# Patient Record
Sex: FEMALE | Race: WHITE | NOT HISPANIC OR LATINO | Employment: FULL TIME | URBAN - METROPOLITAN AREA
[De-identification: names, ages, dates, MRNs, and addresses within clinical notes are randomized per-mention and may not be internally consistent; named-entity substitution may affect disease eponyms.]

---

## 2017-06-22 LAB
ALBUMIN SERPL-MCNC: 4.4 G/DL (ref 3.1–4.7)
ALP SERPL-CCNC: 58 IU/L (ref 40–104)
ALT (SGPT): 26 IU/L (ref 3–33)
AST SERPL-CCNC: 24 IU/L (ref 10–40)
BILIRUB SERPL-MCNC: 0.8 MG/DL (ref 0.3–1)
BUN SERPL-MCNC: 14 MG/DL (ref 8–20)
CALCIUM SERPL-MCNC: 9.3 MG/DL (ref 7.7–10.4)
CHLORIDE: 104 MMOL/L (ref 98–110)
CO2 SERPL-SCNC: 26.7 MMOL/L (ref 22.8–31.6)
CREATININE: 0.66 MG/DL (ref 0.6–1.4)
GLUCOSE: 87 MG/DL (ref 70–99)
POTASSIUM SERPL-SCNC: 3.9 MMOL/L (ref 3.5–5)
PROT SERPL-MCNC: 7.5 G/DL (ref 6–8.2)
SODIUM: 139 MMOL/L (ref 134–144)

## 2017-09-25 RX ORDER — FLUTICASONE PROPIONATE 50 MCG
1 SPRAY, SUSPENSION (ML) NASAL DAILY
COMMUNITY
End: 2019-01-17

## 2017-09-25 RX ORDER — LORATADINE 10 MG/1
10 TABLET ORAL DAILY
COMMUNITY
End: 2017-10-05 | Stop reason: SDUPTHER

## 2017-09-25 RX ORDER — MONTELUKAST SODIUM 10 MG/1
10 TABLET ORAL NIGHTLY
COMMUNITY
End: 2017-10-05 | Stop reason: SDUPTHER

## 2017-09-25 RX ORDER — METOPROLOL SUCCINATE 25 MG/1
25 TABLET, EXTENDED RELEASE ORAL DAILY
COMMUNITY
End: 2017-10-05 | Stop reason: SDUPTHER

## 2017-09-25 RX ORDER — SIMVASTATIN 20 MG/1
20 TABLET, FILM COATED ORAL NIGHTLY
COMMUNITY
End: 2017-10-05 | Stop reason: SDUPTHER

## 2017-10-05 ENCOUNTER — OFFICE VISIT (OUTPATIENT)
Dept: FAMILY MEDICINE | Facility: CLINIC | Age: 42
End: 2017-10-05
Payer: COMMERCIAL

## 2017-10-05 VITALS
HEART RATE: 70 BPM | OXYGEN SATURATION: 98 % | SYSTOLIC BLOOD PRESSURE: 100 MMHG | HEIGHT: 67 IN | DIASTOLIC BLOOD PRESSURE: 70 MMHG | BODY MASS INDEX: 25.11 KG/M2 | WEIGHT: 160 LBS

## 2017-10-05 DIAGNOSIS — J30.1 CHRONIC SEASONAL ALLERGIC RHINITIS DUE TO POLLEN: ICD-10-CM

## 2017-10-05 DIAGNOSIS — E78.01 FAMILIAL HYPERCHOLESTEROLEMIA: ICD-10-CM

## 2017-10-05 DIAGNOSIS — I49.1 PAC (PREMATURE ATRIAL CONTRACTION): ICD-10-CM

## 2017-10-05 DIAGNOSIS — F32.1 MODERATE MAJOR DEPRESSION: Primary | ICD-10-CM

## 2017-10-05 PROBLEM — E78.5 HYPERLIPIDEMIA: Status: ACTIVE | Noted: 2017-10-05

## 2017-10-05 PROCEDURE — 99214 OFFICE O/P EST MOD 30 MIN: CPT | Mod: ,,, | Performed by: FAMILY MEDICINE

## 2017-10-05 RX ORDER — MONTELUKAST SODIUM 10 MG/1
10 TABLET ORAL NIGHTLY
Qty: 90 TABLET | Refills: 1 | Status: SHIPPED | OUTPATIENT
Start: 2017-10-05 | End: 2018-04-04 | Stop reason: SDUPTHER

## 2017-10-05 RX ORDER — SIMVASTATIN 20 MG/1
20 TABLET, FILM COATED ORAL NIGHTLY
Qty: 90 TABLET | Refills: 1 | Status: SHIPPED | OUTPATIENT
Start: 2017-10-05 | End: 2018-04-04 | Stop reason: SDUPTHER

## 2017-10-05 RX ORDER — METOPROLOL SUCCINATE 25 MG/1
25 TABLET, EXTENDED RELEASE ORAL DAILY
Qty: 90 TABLET | Refills: 1 | Status: SHIPPED | OUTPATIENT
Start: 2017-10-05 | End: 2018-04-04 | Stop reason: SDUPTHER

## 2017-10-05 RX ORDER — BUPROPION HYDROCHLORIDE 150 MG/1
150 TABLET ORAL DAILY
Qty: 30 TABLET | Refills: 11 | Status: SHIPPED | OUTPATIENT
Start: 2017-10-05 | End: 2017-11-09 | Stop reason: SDUPTHER

## 2017-10-05 RX ORDER — LORATADINE 10 MG/1
10 TABLET ORAL DAILY
Qty: 90 TABLET | Refills: 1 | COMMUNITY
Start: 2017-10-05 | End: 2019-06-04 | Stop reason: SDUPTHER

## 2017-10-05 NOTE — PATIENT INSTRUCTIONS
Depression  Depression is one of the most common mental health problems today. It is not just a state of unhappiness or sadness. It is a true disease. The cause seems to be related to a decrease in chemicals that transmit signals in the brain. Having a family history of depression, alcoholism, or suicide increases the risk. Chronic illness, chronic pain, migraine headaches and high emotional stress also increase the risk.  Depression is something we tend to recognize in others, but may have a hard time seeing in ourselves. It can show in many physical and emotional ways:  · Loss of appetite  · Over-eating  · Not being able to sleep  · Sleeping too much  · Tiredness not related to physical exertion  · Restlessness or irritability  · Slowness of movement or speech  · Feeling depressed or withdrawn  · Loss of interest in things you once enjoyed  · Trouble concentrating, poor memory, trouble making decisions  · Thoughts of harming or killing oneself, or thoughts that life is not worth living  · Low self-esteem  The treatment for depression may include both medicine and psychotherapy. Antidepressants can reduce suffering and can improve the ability to function during the depressed period. Therapy can offer emotional support and help you understand emotional factors that may be causing the depression.  Home care  · On-going care and support helps people manage this disease.  Find a healthcare provider and therapist who meet your needs. Seek help when you feel like you may be getting ill.  · Be kind to yourself. Make it a point to do things that you enjoy (gardening, walking in nature, going to a movie, etc.). Reward yourself for small successes.  · Take care of your physical body. Eat a balanced diet (low in saturated fat and high in fruits and vegetables). Exercise at least 3 times a week for 30 minutes. Even mild-moderate exercise (like brisk walking) can make you feel better.  · Avoid alcohol, which can make  depression worse.  · Take medicine as prescribed.  · Tell each of your healthcare providers about all of the prescription drugs, over-the-counter medicines, vitamins, and supplements you take. Certain supplements interact with medicines and can result in dangerous side effects. Ask your pharmacist when you have questions about drug interactions.  · Talk with your family and trusted friends about your feelings and thoughts. Ask them to help you recognize behavior changes early so you can get help and, if needed, medicine can be adjusted.  Follow-up care  Follow up with your healthcare provider, or as advised.  Call 911  Call 911 if you:  · Have suicidal thoughts, a suicide plan, and the means to carry out the plan  · Have trouble breathing  · Are very confused  · Feel very drowsy or have trouble awakening  · Faint or lose consciousness  · Have new chest pain that becomes more severe, lasts longer, or spreads into your shoulder, arm, neck, jaw or back  When to seek medical advice  Call your healthcare provider right away if any of these occur:  · Feeling extreme depression, fear, anxiety, or anger toward yourself or others  · Feeling out of control  · Feeling that you may try to harm yourself or another  · Hearing voices that others do not hear  · Seeing things that others do not see  · Cant sleep or eat for 3 days in a row  · Friends or family express concern over your behavior and ask you to seek help  Date Last Reviewed: 9/29/2015  © 5707-3245 [employer]. [address] [place] [address], [place] [address]. All rights reserved. This information is not intended as a substitute for professional medical care. Always follow your healthcare professional's instructions.

## 2017-11-09 ENCOUNTER — OFFICE VISIT (OUTPATIENT)
Dept: FAMILY MEDICINE | Facility: CLINIC | Age: 42
End: 2017-11-09
Payer: COMMERCIAL

## 2017-11-09 VITALS
OXYGEN SATURATION: 99 % | HEART RATE: 67 BPM | WEIGHT: 158 LBS | HEIGHT: 67 IN | DIASTOLIC BLOOD PRESSURE: 70 MMHG | BODY MASS INDEX: 24.8 KG/M2 | SYSTOLIC BLOOD PRESSURE: 110 MMHG

## 2017-11-09 DIAGNOSIS — F40.243 FEAR OF FLYING: ICD-10-CM

## 2017-11-09 DIAGNOSIS — J45.990 EXERCISE-INDUCED ASTHMA: ICD-10-CM

## 2017-11-09 DIAGNOSIS — F32.1 MODERATE MAJOR DEPRESSION: ICD-10-CM

## 2017-11-09 DIAGNOSIS — Z00.00 ENCOUNTER FOR PREVENTIVE HEALTH EXAMINATION: Primary | ICD-10-CM

## 2017-11-09 PROCEDURE — 99396 PREV VISIT EST AGE 40-64: CPT | Mod: ,,, | Performed by: FAMILY MEDICINE

## 2017-11-09 RX ORDER — BUPROPION HYDROCHLORIDE 300 MG/1
300 TABLET ORAL DAILY
Qty: 30 TABLET | Refills: 11 | Status: SHIPPED | OUTPATIENT
Start: 2017-11-09 | End: 2018-01-31

## 2017-11-09 RX ORDER — ALPRAZOLAM 0.5 MG/1
0.5 TABLET ORAL 3 TIMES DAILY
Qty: 60 TABLET | Refills: 0 | Status: SHIPPED | OUTPATIENT
Start: 2017-11-09 | End: 2017-12-13 | Stop reason: SDUPTHER

## 2017-11-09 RX ORDER — ALBUTEROL SULFATE 90 UG/1
2 AEROSOL, METERED RESPIRATORY (INHALATION) EVERY 6 HOURS PRN
Qty: 1 INHALER | Refills: 2 | Status: SHIPPED | OUTPATIENT
Start: 2017-11-09 | End: 2022-08-29 | Stop reason: SDUPTHER

## 2017-11-09 NOTE — PROGRESS NOTES
Subjective:       Patient ID: Rosaura Loredo is a 41 y.o. female.    Chief Complaint: Depression (no better)    Stable on depression, still with some anxiety, nonsuicidal      Review of Systems   Constitutional: Negative for appetite change, chills, diaphoresis, fatigue and fever.   HENT: Negative for congestion, ear pain, hearing loss, sore throat and trouble swallowing.    Eyes: Negative for photophobia, pain and visual disturbance.   Respiratory: Negative for cough, chest tightness and shortness of breath.    Cardiovascular: Negative for chest pain, palpitations and leg swelling.   Gastrointestinal: Negative for abdominal pain, blood in stool, constipation, diarrhea, nausea and vomiting.   Endocrine: Negative for cold intolerance and heat intolerance.   Genitourinary: Negative for difficulty urinating, flank pain, pelvic pain and vaginal pain.   Musculoskeletal: Negative for arthralgias and myalgias.   Skin: Negative for rash.   Allergic/Immunologic: Negative for immunocompromised state.   Neurological: Negative for dizziness, weakness, light-headedness and headaches.   Hematological: Negative for adenopathy. Does not bruise/bleed easily.   Psychiatric/Behavioral: Negative for confusion, self-injury and suicidal ideas.       Past Medical History:   Diagnosis Date    Allergy     Hyperlipidemia     PAC (premature atrial contraction)     Rosacea       Past Surgical History:   Procedure Laterality Date     SECTION      HYSTERECTOMY      SPINE SURGERY      cervical    TONSILLECTOMY         Family History   Problem Relation Age of Onset    Diabetes Mother     Asthma Mother     Cancer Father     Hypertension Father        Social History     Social History    Marital status:      Spouse name: N/A    Number of children: N/A    Years of education: N/A     Social History Main Topics    Smoking status: Never Smoker    Smokeless tobacco: Never Used    Alcohol use Yes    Drug use: No     "Sexual activity: Yes     Other Topics Concern    None     Social History Narrative    None       Current Outpatient Prescriptions   Medication Sig Dispense Refill    buPROPion (WELLBUTRIN XL) 300 MG 24 hr tablet Take 1 tablet (300 mg total) by mouth once daily. 30 tablet 11    fluticasone (FLONASE) 50 mcg/actuation nasal spray 1 spray by Each Nare route once daily.      loratadine (CLARITIN) 10 mg tablet Take 1 tablet (10 mg total) by mouth once daily. 90 tablet 1    metoprolol succinate (TOPROL-XL) 25 MG 24 hr tablet Take 1 tablet (25 mg total) by mouth once daily. 90 tablet 1    montelukast (SINGULAIR) 10 mg tablet Take 1 tablet (10 mg total) by mouth every evening. 90 tablet 1    simvastatin (ZOCOR) 20 MG tablet Take 1 tablet (20 mg total) by mouth every evening. 90 tablet 1    albuterol 90 mcg/actuation inhaler Inhale 2 puffs into the lungs every 6 (six) hours as needed for Wheezing. 1 Inhaler 2    ALPRAZolam (XANAX) 0.5 MG tablet Take 1 tablet (0.5 mg total) by mouth 3 (three) times daily. 60 tablet 0     No current facility-administered medications for this visit.        Review of patient's allergies indicates:   Allergen Reactions    Mepergan fortis Shortness Of Breath    Penicillins Rash    Sulfa (sulfonamide antibiotics) Rash     Objective:    HPI     Depression    Additional comments: no better       Last edited by Marie Downs MA on 11/9/2017  1:11 PM. (History)      Blood pressure 110/70, pulse 67, height 5' 6.5" (1.689 m), weight 71.7 kg (158 lb), SpO2 99 %. Body mass index is 25.12 kg/m².   Physical Exam   Constitutional: She is oriented to person, place, and time. She appears well-developed and well-nourished. She is cooperative. No distress.   HENT:   Head: Normocephalic and atraumatic.   Right Ear: Tympanic membrane normal.   Left Ear: Tympanic membrane normal.   Eyes: Conjunctivae, EOM and lids are normal. Pupils are equal, round, and reactive to light. Lids are everted and swept, no " foreign bodies found. Right pupil is round and reactive. Left pupil is round and reactive.   Neck: Trachea normal and normal range of motion. Neck supple.   Cardiovascular: Normal rate, regular rhythm, S1 normal, S2 normal, normal heart sounds and intact distal pulses.    Pulmonary/Chest: Breath sounds normal.   Abdominal: Soft. Bowel sounds are normal. There is no rigidity and no guarding.   Musculoskeletal: Normal range of motion.   Lymphadenopathy:     She has no cervical adenopathy.     She has no axillary adenopathy.   Neurological: She is alert and oriented to person, place, and time.   Skin: Skin is warm and dry. Capillary refill takes less than 2 seconds.   Psychiatric: She has a normal mood and affect. Her behavior is normal. Judgment and thought content normal.   Nursing note and vitals reviewed.          Assessment:       1. Encounter for preventive health examination    2. Moderate major depression    3. Fear of flying    4. Exercise-induced asthma    5. Moderate major depression        Plan:       Rosaura was seen today for depression.    Diagnoses and all orders for this visit:    Encounter for preventive health examination    Moderate major depression  Comments:  stable  Orders:  -     buPROPion (WELLBUTRIN XL) 300 MG 24 hr tablet; Take 1 tablet (300 mg total) by mouth once daily.    Fear of flying  -     ALPRAZolam (XANAX) 0.5 MG tablet; Take 1 tablet (0.5 mg total) by mouth 3 (three) times daily.    Exercise-induced asthma  -     albuterol 90 mcg/actuation inhaler; Inhale 2 puffs into the lungs every 6 (six) hours as needed for Wheezing.    Moderate major depression  -     buPROPion (WELLBUTRIN XL) 300 MG 24 hr tablet; Take 1 tablet (300 mg total) by mouth once daily.    Improving, latasha 1m

## 2017-11-09 NOTE — PATIENT INSTRUCTIONS
Understanding Anxiety Disorders  Almost everyone gets nervous now and then. Its normal to have knots in your stomach before a test, or for your heart to race on a first date. But an anxiety disorder is much more than a case of nerves. In fact, its symptoms may be overwhelming. But treatment can relieve many of these symptoms. Talking to your healthcare provider is the first step.    What are anxiety disorders?  An anxiety disorder causes intense feelings of panic and fear. These feelings may arise for no apparent reason. And they tend to recur again and again. They may prevent you from coping with life and cause you great distress. As a result, you may avoid anything that triggers your fear. In extreme cases, you may never leave the house. Anxiety disorders may cause other symptoms, such as:  · Obsessive thoughts you cant control  · Constant nightmares or painful thoughts of the past  · Nausea, sweating, and muscle tension  · Trouble sleeping or concentrating  What causes anxiety disorders?  Anxiety disorders tend to run in families. For some people, childhood abuse or neglect may play a role. For others, stressful life events or trauma may trigger anxiety disorders. Anxiety can trigger low self-esteem and poor coping skills.  Common anxiety disorders  · Panic disorder. This causes an intense fear of being in danger.  · Phobias. These are extreme fears of certain objects, places, or events.  · Obsessive-compulsive disorder. This causes you to have unwanted thoughts and urges. You also may perform certain actions over and over.  · Posttraumatic stress disorder. This occurs in people who have survived a terrible ordeal. It can cause nightmares and flashbacks about the event.  · Generalized anxiety disorder. This causes constant worry that can greatly disrupt your life.   Getting better  You may believe that nothing can help you. Or, you might fear what others may think. But most anxiety symptoms can be eased.  Having an anxiety disorder is nothing to be ashamed of. Most people do best with treatment that combines medicine and therapy. These arent cures. But they can help you live a healthier life.  Date Last Reviewed: 2/1/2017  © 4622-7016 [employer] [address] [place] [address], [place] [address]. All rights reserved. This information is not intended as a substitute for professional medical care. Always follow your healthcare professional's instructions.

## 2017-12-13 ENCOUNTER — OFFICE VISIT (OUTPATIENT)
Dept: FAMILY MEDICINE | Facility: CLINIC | Age: 42
End: 2017-12-13
Payer: COMMERCIAL

## 2017-12-13 VITALS
OXYGEN SATURATION: 98 % | WEIGHT: 165 LBS | DIASTOLIC BLOOD PRESSURE: 80 MMHG | BODY MASS INDEX: 25.9 KG/M2 | SYSTOLIC BLOOD PRESSURE: 110 MMHG | HEART RATE: 72 BPM | HEIGHT: 67 IN

## 2017-12-13 DIAGNOSIS — F40.243 FEAR OF FLYING: ICD-10-CM

## 2017-12-13 DIAGNOSIS — F41.9 CHRONIC ANXIETY: ICD-10-CM

## 2017-12-13 PROBLEM — F32.1 MODERATE MAJOR DEPRESSION: Status: RESOLVED | Noted: 2017-10-05 | Resolved: 2017-12-13

## 2017-12-13 PROCEDURE — 99212 OFFICE O/P EST SF 10 MIN: CPT | Mod: ,,, | Performed by: FAMILY MEDICINE

## 2017-12-13 RX ORDER — BUSPIRONE HYDROCHLORIDE 15 MG/1
15 TABLET ORAL 2 TIMES DAILY
Qty: 60 TABLET | Refills: 11 | Status: SHIPPED | OUTPATIENT
Start: 2017-12-13 | End: 2018-01-31

## 2017-12-13 RX ORDER — ALPRAZOLAM 0.5 MG/1
0.5 TABLET ORAL 3 TIMES DAILY
Qty: 60 TABLET | Refills: 0 | Status: SHIPPED | OUTPATIENT
Start: 2017-12-13 | End: 2018-10-09 | Stop reason: SDUPTHER

## 2017-12-13 NOTE — PATIENT INSTRUCTIONS
Treating Anxiety Disorders with Medicine  An anxiety disorder can make you feel nervous or apprehensive, even without a clear reason. In people age 65 and older, generalized anxiety disorder is one of the most commonly diagnosed anxiety disorders. Many times it occurs with depression. Certain anxiety disorders can cause intense feelings of fear or panic. You may even have physical symptoms such as a racing heartbeat, sweating, or dizziness. If you have these feelings, you dont have to suffer anymore. Treatment to help you overcome your fears will likely include therapy (also called counseling). Medicine may also be prescribed to help control your symptoms.    Medicines  Certain medicines may be prescribed to help control your symptoms. So you may feel less anxious. You may also feel able to move forward with therapy. At first, medicines and dosages may need to be adjusted to find what works best for you. Try to be patient. Tell your healthcare provider how a medicine makes you feel. This way, you can work together to find the treatment thats best for you. Keep in mind that medicines can have side effects. Talk with your provider about any side effects that are bothering you. Changing the dose or type of medicine may help. Dont stop taking medicine on your own. That can cause symptoms to come back.  · Anti-anxiety medicine. This medicine eases symptoms and helps you relax. Your healthcare provider will explain when and how to use it. It may be prescribed for use before situations that make you anxious. You may also be told to take medicine on a regular schedule. Anti-anxiety medicine may make you feel a little sleepy or out of it. Dont drive a car or operate machinery while on this medicine, until you know how it affects you.  Caution  Never use alcohol or other drugs with anti-anxiety medicines. This could result in loss of muscular control, sedation, coma, or death. Also, use only the amount of medicine  prescribed for you. If you think you may have taken too much, get emergency care right away.   · Antidepressant medicine. This kind of medicine is often used to treat anxiety, even if you arent depressed. An antidepressant helps balance out brain chemicals. This helps keep anxiety under control. This medicine is taken on a schedule. It takes a few weeks to start working. If you dont notice a change at first, you may just need more time. But if you dont notice results after the first few weeks, tell your provider.  Keep taking medicines as prescribed  Never change your dosage, share or use another person's medicine, or stop taking your medicines without talking to your healthcare provider first. Keep the following in mind:  · Some medicines must be taken on a schedule. Make this part of your daily routine. For instance, always take your pill before brushing your teeth. A pillbox can help you remember if youve taken your medicine each day.  · Medicines are often taken for 6 to 12 months. Your healthcare provider will then evaluate whether you need to stay on them. Many people who have also had therapy may no longer need medicine to manage anxiety.  · You may need to stop taking medicine slowly to give your body time to adjust. When its time to stop, your healthcare provider will tell you more. Remember: Never stop taking your medicine without talking to your provider first.  · If symptoms return, you may need to start taking medicines again. This isnt your fault. Its just the nature of your anxiety disorder.  Special concerns  · Side effects. Medicines may cause side effects. Ask your healthcare provider or pharmacist what you can expect. They may have ideas for avoiding some side effects.  · Sexual problems. Some antidepressants can affect your desire for sex or your ability to have an orgasm. A change in dosage or medicine often solves the problem. If you have a sexual side effect that concerns you, tell your  healthcare provider.  · Addiction. If youve never had a problem with drugs or alcohol, you may not have a problem with medicines used to treat anxiety disorders. But always discuss the medicines with your healthcare provider before taking them. If you have a history of addiction, you may not be able to use certain medicines used to treat anxiety disorders.  · Medicine interactions. Always check with your pharmacist before using any over-the-counter medicines, including herbal supplements.   Date Last Reviewed: 5/1/2017 © 2000-2017 [employer]. [address] [place] [address], [place] [address]. All rights reserved. This information is not intended as a substitute for professional medical care. Always follow your healthcare professional's instructions.

## 2017-12-13 NOTE — PROGRESS NOTES
Subjective:       Patient ID: Rosaura Loredo is a 41 y.o. female.    Chief Complaint: Anxiety    wellbutrin no help and she stopped, nonsuicidal.       Anxiety   Symptoms include nervous/anxious behavior. Patient reports no chest pain, confusion, dizziness, nausea, palpitations, shortness of breath or suicidal ideas.         Review of Systems   Constitutional: Negative for appetite change, chills, diaphoresis, fatigue and fever.   HENT: Negative for congestion, ear pain, hearing loss, sore throat and trouble swallowing.    Eyes: Negative for photophobia, pain and visual disturbance.   Respiratory: Negative for cough, chest tightness and shortness of breath.    Cardiovascular: Negative for chest pain, palpitations and leg swelling.   Gastrointestinal: Negative for abdominal pain, blood in stool, constipation, diarrhea, nausea and vomiting.   Endocrine: Negative for cold intolerance and heat intolerance.   Genitourinary: Negative for difficulty urinating, flank pain, pelvic pain and vaginal pain.   Musculoskeletal: Negative for arthralgias and myalgias.   Skin: Negative for rash.   Allergic/Immunologic: Negative for immunocompromised state.   Neurological: Negative for dizziness, weakness, light-headedness and headaches.   Hematological: Negative for adenopathy. Does not bruise/bleed easily.   Psychiatric/Behavioral: Negative for agitation, confusion, self-injury and suicidal ideas. The patient is nervous/anxious.        Past Medical History:   Diagnosis Date    Allergy     Hyperlipidemia     PAC (premature atrial contraction)     Rosacea       Past Surgical History:   Procedure Laterality Date     SECTION      HYSTERECTOMY      SPINE SURGERY      cervical    TONSILLECTOMY         Family History   Problem Relation Age of Onset    Diabetes Mother     Asthma Mother     Cancer Father     Hypertension Father        Social History     Social History    Marital status:      Spouse name: N/A     "Number of children: N/A    Years of education: N/A     Social History Main Topics    Smoking status: Never Smoker    Smokeless tobacco: Never Used    Alcohol use Yes    Drug use: No    Sexual activity: Yes     Other Topics Concern    None     Social History Narrative    None       Current Outpatient Prescriptions   Medication Sig Dispense Refill    albuterol 90 mcg/actuation inhaler Inhale 2 puffs into the lungs every 6 (six) hours as needed for Wheezing. 1 Inhaler 2    ALPRAZolam (XANAX) 0.5 MG tablet Take 1 tablet (0.5 mg total) by mouth 3 (three) times daily. 60 tablet 0    buPROPion (WELLBUTRIN XL) 300 MG 24 hr tablet Take 1 tablet (300 mg total) by mouth once daily. 30 tablet 11    fluticasone (FLONASE) 50 mcg/actuation nasal spray 1 spray by Each Nare route once daily.      loratadine (CLARITIN) 10 mg tablet Take 1 tablet (10 mg total) by mouth once daily. 90 tablet 1    metoprolol succinate (TOPROL-XL) 25 MG 24 hr tablet Take 1 tablet (25 mg total) by mouth once daily. 90 tablet 1    montelukast (SINGULAIR) 10 mg tablet Take 1 tablet (10 mg total) by mouth every evening. 90 tablet 1    simvastatin (ZOCOR) 20 MG tablet Take 1 tablet (20 mg total) by mouth every evening. 90 tablet 1    busPIRone (BUSPAR) 15 MG tablet Take 1 tablet (15 mg total) by mouth 2 (two) times daily. 60 tablet 11     No current facility-administered medications for this visit.        Review of patient's allergies indicates:   Allergen Reactions    Mepergan fortis Shortness Of Breath    Penicillins Rash    Sulfa (sulfonamide antibiotics) Rash     Objective:      Blood pressure 110/80, pulse 72, height 5' 6.5" (1.689 m), weight 74.8 kg (165 lb), SpO2 98 %. Body mass index is 26.23 kg/m².   Physical Exam   Constitutional: She is oriented to person, place, and time. She appears well-developed and well-nourished. She is cooperative. No distress.   HENT:   Head: Normocephalic and atraumatic.   Right Ear: Tympanic membrane " normal.   Left Ear: Tympanic membrane normal.   Eyes: Conjunctivae, EOM and lids are normal. Pupils are equal, round, and reactive to light. Lids are everted and swept, no foreign bodies found. Right pupil is round and reactive. Left pupil is round and reactive.   Neck: Trachea normal and normal range of motion. Neck supple.   Cardiovascular: S1 normal and S2 normal.    Abdominal: There is no rigidity and no guarding.   Musculoskeletal: Normal range of motion.   Lymphadenopathy:     She has no cervical adenopathy.     She has no axillary adenopathy.   Neurological: She is alert and oriented to person, place, and time.   Skin: Skin is warm and dry. Capillary refill takes less than 2 seconds.   Psychiatric: She has a normal mood and affect. Her behavior is normal. Judgment and thought content normal.   Nursing note and vitals reviewed.          Assessment:       1. Chronic anxiety    2. Fear of flying        Plan:       Rosaura was seen today for anxiety.    Diagnoses and all orders for this visit:    Chronic anxiety  -     busPIRone (BUSPAR) 15 MG tablet; Take 1 tablet (15 mg total) by mouth 2 (two) times daily.    Fear of flying  -     ALPRAZolam (XANAX) 0.5 MG tablet; Take 1 tablet (0.5 mg total) by mouth 3 (three) times daily.

## 2018-01-09 ENCOUNTER — TELEPHONE (OUTPATIENT)
Dept: FAMILY MEDICINE | Facility: CLINIC | Age: 43
End: 2018-01-09

## 2018-01-09 DIAGNOSIS — E78.5 HYPERLIPIDEMIA, UNSPECIFIED HYPERLIPIDEMIA TYPE: Primary | ICD-10-CM

## 2018-01-31 ENCOUNTER — OFFICE VISIT (OUTPATIENT)
Dept: FAMILY MEDICINE | Facility: CLINIC | Age: 43
End: 2018-01-31
Payer: COMMERCIAL

## 2018-01-31 VITALS
BODY MASS INDEX: 25.11 KG/M2 | HEIGHT: 67 IN | WEIGHT: 160 LBS | SYSTOLIC BLOOD PRESSURE: 114 MMHG | TEMPERATURE: 99 F | HEART RATE: 95 BPM | DIASTOLIC BLOOD PRESSURE: 84 MMHG | OXYGEN SATURATION: 98 %

## 2018-01-31 DIAGNOSIS — R50.9 FEVER, UNSPECIFIED FEVER CAUSE: ICD-10-CM

## 2018-01-31 DIAGNOSIS — J06.9 URI WITH COUGH AND CONGESTION: Primary | ICD-10-CM

## 2018-01-31 PROCEDURE — 3008F BODY MASS INDEX DOCD: CPT | Mod: ,,, | Performed by: NURSE PRACTITIONER

## 2018-01-31 PROCEDURE — 99213 OFFICE O/P EST LOW 20 MIN: CPT | Mod: 25,,, | Performed by: NURSE PRACTITIONER

## 2018-01-31 PROCEDURE — 96372 THER/PROPH/DIAG INJ SC/IM: CPT | Mod: ,,, | Performed by: NURSE PRACTITIONER

## 2018-01-31 RX ORDER — BENZONATATE 100 MG/1
100 CAPSULE ORAL 3 TIMES DAILY PRN
Qty: 30 CAPSULE | Refills: 0 | Status: SHIPPED | OUTPATIENT
Start: 2018-01-31 | End: 2018-04-10 | Stop reason: ALTCHOICE

## 2018-01-31 RX ORDER — DEXAMETHASONE SODIUM PHOSPHATE 4 MG/ML
8 INJECTION, SOLUTION INTRA-ARTICULAR; INTRALESIONAL; INTRAMUSCULAR; INTRAVENOUS; SOFT TISSUE
Status: COMPLETED | OUTPATIENT
Start: 2018-01-31 | End: 2018-01-31

## 2018-01-31 RX ORDER — AZITHROMYCIN 250 MG/1
250 TABLET, FILM COATED ORAL DAILY
Qty: 6 TABLET | Refills: 0 | Status: SHIPPED | OUTPATIENT
Start: 2018-01-31 | End: 2018-04-10 | Stop reason: ALTCHOICE

## 2018-01-31 RX ADMIN — DEXAMETHASONE SODIUM PHOSPHATE 8 MG: 4 INJECTION, SOLUTION INTRA-ARTICULAR; INTRALESIONAL; INTRAMUSCULAR; INTRAVENOUS; SOFT TISSUE at 12:01

## 2018-01-31 NOTE — PROGRESS NOTES
SUBJECTIVE:   HPI: Rosaura Loredo  is a 42 y.o. female with   Sore Throat (Since Friday. neg flu monday) and Cough (productive. Had fever last night-100.6)    C/o sore throat since Friday. Had a flu test Monday that was neg. Started last night fever, chills, cough and congestion. Has productive cough, sore throat is improved.       Sore Throat    This is a new problem. The current episode started in the past 7 days. The problem has been unchanged. Neither side of throat is experiencing more pain than the other. The maximum temperature recorded prior to her arrival was 100.4 - 100.9 F. Associated symptoms include congestion and coughing. Pertinent negatives include no ear discharge, shortness of breath, stridor, trouble swallowing or vomiting. She has tried nothing for the symptoms.   Cough   This is a new problem. The current episode started in the past 7 days. The problem has been unchanged. The problem occurs constantly. The cough is non-productive. Associated symptoms include nasal congestion, postnasal drip and a sore throat. Pertinent negatives include no chest pain, chills, shortness of breath or wheezing.       (Not in a hospital admission)  Review of patient's allergies indicates:   Allergen Reactions    Mepergan fortis Shortness Of Breath    Penicillins Rash    Sulfa (sulfonamide antibiotics) Rash     Current Outpatient Prescriptions on File Prior to Visit   Medication Sig Dispense Refill    albuterol 90 mcg/actuation inhaler Inhale 2 puffs into the lungs every 6 (six) hours as needed for Wheezing. 1 Inhaler 2    ALPRAZolam (XANAX) 0.5 MG tablet Take 1 tablet (0.5 mg total) by mouth 3 (three) times daily. 60 tablet 0    fluticasone (FLONASE) 50 mcg/actuation nasal spray 1 spray by Each Nare route once daily.      loratadine (CLARITIN) 10 mg tablet Take 1 tablet (10 mg total) by mouth once daily. 90 tablet 1    metoprolol succinate (TOPROL-XL) 25 MG 24 hr tablet Take 1 tablet (25 mg total) by  mouth once daily. 90 tablet 1    montelukast (SINGULAIR) 10 mg tablet Take 1 tablet (10 mg total) by mouth every evening. 90 tablet 1    simvastatin (ZOCOR) 20 MG tablet Take 1 tablet (20 mg total) by mouth every evening. 90 tablet 1    [DISCONTINUED] buPROPion (WELLBUTRIN XL) 300 MG 24 hr tablet Take 1 tablet (300 mg total) by mouth once daily. 30 tablet 11    [DISCONTINUED] busPIRone (BUSPAR) 15 MG tablet Take 1 tablet (15 mg total) by mouth 2 (two) times daily. 60 tablet 11     No current facility-administered medications on file prior to visit.      Past Medical History:   Diagnosis Date    Allergy     Hyperlipidemia     PAC (premature atrial contraction)     Rosacea      Past Surgical History:   Procedure Laterality Date     SECTION      HYSTERECTOMY      SPINE SURGERY      cervical    TONSILLECTOMY       Family History   Problem Relation Age of Onset    Diabetes Mother     Asthma Mother     Cancer Father     Hypertension Father      Social History   Substance Use Topics    Smoking status: Never Smoker    Smokeless tobacco: Never Used    Alcohol use Yes      Health Maintenance Topics with due status: Not Due       Topic Last Completion Date    Mammogram 10/25/2016    TETANUS VACCINE 10/05/2017       There is no immunization history on file for this patient.    Review of Systems   Constitutional: Negative for appetite change, chills, diaphoresis and unexpected weight change.   HENT: Positive for congestion, postnasal drip and sore throat. Negative for ear discharge, hearing loss, trouble swallowing and voice change.    Eyes: Negative for photophobia and pain.   Respiratory: Positive for cough. Negative for chest tightness, shortness of breath, wheezing and stridor.    Cardiovascular: Negative for chest pain.   Gastrointestinal: Negative for blood in stool and vomiting.   Endocrine: Negative for cold intolerance and heat intolerance.   Genitourinary: Negative for difficulty urinating  "and flank pain.   Musculoskeletal: Negative for joint swelling and neck stiffness.   Skin: Negative for pallor.   Neurological: Negative for speech difficulty.   Hematological: Does not bruise/bleed easily.   Psychiatric/Behavioral: Negative for confusion.      OBJECTIVE:      Vitals:    01/31/18 1103   BP: 114/84   Pulse: 95   Temp: 98.9 °F (37.2 °C)   TempSrc: Oral   SpO2: 98%   Weight: 72.6 kg (160 lb)   Height: 5' 6.5" (1.689 m)     Physical Exam   Constitutional: She is oriented to person, place, and time. She appears well-developed and well-nourished.   HENT:   Head: Atraumatic.   Right Ear: Tympanic membrane normal.   Left Ear: Tympanic membrane normal.   Nose: Rhinorrhea present. Mucosal edema: turbinates erythematous and swollen. Right sinus exhibits no maxillary sinus tenderness and no frontal sinus tenderness. Left sinus exhibits no maxillary sinus tenderness and no frontal sinus tenderness.   Mouth/Throat: Uvula is midline and mucous membranes are normal. Posterior oropharyngeal erythema present. No tonsillar exudate.   Eyes: Conjunctivae are normal.   Neck: Neck supple.   Cardiovascular: Normal rate, regular rhythm, normal heart sounds and intact distal pulses.    Pulmonary/Chest: Effort normal and breath sounds normal. She has no wheezes. She has no rhonchi. She has no rales.   Abdominal: Soft. Bowel sounds are normal. She exhibits no distension. There is no tenderness.   Musculoskeletal: Normal range of motion.   Neurological: She is alert and oriented to person, place, and time.   Skin: Skin is warm and dry. No rash noted.   Psychiatric: She has a normal mood and affect. Her speech is normal and behavior is normal.   Nursing note and vitals reviewed.     Assessment:       1. URI with cough and congestion    2. Fever, unspecified fever cause        Plan:       URI with cough and congestion  -     azithromycin (Z-ORION) 250 MG tablet; Take 1 tablet (250 mg total) by mouth once daily. po on day 1 then 1 " tab po on days 2-5  Dispense: 6 tablet; Refill: 0  -     benzonatate (TESSALON) 100 MG capsule; Take 1 capsule (100 mg total) by mouth 3 (three) times daily as needed for Cough.  Dispense: 30 capsule; Refill: 0  -     dexamethasone injection 8 mg; Inject 2 mLs (8 mg total) into the muscle one time.  Instructed to hold antibiotic unless symptoms worsen    Fever, unspecified fever cause  -     POCT Influenza A/B                      Neg  Nasopharyngitis acute: likely viral infection.  Treated with symptomatic relief.  Advise pt to take antipyretics such as  alternative tylenol and motrin for fever > 100.4.  Getting plenty of rest, drinking  Fluid to maintain hydration, and gargling with warm salt water.     Follow-up if symptoms worsen or fail to improve.      1/31/2018 FRED Noel, FNP

## 2018-01-31 NOTE — PATIENT INSTRUCTIONS

## 2018-03-05 ENCOUNTER — TELEPHONE (OUTPATIENT)
Dept: FAMILY MEDICINE | Facility: CLINIC | Age: 43
End: 2018-03-05

## 2018-03-05 DIAGNOSIS — E78.5 HYPERLIPIDEMIA, UNSPECIFIED HYPERLIPIDEMIA TYPE: Primary | ICD-10-CM

## 2018-03-15 ENCOUNTER — TELEPHONE (OUTPATIENT)
Dept: FAMILY MEDICINE | Facility: CLINIC | Age: 43
End: 2018-03-15

## 2018-03-15 LAB
ALBUMIN SERPL-MCNC: 4.4 G/DL (ref 3.6–5.1)
ALBUMIN/GLOB SERPL: 1.8 (CALC) (ref 1–2.5)
ALP SERPL-CCNC: 57 U/L (ref 33–115)
ALT SERPL-CCNC: 15 U/L (ref 6–29)
AST SERPL-CCNC: 16 U/L (ref 10–30)
BILIRUB SERPL-MCNC: 0.5 MG/DL (ref 0.2–1.2)
BUN SERPL-MCNC: 11 MG/DL (ref 7–25)
BUN/CREAT SERPL: NORMAL (CALC) (ref 6–22)
CALCIUM SERPL-MCNC: 9.3 MG/DL (ref 8.6–10.2)
CHLORIDE SERPL-SCNC: 104 MMOL/L (ref 98–110)
CHOLEST SERPL-MCNC: 142 MG/DL
CHOLEST/HDLC SERPL: 2.5 (CALC)
CO2 SERPL-SCNC: 26 MMOL/L (ref 20–31)
CREAT SERPL-MCNC: 0.66 MG/DL (ref 0.5–1.1)
GFR SERPL CREATININE-BSD FRML MDRD: 107 ML/MIN/1.73M2
GLOBULIN SER CALC-MCNC: 2.4 G/DL (CALC) (ref 1.9–3.7)
GLUCOSE SERPL-MCNC: 98 MG/DL (ref 65–99)
HDLC SERPL-MCNC: 56 MG/DL
LDLC SERPL CALC-MCNC: 70 MG/DL (CALC)
NONHDLC SERPL-MCNC: 86 MG/DL (CALC)
POTASSIUM SERPL-SCNC: 4 MMOL/L (ref 3.5–5.3)
PROT SERPL-MCNC: 6.8 G/DL (ref 6.1–8.1)
SODIUM SERPL-SCNC: 137 MMOL/L (ref 135–146)
TRIGL SERPL-MCNC: 77 MG/DL

## 2018-04-04 DIAGNOSIS — J30.1 CHRONIC SEASONAL ALLERGIC RHINITIS DUE TO POLLEN: ICD-10-CM

## 2018-04-04 DIAGNOSIS — I49.1 PAC (PREMATURE ATRIAL CONTRACTION): ICD-10-CM

## 2018-04-04 DIAGNOSIS — E78.01 FAMILIAL HYPERCHOLESTEROLEMIA: ICD-10-CM

## 2018-04-04 RX ORDER — METOPROLOL SUCCINATE 25 MG/1
TABLET, EXTENDED RELEASE ORAL
Qty: 90 TABLET | Refills: 0 | Status: SHIPPED | OUTPATIENT
Start: 2018-04-04 | End: 2018-09-24 | Stop reason: SDUPTHER

## 2018-04-04 RX ORDER — MONTELUKAST SODIUM 10 MG/1
TABLET ORAL
Qty: 90 TABLET | Refills: 0 | Status: SHIPPED | OUTPATIENT
Start: 2018-04-04 | End: 2018-09-24 | Stop reason: SDUPTHER

## 2018-04-04 RX ORDER — SIMVASTATIN 20 MG/1
TABLET, FILM COATED ORAL
Qty: 90 TABLET | Refills: 0 | Status: SHIPPED | OUTPATIENT
Start: 2018-04-04 | End: 2018-10-09 | Stop reason: SDUPTHER

## 2018-04-10 ENCOUNTER — OFFICE VISIT (OUTPATIENT)
Dept: FAMILY MEDICINE | Facility: CLINIC | Age: 43
End: 2018-04-10
Payer: COMMERCIAL

## 2018-04-10 VITALS
WEIGHT: 159 LBS | BODY MASS INDEX: 24.96 KG/M2 | OXYGEN SATURATION: 99 % | SYSTOLIC BLOOD PRESSURE: 110 MMHG | DIASTOLIC BLOOD PRESSURE: 80 MMHG | HEART RATE: 81 BPM | HEIGHT: 67 IN

## 2018-04-10 DIAGNOSIS — I49.1 PAC (PREMATURE ATRIAL CONTRACTION): ICD-10-CM

## 2018-04-10 DIAGNOSIS — J30.1 CHRONIC SEASONAL ALLERGIC RHINITIS DUE TO POLLEN: ICD-10-CM

## 2018-04-10 DIAGNOSIS — E78.01 FAMILIAL HYPERCHOLESTEROLEMIA: Primary | ICD-10-CM

## 2018-04-10 DIAGNOSIS — F41.9 CHRONIC ANXIETY: ICD-10-CM

## 2018-04-10 PROCEDURE — 99213 OFFICE O/P EST LOW 20 MIN: CPT | Mod: ,,, | Performed by: FAMILY MEDICINE

## 2018-04-10 NOTE — PROGRESS NOTES
Subjective:       Patient ID: Rosaura Loredo is a 42 y.o. female.    Chief Complaint: Hyperlipidemia (discuss lab results)    Feeling great, no side effects of meds, nonsuicidal      Hyperlipidemia   This is a chronic problem. The current episode started more than 1 year ago. The problem is controlled. Recent lipid tests were reviewed and are normal. She has no history of chronic renal disease or obesity. Factors aggravating her hyperlipidemia include fatty foods. Pertinent negatives include no chest pain, focal sensory loss, myalgias or shortness of breath. Current antihyperlipidemic treatment includes statins. The current treatment provides significant improvement of lipids. There are no compliance problems.      Review of Systems   Constitutional: Negative for appetite change, chills, diaphoresis, fatigue and fever.   HENT: Negative for congestion, ear pain, hearing loss, sore throat and trouble swallowing.    Eyes: Negative for photophobia, pain and visual disturbance.   Respiratory: Negative for cough, chest tightness and shortness of breath.    Cardiovascular: Negative for chest pain, palpitations and leg swelling.   Gastrointestinal: Negative for abdominal pain, blood in stool, constipation, diarrhea, nausea and vomiting.   Endocrine: Negative for cold intolerance and heat intolerance.   Genitourinary: Negative for difficulty urinating, flank pain, pelvic pain and vaginal pain.   Musculoskeletal: Negative for arthralgias and myalgias.   Skin: Negative for rash.   Allergic/Immunologic: Negative for immunocompromised state.   Neurological: Negative for dizziness, weakness, light-headedness and headaches.   Hematological: Negative for adenopathy. Does not bruise/bleed easily.   Psychiatric/Behavioral: Negative for confusion, self-injury and suicidal ideas.       Past Medical History:   Diagnosis Date    Allergy     Hyperlipidemia     PAC (premature atrial contraction)     Rosacea       Past Surgical  "History:   Procedure Laterality Date     SECTION      HYSTERECTOMY      SPINE SURGERY      cervical    TONSILLECTOMY         Family History   Problem Relation Age of Onset    Diabetes Mother     Asthma Mother     Cancer Father     Hypertension Father        Social History     Social History    Marital status:      Spouse name: N/A    Number of children: N/A    Years of education: N/A     Social History Main Topics    Smoking status: Never Smoker    Smokeless tobacco: Never Used    Alcohol use Yes    Drug use: No    Sexual activity: Yes     Other Topics Concern    None     Social History Narrative    None       Current Outpatient Prescriptions   Medication Sig Dispense Refill    albuterol 90 mcg/actuation inhaler Inhale 2 puffs into the lungs every 6 (six) hours as needed for Wheezing. 1 Inhaler 2    ALPRAZolam (XANAX) 0.5 MG tablet Take 1 tablet (0.5 mg total) by mouth 3 (three) times daily. 60 tablet 0    fluticasone (FLONASE) 50 mcg/actuation nasal spray 1 spray by Each Nare route once daily.      loratadine (CLARITIN) 10 mg tablet Take 1 tablet (10 mg total) by mouth once daily. 90 tablet 1    metoprolol succinate (TOPROL-XL) 25 MG 24 hr tablet TAKE 1 TABLET BY MOUTH DAILY 90 tablet 0    montelukast (SINGULAIR) 10 mg tablet TAKE 1 TABLET BY MOUTH EVERY EVENING 90 tablet 0    simvastatin (ZOCOR) 20 MG tablet TAKE 1 TABLET BY MOUTH EVERY EVENING 90 tablet 0     No current facility-administered medications for this visit.        Review of patient's allergies indicates:   Allergen Reactions    Mepergan fortis Shortness Of Breath    Penicillins Rash    Sulfa (sulfonamide antibiotics) Rash     Objective:    HPI     Hyperlipidemia    Additional comments: discuss lab results       Last edited by Marie Downs MA on 4/10/2018 10:14 AM. (History)      Blood pressure 110/80, pulse 81, height 5' 6.5" (1.689 m), weight 72.1 kg (159 lb), SpO2 99 %. Body mass index is 25.28 kg/m². "   Physical Exam   Constitutional: She is oriented to person, place, and time. She appears well-developed and well-nourished. She is cooperative. No distress.   HENT:   Head: Normocephalic and atraumatic.   Right Ear: Tympanic membrane normal.   Left Ear: Tympanic membrane normal.   Eyes: Conjunctivae, EOM and lids are normal. Pupils are equal, round, and reactive to light. Lids are everted and swept, no foreign bodies found. Right pupil is round and reactive. Left pupil is round and reactive.   Neck: Trachea normal and normal range of motion. Neck supple.   Cardiovascular: Normal rate, regular rhythm, S1 normal, S2 normal, normal heart sounds and intact distal pulses.    Pulmonary/Chest: Breath sounds normal.   Abdominal: There is no rigidity and no guarding.   Musculoskeletal: Normal range of motion.   Lymphadenopathy:     She has no cervical adenopathy.     She has no axillary adenopathy.   Neurological: She is alert and oriented to person, place, and time.   Skin: Skin is warm and dry. Capillary refill takes less than 2 seconds.   Psychiatric: She has a normal mood and affect. Her behavior is normal. Judgment and thought content normal.   Nursing note and vitals reviewed.          Assessment:       1. Familial hypercholesterolemia    2. PAC (premature atrial contraction)    3. Chronic seasonal allergic rhinitis due to pollen    4. Chronic anxiety        Plan:       Rosaura was seen today for hyperlipidemia.    Diagnoses and all orders for this visit:    Familial hypercholesterolemia  Comments:  labs reviewed, stable    PAC (premature atrial contraction)  Comments:  stable    Chronic seasonal allergic rhinitis due to pollen  Comments:  controlled    Chronic anxiety  Comments:  controlled    The patient is asked to make an attempt to improve diet and exercise patterns to aid in medical management of this problem.  Maria Teresa 6m.   Refills up to date

## 2018-07-03 ENCOUNTER — TELEPHONE (OUTPATIENT)
Dept: FAMILY MEDICINE | Facility: CLINIC | Age: 43
End: 2018-07-03

## 2018-07-03 NOTE — TELEPHONE ENCOUNTER
Pt says she has been on acyclovir before for cold sores and is asking for a new script. She is currently having a breakout and has no medication.

## 2018-07-05 RX ORDER — VALACYCLOVIR HYDROCHLORIDE 1 G/1
2000 TABLET, FILM COATED ORAL EVERY 12 HOURS
Qty: 12 TABLET | Refills: 0 | Status: SHIPPED | OUTPATIENT
Start: 2018-07-05 | End: 2019-04-09

## 2018-07-05 NOTE — TELEPHONE ENCOUNTER
rx sent for valtrex;  She should take 2 tab every 12 hours for only 2 doses.  That is the recommended dose for recurrences.

## 2018-09-17 ENCOUNTER — TELEPHONE (OUTPATIENT)
Dept: FAMILY MEDICINE | Facility: CLINIC | Age: 43
End: 2018-09-17

## 2018-09-17 DIAGNOSIS — E78.01 FAMILIAL HYPERCHOLESTEROLEMIA: Primary | ICD-10-CM

## 2018-09-24 DIAGNOSIS — I49.1 PAC (PREMATURE ATRIAL CONTRACTION): ICD-10-CM

## 2018-09-24 DIAGNOSIS — J30.1 CHRONIC SEASONAL ALLERGIC RHINITIS DUE TO POLLEN: ICD-10-CM

## 2018-09-24 RX ORDER — MONTELUKAST SODIUM 10 MG/1
TABLET ORAL
Qty: 90 TABLET | Refills: 0 | Status: SHIPPED | OUTPATIENT
Start: 2018-09-24 | End: 2018-10-09 | Stop reason: SDUPTHER

## 2018-09-24 RX ORDER — METOPROLOL SUCCINATE 25 MG/1
TABLET, EXTENDED RELEASE ORAL
Qty: 90 TABLET | Refills: 0 | Status: SHIPPED | OUTPATIENT
Start: 2018-09-24 | End: 2018-10-09 | Stop reason: SDUPTHER

## 2018-09-26 LAB
ALBUMIN SERPL-MCNC: 4.2 G/DL (ref 3.6–5.1)
ALBUMIN/GLOB SERPL: 1.8 (CALC) (ref 1–2.5)
ALP SERPL-CCNC: 67 U/L (ref 33–115)
ALT SERPL-CCNC: 18 U/L (ref 6–29)
AST SERPL-CCNC: 19 U/L (ref 10–30)
BILIRUB SERPL-MCNC: 0.4 MG/DL (ref 0.2–1.2)
BUN SERPL-MCNC: 11 MG/DL (ref 7–25)
BUN/CREAT SERPL: NORMAL (CALC) (ref 6–22)
CALCIUM SERPL-MCNC: 8.9 MG/DL (ref 8.6–10.2)
CHLORIDE SERPL-SCNC: 103 MMOL/L (ref 98–110)
CO2 SERPL-SCNC: 30 MMOL/L (ref 20–32)
CREAT SERPL-MCNC: 0.58 MG/DL (ref 0.5–1.1)
GFR SERPL CREATININE-BSD FRML MDRD: 112 ML/MIN/1.73M2
GLOBULIN SER CALC-MCNC: 2.3 G/DL (CALC) (ref 1.9–3.7)
GLUCOSE SERPL-MCNC: 97 MG/DL (ref 65–139)
POTASSIUM SERPL-SCNC: 4.1 MMOL/L (ref 3.5–5.3)
PROT SERPL-MCNC: 6.5 G/DL (ref 6.1–8.1)
SODIUM SERPL-SCNC: 140 MMOL/L (ref 135–146)

## 2018-10-09 ENCOUNTER — OFFICE VISIT (OUTPATIENT)
Dept: FAMILY MEDICINE | Facility: CLINIC | Age: 43
End: 2018-10-09
Payer: COMMERCIAL

## 2018-10-09 VITALS
OXYGEN SATURATION: 98 % | BODY MASS INDEX: 25.74 KG/M2 | DIASTOLIC BLOOD PRESSURE: 82 MMHG | HEIGHT: 67 IN | SYSTOLIC BLOOD PRESSURE: 116 MMHG | HEART RATE: 81 BPM | WEIGHT: 164 LBS

## 2018-10-09 DIAGNOSIS — F40.243 FEAR OF FLYING: ICD-10-CM

## 2018-10-09 DIAGNOSIS — J30.1 CHRONIC SEASONAL ALLERGIC RHINITIS DUE TO POLLEN: ICD-10-CM

## 2018-10-09 DIAGNOSIS — Z00.00 ENCOUNTER FOR PREVENTIVE HEALTH EXAMINATION: Primary | ICD-10-CM

## 2018-10-09 DIAGNOSIS — I49.1 PAC (PREMATURE ATRIAL CONTRACTION): ICD-10-CM

## 2018-10-09 DIAGNOSIS — E78.01 FAMILIAL HYPERCHOLESTEROLEMIA: ICD-10-CM

## 2018-10-09 DIAGNOSIS — G43.009 MIGRAINE WITHOUT AURA AND WITHOUT STATUS MIGRAINOSUS, NOT INTRACTABLE: ICD-10-CM

## 2018-10-09 PROCEDURE — 99396 PREV VISIT EST AGE 40-64: CPT | Mod: ,,, | Performed by: FAMILY MEDICINE

## 2018-10-09 RX ORDER — METOPROLOL SUCCINATE 25 MG/1
25 TABLET, EXTENDED RELEASE ORAL DAILY
Qty: 90 TABLET | Refills: 0 | Status: SHIPPED | OUTPATIENT
Start: 2018-10-09 | End: 2018-10-09 | Stop reason: SDUPTHER

## 2018-10-09 RX ORDER — MONTELUKAST SODIUM 10 MG/1
10 TABLET ORAL NIGHTLY
Qty: 90 TABLET | Refills: 0 | Status: SHIPPED | OUTPATIENT
Start: 2018-10-09 | End: 2018-10-09 | Stop reason: SDUPTHER

## 2018-10-09 RX ORDER — CYCLOBENZAPRINE HCL 10 MG
10 TABLET ORAL DAILY PRN
Qty: 30 TABLET | Refills: 0 | Status: SHIPPED | OUTPATIENT
Start: 2018-10-09 | End: 2018-10-19

## 2018-10-09 RX ORDER — SUMATRIPTAN SUCCINATE 100 MG/1
100 TABLET ORAL
Qty: 27 TABLET | Refills: 3 | Status: SHIPPED | OUTPATIENT
Start: 2018-10-09 | End: 2018-10-09 | Stop reason: SDUPTHER

## 2018-10-09 RX ORDER — ALPRAZOLAM 0.5 MG/1
0.5 TABLET ORAL 3 TIMES DAILY
Qty: 60 TABLET | Refills: 0 | Status: SHIPPED | OUTPATIENT
Start: 2018-10-09 | End: 2019-06-04 | Stop reason: SDUPTHER

## 2018-10-09 RX ORDER — SIMVASTATIN 20 MG/1
20 TABLET, FILM COATED ORAL NIGHTLY
Qty: 90 TABLET | Refills: 0 | Status: SHIPPED | OUTPATIENT
Start: 2018-10-09 | End: 2018-10-09 | Stop reason: SDUPTHER

## 2018-10-09 NOTE — TELEPHONE ENCOUNTER
----- Message from Jennifer Dave sent at 10/9/2018 10:10 AM CDT -----  Pt needs these medications to go to [facility] (mail order) and a 90 day supply please:      Metoprolol Succinate  Montelukast  Simvastatin  Sumatriptan    ##They were sent to her local pharmacy already but needs to go to mail order##

## 2018-10-09 NOTE — PROGRESS NOTES
Subjective:       Patient ID: Rosaura Loredo is a 42 y.o. female.    Chief Complaint: Hyperlipidemia (6m f/u, lab results) and Migraine (needs imitrex 100mg fill)    Feeling well, labs reviewed, compliant with meds. Mild fatigue      Review of Systems   Constitutional: Positive for fatigue. Negative for appetite change, chills, diaphoresis and fever.   HENT: Negative for congestion, ear pain, hearing loss, sore throat and trouble swallowing.    Eyes: Negative for photophobia, pain and visual disturbance.   Respiratory: Negative for cough, chest tightness and shortness of breath.    Cardiovascular: Negative for chest pain, palpitations and leg swelling.   Gastrointestinal: Negative for abdominal pain, blood in stool, constipation, diarrhea, nausea and vomiting.   Endocrine: Negative for cold intolerance and heat intolerance.   Genitourinary: Negative for difficulty urinating, flank pain, pelvic pain and vaginal pain.   Musculoskeletal: Negative for arthralgias and myalgias.   Skin: Negative for rash.   Allergic/Immunologic: Negative for immunocompromised state.   Neurological: Negative for dizziness, weakness, light-headedness and headaches.   Hematological: Negative for adenopathy. Does not bruise/bleed easily.   Psychiatric/Behavioral: Negative for confusion, self-injury and suicidal ideas.       Past Medical History:   Diagnosis Date    Allergy     Hyperlipidemia     PAC (premature atrial contraction)     Rosacea       Past Surgical History:   Procedure Laterality Date     SECTION      HYSTERECTOMY      SPINE SURGERY      cervical    TONSILLECTOMY         Family History   Problem Relation Age of Onset    Diabetes Mother     Asthma Mother     Cancer Father     Hypertension Father        Social History     Socioeconomic History    Marital status:      Spouse name: None    Number of children: None    Years of education: None    Highest education level: None   Social Needs     Financial resource strain: None    Food insecurity - worry: None    Food insecurity - inability: None    Transportation needs - medical: None    Transportation needs - non-medical: None   Occupational History    None   Tobacco Use    Smoking status: Never Smoker    Smokeless tobacco: Never Used   Substance and Sexual Activity    Alcohol use: Yes    Drug use: No    Sexual activity: Yes   Other Topics Concern    None   Social History Narrative    None       Current Outpatient Medications   Medication Sig Dispense Refill    albuterol 90 mcg/actuation inhaler Inhale 2 puffs into the lungs every 6 (six) hours as needed for Wheezing. 1 Inhaler 2    ALPRAZolam (XANAX) 0.5 MG tablet Take 1 tablet (0.5 mg total) by mouth 3 (three) times daily. 60 tablet 0    fluticasone (FLONASE) 50 mcg/actuation nasal spray 1 spray by Each Nare route once daily.      loratadine (CLARITIN) 10 mg tablet Take 1 tablet (10 mg total) by mouth once daily. 90 tablet 1    metoprolol succinate (TOPROL-XL) 25 MG 24 hr tablet Take 1 tablet (25 mg total) by mouth once daily. 90 tablet 0    montelukast (SINGULAIR) 10 mg tablet Take 1 tablet (10 mg total) by mouth every evening. 90 tablet 0    simvastatin (ZOCOR) 20 MG tablet Take 1 tablet (20 mg total) by mouth every evening. 90 tablet 0    sumatriptan (IMITREX) 100 MG tablet Take 1 tablet (100 mg total) by mouth every 2 (two) hours as needed for Migraine. 27 tablet 3    valACYclovir (VALTREX) 1000 MG tablet Take 2 tablets (2,000 mg total) by mouth every 12 (twelve) hours. For 2 doses prn outbreak 12 tablet 0     No current facility-administered medications for this visit.        Review of patient's allergies indicates:   Allergen Reactions    Mepergan fortis Shortness Of Breath    Penicillins Rash    Sulfa (sulfonamide antibiotics) Rash     Objective:    HPI     Hyperlipidemia      Additional comments: 6m f/u, lab results              Migraine      Additional comments: needs  "imitrex 100mg fill          Last edited by Gely Scott LPN on 10/9/2018  9:46 AM. (History)      Blood pressure 116/82, pulse 81, height 5' 6.5" (1.689 m), weight 74.4 kg (164 lb), SpO2 98 %. Body mass index is 26.07 kg/m².   Physical Exam   Constitutional: She is oriented to person, place, and time. She appears well-developed and well-nourished. She is cooperative. No distress.   HENT:   Head: Normocephalic and atraumatic.   Right Ear: Tympanic membrane normal.   Left Ear: Tympanic membrane normal.   Eyes: Conjunctivae, EOM and lids are normal. Pupils are equal, round, and reactive to light. Lids are everted and swept, no foreign bodies found. Right pupil is round and reactive. Left pupil is round and reactive.   Neck: Trachea normal and normal range of motion. Neck supple.   Cardiovascular: Normal rate, regular rhythm, S1 normal, S2 normal, normal heart sounds and intact distal pulses.   Pulmonary/Chest: Breath sounds normal.   Abdominal: There is no rigidity and no guarding.   Musculoskeletal: Normal range of motion.   Lymphadenopathy:     She has no cervical adenopathy.     She has no axillary adenopathy.   Neurological: She is alert and oriented to person, place, and time.   Skin: Skin is warm and dry. Capillary refill takes less than 2 seconds.   Psychiatric: She has a normal mood and affect. Her behavior is normal. Judgment and thought content normal.   Nursing note and vitals reviewed.          Assessment:       1. Familial hypercholesterolemia    2. Chronic seasonal allergic rhinitis due to pollen    3. PAC (premature atrial contraction)    4. Fear of flying    5. Migraine without aura and without status migrainosus, not intractable        Plan:       Rosaura was seen today for hyperlipidemia and migraine.    Diagnoses and all orders for this visit:    Familial hypercholesterolemia  -     simvastatin (ZOCOR) 20 MG tablet; Take 1 tablet (20 mg total) by mouth every evening.    Chronic seasonal allergic " rhinitis due to pollen  -     montelukast (SINGULAIR) 10 mg tablet; Take 1 tablet (10 mg total) by mouth every evening.    PAC (premature atrial contraction)  -     metoprolol succinate (TOPROL-XL) 25 MG 24 hr tablet; Take 1 tablet (25 mg total) by mouth once daily.    Fear of flying  -     ALPRAZolam (XANAX) 0.5 MG tablet; Take 1 tablet (0.5 mg total) by mouth 3 (three) times daily.    Migraine without aura and without status migrainosus, not intractable    Other orders  -     sumatriptan (IMITREX) 100 MG tablet; Take 1 tablet (100 mg total) by mouth every 2 (two) hours as needed for Migraine.    Maria Teresa 6m or change. Will get flu shot through work.

## 2018-10-09 NOTE — PATIENT INSTRUCTIONS

## 2018-10-10 RX ORDER — MONTELUKAST SODIUM 10 MG/1
10 TABLET ORAL NIGHTLY
Qty: 90 TABLET | Refills: 1 | Status: SHIPPED | OUTPATIENT
Start: 2018-10-10 | End: 2019-04-09 | Stop reason: SDUPTHER

## 2018-10-10 RX ORDER — SIMVASTATIN 20 MG/1
20 TABLET, FILM COATED ORAL NIGHTLY
Qty: 90 TABLET | Refills: 1 | Status: SHIPPED | OUTPATIENT
Start: 2018-10-10 | End: 2019-04-09 | Stop reason: SDUPTHER

## 2018-10-10 RX ORDER — SUMATRIPTAN SUCCINATE 100 MG/1
100 TABLET ORAL
Qty: 27 TABLET | Refills: 3 | Status: SHIPPED | OUTPATIENT
Start: 2018-10-10 | End: 2019-12-05 | Stop reason: SDUPTHER

## 2018-10-10 RX ORDER — METOPROLOL SUCCINATE 25 MG/1
25 TABLET, EXTENDED RELEASE ORAL DAILY
Qty: 90 TABLET | Refills: 1 | Status: SHIPPED | OUTPATIENT
Start: 2018-10-10 | End: 2019-01-17

## 2018-11-07 ENCOUNTER — TELEPHONE (OUTPATIENT)
Dept: FAMILY MEDICINE | Facility: CLINIC | Age: 43
End: 2018-11-07

## 2018-11-07 NOTE — TELEPHONE ENCOUNTER
----- Message from Jennifer Wheelern sent at 11/7/2018 10:21 AM CST -----  Pt called; she was seen for wellness visit on 10/9/18 and it is documented in the chart but the code that was billed was 39070.  Can an addendum be done to reflect the preventative CPT code please. Also, please let me know the action taken for this so I can ask for the claim to be rebilled.  Thank you.

## 2019-01-17 ENCOUNTER — OFFICE VISIT (OUTPATIENT)
Dept: FAMILY MEDICINE | Facility: CLINIC | Age: 44
End: 2019-01-17
Payer: COMMERCIAL

## 2019-01-17 VITALS
OXYGEN SATURATION: 99 % | BODY MASS INDEX: 25.43 KG/M2 | SYSTOLIC BLOOD PRESSURE: 130 MMHG | WEIGHT: 162 LBS | DIASTOLIC BLOOD PRESSURE: 80 MMHG | HEART RATE: 100 BPM | HEIGHT: 67 IN

## 2019-01-17 DIAGNOSIS — R07.81 RIB PAIN ON LEFT SIDE: Primary | ICD-10-CM

## 2019-01-17 PROCEDURE — 3008F PR BODY MASS INDEX (BMI) DOCUMENTED: ICD-10-PCS | Mod: ,,, | Performed by: NURSE PRACTITIONER

## 2019-01-17 PROCEDURE — 99213 OFFICE O/P EST LOW 20 MIN: CPT | Mod: ,,, | Performed by: NURSE PRACTITIONER

## 2019-01-17 PROCEDURE — 3008F BODY MASS INDEX DOCD: CPT | Mod: ,,, | Performed by: NURSE PRACTITIONER

## 2019-01-17 PROCEDURE — 99213 PR OFFICE/OUTPT VISIT, EST, LEVL III, 20-29 MIN: ICD-10-PCS | Mod: ,,, | Performed by: NURSE PRACTITIONER

## 2019-01-17 RX ORDER — MELOXICAM 15 MG/1
15 TABLET ORAL DAILY
Qty: 20 TABLET | Refills: 0 | Status: SHIPPED | OUTPATIENT
Start: 2019-01-17 | End: 2019-04-09 | Stop reason: ALTCHOICE

## 2019-01-17 NOTE — PROGRESS NOTES
SUBJECTIVE:      Patient ID: Rosaura Loredo is a 43 y.o. female.    Chief Complaint: Back Pain (lt side rib)    Patient states she started with left sided back pain one month ago after having a stomach virus. She vomited a few times but does not remember injuring her back. The pain has been intermittent and unchanged since then. The pain is worse with lying on her left side, reclining on her back and twisting while driving.        Back Pain   This is a new problem. The current episode started more than 1 month ago. The problem occurs 2 to 4 times per day. The problem is unchanged. The pain is present in the costovertebral angle and thoracic spine. The quality of the pain is described as aching. The pain is at a severity of 8/10. The pain is moderate. The pain is worse during the night. The symptoms are aggravated by lying down and sitting. Stiffness is present at night. Pertinent negatives include no abdominal pain, bladder incontinence, bowel incontinence, chest pain, dysuria, fever, headaches, leg pain, numbness, paresis, paresthesias, pelvic pain, perianal numbness, tingling, weakness or weight loss. Risk factors include lack of exercise. She has tried NSAIDs for the symptoms. The treatment provided mild relief.       Past Surgical History:   Procedure Laterality Date     SECTION      HYSTERECTOMY      SPINE SURGERY      cervical    TONSILLECTOMY       Family History   Problem Relation Age of Onset    Diabetes Mother     Asthma Mother     Cancer Father     Hypertension Father       Social History     Socioeconomic History    Marital status:      Spouse name: None    Number of children: None    Years of education: None    Highest education level: None   Social Needs    Financial resource strain: None    Food insecurity - worry: None    Food insecurity - inability: None    Transportation needs - medical: None    Transportation needs - non-medical: None   Occupational  History    None   Tobacco Use    Smoking status: Never Smoker    Smokeless tobacco: Never Used   Substance and Sexual Activity    Alcohol use: Yes    Drug use: No    Sexual activity: Yes   Other Topics Concern    None   Social History Narrative    None     Current Outpatient Medications   Medication Sig Dispense Refill    albuterol 90 mcg/actuation inhaler Inhale 2 puffs into the lungs every 6 (six) hours as needed for Wheezing. 1 Inhaler 2    ALPRAZolam (XANAX) 0.5 MG tablet Take 1 tablet (0.5 mg total) by mouth 3 (three) times daily. 60 tablet 0    loratadine (CLARITIN) 10 mg tablet Take 1 tablet (10 mg total) by mouth once daily. 90 tablet 1    montelukast (SINGULAIR) 10 mg tablet Take 1 tablet (10 mg total) by mouth every evening. 90 tablet 1    simvastatin (ZOCOR) 20 MG tablet Take 1 tablet (20 mg total) by mouth every evening. 90 tablet 1    sumatriptan (IMITREX) 100 MG tablet Take 1 tablet (100 mg total) by mouth every 2 (two) hours as needed for Migraine. 27 tablet 3    valACYclovir (VALTREX) 1000 MG tablet Take 2 tablets (2,000 mg total) by mouth every 12 (twelve) hours. For 2 doses prn outbreak 12 tablet 0    meloxicam (MOBIC) 15 MG tablet Take 1 tablet (15 mg total) by mouth once daily. 20 tablet 0     No current facility-administered medications for this visit.      Review of patient's allergies indicates:   Allergen Reactions    Mepergan fortis Shortness Of Breath    Penicillins Rash    Sulfa (sulfonamide antibiotics) Rash      Past Medical History:   Diagnosis Date    Allergy     Hyperlipidemia     PAC (premature atrial contraction)     Rosacea      Past Surgical History:   Procedure Laterality Date     SECTION      HYSTERECTOMY      SPINE SURGERY      cervical    TONSILLECTOMY         Review of Systems   Constitutional: Negative for appetite change, chills, diaphoresis, fever, unexpected weight change and weight loss.   HENT: Negative for ear discharge, hearing loss,  "trouble swallowing and voice change.    Eyes: Negative for photophobia and pain.   Respiratory: Negative for chest tightness, shortness of breath, wheezing and stridor.    Cardiovascular: Negative for chest pain.   Gastrointestinal: Negative for abdominal pain, blood in stool, bowel incontinence and vomiting.   Endocrine: Negative for cold intolerance and heat intolerance.   Genitourinary: Negative for bladder incontinence, difficulty urinating, dysuria, flank pain, hematuria and pelvic pain.   Musculoskeletal: Positive for back pain. Negative for joint swelling and neck stiffness.   Skin: Negative for pallor.   Neurological: Negative for tingling, speech difficulty, weakness, numbness, headaches and paresthesias.   Hematological: Does not bruise/bleed easily.   Psychiatric/Behavioral: Negative for confusion.      OBJECTIVE:      Vitals:    01/17/19 1358   BP: 130/80   Pulse: 100   SpO2: 99%   Weight: 73.5 kg (162 lb)   Height: 5' 6.5" (1.689 m)     Physical Exam   Constitutional: She is oriented to person, place, and time. She appears well-developed and well-nourished.   HENT:   Head: Atraumatic.   Eyes: Conjunctivae are normal.   Neck: Neck supple.   Cardiovascular: Normal rate, regular rhythm, normal heart sounds and intact distal pulses.   Pulmonary/Chest: Effort normal and breath sounds normal. She has no wheezes. She has no rhonchi. She has no rales.   Abdominal: Soft. Bowel sounds are normal. She exhibits no distension. There is no hepatosplenomegaly. There is no tenderness. There is no rigidity, no rebound, no guarding and negative Smith's sign.   Musculoskeletal: Normal range of motion.        Thoracic back: She exhibits tenderness.   Left posterior lower ribs tender to palpation   Lymphadenopathy:     She has no cervical adenopathy.   Neurological: She is alert and oriented to person, place, and time.   Skin: Skin is warm and dry. No rash noted.   Psychiatric: She has a normal mood and affect. Her speech " is normal.   Nursing note and vitals reviewed.     Assessment:       1. Rib pain on left side        Plan:       Rib pain on left side  -     X-Ray Ribs 2 View Left; Future; Expected date: 01/17/2019  -     X-Ray Chest PA And Lateral; Future; Expected date: 01/17/2019  -     meloxicam (MOBIC) 15 MG tablet; Take 1 tablet (15 mg total) by mouth once daily.  Dispense: 20 tablet; Refill: 0        Follow-up if symptoms worsen or fail to improve.      1/17/2019 FRED Noel, FNP

## 2019-01-17 NOTE — PATIENT INSTRUCTIONS
Back Pain (Acute or Chronic)    Back pain is one of the most common problems. The good news is that most people feel better in 1 to 2 weeks, and most of the rest in 1 to 2 months. Most people can remain active.  People experience and describe pain differently; not everyone is the same.  · The pain can be sharp, stabbing, shooting, aching, cramping or burning.  · Movement, standing, bending, lifting, sitting, or walking may worsen pain.  · It can be localized to one spot or area, or it can be more generalized.  · It can spread or radiate upwards, to the front, or go down your arms or legs (sciatica).  · It can cause muscle spasm.  Most of the time, mechanical problems with the muscles or spine cause the pain. Mechanical problems are usually caused by an injury to the muscles or ligaments. While illness can cause back pain, it is usually not caused by a serious illness. Mechanical problems include:   · Physical activity such as sports, exercise, work, or normal activity  · Overexertion, lifting, pushing, pulling incorrectly or too aggressively  · Sudden twisting, bending, or stretching from an accident, or accidental movement  · Poor posture  · Stretching or moving wrong, without noticing pain at the time  · Poor coordination, lack of regular exercise (check with your doctor about this)  · Spinal disc disease or arthritis  · Stress  Pain can also be related to pregnancy, or illness like appendicitis, bladder or kidney infections, pelvic infections, and many other things.  Acute back pain usually gets better in 1 to 2 weeks. Back pain related to disk disease, arthritis in the spinal joints or spinal stenosis (narrowing of the spinal canal) can become chronic and last for months or years.  Unless you had a physical injury (for example, a car accident or fall) X-rays are usually not needed for the initial evaluation of back pain. If pain continues and does not respond to medical treatment, X-rays and other tests may be  needed.  Home care  Try these home care recommendations:  · When in bed, try to find a position of comfort. A firm mattress is best. Try lying flat on your back with pillows under your knees. You can also try lying on your side with your knees bent up towards your chest and a pillow between your knees.  · At first, do not try to stretch out the sore spots. If there is a strain, it is not like the good soreness you get after exercising without an injury. In this case, stretching may make it worse.  · Avoid prolong sitting, long car rides, or travel. This puts more stress on the lower back than standing or walking.  · During the first 24 to 72 hours after an acute injury or flare up of chronic back pain, apply an ice pack to the painful area for 20 minutes and then remove it for 20 minutes. Do this over a period of 60 to 90 minutes or several times a day. This will reduce swelling and pain. Wrap the ice pack in a thin towel or plastic to protect your skin.  · You can start with ice, then switch to heat. Heat (hot shower, hot bath, or heating pad) reduces pain and works well for muscle spasms. Heat can be applied to the painful area for 20 minutes then remove it for 20 minutes. Do this over a period of 60 to 90 minutes or several times a day. Do not sleep on a heating pad. It can lead to skin burns or tissue damage.  · You can alternate ice and heat therapy. Talk with your doctor about the best treatment for your back pain.  · Therapeutic massage can help relax the back muscles without stretching them.  · Be aware of safe lifting methods and do not lift anything without stretching first.  Medicines  Talk to your doctor before using medicine, especially if you have other medical problems or are taking other medicines.  · You may use over-the-counter medicine as directed on the bottle to control pain, unless another pain medicine was prescribed. If you have chronic conditions like diabetes, liver or kidney disease,  stomach ulcers, or gastrointestinal bleeding, or are taking blood thinners, talk to your doctor before taking any medicine.  · Be careful if you are given a prescription medicines, narcotics, or medicine for muscle spasms. They can cause drowsiness, affect your coordination, reflexes, and judgement. Do not drive or operate heavy machinery.  Follow-up care  Follow up with your healthcare provider, or as advised.   A radiologist will review any X-rays that were taken. Your provide will notify you of any new findings that may affect your care.  Call 911  Call emergency services if any of the following occur:  · Trouble breathing  · Confusion  · Very drowsy or trouble awakening  · Fainting or loss of consciousness  · Rapid or very slow heart rate  · Loss of bowel or bladder control  When to seek medical advice  Call your healthcare provider right away if any of these occur:   · Pain becomes worse or spreads to your legs  · Weakness or numbness in one or both legs  · Numbness in the groin or genital area  Date Last Reviewed: 7/1/2016  © 7681-3655 [employer]. [address] [place] [address], [place] [address]. All rights reserved. This information is not intended as a substitute for professional medical care. Always follow your healthcare professional's instructions.

## 2019-04-05 ENCOUNTER — PATIENT MESSAGE (OUTPATIENT)
Dept: FAMILY MEDICINE | Facility: CLINIC | Age: 44
End: 2019-04-05

## 2019-04-09 ENCOUNTER — OFFICE VISIT (OUTPATIENT)
Dept: FAMILY MEDICINE | Facility: CLINIC | Age: 44
End: 2019-04-09
Payer: COMMERCIAL

## 2019-04-09 VITALS
SYSTOLIC BLOOD PRESSURE: 120 MMHG | OXYGEN SATURATION: 98 % | HEART RATE: 82 BPM | BODY MASS INDEX: 24.8 KG/M2 | DIASTOLIC BLOOD PRESSURE: 80 MMHG | WEIGHT: 158 LBS | HEIGHT: 67 IN

## 2019-04-09 DIAGNOSIS — R07.82 INTERCOSTAL PAIN: ICD-10-CM

## 2019-04-09 DIAGNOSIS — R07.89 LEFT-SIDED CHEST WALL PAIN: Primary | ICD-10-CM

## 2019-04-09 DIAGNOSIS — M54.14 RADICULITIS, THORACIC: ICD-10-CM

## 2019-04-09 DIAGNOSIS — E78.01 FAMILIAL HYPERCHOLESTEROLEMIA: ICD-10-CM

## 2019-04-09 DIAGNOSIS — J30.1 CHRONIC SEASONAL ALLERGIC RHINITIS DUE TO POLLEN: ICD-10-CM

## 2019-04-09 PROCEDURE — 99213 OFFICE O/P EST LOW 20 MIN: CPT | Mod: ,,, | Performed by: FAMILY MEDICINE

## 2019-04-09 PROCEDURE — 3008F PR BODY MASS INDEX (BMI) DOCUMENTED: ICD-10-PCS | Mod: ,,, | Performed by: FAMILY MEDICINE

## 2019-04-09 PROCEDURE — 3008F BODY MASS INDEX DOCD: CPT | Mod: ,,, | Performed by: FAMILY MEDICINE

## 2019-04-09 PROCEDURE — 99213 PR OFFICE/OUTPT VISIT, EST, LEVL III, 20-29 MIN: ICD-10-PCS | Mod: ,,, | Performed by: FAMILY MEDICINE

## 2019-04-09 RX ORDER — GLUCOSAMINE/CHONDR SU A SOD 167-133 MG
100 CAPSULE ORAL NIGHTLY
COMMUNITY
End: 2019-06-17 | Stop reason: ALTCHOICE

## 2019-04-09 RX ORDER — MONTELUKAST SODIUM 10 MG/1
10 TABLET ORAL NIGHTLY
Qty: 90 TABLET | Refills: 1 | Status: SHIPPED | OUTPATIENT
Start: 2019-04-09 | End: 2019-06-04 | Stop reason: SDUPTHER

## 2019-04-09 RX ORDER — SIMVASTATIN 20 MG/1
20 TABLET, FILM COATED ORAL NIGHTLY
Qty: 90 TABLET | Refills: 1 | Status: SHIPPED | OUTPATIENT
Start: 2019-04-09 | End: 2019-06-04 | Stop reason: SDUPTHER

## 2019-04-09 NOTE — PATIENT INSTRUCTIONS
Back Safety: Basics of Good Posture  Good posture helps protect you from injury. It also increases your comfort. Aim for good posture throughout the day.    Check your posture  The human body works best when it is properly aligned. To improve your standing posture, follow these steps:  · Take a moment to close your eyes and feel your body. Then breathe deeply and relax your shoulders, hips, and knees.  · Now, from the very top of your head, lift up just a bit. Think of a line linking your ears, shoulders, hips, and ankles. Adjust your body to follow the line. You may need to relax your hips and tuck your buttocks under a bit.  · Next, take a look at yourself in a mirror. Is one ear, shoulder, or hip higher than the other? They should be level.  Check how you sit  When you sit properly, pressure on your back is reduced. Try these steps:  · Sit so that the curve of your lower back fits easily against the chair. Keep your gaze level.  · Support your feet. They should be flat on the floor or on a footrest. Your knees should be level with your hips.  · Adjust the chair height as needed. Sit so your forearms are level with the work surface.  Proper posture helps  When your back is aligned, its more likely to stay safe throughout the day.  · Standing in place. Rest one foot on a stool or low box to ease pressure on your lower back. Switch feet often. If you can, adjust the height of your work surface so your neck and shoulders arent under strain.  · Driving. Sit close enough to the steering wheel to keep your knees slightly bent. For comfort, your knees should be level with your hips or just a bit lower. Sit as straight as you can. The curve of your lower back should be fully supported.  · Walking. Stand tall and walk with your head up. Let your arms swing while you walk. This helps relax muscles. Wear shoes that fit and support your feet. If you will be standing or walking for a long time, dont wear high  heels.  · Sitting and sleeping. Choose your furniture with care. Make sure its not causing or increasing your back pain. Chairs should allow for comfortable, correct sitting posture. Use pillows for added support if needed. Your bed should support your backs natural curves without being too hard or too soft.  Date Last Reviewed: 10/18/2015  © 5115-0996 [employer]. [address] [place] [address], [place] [address]. All rights reserved. This information is not intended as a substitute for professional medical care. Always follow your healthcare professional's instructions.

## 2019-04-09 NOTE — PROGRESS NOTES
Subjective:       Patient ID: Rosaura Loredo is a 43 y.o. female.    Chief Complaint: Chest Pain (lt side.  Follow up)    Chest Pain    This is a chronic problem. The current episode started more than 1 month ago. The onset quality is gradual. The problem occurs constantly. The problem has been unchanged. The pain is at a severity of 3/10. The pain is mild. The quality of the pain is described as dull. Pertinent negatives include no abdominal pain, cough, diaphoresis, dizziness, fever, headaches, nausea, palpitations, shortness of breath, vomiting or weakness.     Review of Systems   Constitutional: Positive for activity change. Negative for appetite change, chills, diaphoresis, fatigue, fever and unexpected weight change.   HENT: Negative for congestion, ear pain, hearing loss, rhinorrhea, sore throat and trouble swallowing.    Eyes: Negative for photophobia, pain, discharge and visual disturbance.   Respiratory: Negative for cough, chest tightness, shortness of breath and wheezing.    Cardiovascular: Positive for chest pain. Negative for palpitations and leg swelling.   Gastrointestinal: Negative for abdominal pain, blood in stool, constipation, diarrhea, nausea and vomiting.   Endocrine: Negative for cold intolerance, heat intolerance, polydipsia and polyuria.   Genitourinary: Negative for difficulty urinating, dysuria, flank pain, hematuria, menstrual problem, pelvic pain and vaginal pain.   Musculoskeletal: Negative for arthralgias, joint swelling, myalgias and neck pain.   Skin: Negative for rash.   Allergic/Immunologic: Negative for immunocompromised state.   Neurological: Negative for dizziness, weakness, light-headedness and headaches.   Hematological: Negative for adenopathy. Does not bruise/bleed easily.   Psychiatric/Behavioral: Negative for confusion, dysphoric mood, self-injury and suicidal ideas.       Past Medical History:   Diagnosis Date    Allergy     Hyperlipidemia     PAC (premature  atrial contraction)     Rosacea       Past Surgical History:   Procedure Laterality Date     SECTION      HYSTERECTOMY      SPINE SURGERY      cervical    TONSILLECTOMY         Family History   Problem Relation Age of Onset    Diabetes Mother     Asthma Mother     Cancer Father     Hypertension Father        Social History     Socioeconomic History    Marital status:      Spouse name: Not on file    Number of children: Not on file    Years of education: Not on file    Highest education level: Not on file   Occupational History    Not on file   Social Needs    Financial resource strain: Not on file    Food insecurity:     Worry: Not on file     Inability: Not on file    Transportation needs:     Medical: Not on file     Non-medical: Not on file   Tobacco Use    Smoking status: Never Smoker    Smokeless tobacco: Never Used   Substance and Sexual Activity    Alcohol use: Yes    Drug use: No    Sexual activity: Yes   Lifestyle    Physical activity:     Days per week: Not on file     Minutes per session: Not on file    Stress: Not on file   Relationships    Social connections:     Talks on phone: Not on file     Gets together: Not on file     Attends [employer] service: Not on file     Active member of club or organization: Not on file     Attends meetings of clubs or organizations: Not on file     Relationship status: Not on file   Other Topics Concern    Not on file   Social History Narrative    Not on file       Current Outpatient Medications   Medication Sig Dispense Refill    albuterol 90 mcg/actuation inhaler Inhale 2 puffs into the lungs every 6 (six) hours as needed for Wheezing. 1 Inhaler 2    ALPRAZolam (XANAX) 0.5 MG tablet Take 1 tablet (0.5 mg total) by mouth 3 (three) times daily. 60 tablet 0    loratadine (CLARITIN) 10 mg tablet Take 1 tablet (10 mg total) by mouth once daily. 90 tablet 1    montelukast (SINGULAIR) 10 mg tablet Take 1 tablet (10 mg total) by  "mouth every evening. 90 tablet 1    niacin 500 MG Tab Take 100 mg by mouth every evening.      simvastatin (ZOCOR) 20 MG tablet Take 1 tablet (20 mg total) by mouth every evening. 90 tablet 1    sumatriptan (IMITREX) 100 MG tablet Take 1 tablet (100 mg total) by mouth every 2 (two) hours as needed for Migraine. 27 tablet 3     No current facility-administered medications for this visit.        Review of patient's allergies indicates:   Allergen Reactions    Mepergan fortis Shortness Of Breath    Penicillins Rash    Sulfa (sulfonamide antibiotics) Rash     Objective:    HPI     Chest Pain      Additional comments: lt side.  Follow up          Last edited by Marie Downs MA on 4/9/2019 10:44 AM. (History)      Blood pressure 120/80, pulse 82, height 5' 6.5" (1.689 m), weight 71.7 kg (158 lb), SpO2 98 %. Body mass index is 25.12 kg/m².   Physical Exam   Constitutional: She is oriented to person, place, and time. She appears well-developed and well-nourished. She is cooperative. No distress.   HENT:   Head: Normocephalic and atraumatic.   Right Ear: Tympanic membrane normal.   Left Ear: Tympanic membrane normal.   Eyes: Pupils are equal, round, and reactive to light. Conjunctivae, EOM and lids are normal. Lids are everted and swept, no foreign bodies found. Right pupil is round and reactive. Left pupil is round and reactive.   Neck: Trachea normal and normal range of motion. Neck supple.   Cardiovascular: Normal rate, regular rhythm, S1 normal, S2 normal, normal heart sounds and intact distal pulses.   Pulmonary/Chest: Breath sounds normal.   Abdominal: Soft. Bowel sounds are normal. There is no rigidity and no guarding.   Musculoskeletal: Normal range of motion. She exhibits no edema, tenderness or deformity.   Lymphadenopathy:     She has no cervical adenopathy.     She has no axillary adenopathy.   Neurological: She is alert and oriented to person, place, and time.   Skin: Skin is warm and dry. Capillary " refill takes less than 2 seconds.   Psychiatric: She has a normal mood and affect. Her behavior is normal. Judgment and thought content normal.   Nursing note and vitals reviewed.          Assessment:       1. Left-sided chest wall pain    2. Radiculitis, thoracic    3. Intercostal pain        Plan:       Rosaura was seen today for chest pain.    Diagnoses and all orders for this visit:    Left-sided chest wall pain  -     CT Chest W Wo Contrast; Future    Radiculitis, thoracic  -     X-Ray Thoracic Spine 4 or more views  -     CT Chest W Wo Contrast; Future    Intercostal pain  -     CT Chest W Wo Contrast; Future

## 2019-04-10 ENCOUNTER — TELEPHONE (OUTPATIENT)
Dept: FAMILY MEDICINE | Facility: CLINIC | Age: 44
End: 2019-04-10

## 2019-04-10 DIAGNOSIS — R93.89 ABNORMAL CT OF THE CHEST: Primary | ICD-10-CM

## 2019-04-10 NOTE — TELEPHONE ENCOUNTER
----- Message from NIEVES Humphrey MD sent at 4/10/2019  8:50 AM CDT -----  Left renal ultrasound is needed to confirm that this is a cyst.

## 2019-04-10 NOTE — TELEPHONE ENCOUNTER
"Pt asking about the spot on liver and asking if that should be looked at further as well.  "The gallbladder is partially contracted. A low density mass inferiorly  within the left lobe of the liver may reflect a cyst but is also not accurately characterized."  "

## 2019-04-11 ENCOUNTER — TELEPHONE (OUTPATIENT)
Dept: FAMILY MEDICINE | Facility: CLINIC | Age: 44
End: 2019-04-11

## 2019-04-11 NOTE — TELEPHONE ENCOUNTER
"Pt asking about the spot on liver in ct scan and asking if that should be looked at further as well.  "The gallbladder is partially contracted. A low density mass inferiorly  within the left lobe of the liver may reflect a cyst but is also not accurately characterized."    "

## 2019-04-16 ENCOUNTER — TELEPHONE (OUTPATIENT)
Dept: FAMILY MEDICINE | Facility: CLINIC | Age: 44
End: 2019-04-16

## 2019-04-16 NOTE — TELEPHONE ENCOUNTER
----- Message from NIEVES Humphrey MD sent at 4/15/2019  8:22 AM CDT -----  Multiple kidney cysts but none look worrisome. Needs eval by nephrology to follow long term.

## 2019-04-16 NOTE — TELEPHONE ENCOUNTER
Spoke with pt regarding results. What can be causing the pain in her side? And what should she do next to figure that out?

## 2019-04-30 ENCOUNTER — TELEPHONE (OUTPATIENT)
Dept: FAMILY MEDICINE | Facility: CLINIC | Age: 44
End: 2019-04-30

## 2019-04-30 DIAGNOSIS — N28.1 KIDNEY CYSTS: Primary | ICD-10-CM

## 2019-04-30 NOTE — TELEPHONE ENCOUNTER
Pt says she is still having pain that she came in for on 4/9. She is asking for MRI orders as nothing else has shown what the cause is.

## 2019-05-02 NOTE — TELEPHONE ENCOUNTER
Pt notified of referral. Pt says she will see urologist but will see someone else. Pt stated that she does not need referral.

## 2019-06-04 ENCOUNTER — OFFICE VISIT (OUTPATIENT)
Dept: FAMILY MEDICINE | Facility: CLINIC | Age: 44
End: 2019-06-04
Payer: COMMERCIAL

## 2019-06-04 ENCOUNTER — TELEPHONE (OUTPATIENT)
Dept: FAMILY MEDICINE | Facility: CLINIC | Age: 44
End: 2019-06-04

## 2019-06-04 VITALS
HEART RATE: 87 BPM | HEIGHT: 67 IN | OXYGEN SATURATION: 99 % | WEIGHT: 159 LBS | SYSTOLIC BLOOD PRESSURE: 130 MMHG | BODY MASS INDEX: 24.96 KG/M2 | DIASTOLIC BLOOD PRESSURE: 84 MMHG

## 2019-06-04 DIAGNOSIS — E78.01 FAMILIAL HYPERCHOLESTEROLEMIA: ICD-10-CM

## 2019-06-04 DIAGNOSIS — J30.1 CHRONIC SEASONAL ALLERGIC RHINITIS DUE TO POLLEN: ICD-10-CM

## 2019-06-04 DIAGNOSIS — F40.243 FEAR OF FLYING: ICD-10-CM

## 2019-06-04 DIAGNOSIS — R41.840 DIFFICULTY CONCENTRATING: ICD-10-CM

## 2019-06-04 PROCEDURE — 99214 OFFICE O/P EST MOD 30 MIN: CPT | Mod: ,,, | Performed by: FAMILY MEDICINE

## 2019-06-04 PROCEDURE — 3008F PR BODY MASS INDEX (BMI) DOCUMENTED: ICD-10-PCS | Mod: ,,, | Performed by: FAMILY MEDICINE

## 2019-06-04 PROCEDURE — 99214 PR OFFICE/OUTPT VISIT, EST, LEVL IV, 30-39 MIN: ICD-10-PCS | Mod: ,,, | Performed by: FAMILY MEDICINE

## 2019-06-04 PROCEDURE — 3008F BODY MASS INDEX DOCD: CPT | Mod: ,,, | Performed by: FAMILY MEDICINE

## 2019-06-04 RX ORDER — LORATADINE 10 MG/1
10 TABLET ORAL DAILY
Qty: 90 TABLET | Refills: 1 | COMMUNITY
Start: 2019-06-04

## 2019-06-04 RX ORDER — MONTELUKAST SODIUM 10 MG/1
10 TABLET ORAL NIGHTLY
Qty: 90 TABLET | Refills: 1 | Status: SHIPPED | OUTPATIENT
Start: 2019-06-04 | End: 2019-09-05 | Stop reason: SDUPTHER

## 2019-06-04 RX ORDER — LISDEXAMFETAMINE DIMESYLATE 30 MG/1
30 CAPSULE ORAL EVERY MORNING
Qty: 30 CAPSULE | Refills: 0 | Status: SHIPPED | OUTPATIENT
Start: 2019-06-04 | End: 2019-07-23 | Stop reason: SDUPTHER

## 2019-06-04 RX ORDER — SIMVASTATIN 20 MG/1
20 TABLET, FILM COATED ORAL NIGHTLY
Qty: 90 TABLET | Refills: 1 | Status: SHIPPED | OUTPATIENT
Start: 2019-06-04 | End: 2019-09-05 | Stop reason: SDUPTHER

## 2019-06-04 RX ORDER — ALPRAZOLAM 0.5 MG/1
0.5 TABLET ORAL 3 TIMES DAILY
Qty: 60 TABLET | Refills: 0 | Status: SHIPPED | OUTPATIENT
Start: 2019-06-04 | End: 2019-12-05 | Stop reason: SDUPTHER

## 2019-06-04 NOTE — TELEPHONE ENCOUNTER
The following medication needs a prior authorization:     Medication Name: Vyvanse    Dosage: 30mg    Frequency: qam    Directions for use: take 1 capsule by mouth every morning    Diagnosis: R41.840 difficulty concentrating    Is the request for a reauthorization? no    Is the patient currently stable on therapy?     Please list all therapeutic alternatives previously used with start/end dates and outcome:

## 2019-06-04 NOTE — PATIENT INSTRUCTIONS
Anxiety Reaction  Anxiety is the feeling we all get when we think something bad might happen. It is a normal response to stress and usually causes only a mild reaction. When anxiety becomes more severe, it can interfere with daily life. In some cases, you may not even be aware of what it is youre anxious about. There may also be a genetic link or it may be a learned behavior in the home.  Both psychological and physical triggers cause stress reaction. It's often a response to fear or emotional stress, real or imagined. This stress may come from home, family, work, or social relationships.  During an anxiety reaction, you may feel:  · Helpless  · Nervous  · Depressed  · Irritable  Your body may show signs of anxiety in many ways. You may experience:  · Dry mouth  · Shakiness  · Dizziness  · Weakness  · Trouble breathing  · Breathing fast (hyperventilating)  · Chest pressure  · Sweating  · Headache  · Nausea  · Diarrhea  · Tiredness  · Inability to sleep  · Sexual problems  Home care  · Try to locate the sources of stress in your life. They may not be obvious. These may include:  ¨ Daily hassles of life (traffic jams, missed appointments, car troubles, etc.)  ¨ Major life changes, both good (new baby, job promotion) and bad (loss of job, loss of loved one)  ¨ Overload: feeling that you have too many responsibilities and can't take care of all of them at once  ¨ Feeling helpless, feeling that your problems are beyond what youre able to solve  · Notice how your body reacts to stress. Learn to listen to your body signals. This will help you take action before the stress becomes severe.  · When you can, do something about the source of your stress. (Avoid hassles, limit the amount of change that happens in your life at one time and take a break when you feel overloaded).  · Unfortunately, many stressful situations can't be avoided. It is necessary to learn how to better manage stress. There are many proven methods  that will reduce your anxiety. These include simple things like exercise, good nutrition and adequate rest. Also, there are certain techniques that are helpful:  ¨ Relaxation  ¨ Breathing exercises  ¨ Visualization  ¨ Biofeedback  ¨ Meditation  For more information about this, consult your doctor or go to a local bookstore and review the many books and tapes available on this subject.  Follow-up care  If you feel that your anxiety is not responding to self-help measures, contact your doctor or make an appointment with a counselor. You may need short-term psychological counseling and temporary medicine to help you manage stress.  Call 911  Call your healthcare provider right away if any of these occur:  · Trouble breathing  · Confusion  · Drowsiness or trouble wakening  · Fainting or loss of consciousness  · Rapid heart rate  · Seizure  · New chest pain that becomes more severe, lasts longer, or spreads into your shoulder, arm, neck, jaw, or back  When to seek medical advice  Call your healthcare provider right away if any of these occur:  · Your symptoms get worse  · Severe headache not relieved by rest and mild pain reliever  Date Last Reviewed: 9/29/2015  © 7816-0142 [employer]. [address] [place] [id number]. All rights reserved. This information is not intended as a substitute for professional medical care. Always follow your healthcare professional's instructions.

## 2019-06-04 NOTE — PROGRESS NOTES
Subjective:       Patient ID: Rosaura Loredo is a 43 y.o. female.    Chief Complaint: ADHD    Feeling well but having difficulty concentrating. Has been on ADD meds in the past including vyvanse. No cardiac issues recently.    Review of Systems   Constitutional: Negative for appetite change, chills, diaphoresis, fatigue and fever.   HENT: Negative for congestion, ear pain, hearing loss, sore throat and trouble swallowing.    Eyes: Negative for photophobia, pain and visual disturbance.   Respiratory: Negative for cough, chest tightness and shortness of breath.    Cardiovascular: Negative for chest pain, palpitations and leg swelling.   Gastrointestinal: Negative for abdominal pain, blood in stool, constipation, diarrhea, nausea and vomiting.   Endocrine: Negative for cold intolerance and heat intolerance.   Genitourinary: Negative for difficulty urinating, flank pain, pelvic pain and vaginal pain.   Musculoskeletal: Negative for arthralgias and myalgias.   Skin: Negative for rash.   Allergic/Immunologic: Negative for immunocompromised state.   Neurological: Negative for dizziness, weakness, light-headedness and headaches.   Hematological: Negative for adenopathy. Does not bruise/bleed easily.   Psychiatric/Behavioral: Positive for decreased concentration. Negative for agitation, behavioral problems, confusion, dysphoric mood, self-injury and suicidal ideas.       Past Medical History:   Diagnosis Date    Allergy     Anxiety     Asthma     Hyperlipidemia     PAC (premature atrial contraction)     Rosacea       Past Surgical History:   Procedure Laterality Date     SECTION      HYSTERECTOMY      SPINE SURGERY      cervical    TONSILLECTOMY         Family History   Problem Relation Age of Onset    Diabetes Mother     Asthma Mother     Cancer Father     Hypertension Father        Social History     Socioeconomic History    Marital status:      Spouse name: Not on file    Number of  children: Not on file    Years of education: Not on file    Highest education level: Not on file   Occupational History    Not on file   Social Needs    Financial resource strain: Not on file    Food insecurity:     Worry: Not on file     Inability: Not on file    Transportation needs:     Medical: Not on file     Non-medical: Not on file   Tobacco Use    Smoking status: Never Smoker    Smokeless tobacco: Never Used   Substance and Sexual Activity    Alcohol use: Yes    Drug use: No    Sexual activity: Yes   Lifestyle    Physical activity:     Days per week: Not on file     Minutes per session: Not on file    Stress: Not on file   Relationships    Social connections:     Talks on phone: Not on file     Gets together: Not on file     Attends Bahai service: Not on file     Active member of club or organization: Not on file     Attends meetings of clubs or organizations: Not on file     Relationship status: Not on file   Other Topics Concern    Not on file   Social History Narrative    Not on file       Current Outpatient Medications   Medication Sig Dispense Refill    albuterol 90 mcg/actuation inhaler Inhale 2 puffs into the lungs every 6 (six) hours as needed for Wheezing. 1 Inhaler 2    ALPRAZolam (XANAX) 0.5 MG tablet Take 1 tablet (0.5 mg total) by mouth 3 (three) times daily. 60 tablet 0    loratadine (CLARITIN) 10 mg tablet Take 1 tablet (10 mg total) by mouth once daily. 90 tablet 1    montelukast (SINGULAIR) 10 mg tablet Take 1 tablet (10 mg total) by mouth every evening. 90 tablet 1    niacin 500 MG Tab Take 100 mg by mouth every evening.      simvastatin (ZOCOR) 20 MG tablet Take 1 tablet (20 mg total) by mouth every evening. 90 tablet 1    sumatriptan (IMITREX) 100 MG tablet Take 1 tablet (100 mg total) by mouth every 2 (two) hours as needed for Migraine. 27 tablet 3    lisdexamfetamine (VYVANSE) 30 MG capsule Take 1 capsule (30 mg total) by mouth every morning. 30 capsule 0  "    No current facility-administered medications for this visit.        Review of patient's allergies indicates:   Allergen Reactions    Mepergan fortis Shortness Of Breath    Penicillins Rash    Sulfa (sulfonamide antibiotics) Rash     Objective:      Blood pressure 130/84, pulse 87, height 5' 6.5" (1.689 m), weight 72.1 kg (159 lb), SpO2 99 %. Body mass index is 25.28 kg/m².   Physical Exam   Constitutional: She is oriented to person, place, and time. She appears well-developed and well-nourished. She is cooperative. No distress.   HENT:   Head: Normocephalic and atraumatic.   Right Ear: Tympanic membrane normal.   Left Ear: Tympanic membrane normal.   Eyes: Pupils are equal, round, and reactive to light. Conjunctivae, EOM and lids are normal. Lids are everted and swept, no foreign bodies found. Right pupil is round and reactive. Left pupil is round and reactive.   Neck: Trachea normal and normal range of motion. Neck supple.   Cardiovascular: Normal rate, regular rhythm, S1 normal, S2 normal, normal heart sounds and intact distal pulses.   Pulmonary/Chest: Breath sounds normal.   Abdominal: There is no rigidity and no guarding.   Musculoskeletal: Normal range of motion.   Lymphadenopathy:     She has no cervical adenopathy.     She has no axillary adenopathy.   Neurological: She is alert and oriented to person, place, and time.   Skin: Skin is warm and dry. Capillary refill takes less than 2 seconds.   Psychiatric: She has a normal mood and affect. Her behavior is normal. Judgment and thought content normal.   Nursing note and vitals reviewed.          Assessment:       1. Fear of flying    2. Familial hypercholesterolemia    3. Chronic seasonal allergic rhinitis due to pollen    4. Difficulty concentrating        Plan:       Rosaura was seen today for adhd.    Diagnoses and all orders for this visit:    Fear of flying  -     ALPRAZolam (XANAX) 0.5 MG tablet; Take 1 tablet (0.5 mg total) by mouth 3 (three) times " daily.    Familial hypercholesterolemia  -     simvastatin (ZOCOR) 20 MG tablet; Take 1 tablet (20 mg total) by mouth every evening.    Chronic seasonal allergic rhinitis due to pollen  -     montelukast (SINGULAIR) 10 mg tablet; Take 1 tablet (10 mg total) by mouth every evening.  -     loratadine (CLARITIN) 10 mg tablet; Take 1 tablet (10 mg total) by mouth once daily.    Difficulty concentrating    Other orders  -     lisdexamfetamine (VYVANSE) 30 MG capsule; Take 1 capsule (30 mg total) by mouth every morning.

## 2019-06-05 ENCOUNTER — TELEPHONE (OUTPATIENT)
Dept: UROLOGY | Facility: CLINIC | Age: 44
End: 2019-06-05

## 2019-06-05 NOTE — TELEPHONE ENCOUNTER
Spoke w pt regarding urological history and pt voiced that she has seen Dr Garcia many years ago.

## 2019-06-16 NOTE — PROGRESS NOTES
[facility] Urology Consult:  Consult from: Dr Humphrey  Consult for: renal cysts    Rosaura Loredo is a 43 y.o. female who presents for evaluation of renal cysts and L flank pain    She had been having L sided chest wall pain and intercostal pain and thoracic radiculitis. Also followed for ADD and anxiety.  CT chest 4/9/19: A 9 mm hyperdense mass arises exophytically from the upper pole of the leftkidney. Correlation with targeted ultrasound is recommended for further characterization. Small low density masses within the cortex of the upper kidneys are not accurately characterized, possibly representing small cortical cysts. The gallbladder is partially contracted. A low density mass inferiorly within the left lobe of the liver may reflect a cyst but is also not accurately characterized.  Follow up RBUS 4/12/19:  The right kidney measures 11.4 cm in sagittal dimension and the left kidney measures 10.9 cm. There is normal-appearing cortical thickness and echogenicity. There is no hydronephrosis. A 10 mm cyst is present at the upper pole of the right kidney. There are several scattered left renal cysts measuring up to 10mm in the lower pole. A 9 mm cyst corresponds to the exophytic mass at the upper pole of left kidney as described on recent CT. The adjacent retroperitoneum including the visualized portions of the aorta and vena cava demonstrate no significant abnormalities. Scattered atheromatous changes are present in the wall of the aorta. The urinary bladder is unremarkable.  - Small bilateral renal cortical cysts. One of these corresponds with the exophytic mass arising from the upper left kidney demonstrated on recent CT examination.    She reports that her pain was more left upper quadrant front to back.  Pain is positional in exacerbated by sitting in different positions.  Not worsened by bending twisting.  No change in relation to urination or fluid intake.  Localize it to lower intercostal spaces left of  "center  Pain worse at rest, relaxing, trying to sleep.  When on her feet active and working, no pain.  No dysuria, no hematuria  Does have history of kidney stones, 15 years ago passed 2 stones and was told she had a few in the collecting system as well as a few phleboliths.    On personal review of imaging, CT of the chest reviewed and did see simple cyst in the right kidney, as well as the slightly hyperdense cyst exophytic posterior upper pole of the left kidney.  On review of ultrasound this does appear simply cystic without complex features, as does the left lower pole cyst as well.      ROS: A comprehensive 10 system review was performed and is negative except as noted above in HPI    PHYSICAL EXAM:    Vitals:    06/17/19 0855   BP: (!) 144/82   Pulse: 83   Resp: 18     Body mass index is 25.03 kg/m². Weight: 71.4 kg (157 lb 6.5 oz) Height: 5' 6.5" (168.9 cm)       General: Alert, cooperative, no distress, appears stated age   Head: Normocephalic, without obvious abnormality, atraumatic   Eyes: PERRL, conjunctiva/corneas clear   Lungs: Respirations unlabored   Heart: Warm and well perfused   Abdomen: soft, NT, ND  Back: no CVAT, no point tenderness, no TTP intercostal, no reproducible pain  Extremities: Extremities normal, atraumatic, no cyanosis or edema   Skin: Skin color, texture, turgor normal, no rashes or lesions   Psych: Appropriate   Neurologic: Non-focal       Recent Results (from the past 336 hour(s))   POCT URINE DIPSTICK WITHOUT MICROSCOPE    Collection Time: 06/17/19  9:02 AM   Result Value Ref Range    Color, UA yellow     Spec Grav UA 1.025     pH, UA 5     WBC, UA neg     Nitrite, UA neg     Protein neg     Glucose, UA neg     Ketones, UA neg     Urobilinogen, UA 0.2     Bilirubin neg     Blood, UA trace        ASSESSMENT   1. Renal cysts, acquired, bilateral  POCT URINE DIPSTICK WITHOUT MICROSCOPE   2. Microhematuria  Urinalysis Microscopic       Plan    We did discuss the benign nature of " renal cysts, and natural history that they may enlarge over time. Very rarely cause symptoms, and when doing so are often anterior cysts causing compressive symptoms on adjacent structures, or significant sized posterior cysts causing back pain. Both scenarios quite rare.   On review of his imaging, her cysts are all subcentimeter and provided reassurance at this time. Renal cysts are not the cause of her symptoms   Discussed incidence of simple renal cysts approximately 30% of population of varying sizes, and periodic ultrasound screening can be performed every 1-2 years.  If any concern for complexity of cysts on ultrasound in the future could reevaluate with CT abdomen with/without contrast and refer back if any concerning features.   Could also consider that initial surveillance in 1-2 years be a CT with and without contrast, though no indication at this time.  Reassurance provided. RTC prn    Her urinalysis dipstick demonstrates trace blood only.  We did discuss that it dipstick trace positivity is often false positive for clinically insignificant.  I will send a formal urinalysis microscopic exam to evaluate for true microscopic hematuria, greater than 3-5 red blood cells per high-power field, which if present we did discuss would pursue workup for micro hematuria which would include a CT urogram and therefore cross-sectional imaging would be done at that time.  If there are less than 3-5 red blood cells in the urine, we did discuss this is insignificant and again reassurance was provided, and no further workup is needed.

## 2019-06-17 ENCOUNTER — OFFICE VISIT (OUTPATIENT)
Dept: UROLOGY | Facility: CLINIC | Age: 44
End: 2019-06-17
Payer: COMMERCIAL

## 2019-06-17 VITALS
RESPIRATION RATE: 18 BRPM | SYSTOLIC BLOOD PRESSURE: 144 MMHG | DIASTOLIC BLOOD PRESSURE: 82 MMHG | WEIGHT: 157.44 LBS | HEART RATE: 83 BPM | BODY MASS INDEX: 24.71 KG/M2 | HEIGHT: 67 IN

## 2019-06-17 DIAGNOSIS — R31.29 MICROHEMATURIA: ICD-10-CM

## 2019-06-17 DIAGNOSIS — N28.1 RENAL CYSTS, ACQUIRED, BILATERAL: Primary | ICD-10-CM

## 2019-06-17 LAB
BILIRUB SERPL-MCNC: ABNORMAL MG/DL
BLOOD URINE, POC: ABNORMAL
COLOR, POC UA: YELLOW
GLUCOSE UR QL STRIP: ABNORMAL
KETONES UR QL STRIP: ABNORMAL
LEUKOCYTE ESTERASE URINE, POC: ABNORMAL
NITRITE, POC UA: ABNORMAL
PH, POC UA: 5
PROTEIN, POC: ABNORMAL
SPECIFIC GRAVITY, POC UA: 1.02
UROBILINOGEN, POC UA: 0.2

## 2019-06-17 PROCEDURE — 99999 PR PBB SHADOW E&M-EST. PATIENT-LVL III: CPT | Mod: PBBFAC,,, | Performed by: UROLOGY

## 2019-06-17 PROCEDURE — 81002 POCT URINE DIPSTICK WITHOUT MICROSCOPE: ICD-10-PCS | Mod: S$GLB,,, | Performed by: UROLOGY

## 2019-06-17 PROCEDURE — 99999 PR PBB SHADOW E&M-EST. PATIENT-LVL III: ICD-10-PCS | Mod: PBBFAC,,, | Performed by: UROLOGY

## 2019-06-17 PROCEDURE — 99243 PR OFFICE CONSULTATION,LEVEL III: ICD-10-PCS | Mod: 25,S$GLB,, | Performed by: UROLOGY

## 2019-06-17 PROCEDURE — 99243 OFF/OP CNSLTJ NEW/EST LOW 30: CPT | Mod: 25,S$GLB,, | Performed by: UROLOGY

## 2019-06-17 PROCEDURE — 81002 URINALYSIS NONAUTO W/O SCOPE: CPT | Mod: S$GLB,,, | Performed by: UROLOGY

## 2019-06-17 NOTE — LETTER
June 17, 2019      NIEVES Humphrey MD  [address]  [place] [address]           [place] [facility]  [address] [place] [address] [address]  [place] [address]  Phone: [phone]  Fax: [phone]          Patient: Rosaura Loredo   MR Number: [id number]   YOB: 1975   Date of Visit: 6/17/2019       Dear Dr. NIEVES Humphrey:    Thank you for referring Rosaura Loredo to me for evaluation. Attached you will find relevant portions of my assessment and plan of care.    If you have questions, please do not hesitate to call me. I look forward to following Rosaura Lordeo along with you.    Sincerely,    Paul Payton MD    Enclosure  CC:  No Recipients    If you would like to receive this communication electronically, please contact [email or web address] or [phone] to request more information on [employer] access.    For providers and/or their staff who would like to refer a patient to [facility], please contact us through our one-stop-shop provider referral line, [facility], [place], at [phone].    If you feel you have received this communication in error or would no longer like to receive these types of communications, please e-mail [email or web address]

## 2019-06-19 NOTE — TELEPHONE ENCOUNTER
Spoke with insurance rep for PA. Information on previous ADHD medications given. Told that the information will be processed and if anything else is needed, the req will be faxed to us. Faxed number verified.

## 2019-07-24 RX ORDER — LISDEXAMFETAMINE DIMESYLATE 30 MG/1
30 CAPSULE ORAL EVERY MORNING
Qty: 30 CAPSULE | Refills: 0 | Status: SHIPPED | OUTPATIENT
Start: 2019-07-24 | End: 2019-09-05 | Stop reason: SDUPTHER

## 2019-09-05 ENCOUNTER — OFFICE VISIT (OUTPATIENT)
Dept: FAMILY MEDICINE | Facility: CLINIC | Age: 44
End: 2019-09-05
Payer: COMMERCIAL

## 2019-09-05 VITALS
WEIGHT: 159 LBS | SYSTOLIC BLOOD PRESSURE: 108 MMHG | OXYGEN SATURATION: 98 % | HEIGHT: 67 IN | HEART RATE: 86 BPM | DIASTOLIC BLOOD PRESSURE: 70 MMHG | TEMPERATURE: 98 F | BODY MASS INDEX: 24.96 KG/M2

## 2019-09-05 DIAGNOSIS — Z00.00 ENCOUNTER FOR PREVENTATIVE ADULT HEALTH CARE EXAMINATION: ICD-10-CM

## 2019-09-05 DIAGNOSIS — E78.01 FAMILIAL HYPERCHOLESTEROLEMIA: ICD-10-CM

## 2019-09-05 DIAGNOSIS — J30.1 CHRONIC SEASONAL ALLERGIC RHINITIS DUE TO POLLEN: ICD-10-CM

## 2019-09-05 DIAGNOSIS — F90.0 ATTENTION DEFICIT HYPERACTIVITY DISORDER (ADHD), PREDOMINANTLY INATTENTIVE TYPE: Primary | ICD-10-CM

## 2019-09-05 PROCEDURE — 99213 OFFICE O/P EST LOW 20 MIN: CPT | Mod: S$GLB,,, | Performed by: INTERNAL MEDICINE

## 2019-09-05 PROCEDURE — 3008F PR BODY MASS INDEX (BMI) DOCUMENTED: ICD-10-PCS | Mod: S$GLB,,, | Performed by: INTERNAL MEDICINE

## 2019-09-05 PROCEDURE — 3008F BODY MASS INDEX DOCD: CPT | Mod: S$GLB,,, | Performed by: INTERNAL MEDICINE

## 2019-09-05 PROCEDURE — 99213 PR OFFICE/OUTPT VISIT, EST, LEVL III, 20-29 MIN: ICD-10-PCS | Mod: S$GLB,,, | Performed by: INTERNAL MEDICINE

## 2019-09-05 RX ORDER — LISDEXAMFETAMINE DIMESYLATE 30 MG/1
30 CAPSULE ORAL EVERY MORNING
Qty: 30 CAPSULE | Refills: 0 | Status: SHIPPED | OUTPATIENT
Start: 2019-10-31 | End: 2020-09-15 | Stop reason: CLARIF

## 2019-09-05 RX ORDER — LISDEXAMFETAMINE DIMESYLATE 30 MG/1
30 CAPSULE ORAL EVERY MORNING
Qty: 30 CAPSULE | Refills: 0 | Status: SHIPPED | OUTPATIENT
Start: 2019-09-05 | End: 2020-09-15 | Stop reason: CLARIF

## 2019-09-05 RX ORDER — LISDEXAMFETAMINE DIMESYLATE 30 MG/1
30 CAPSULE ORAL EVERY MORNING
Qty: 30 CAPSULE | Refills: 0 | Status: SHIPPED | OUTPATIENT
Start: 2019-10-03 | End: 2020-09-15 | Stop reason: CLARIF

## 2019-09-05 RX ORDER — MONTELUKAST SODIUM 10 MG/1
10 TABLET ORAL NIGHTLY
Qty: 90 TABLET | Refills: 1 | Status: SHIPPED | OUTPATIENT
Start: 2019-09-05 | End: 2019-12-05 | Stop reason: SDUPTHER

## 2019-09-05 RX ORDER — SIMVASTATIN 20 MG/1
20 TABLET, FILM COATED ORAL NIGHTLY
Qty: 90 TABLET | Refills: 1 | Status: SHIPPED | OUTPATIENT
Start: 2019-09-05 | End: 2019-12-05 | Stop reason: SDUPTHER

## 2019-09-05 NOTE — PROGRESS NOTES
Subjective:       Patient ID: Rosaura Loredo is a 43 y.o. female.    Chief Complaint: ADHD (continuity of care) and Hyperlipidemia    Here for routine follow up and med refills; previously seeing Dr. Humphrey.  Diagnosed by Psychology with ADHD about 7-8 years ago.  She was on meds until she got tired of taking them but decided to resume them earlier this year d/t better work performance when she is on meds.   Currently on vyvanse with good results.   No appetite issues or weight loss.  She does have a h/o PACs and anxiety but hasn't noticed any worsening of anxiety or PACs (which she only notices about once a month).      Review of Systems   Constitutional: Negative for chills, fatigue, fever and unexpected weight change.   HENT: Positive for postnasal drip. Negative for congestion, hearing loss, rhinorrhea, trouble swallowing and voice change.    Eyes: Negative for photophobia and visual disturbance.   Respiratory: Negative for apnea, cough, choking, chest tightness, shortness of breath and wheezing.    Cardiovascular: Positive for palpitations. Negative for chest pain and leg swelling.   Gastrointestinal: Negative for abdominal pain, blood in stool, constipation, diarrhea, nausea, rectal pain and vomiting.   Endocrine: Negative for cold intolerance, heat intolerance, polydipsia and polyuria.   Genitourinary: Negative for decreased urine volume, difficulty urinating, dysuria, frequency, genital sores, hematuria, menstrual problem, pelvic pain, urgency, vaginal bleeding and vaginal discharge.   Musculoskeletal: Negative for arthralgias, back pain, gait problem, joint swelling, myalgias, neck pain and neck stiffness.   Skin: Negative for color change, rash and wound.   Allergic/Immunologic: Negative for environmental allergies and food allergies.   Neurological: Negative for dizziness, tremors, seizures, syncope, facial asymmetry, speech difficulty, weakness, light-headedness, numbness and headaches.    Hematological: Negative for adenopathy. Does not bruise/bleed easily.   Psychiatric/Behavioral: Negative for confusion, hallucinations, sleep disturbance and suicidal ideas. The patient is nervous/anxious.        Past Medical History:   Diagnosis Date    Allergy     Anxiety     Asthma     Hyperlipidemia     PAC (premature atrial contraction)     Rosacea       Past Surgical History:   Procedure Laterality Date     SECTION      COLD KNIFE CONIZATION OF CERVIX      multiple    HYSTERECTOMY      ovaries intact    TONSILLECTOMY         Family History   Problem Relation Age of Onset    Diabetes Mother     Asthma Mother     Hypertension Father     Prostate cancer Father        Social History     Socioeconomic History    Marital status:      Spouse name: Not on file    Number of children: Not on file    Years of education: Not on file    Highest education level: Not on file   Occupational History    Not on file   Social Needs    Financial resource strain: Not on file    Food insecurity:     Worry: Not on file     Inability: Not on file    Transportation needs:     Medical: Not on file     Non-medical: Not on file   Tobacco Use    Smoking status: Never Smoker    Smokeless tobacco: Never Used   Substance and Sexual Activity    Alcohol use: Yes     Frequency: 2-4 times a month     Drinks per session: 1 or 2    Drug use: No    Sexual activity: Yes     Birth control/protection: See Surgical Hx, Surgical   Lifestyle    Physical activity:     Days per week: Not on file     Minutes per session: Not on file    Stress: Not at all   Relationships    Social connections:     Talks on phone: Not on file     Gets together: Not on file     Attends [employer] service: Not on file     Active member of club or organization: Not on file     Attends meetings of clubs or organizations: Not on file     Relationship status: Not on file   Other Topics Concern    Not on file   Social History Narrative  "   Live with        Current Outpatient Medications   Medication Sig Dispense Refill    albuterol 90 mcg/actuation inhaler Inhale 2 puffs into the lungs every 6 (six) hours as needed for Wheezing. 1 Inhaler 2    ALPRAZolam (XANAX) 0.5 MG tablet Take 1 tablet (0.5 mg total) by mouth 3 (three) times daily. 60 tablet 0    lisdexamfetamine (VYVANSE) 30 MG capsule Take 1 capsule (30 mg total) by mouth every morning. 30 capsule 0    [START ON 10/3/2019] lisdexamfetamine (VYVANSE) 30 MG capsule Take 1 capsule (30 mg total) by mouth every morning. 30 capsule 0    [START ON 10/31/2019] lisdexamfetamine (VYVANSE) 30 MG capsule Take 1 capsule (30 mg total) by mouth every morning. 30 capsule 0    loratadine (CLARITIN) 10 mg tablet Take 1 tablet (10 mg total) by mouth once daily. 90 tablet 1    montelukast (SINGULAIR) 10 mg tablet Take 1 tablet (10 mg total) by mouth every evening. 90 tablet 1    simvastatin (ZOCOR) 20 MG tablet Take 1 tablet (20 mg total) by mouth every evening. 90 tablet 1    sumatriptan (IMITREX) 100 MG tablet Take 1 tablet (100 mg total) by mouth every 2 (two) hours as needed for Migraine. 27 tablet 3     No current facility-administered medications for this visit.        Review of patient's allergies indicates:   Allergen Reactions    Mepergan fortis Shortness Of Breath    Penicillins Rash    Sulfa (sulfonamide antibiotics) Rash     Objective:    HPI     ADHD      Additional comments: continuity of care          Last edited by Ismael Raymond MA on 9/5/2019  9:45 AM. (History)      Blood pressure 108/70, pulse 86, temperature 97.7 °F (36.5 °C), temperature source Temporal, height 5' 6.5" (1.689 m), weight 72.1 kg (159 lb), SpO2 98 %. Body mass index is 25.28 kg/m².   Physical Exam   Constitutional: She appears well-developed. No distress.   HENT:   Nose: Nose normal.   Mouth/Throat: Oropharynx is clear and moist.   Eyes: Conjunctivae are normal. Right eye exhibits no discharge. Left eye " exhibits no discharge. No scleral icterus.   Neck: Carotid bruit is not present.   Cardiovascular: Normal rate, regular rhythm and normal heart sounds.   No murmur heard.  Pulmonary/Chest: Effort normal and breath sounds normal. No respiratory distress. She has no decreased breath sounds. She has no wheezes. She has no rhonchi. She has no rales.   Abdominal: Soft. She exhibits no distension. There is no tenderness. There is no rebound and no guarding.   Musculoskeletal: She exhibits no edema.   Neurological: She is alert. She displays no tremor.   Skin: Skin is warm and dry.   Psychiatric: She has a normal mood and affect. Her speech is normal.   Nursing note and vitals reviewed.          Assessment:       1. Attention deficit hyperactivity disorder (ADHD), predominantly inattentive type    2. Encounter for preventative adult health care examination        Plan:       Rosaura was seen today for adhd and hyperlipidemia.    Diagnoses and all orders for this visit:    Attention deficit hyperactivity disorder (ADHD), predominantly inattentive type  -     lisdexamfetamine (VYVANSE) 30 MG capsule; Take 1 capsule (30 mg total) by mouth every morning.  -     lisdexamfetamine (VYVANSE) 30 MG capsule; Take 1 capsule (30 mg total) by mouth every morning.  -     lisdexamfetamine (VYVANSE) 30 MG capsule; Take 1 capsule (30 mg total) by mouth every morning.    Encounter for preventative adult health care examination  -     Comprehensive metabolic panel; Future  -     Lipid panel; Future  -     Comprehensive metabolic panel  -     Lipid panel

## 2019-10-02 DIAGNOSIS — Z12.39 SCREENING BREAST EXAMINATION: Primary | ICD-10-CM

## 2019-11-05 LAB
ALBUMIN SERPL-MCNC: 4.4 G/DL (ref 3.6–5.1)
ALBUMIN/GLOB SERPL: 1.8 (CALC) (ref 1–2.5)
ALP SERPL-CCNC: 67 U/L (ref 33–115)
ALT SERPL-CCNC: 12 U/L (ref 6–29)
AST SERPL-CCNC: 15 U/L (ref 10–35)
BILIRUB SERPL-MCNC: 0.6 MG/DL (ref 0.2–1.2)
BUN SERPL-MCNC: 12 MG/DL (ref 7–25)
BUN/CREAT SERPL: NORMAL (CALC) (ref 6–22)
CALCIUM SERPL-MCNC: 9.3 MG/DL (ref 8.6–10.2)
CHLORIDE SERPL-SCNC: 103 MMOL/L (ref 98–110)
CHOLEST SERPL-MCNC: 160 MG/DL
CHOLEST/HDLC SERPL: 2.9 (CALC)
CO2 SERPL-SCNC: 31 MMOL/L (ref 20–32)
CREAT SERPL-MCNC: 0.7 MG/DL (ref 0.5–1.1)
GFRSERPLBLD MDRD-ARVRAT: 105 ML/MIN/1.73M2
GLOBULIN SER CALC-MCNC: 2.4 G/DL (CALC) (ref 1.9–3.7)
GLUCOSE SERPL-MCNC: 89 MG/DL (ref 65–99)
HDLC SERPL-MCNC: 55 MG/DL
LDLC SERPL CALC-MCNC: 86 MG/DL (CALC)
NONHDLC SERPL-MCNC: 105 MG/DL (CALC)
POTASSIUM SERPL-SCNC: 4.2 MMOL/L (ref 3.5–5.3)
PROT SERPL-MCNC: 6.8 G/DL (ref 6.1–8.1)
SODIUM SERPL-SCNC: 139 MMOL/L (ref 135–146)
TRIGL SERPL-MCNC: 97 MG/DL

## 2019-12-05 ENCOUNTER — OFFICE VISIT (OUTPATIENT)
Dept: FAMILY MEDICINE | Facility: CLINIC | Age: 44
End: 2019-12-05
Payer: COMMERCIAL

## 2019-12-05 VITALS
TEMPERATURE: 98 F | DIASTOLIC BLOOD PRESSURE: 84 MMHG | BODY MASS INDEX: 25.27 KG/M2 | OXYGEN SATURATION: 99 % | WEIGHT: 161 LBS | HEART RATE: 80 BPM | SYSTOLIC BLOOD PRESSURE: 128 MMHG | HEIGHT: 67 IN

## 2019-12-05 DIAGNOSIS — J30.1 CHRONIC SEASONAL ALLERGIC RHINITIS DUE TO POLLEN: ICD-10-CM

## 2019-12-05 DIAGNOSIS — F41.9 ANXIETY AND DEPRESSION: Primary | ICD-10-CM

## 2019-12-05 DIAGNOSIS — F90.0 ATTENTION DEFICIT HYPERACTIVITY DISORDER (ADHD), PREDOMINANTLY INATTENTIVE TYPE: ICD-10-CM

## 2019-12-05 DIAGNOSIS — E78.01 FAMILIAL HYPERCHOLESTEROLEMIA: ICD-10-CM

## 2019-12-05 DIAGNOSIS — G43.009 MIGRAINE WITHOUT AURA AND WITHOUT STATUS MIGRAINOSUS, NOT INTRACTABLE: ICD-10-CM

## 2019-12-05 DIAGNOSIS — F32.A ANXIETY AND DEPRESSION: Primary | ICD-10-CM

## 2019-12-05 PROCEDURE — 3008F PR BODY MASS INDEX (BMI) DOCUMENTED: ICD-10-PCS | Mod: S$GLB,,, | Performed by: INTERNAL MEDICINE

## 2019-12-05 PROCEDURE — 99214 PR OFFICE/OUTPT VISIT, EST, LEVL IV, 30-39 MIN: ICD-10-PCS | Mod: S$GLB,,, | Performed by: INTERNAL MEDICINE

## 2019-12-05 PROCEDURE — 3008F BODY MASS INDEX DOCD: CPT | Mod: S$GLB,,, | Performed by: INTERNAL MEDICINE

## 2019-12-05 PROCEDURE — 99214 OFFICE O/P EST MOD 30 MIN: CPT | Mod: S$GLB,,, | Performed by: INTERNAL MEDICINE

## 2019-12-05 RX ORDER — DULOXETIN HYDROCHLORIDE 60 MG/1
60 CAPSULE, DELAYED RELEASE ORAL DAILY
Qty: 30 CAPSULE | Refills: 3 | Status: SHIPPED | OUTPATIENT
Start: 2019-12-05 | End: 2020-09-15 | Stop reason: CLARIF

## 2019-12-05 RX ORDER — ALPRAZOLAM 0.5 MG/1
0.5 TABLET ORAL 3 TIMES DAILY PRN
Qty: 60 TABLET | Refills: 0 | Status: SHIPPED | OUTPATIENT
Start: 2019-12-05 | End: 2020-10-26 | Stop reason: SDUPTHER

## 2019-12-05 RX ORDER — LISDEXAMFETAMINE DIMESYLATE 30 MG/1
30 CAPSULE ORAL EVERY MORNING
Qty: 30 CAPSULE | Refills: 0 | Status: CANCELLED | OUTPATIENT
Start: 2019-12-05

## 2019-12-05 RX ORDER — SIMVASTATIN 20 MG/1
20 TABLET, FILM COATED ORAL NIGHTLY
Qty: 90 TABLET | Refills: 1 | Status: SHIPPED | OUTPATIENT
Start: 2019-12-05 | End: 2020-05-18

## 2019-12-05 RX ORDER — MONTELUKAST SODIUM 10 MG/1
10 TABLET ORAL NIGHTLY
Qty: 90 TABLET | Refills: 1 | Status: SHIPPED | OUTPATIENT
Start: 2019-12-05 | End: 2020-09-21 | Stop reason: SDUPTHER

## 2019-12-05 RX ORDER — SUMATRIPTAN SUCCINATE 100 MG/1
100 TABLET ORAL
Qty: 27 TABLET | Refills: 3 | Status: SHIPPED | OUTPATIENT
Start: 2019-12-05 | End: 2020-10-26 | Stop reason: SDUPTHER

## 2019-12-05 NOTE — PROGRESS NOTES
Subjective:       Patient ID: Rosaura Loredo is a 43 y.o. female.    Chief Complaint: ADHD (continuity of care); Depression; Anxiety; and Hyperlipidemia    Here for routine follow up and med refills.  She has been having worsening issues with depression over the last month or so.   She also has issues with anxiety but that hasn't really been significantly.  She has been on SSRIs in the past, she thinks paxil and zoloft, but doesn't remember them working well.  PHQ9 score 14.   Her focus and concentration have been fine; she reports she only takes the vyvanse about 3 days per week and that she still has a month at the pharmacy.   Has taken toprol twice in the last month for PACs.    Review of Systems   Constitutional: Negative for activity change, chills, fatigue, fever and unexpected weight change.   HENT: Negative for congestion, hearing loss, postnasal drip, rhinorrhea, trouble swallowing and voice change.    Eyes: Negative for photophobia, discharge and visual disturbance.   Respiratory: Negative for apnea, cough, choking, chest tightness, shortness of breath and wheezing.    Cardiovascular: Positive for palpitations. Negative for chest pain and leg swelling.   Gastrointestinal: Negative for abdominal pain, blood in stool, constipation, diarrhea, nausea, rectal pain and vomiting.   Endocrine: Negative for cold intolerance, heat intolerance, polydipsia and polyuria.   Genitourinary: Negative for decreased urine volume, difficulty urinating, dysuria, frequency, genital sores, hematuria, menstrual problem, pelvic pain, urgency, vaginal bleeding and vaginal discharge.   Musculoskeletal: Negative for arthralgias, back pain, gait problem, joint swelling, myalgias, neck pain and neck stiffness.   Skin: Negative for color change, rash and wound.   Allergic/Immunologic: Negative for environmental allergies and food allergies.   Neurological: Negative for dizziness, tremors, seizures, syncope, facial asymmetry,  speech difficulty, weakness, light-headedness, numbness and headaches.   Hematological: Negative for adenopathy. Does not bruise/bleed easily.   Psychiatric/Behavioral: Negative for confusion, dysphoric mood, hallucinations, sleep disturbance and suicidal ideas. The patient is nervous/anxious.        Past Medical History:   Diagnosis Date    Allergy     Anxiety     Asthma     Hyperlipidemia     PAC (premature atrial contraction)     Rosacea       Past Surgical History:   Procedure Laterality Date     SECTION      COLD KNIFE CONIZATION OF CERVIX      multiple    HYSTERECTOMY      ovaries intact    TONSILLECTOMY         Family History   Problem Relation Age of Onset    Diabetes Mother     Asthma Mother     Hypertension Father     Prostate cancer Father        Social History     Socioeconomic History    Marital status:      Spouse name: Not on file    Number of children: Not on file    Years of education: Not on file    Highest education level: Not on file   Occupational History    Not on file   Social Needs    Financial resource strain: Not hard at all    Food insecurity:     Worry: Never true     Inability: Never true    Transportation needs:     Medical: No     Non-medical: No   Tobacco Use    Smoking status: Never Smoker    Smokeless tobacco: Never Used   Substance and Sexual Activity    Alcohol use: Yes     Frequency: 2-4 times a month     Drinks per session: 1 or 2     Binge frequency: Less than monthly    Drug use: No    Sexual activity: Yes     Birth control/protection: See Surgical Hx, Surgical   Lifestyle    Physical activity:     Days per week: 0 days     Minutes per session: Not on file    Stress: Rather much   Relationships    Social connections:     Talks on phone: More than three times a week     Gets together: Once a week     Attends [employer] service: Not on file     Active member of club or organization: Yes     Attends meetings of clubs or organizations:  "More than 4 times per year     Relationship status:    Other Topics Concern    Not on file   Social History Narrative    Live with        Current Outpatient Medications   Medication Sig Dispense Refill    albuterol 90 mcg/actuation inhaler Inhale 2 puffs into the lungs every 6 (six) hours as needed for Wheezing. 1 Inhaler 2    ALPRAZolam (XANAX) 0.5 MG tablet Take 1 tablet (0.5 mg total) by mouth 3 (three) times daily as needed for Anxiety. 60 tablet 0    lisdexamfetamine (VYVANSE) 30 MG capsule Take 1 capsule (30 mg total) by mouth every morning. 30 capsule 0    lisdexamfetamine (VYVANSE) 30 MG capsule Take 1 capsule (30 mg total) by mouth every morning. 30 capsule 0    lisdexamfetamine (VYVANSE) 30 MG capsule Take 1 capsule (30 mg total) by mouth every morning. 30 capsule 0    loratadine (CLARITIN) 10 mg tablet Take 1 tablet (10 mg total) by mouth once daily. 90 tablet 1    montelukast (SINGULAIR) 10 mg tablet Take 1 tablet (10 mg total) by mouth every evening. 90 tablet 1    simvastatin (ZOCOR) 20 MG tablet Take 1 tablet (20 mg total) by mouth every evening. 90 tablet 1    DULoxetine (CYMBALTA) 60 MG capsule Take 1 capsule (60 mg total) by mouth once daily. 30 capsule 3    sumatriptan (IMITREX) 100 MG tablet Take 1 tablet (100 mg total) by mouth every 2 (two) hours as needed for Migraine. Do not exceed 2 doses in 24 hours. 27 tablet 3     No current facility-administered medications for this visit.        Review of patient's allergies indicates:   Allergen Reactions    Mepergan fortis Shortness Of Breath    Penicillins Rash    Sulfa (sulfonamide antibiotics) Rash     Objective:    HPI     ADHD      Additional comments: continuity of care          Last edited by Ismael Raymond MA on 12/5/2019 10:41 AM. (History)      Blood pressure 128/84, pulse 80, temperature 97.7 °F (36.5 °C), temperature source Temporal, height 5' 6.5" (1.689 m), weight 73 kg (161 lb), SpO2 99 %. Body mass index is " 25.6 kg/m².   Physical Exam      No visits with results within 1 Month(s) from this visit.   Latest known visit with results is:   Office Visit on 09/05/2019   Component Date Value Ref Range Status    Glucose 11/04/2019 89  65 - 99 mg/dL Final    Comment:               Fasting reference interval         BUN, Bld 11/04/2019 12  7 - 25 mg/dL Final    Creatinine 11/04/2019 0.70  0.50 - 1.10 mg/dL Final    eGFR if non African American 11/04/2019 105  > OR = 60 mL/min/1.73m2 Final    eGFR if  11/04/2019 121  > OR = 60 mL/min/1.73m2 Final    BUN/Creatinine Ratio 11/04/2019 NOT APPLICABLE  6 - 22 (calc) Final    Sodium 11/04/2019 139  135 - 146 mmol/L Final    Potassium 11/04/2019 4.2  3.5 - 5.3 mmol/L Final    Chloride 11/04/2019 103  98 - 110 mmol/L Final    CO2 11/04/2019 31  20 - 32 mmol/L Final    Calcium 11/04/2019 9.3  8.6 - 10.2 mg/dL Final    Total Protein 11/04/2019 6.8  6.1 - 8.1 g/dL Final    Albumin 11/04/2019 4.4  3.6 - 5.1 g/dL Final    Globulin, Total 11/04/2019 2.4  1.9 - 3.7 g/dL (calc) Final    Albumin/Globulin Ratio 11/04/2019 1.8  1.0 - 2.5 (calc) Final    Total Bilirubin 11/04/2019 0.6  0.2 - 1.2 mg/dL Final    Alkaline Phosphatase 11/04/2019 67  33 - 115 U/L Final    AST 11/04/2019 15  10 - 35 U/L Final    ALT 11/04/2019 12  6 - 29 U/L Final    Cholesterol 11/04/2019 160  <200 mg/dL Final    HDL 11/04/2019 55  >50 mg/dL Final    Triglycerides 11/04/2019 97  <150 mg/dL Final    LDL Cholesterol 11/04/2019 86  mg/dL (calc) Final    Comment: Reference range: <100     Desirable range <100 mg/dL for primary prevention;    <70 mg/dL for patients with CHD or diabetic patients   with > or = 2 CHD risk factors.     LDL-C is now calculated using the Brian-Gracia   calculation, which is a validated novel method providing   better accuracy than the Friedewald equation in the   estimation of LDL-C.   Brian FRAUSTO et al. JACQUELINE. 2013;310(): 1987-3630    (http://education.[employer].MiArch/faq/OKD413)      Hdl/Cholesterol Ratio 11/04/2019 2.9  <5.0 (calc) Final    Non HDL Chol. (LDL+VLDL) 11/04/2019 105  <130 mg/dL (calc) Final    Comment: For patients with diabetes plus 1 major ASCVD risk   factor, treating to a non-HDL-C goal of <100 mg/dL   (LDL-C of <70 mg/dL) is considered a therapeutic   option.     ]  Assessment:       1. Anxiety and depression    2. Familial hypercholesterolemia    3. Chronic seasonal allergic rhinitis due to pollen    4. Attention deficit hyperactivity disorder (ADHD), predominantly inattentive type    5. Migraine without aura and without status migrainosus, not intractable        Plan:       Rosaura was seen today for adhd, depression, anxiety and hyperlipidemia.    Diagnoses and all orders for this visit:    Anxiety and depression  -     ALPRAZolam (XANAX) 0.5 MG tablet; Take 1 tablet (0.5 mg total) by mouth 3 (three) times daily as needed for Anxiety.  -     DULoxetine (CYMBALTA) 60 MG capsule; Take 1 capsule (60 mg total) by mouth once daily.    Familial hypercholesterolemia  -     simvastatin (ZOCOR) 20 MG tablet; Take 1 tablet (20 mg total) by mouth every evening.    Chronic seasonal allergic rhinitis due to pollen  -     montelukast (SINGULAIR) 10 mg tablet; Take 1 tablet (10 mg total) by mouth every evening.    Attention deficit hyperactivity disorder (ADHD), predominantly inattentive type    Migraine without aura and without status migrainosus, not intractable  -     sumatriptan (IMITREX) 100 MG tablet; Take 1 tablet (100 mg total) by mouth every 2 (two) hours as needed for Migraine. Do not exceed 2 doses in 24 hours.    Other orders  -     Cancel: lisdexamfetamine (VYVANSE) 30 MG capsule; Take 1 capsule (30 mg total) by mouth every morning.  -     Cancel: lisdexamfetamine (VYVANSE) 30 MG capsule; Take 1 capsule (30 mg total) by mouth every morning.  -     Cancel: lisdexamfetamine (VYVANSE) 30 MG capsule; Take 1 capsule (30 mg  total) by mouth every morning.

## 2020-01-27 ENCOUNTER — HOSPITAL ENCOUNTER (OUTPATIENT)
Dept: RADIOLOGY | Facility: HOSPITAL | Age: 45
Discharge: HOME | End: 2020-01-27
Attending: SPECIALIST
Payer: COMMERCIAL

## 2020-01-27 VITALS — BODY MASS INDEX: 25.26 KG/M2 | WEIGHT: 160.94 LBS | HEIGHT: 67 IN

## 2020-01-27 DIAGNOSIS — Z12.39 SCREENING BREAST EXAMINATION: ICD-10-CM

## 2020-01-27 PROCEDURE — 77067 SCR MAMMO BI INCL CAD: CPT | Mod: TC,PO

## 2020-04-23 DIAGNOSIS — Z01.84 ANTIBODY RESPONSE EXAMINATION: ICD-10-CM

## 2020-04-27 ENCOUNTER — LAB VISIT (OUTPATIENT)
Dept: LAB | Facility: HOSPITAL | Age: 45
End: 2020-04-27
Attending: INTERNAL MEDICINE
Payer: COMMERCIAL

## 2020-04-27 DIAGNOSIS — Z01.84 ANTIBODY RESPONSE EXAMINATION: ICD-10-CM

## 2020-04-27 LAB — SARS-COV-2 IGG SERPL QL IA: NEGATIVE

## 2020-04-27 PROCEDURE — 86769 SARS-COV-2 COVID-19 ANTIBODY: CPT

## 2020-04-27 PROCEDURE — 36415 COLL VENOUS BLD VENIPUNCTURE: CPT

## 2020-05-18 DIAGNOSIS — E78.01 FAMILIAL HYPERCHOLESTEROLEMIA: ICD-10-CM

## 2020-05-18 RX ORDER — SIMVASTATIN 20 MG/1
TABLET, FILM COATED ORAL
Qty: 90 TABLET | Refills: 0 | Status: SHIPPED | OUTPATIENT
Start: 2020-05-18 | End: 2020-09-15 | Stop reason: CLARIF

## 2020-08-27 ENCOUNTER — OFFICE VISIT (OUTPATIENT)
Dept: PRIMARY CARE CLINIC | Facility: CLINIC | Age: 45
End: 2020-08-27
Payer: COMMERCIAL

## 2020-08-27 DIAGNOSIS — Z20.822 SUSPECTED COVID-19 VIRUS INFECTION: Primary | ICD-10-CM

## 2020-08-27 PROCEDURE — 99203 OFFICE O/P NEW LOW 30 MIN: CPT | Mod: S$GLB,CS,, | Performed by: EMERGENCY MEDICINE

## 2020-08-27 PROCEDURE — 99203 PR OFFICE/OUTPT VISIT, NEW, LEVL III, 30-44 MIN: ICD-10-PCS | Mod: S$GLB,CS,, | Performed by: EMERGENCY MEDICINE

## 2020-08-27 PROCEDURE — U0003 INFECTIOUS AGENT DETECTION BY NUCLEIC ACID (DNA OR RNA); SEVERE ACUTE RESPIRATORY SYNDROME CORONAVIRUS 2 (SARS-COV-2) (CORONAVIRUS DISEASE [COVID-19]), AMPLIFIED PROBE TECHNIQUE, MAKING USE OF HIGH THROUGHPUT TECHNOLOGIES AS DESCRIBED BY CMS-2020-01-R: HCPCS

## 2020-08-27 NOTE — PROGRESS NOTES
Subjective:        Time seen by provider: 2:13 PM on 08/27/2020    Rosaura Loredo is a 44 y.o. female with asthma who presents for an evaluation of possible COVID-19. She complains of mild SOB, mild cough, and HA. The patient states her symptoms began a few days ago with a head cold and has had no known positive exposure but does work in health care. She denies fever or any other symptoms at this time. No pulmonary PSHx.     Review of Systems   Constitutional: Negative for activity change, appetite change, fatigue and fever.   HENT: Negative for congestion, rhinorrhea and sore throat.    Respiratory: Positive for cough and shortness of breath. Negative for chest tightness and wheezing.    Cardiovascular: Negative for chest pain and palpitations.   Gastrointestinal: Negative for diarrhea, nausea and vomiting.   Musculoskeletal: Negative for arthralgias and myalgias.   Skin: Negative for rash.   Neurological: Positive for headaches. Negative for weakness, light-headedness and numbness.       Objective:      Physical Exam  Vitals signs and nursing note reviewed.   Constitutional:       General: She is not in acute distress.     Appearance: She is well-developed. She is not diaphoretic.   HENT:      Head: Normocephalic and atraumatic.      Nose: Nose normal.   Eyes:      Conjunctiva/sclera: Conjunctivae normal.   Neck:      Musculoskeletal: Normal range of motion.   Cardiovascular:      Rate and Rhythm: Normal rate and regular rhythm.      Heart sounds: Normal heart sounds. No murmur.   Pulmonary:      Effort: No respiratory distress.      Breath sounds: Normal breath sounds. No wheezing.   Musculoskeletal: Normal range of motion.   Skin:     General: Skin is warm and dry.   Neurological:      Mental Status: She is alert and oriented to person, place, and time.         Assessment:       1. Suspected COVID-19  COVID-19 Routine Screening     Plan:       1. Suspected COVID-19  COVID-19 Routine Screening    2. Discharge home  and await results.   3. Return to clinic or ED for new or worsening symptoms.   4. Follow-up with PCP as needed.     Scribe Attestation:   I, Allie Rico, am scribing for, and in the presence of, Shelby Mcpherson PA-C. I performed the above scribed service and the documentation accurately describes the services I performed. I attest to the accuracy of the note.    I, Shelby Mcpherson PA-C, personally performed the services described in this documentation. All medical record entries made by the scribe were at my direction and in my presence.  I have reviewed the chart and agree that the record reflects my personal performance and is accurate and complete. Shelby Mcpherson PA-C.  6:57 PM 08/27/2020

## 2020-08-27 NOTE — PATIENT INSTRUCTIONS
Instructions for Patients with Confirmed or Suspected COVID-19    If you are awaiting your test result, you will either be called or it will be released to the patient portal.  If you have any questions about your test, please visit [email or web address] or call our COVID-19 information line at [phone].      Instructions for non-hospitalized or discharged patients with confirmed or suspected COVID-19:       Stay home except to get medical care.    Separate yourself from other people and animals in your home.    Call ahead before visiting your doctor.    Wear a face mask.    Cover your coughs and sneezes.    Clean your hands often.    Avoid sharing personal household items.    Clean all high-touch surfaces every day.    Monitor your symptoms. Seek prompt medical attention if your illness is worsening (e.g., difficulty breathing). Before seeking care, call your healthcare provider.    If you have a medical emergency and must call 911, notify the dispatcher that you have or are being evaluated for COVID-19. If possible, put on a face mask before emergency medical services arrive.    Use the following symptom-based strategy to return to normal activity following a suspected or confirmed case of COVID-19. Continue isolation until:   o At least 3 days (72 hours) have passed since recovery defined as resolution of fever without the use of fever-reducing medications and improvement in respiratory symptoms (e.g. cough, shortness of breath), and   o At least 10 days have passed since the first positive test.       As one of the next steps, you will receive a call or text from the [place] Department of Health ([facility]) COVID-19 Tracing Team. See the contact information below so you know not to ignore the health departments call. It is important that you contact them back immediately so they can help.     Contact Tracer Number:  [phone]  Caller ID for most carriers: [place] Dept [facility]    What is  contact tracing?   Contact tracing is a process that helps identify everyone who has been in close contact with an infected person. Contact tracers let those people know they may have been exposed and guide them on next steps. Confidentiality is important for everyone; no one will be told who may have exposed them to the virus.   Your involvement is important. The more we know about where and how this virus is spreading, the better chance we have at stopping it from spreading further.  What does exposure mean?   Exposure means you have been within 6 feet for more than 15 minutes with a person who has or had COVID-19.  What kind of questions do the contact tracers ask?   A contact tracer will confirm your basic contact information including name, address, phone number, and next of kin, as well as asking about any symptoms you may have had. Theyll also ask you how you think you may have gotten sick, such as places where you may have been exposed to the virus, and people you were with. Those names will never be shared with anyone outside of that call, and will only be used to help trace and stop the spread of the virus.   I have privacy concerns. How will the state use my information?   Your privacy about your health is important. All calls are completed using call centers that use the appropriate health privacy protection measures (HIPAA compliance), meaning that your patient information is safe. No one will ever ask you any questions related to immigration status. Your health comes first.   Do I have to participate?   You do not have to participate, but we strongly encourage you to. Contact tracing can help us catch and control new outbreaks as theyre developing to keep your friends and family safe.   What if I dont hear from anyone?   If you dont receive a call within 24 hours, you can call the number above right away to inquire about your status. That line is open from 8:00 am - 8:00 p.m., 7 days a  week.  Contact tracing saves lives! Together, we have the power to beat this virus and keep our loved ones and neighbors safe.       Instructions for household members, intimate partners and caregivers in a non-healthcare setting of a patient with confirmed or suspected COVID-19:         Close contacts should monitor their health and call their healthcare provider right away if they develop symptoms suggestive of COVID-19 (e.g., fever, cough, shortness of breath).    Stay home except to get medical care. Separate yourself from other people and animals in the home.   Monitor the patients symptoms. If the patient is getting sicker, call his or her healthcare provider. If the patient has a medical emergency and you need to call 911, notify the dispatch personnel that the patient has or is being evaluated for COVID-19.    Wear a facemask when around other people such as sharing a room or vehicle and before entering a healthcare provider's office.   Cover coughs and sneezes with a tissue. Throw used tissues in a lined trash can immediately and wash hands.   Clean hands often with soap and water for at least 20 seconds or with an alcohol-based hand , rubbing hands together until they feel dry. Avoid touching your eyes, nose, and mouth with unwashed hands.   Clean all high-touch; surfaces every day, including counters, tabletops, doorknobs, bathroom fixtures, toilets, phones, keyboards, tablets, bedside tables, etc. Use a household cleaning spray or wipe according to label instructions.   Avoid sharing personal household items such as dishes, drinking glasses, cups, towels, bedding, etc. After these items are used, they should be washed thoroughly with soap and water.   Continue isolation until:   At least 3 days (72 hours) have passed since recovery defined as resolution of fever without the use of fever-reducing medications and improvement in respiratory symptoms (e.g. cough, shortness of breath),  and    At least 10 days have passed since the patients first positive test.    https://www.cdc.gov/coronavirus/2019-ncov/your-health/index.htm

## 2020-08-28 LAB — SARS-COV-2 RNA RESP QL NAA+PROBE: NOT DETECTED

## 2020-09-02 ENCOUNTER — OFFICE VISIT (OUTPATIENT)
Dept: PRIMARY CARE CLINIC | Facility: CLINIC | Age: 45
End: 2020-09-02
Payer: COMMERCIAL

## 2020-09-02 VITALS
DIASTOLIC BLOOD PRESSURE: 76 MMHG | TEMPERATURE: 99 F | OXYGEN SATURATION: 100 % | RESPIRATION RATE: 18 BRPM | SYSTOLIC BLOOD PRESSURE: 132 MMHG | HEART RATE: 72 BPM

## 2020-09-02 DIAGNOSIS — Z20.822 SUSPECTED COVID-19 VIRUS INFECTION: ICD-10-CM

## 2020-09-02 PROCEDURE — 99203 OFFICE O/P NEW LOW 30 MIN: CPT | Mod: S$GLB,CS,ICN, | Performed by: NURSE PRACTITIONER

## 2020-09-02 PROCEDURE — 99203 PR OFFICE/OUTPT VISIT, NEW, LEVL III, 30-44 MIN: ICD-10-PCS | Mod: S$GLB,CS,ICN, | Performed by: NURSE PRACTITIONER

## 2020-09-02 PROCEDURE — U0003 INFECTIOUS AGENT DETECTION BY NUCLEIC ACID (DNA OR RNA); SEVERE ACUTE RESPIRATORY SYNDROME CORONAVIRUS 2 (SARS-COV-2) (CORONAVIRUS DISEASE [COVID-19]), AMPLIFIED PROBE TECHNIQUE, MAKING USE OF HIGH THROUGHPUT TECHNOLOGIES AS DESCRIBED BY CMS-2020-01-R: HCPCS

## 2020-09-02 NOTE — PATIENT INSTRUCTIONS
Instructions for Patients with Confirmed or Suspected COVID-19    If you are awaiting your test result, you will either be called or it will be released to the patient portal.  If you have any questions about your test, please visit [email or web address] or call our COVID-19 information line at [phone].      Instructions for non-hospitalized or discharged patients with confirmed or suspected COVID-19:       Stay home except to get medical care.    Separate yourself from other people and animals in your home.    Call ahead before visiting your doctor.    Wear a face mask.    Cover your coughs and sneezes.    Clean your hands often.    Avoid sharing personal household items.    Clean all high-touch surfaces every day.    Monitor your symptoms. Seek prompt medical attention if your illness is worsening (e.g., difficulty breathing). Before seeking care, call your healthcare provider.    If you have a medical emergency and must call 911, notify the dispatcher that you have or are being evaluated for COVID-19. If possible, put on a face mask before emergency medical services arrive.    Use the following symptom-based strategy to return to normal activity following a suspected or confirmed case of COVID-19. Continue isolation until:   o At least 3 days (72 hours) have passed since recovery defined as resolution of fever without the use of fever-reducing medications and improvement in respiratory symptoms (e.g. cough, shortness of breath), and   o At least 10 days have passed since the first positive test.       As one of the next steps, you will receive a call or text from the [place] Department of Health ([facility]) COVID-19 Tracing Team. See the contact information below so you know not to ignore the health departments call. It is important that you contact them back immediately so they can help.     Contact Tracer Number:  [phone]  Caller ID for most carriers: [place] Dept [facility]    What is  contact tracing?   Contact tracing is a process that helps identify everyone who has been in close contact with an infected person. Contact tracers let those people know they may have been exposed and guide them on next steps. Confidentiality is important for everyone; no one will be told who may have exposed them to the virus.   Your involvement is important. The more we know about where and how this virus is spreading, the better chance we have at stopping it from spreading further.  What does exposure mean?   Exposure means you have been within 6 feet for more than 15 minutes with a person who has or had COVID-19.  What kind of questions do the contact tracers ask?   A contact tracer will confirm your basic contact information including name, address, phone number, and next of kin, as well as asking about any symptoms you may have had. Theyll also ask you how you think you may have gotten sick, such as places where you may have been exposed to the virus, and people you were with. Those names will never be shared with anyone outside of that call, and will only be used to help trace and stop the spread of the virus.   I have privacy concerns. How will the state use my information?   Your privacy about your health is important. All calls are completed using call centers that use the appropriate health privacy protection measures (HIPAA compliance), meaning that your patient information is safe. No one will ever ask you any questions related to immigration status. Your health comes first.   Do I have to participate?   You do not have to participate, but we strongly encourage you to. Contact tracing can help us catch and control new outbreaks as theyre developing to keep your friends and family safe.   What if I dont hear from anyone?   If you dont receive a call within 24 hours, you can call the number above right away to inquire about your status. That line is open from 8:00 am - 8:00 p.m., 7 days a  week.  Contact tracing saves lives! Together, we have the power to beat this virus and keep our loved ones and neighbors safe.       Instructions for household members, intimate partners and caregivers in a non-healthcare setting of a patient with confirmed or suspected COVID-19:         Close contacts should monitor their health and call their healthcare provider right away if they develop symptoms suggestive of COVID-19 (e.g., fever, cough, shortness of breath).    Stay home except to get medical care. Separate yourself from other people and animals in the home.   Monitor the patients symptoms. If the patient is getting sicker, call his or her healthcare provider. If the patient has a medical emergency and you need to call 911, notify the dispatch personnel that the patient has or is being evaluated for COVID-19.    Wear a facemask when around other people such as sharing a room or vehicle and before entering a healthcare provider's office.   Cover coughs and sneezes with a tissue. Throw used tissues in a lined trash can immediately and wash hands.   Clean hands often with soap and water for at least 20 seconds or with an alcohol-based hand , rubbing hands together until they feel dry. Avoid touching your eyes, nose, and mouth with unwashed hands.   Clean all high-touch; surfaces every day, including counters, tabletops, doorknobs, bathroom fixtures, toilets, phones, keyboards, tablets, bedside tables, etc. Use a household cleaning spray or wipe according to label instructions.   Avoid sharing personal household items such as dishes, drinking glasses, cups, towels, bedding, etc. After these items are used, they should be washed thoroughly with soap and water.   Continue isolation until:   At least 3 days (72 hours) have passed since recovery defined as resolution of fever without the use of fever-reducing medications and improvement in respiratory symptoms (e.g. cough, shortness of breath),  and    At least 10 days have passed since the patients first positive test.    https://www.cdc.gov/coronavirus/2019-ncov/your-health/index.htm

## 2020-09-02 NOTE — PROGRESS NOTES
Subjective:        Time seen by provider: 3:59 PM on 09/02/2020    Rosaura Loredo is a 44 y.o. female with asthma who presents for an evaluation of possible COVID-19. She complains of persistent fatigue and bilateral shoulder pain. The patient states her symptoms began 7 days ago and had a negative COVID test the same day on 8/27 but was sent to the clinic by her employer for a second test. She has had no known positive exposure but is a nurse at [employer]. The patient reports no fever in the last 5 days. Her Tmax was 99.8°. She denies any other symptoms at this time. No pulmonary PSHx.     Review of Systems   Constitutional: Positive for fatigue. Negative for activity change, appetite change and fever.   HENT: Negative for congestion, rhinorrhea and sore throat.    Respiratory: Negative for cough, chest tightness, shortness of breath and wheezing.    Cardiovascular: Negative for chest pain and palpitations.   Gastrointestinal: Negative for diarrhea, nausea and vomiting.   Musculoskeletal: Positive for arthralgias. Negative for myalgias.   Skin: Negative for rash.   Neurological: Negative for weakness, light-headedness, numbness and headaches.       Objective:      Physical Exam  Vitals signs and nursing note reviewed.   Constitutional:       General: She is not in acute distress.     Appearance: She is well-developed. She is not diaphoretic.   HENT:      Head: Normocephalic and atraumatic.      Nose: Nose normal.   Eyes:      Conjunctiva/sclera: Conjunctivae normal.   Neck:      Musculoskeletal: Normal range of motion and neck supple.   Cardiovascular:      Rate and Rhythm: Normal rate and regular rhythm.      Heart sounds: Normal heart sounds. No murmur.   Pulmonary:      Effort: No respiratory distress.      Breath sounds: Normal breath sounds. No wheezing.   Musculoskeletal: Normal range of motion.   Skin:     General: Skin is warm and dry.   Neurological:      Mental Status: She is alert and  oriented to person, place, and time.         Assessment:       1. Suspected COVID-19  COVID-19 Routine Screening     Plan:       1. 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 No documented fever but persistent body aches and fatigue. Instructed to follow up with her PCP pending test results for further evaluation. Pt verbalized understanding and agreeable to plan of care.   - COVID-19 Routine Screening    2. Discharge home and await results.   3. Return to clinic or ED for new or worsening symptoms.        Scribe Attestation:   I, Allie Rico, am scribing for, and in the presence of, DAMARIS Zhang. I performed the above scribed service and the documentation accurately describes the services I performed. I attest to the accuracy of the note.    I, DAMARIS Zhang, personally performed the services described in this documentation. All medical record entries made by the scribe were at my direction and in my presence.  I have reviewed the chart and agree that the record reflects my personal performance and is accurate and complete. DAMARIS Zhang.  4:54 PM 09/02/2020

## 2020-09-03 LAB — SARS-COV-2 RNA RESP QL NAA+PROBE: DETECTED

## 2020-09-04 DIAGNOSIS — U07.1 COVID-19 VIRUS DETECTED: ICD-10-CM

## 2020-09-15 ENCOUNTER — HOSPITAL ENCOUNTER (OUTPATIENT)
Facility: HOSPITAL | Age: 45
Discharge: HOME | End: 2020-09-16
Attending: EMERGENCY MEDICINE | Admitting: INTERNAL MEDICINE
Payer: COMMERCIAL

## 2020-09-15 DIAGNOSIS — R10.31 RIGHT LOWER QUADRANT ABDOMINAL PAIN: Primary | ICD-10-CM

## 2020-09-15 LAB
ALBUMIN SERPL BCP-MCNC: 4.6 G/DL (ref 3.5–5.2)
ALP SERPL-CCNC: 59 U/L (ref 55–135)
ALT SERPL W/O P-5'-P-CCNC: 46 U/L (ref 10–44)
ANION GAP SERPL CALC-SCNC: 14 MMOL/L (ref 8–16)
AST SERPL-CCNC: 31 U/L (ref 10–40)
BASOPHILS # BLD AUTO: 0.03 K/UL (ref 0–0.2)
BASOPHILS NFR BLD: 0.4 % (ref 0–1.9)
BILIRUB SERPL-MCNC: 0.9 MG/DL (ref 0.1–1)
BILIRUB UR QL STRIP: NEGATIVE
BUN SERPL-MCNC: 15 MG/DL (ref 6–20)
CALCIUM SERPL-MCNC: 9.5 MG/DL (ref 8.7–10.5)
CHLORIDE SERPL-SCNC: 99 MMOL/L (ref 95–110)
CLARITY UR: CLEAR
CO2 SERPL-SCNC: 24 MMOL/L (ref 23–29)
COLOR UR: YELLOW
CREAT SERPL-MCNC: 0.5 MG/DL (ref 0.5–1.4)
DIFFERENTIAL METHOD: NORMAL
EOSINOPHIL # BLD AUTO: 0.1 K/UL (ref 0–0.5)
EOSINOPHIL NFR BLD: 1.1 % (ref 0–8)
ERYTHROCYTE [DISTWIDTH] IN BLOOD BY AUTOMATED COUNT: 12.8 % (ref 11.5–14.5)
EST. GFR  (AFRICAN AMERICAN): >60 ML/MIN/1.73 M^2
EST. GFR  (NON AFRICAN AMERICAN): >60 ML/MIN/1.73 M^2
GLUCOSE SERPL-MCNC: 95 MG/DL (ref 70–110)
GLUCOSE UR QL STRIP: NEGATIVE
HCT VFR BLD AUTO: 40.1 % (ref 37–48.5)
HGB BLD-MCNC: 13.5 G/DL (ref 12–16)
HGB UR QL STRIP: NEGATIVE
IMM GRANULOCYTES # BLD AUTO: 0.04 K/UL (ref 0–0.04)
IMM GRANULOCYTES NFR BLD AUTO: 0.5 % (ref 0–0.5)
KETONES UR QL STRIP: ABNORMAL
LEUKOCYTE ESTERASE UR QL STRIP: NEGATIVE
LIPASE SERPL-CCNC: 28 U/L (ref 4–60)
LYMPHOCYTES # BLD AUTO: 1.9 K/UL (ref 1–4.8)
LYMPHOCYTES NFR BLD: 22.6 % (ref 18–48)
MAGNESIUM SERPL-MCNC: 2 MG/DL (ref 1.6–2.6)
MCH RBC QN AUTO: 30 PG (ref 27–31)
MCHC RBC AUTO-ENTMCNC: 33.7 G/DL (ref 32–36)
MCV RBC AUTO: 89 FL (ref 82–98)
MONOCYTES # BLD AUTO: 0.6 K/UL (ref 0.3–1)
MONOCYTES NFR BLD: 7.2 % (ref 4–15)
NEUTROPHILS # BLD AUTO: 5.6 K/UL (ref 1.8–7.7)
NEUTROPHILS NFR BLD: 68.2 % (ref 38–73)
NITRITE UR QL STRIP: NEGATIVE
NRBC BLD-RTO: 0 /100 WBC
PH UR STRIP: 8 [PH] (ref 5–8)
PLATELET # BLD AUTO: 323 K/UL (ref 150–350)
PMV BLD AUTO: 9.5 FL (ref 9.2–12.9)
POTASSIUM SERPL-SCNC: 4.1 MMOL/L (ref 3.5–5.1)
PROT SERPL-MCNC: 7.4 G/DL (ref 6–8.4)
PROT UR QL STRIP: NEGATIVE
RBC # BLD AUTO: 4.5 M/UL (ref 4–5.4)
SODIUM SERPL-SCNC: 137 MMOL/L (ref 136–145)
SP GR UR STRIP: 1.02 (ref 1–1.03)
URN SPEC COLLECT METH UR: ABNORMAL
UROBILINOGEN UR STRIP-ACNC: NEGATIVE EU/DL
WBC # BLD AUTO: 8.19 K/UL (ref 3.9–12.7)

## 2020-09-15 PROCEDURE — 36415 COLL VENOUS BLD VENIPUNCTURE: CPT

## 2020-09-15 PROCEDURE — 85025 COMPLETE CBC W/AUTO DIFF WBC: CPT

## 2020-09-15 PROCEDURE — 81003 URINALYSIS AUTO W/O SCOPE: CPT

## 2020-09-15 PROCEDURE — G0378 HOSPITAL OBSERVATION PER HR: HCPCS

## 2020-09-15 PROCEDURE — 99900035 HC TECH TIME PER 15 MIN (STAT)

## 2020-09-15 PROCEDURE — 96360 HYDRATION IV INFUSION INIT: CPT | Mod: 59

## 2020-09-15 PROCEDURE — 96361 HYDRATE IV INFUSION ADD-ON: CPT

## 2020-09-15 PROCEDURE — 83690 ASSAY OF LIPASE: CPT

## 2020-09-15 PROCEDURE — 80053 COMPREHEN METABOLIC PANEL: CPT

## 2020-09-15 PROCEDURE — 25500020 PHARM REV CODE 255: Performed by: EMERGENCY MEDICINE

## 2020-09-15 PROCEDURE — 83735 ASSAY OF MAGNESIUM: CPT

## 2020-09-15 PROCEDURE — 25000003 PHARM REV CODE 250: Performed by: NURSE PRACTITIONER

## 2020-09-15 PROCEDURE — 99285 EMERGENCY DEPT VISIT HI MDM: CPT | Mod: 25

## 2020-09-15 PROCEDURE — 99900031 HC PATIENT EDUCATION (STAT)

## 2020-09-15 RX ORDER — MULTIVITAMIN
1 TABLET ORAL DAILY
COMMUNITY

## 2020-09-15 RX ORDER — SODIUM CHLORIDE 9 MG/ML
INJECTION, SOLUTION INTRAVENOUS CONTINUOUS
Status: DISCONTINUED | OUTPATIENT
Start: 2020-09-15 | End: 2020-09-16

## 2020-09-15 RX ORDER — SIMVASTATIN 20 MG/1
20 TABLET, FILM COATED ORAL NIGHTLY
COMMUNITY
End: 2020-10-26 | Stop reason: SDUPTHER

## 2020-09-15 RX ORDER — HYDROCODONE BITARTRATE AND ACETAMINOPHEN 5; 325 MG/1; MG/1
1 TABLET ORAL EVERY 6 HOURS PRN
Status: DISCONTINUED | OUTPATIENT
Start: 2020-09-15 | End: 2020-09-15

## 2020-09-15 RX ORDER — SIMVASTATIN 20 MG/1
20 TABLET, FILM COATED ORAL NIGHTLY
Status: DISCONTINUED | OUTPATIENT
Start: 2020-09-15 | End: 2020-09-16 | Stop reason: HOSPADM

## 2020-09-15 RX ORDER — ACETAMINOPHEN 500 MG
1000 TABLET ORAL EVERY 6 HOURS PRN
COMMUNITY
End: 2023-09-13

## 2020-09-15 RX ORDER — SODIUM CHLORIDE 0.9 % (FLUSH) 0.9 %
10 SYRINGE (ML) INJECTION
Status: DISCONTINUED | OUTPATIENT
Start: 2020-09-15 | End: 2020-09-16 | Stop reason: HOSPADM

## 2020-09-15 RX ORDER — ALBUTEROL SULFATE 90 UG/1
2 AEROSOL, METERED RESPIRATORY (INHALATION) EVERY 6 HOURS PRN
Status: DISCONTINUED | OUTPATIENT
Start: 2020-09-15 | End: 2020-09-16 | Stop reason: HOSPADM

## 2020-09-15 RX ORDER — SUMATRIPTAN 50 MG/1
100 TABLET, FILM COATED ORAL
Status: DISCONTINUED | OUTPATIENT
Start: 2020-09-15 | End: 2020-09-16 | Stop reason: HOSPADM

## 2020-09-15 RX ORDER — ONDANSETRON 2 MG/ML
4 INJECTION INTRAMUSCULAR; INTRAVENOUS EVERY 6 HOURS PRN
Status: DISCONTINUED | OUTPATIENT
Start: 2020-09-15 | End: 2020-09-16 | Stop reason: HOSPADM

## 2020-09-15 RX ORDER — IBUPROFEN 200 MG
400 TABLET ORAL EVERY 6 HOURS PRN
COMMUNITY

## 2020-09-15 RX ORDER — TIZANIDINE 4 MG/1
4 TABLET ORAL EVERY 8 HOURS PRN
Status: DISCONTINUED | OUTPATIENT
Start: 2020-09-15 | End: 2020-09-16 | Stop reason: HOSPADM

## 2020-09-15 RX ORDER — ACETAMINOPHEN 500 MG
1000 TABLET ORAL EVERY 6 HOURS PRN
Status: DISCONTINUED | OUTPATIENT
Start: 2020-09-15 | End: 2020-09-16 | Stop reason: HOSPADM

## 2020-09-15 RX ORDER — MONTELUKAST SODIUM 10 MG/1
10 TABLET ORAL NIGHTLY
Status: DISCONTINUED | OUTPATIENT
Start: 2020-09-15 | End: 2020-09-16 | Stop reason: HOSPADM

## 2020-09-15 RX ORDER — SIMETHICONE 80 MG
1 TABLET,CHEWABLE ORAL 3 TIMES DAILY PRN
Status: DISCONTINUED | OUTPATIENT
Start: 2020-09-15 | End: 2020-09-16 | Stop reason: HOSPADM

## 2020-09-15 RX ORDER — CETIRIZINE HYDROCHLORIDE 10 MG/1
10 TABLET ORAL DAILY
Status: DISCONTINUED | OUTPATIENT
Start: 2020-09-16 | End: 2020-09-16 | Stop reason: HOSPADM

## 2020-09-15 RX ORDER — HYDROXYZINE HYDROCHLORIDE 25 MG/1
25 TABLET, FILM COATED ORAL EVERY 6 HOURS PRN
Status: DISCONTINUED | OUTPATIENT
Start: 2020-09-15 | End: 2020-09-16 | Stop reason: HOSPADM

## 2020-09-15 RX ORDER — ALPRAZOLAM 0.5 MG/1
0.5 TABLET ORAL 3 TIMES DAILY PRN
Status: DISCONTINUED | OUTPATIENT
Start: 2020-09-15 | End: 2020-09-16 | Stop reason: HOSPADM

## 2020-09-15 RX ADMIN — IOHEXOL 100 ML: 350 INJECTION, SOLUTION INTRAVENOUS at 11:09

## 2020-09-15 RX ADMIN — SODIUM CHLORIDE: 0.9 INJECTION, SOLUTION INTRAVENOUS at 07:09

## 2020-09-15 RX ADMIN — SIMETHICONE CHEW TAB 80 MG 80 MG: 80 TABLET ORAL at 08:09

## 2020-09-15 RX ADMIN — TIZANIDINE 4 MG: 4 TABLET ORAL at 09:09

## 2020-09-15 NOTE — ED PROVIDER NOTES
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  The patient describes the pain as sharp and ringing in nature.  She had 1 episode of nausea and vomiting this morning.  She also had a stool at 3:00 a.m. this morning.  There is no blood in the emesis or stool.  No history any fever.  She admits having had a hysterectomy many years ago but still has her tubes and ovaries.  Denies any difficulty urinating. No complaint of any flank pain.        Review of patient's allergies indicates:   Allergen Reactions    Mepergan fortis Shortness Of Breath    Penicillins Rash    Sulfa (sulfonamide antibiotics) Rash     Past Medical History:   Diagnosis Date    Allergy     Anxiety     Asthma     Hyperlipidemia     PAC (premature atrial contraction)     Rosacea      Past Surgical History:   Procedure Laterality Date     SECTION      COLD KNIFE CONIZATION OF CERVIX      multiple    HYSTERECTOMY      ovaries intact    TONSILLECTOMY       Family History   Problem Relation Age of Onset    Diabetes Mother     Asthma Mother     Hypertension Father     Prostate cancer Father      Social History     Tobacco Use    Smoking status: Never Smoker    Smokeless tobacco: Never Used   Substance Use Topics    Alcohol use: Yes     Frequency: 2-4 times a month     Drinks per session: 1 or 2     Binge frequency: Less than monthly     Comment: occ    Drug use: No     Review of Systems   Constitutional: Positive for appetite change. Negative for chills, diaphoresis and fever.   HENT: Negative.  Negative for sore throat.    Respiratory: Negative.  Negative for shortness of breath.    Cardiovascular: Negative.  Negative for chest pain.   Gastrointestinal: Positive for abdominal pain,  nausea and vomiting.   Genitourinary: Negative for difficulty urinating and dysuria.   Musculoskeletal: Negative for back pain.   Skin: Negative for rash.   Neurological: Negative for weakness.   Hematological: Does not bruise/bleed easily.   All other systems reviewed and are negative.      Physical Exam     Initial Vitals [09/15/20 0855]   BP Pulse Resp Temp SpO2   (!) 140/68 98 16 98.1 °F (36.7 °C) 98 %      MAP       --         Physical Exam    Vitals reviewed.  Constitutional: She appears well-developed and well-nourished. She is not diaphoretic. No distress.   HENT:   Head: Normocephalic and atraumatic.   Nose: Nose normal.   Mouth/Throat: Oropharynx is clear and moist. No oropharyngeal exudate.   Eyes: Conjunctivae are normal. Pupils are equal, round, and reactive to light. Right eye exhibits no discharge. Left eye exhibits no discharge.   Neck: Normal range of motion. Neck supple. No JVD present.   Cardiovascular: Normal rate, regular rhythm, normal heart sounds and intact distal pulses. Exam reveals no gallop and no friction rub.    No murmur heard.  Pulmonary/Chest: Breath sounds normal. No respiratory distress. She has no wheezes. She has no rhonchi. She has no rales. She exhibits no tenderness.   Abdominal: Soft. Bowel sounds are normal. She exhibits no distension. There is abdominal tenderness. There is guarding. There is no rebound.   Right lower quadrant pain   Musculoskeletal: Normal range of motion. No tenderness or edema.   Lymphadenopathy:     She has no cervical adenopathy.   Neurological: She is alert and oriented to person, place, and time. She has normal strength. GCS score is 15. GCS eye subscore is 4. GCS verbal subscore is 5. GCS motor subscore is 6.   Skin: Skin is warm and dry. Capillary refill takes less than 2 seconds. No rash noted. No erythema. No pallor.   Psychiatric: She has a normal mood and affect. Her behavior is normal. Judgment and thought content normal.         ED Course    Procedures  Labs Reviewed - No data to display       Imaging Results    None                       Attending Attestation:             Attending ED Notes:   This 44-year-old female presented with right lower quadrant abdominal pain, has a normal white blood cell count.  The patient's urinalysis unremarkable.  Chemistries unremarkable.  The patient's CT scan of the abdomen and pelvis did not reveal any appendicitis.  Later in a transvaginal non OB ultrasound was obtained which showed physiologic follicles are ovary but otherwise unremarkable.  Throughout the visit the patient was re-examined and remained with exquisite right lower quadrant pain.  As a consequence hospital Medicine is being consulted for admission the patient may be in need of seeing either general surgery or gyn.  The patient may require a laparoscopy to further work her up.  Additionally concerning is a possibility of adhesions accounting for pain.                    Clinical Impression:       ICD-10-CM ICD-9-CM   1. Right lower quadrant abdominal pain  R10.31 789.03                                  Enrico Wahl Jr., MD  09/15/20 1612

## 2020-09-15 NOTE — ED NOTES
Attempted to call report to the floor, I was told by Norma that they are trying to do shift change and the night nurse (Ralf) would call me when they are finished.

## 2020-09-16 VITALS
WEIGHT: 140.5 LBS | SYSTOLIC BLOOD PRESSURE: 118 MMHG | RESPIRATION RATE: 15 BRPM | BODY MASS INDEX: 23.41 KG/M2 | HEIGHT: 65 IN | OXYGEN SATURATION: 97 % | DIASTOLIC BLOOD PRESSURE: 57 MMHG | HEART RATE: 88 BPM | TEMPERATURE: 98 F

## 2020-09-16 PROBLEM — R10.31 RIGHT LOWER QUADRANT ABDOMINAL PAIN: Status: RESOLVED | Noted: 2020-09-15 | Resolved: 2020-09-16

## 2020-09-16 LAB
ALBUMIN SERPL BCP-MCNC: 3.9 G/DL (ref 3.5–5.2)
ALP SERPL-CCNC: 48 U/L (ref 55–135)
ALT SERPL W/O P-5'-P-CCNC: 36 U/L (ref 10–44)
ANION GAP SERPL CALC-SCNC: 7 MMOL/L (ref 8–16)
AST SERPL-CCNC: 22 U/L (ref 10–40)
BASOPHILS # BLD AUTO: 0.02 K/UL (ref 0–0.2)
BASOPHILS NFR BLD: 0.3 % (ref 0–1.9)
BILIRUB SERPL-MCNC: 1.1 MG/DL (ref 0.1–1)
BUN SERPL-MCNC: 10 MG/DL (ref 6–20)
CALCIUM SERPL-MCNC: 8.7 MG/DL (ref 8.7–10.5)
CHLORIDE SERPL-SCNC: 106 MMOL/L (ref 95–110)
CO2 SERPL-SCNC: 28 MMOL/L (ref 23–29)
CREAT SERPL-MCNC: 0.5 MG/DL (ref 0.5–1.4)
CRP SERPL-MCNC: 0.27 MG/DL
DIFFERENTIAL METHOD: ABNORMAL
EOSINOPHIL # BLD AUTO: 0.2 K/UL (ref 0–0.5)
EOSINOPHIL NFR BLD: 2.7 % (ref 0–8)
ERYTHROCYTE [DISTWIDTH] IN BLOOD BY AUTOMATED COUNT: 13 % (ref 11.5–14.5)
EST. GFR  (AFRICAN AMERICAN): >60 ML/MIN/1.73 M^2
EST. GFR  (NON AFRICAN AMERICAN): >60 ML/MIN/1.73 M^2
GLUCOSE SERPL-MCNC: 91 MG/DL (ref 70–110)
HCT VFR BLD AUTO: 39.4 % (ref 37–48.5)
HGB BLD-MCNC: 13 G/DL (ref 12–16)
IMM GRANULOCYTES # BLD AUTO: 0.04 K/UL (ref 0–0.04)
IMM GRANULOCYTES NFR BLD AUTO: 0.6 % (ref 0–0.5)
LYMPHOCYTES # BLD AUTO: 1.8 K/UL (ref 1–4.8)
LYMPHOCYTES NFR BLD: 26.3 % (ref 18–48)
MCH RBC QN AUTO: 30.7 PG (ref 27–31)
MCHC RBC AUTO-ENTMCNC: 33 G/DL (ref 32–36)
MCV RBC AUTO: 93 FL (ref 82–98)
MONOCYTES # BLD AUTO: 0.8 K/UL (ref 0.3–1)
MONOCYTES NFR BLD: 10.7 % (ref 4–15)
NEUTROPHILS # BLD AUTO: 4.2 K/UL (ref 1.8–7.7)
NEUTROPHILS NFR BLD: 59.4 % (ref 38–73)
NRBC BLD-RTO: 0 /100 WBC
PLATELET # BLD AUTO: 285 K/UL (ref 150–350)
PMV BLD AUTO: 9.7 FL (ref 9.2–12.9)
POTASSIUM SERPL-SCNC: 3.6 MMOL/L (ref 3.5–5.1)
PROT SERPL-MCNC: 6.3 G/DL (ref 6–8.4)
RBC # BLD AUTO: 4.24 M/UL (ref 4–5.4)
SODIUM SERPL-SCNC: 141 MMOL/L (ref 136–145)
WBC # BLD AUTO: 6.99 K/UL (ref 3.9–12.7)

## 2020-09-16 PROCEDURE — 94761 N-INVAS EAR/PLS OXIMETRY MLT: CPT

## 2020-09-16 PROCEDURE — 96361 HYDRATE IV INFUSION ADD-ON: CPT

## 2020-09-16 PROCEDURE — 86140 C-REACTIVE PROTEIN: CPT

## 2020-09-16 PROCEDURE — G0378 HOSPITAL OBSERVATION PER HR: HCPCS

## 2020-09-16 PROCEDURE — 36415 COLL VENOUS BLD VENIPUNCTURE: CPT

## 2020-09-16 PROCEDURE — 99900035 HC TECH TIME PER 15 MIN (STAT)

## 2020-09-16 PROCEDURE — 85025 COMPLETE CBC W/AUTO DIFF WBC: CPT

## 2020-09-16 PROCEDURE — 80053 COMPREHEN METABOLIC PANEL: CPT

## 2020-09-16 RX ORDER — DICYCLOMINE HYDROCHLORIDE 10 MG/1
10 CAPSULE ORAL 3 TIMES DAILY PRN
Qty: 30 CAPSULE | Refills: 0 | Status: SHIPPED | OUTPATIENT
Start: 2020-09-16 | End: 2020-09-26

## 2020-09-16 RX ORDER — HYDROCODONE BITARTRATE AND ACETAMINOPHEN 5; 325 MG/1; MG/1
1 TABLET ORAL EVERY 6 HOURS PRN
Qty: 20 TABLET | Refills: 0 | Status: SHIPPED | OUTPATIENT
Start: 2020-09-16 | End: 2020-09-21

## 2020-09-16 NOTE — RESPIRATORY THERAPY
09/15/20 2048   Patient Assessment/Suction   Level of Consciousness (AVPU) alert   Respiratory Effort Normal;Unlabored   Expansion/Accessory Muscles/Retractions expansion symmetric;no retractions;no use of accessory muscles   All Lung Fields Breath Sounds clear   Rhythm/Pattern, Respiratory no shortness of breath reported;pattern regular;unlabored   Cough Type dry;good;nonproductive   PRE-TX-O2   O2 Device (Oxygen Therapy) room air   SpO2 96 %   Pulse 78   Resp 14   Inhaler   $ Inhaler Charges Spacer - Equipment;PRN treatment not required   Respiratory Interventions   Cough And Deep Breathing done with encouragement   Breathing Techniques/Airway Clearance deep/controlled cough encouraged;diaphragmatic breathing promoted   Education   $ Education Bronchodilator;Other (see comment);15 min  (prn tx,mdi,spacer,deep diaphragmatic breathing exercises)   Respiratory Evaluation   $ Care Plan Tech Time 15 min   Evaluation For New Orders

## 2020-09-16 NOTE — PLAN OF CARE
Pharmacy with [facility] in [facility] PCP is Dr Reis in [facility] area.       09/16/20 1202   Discharge Assessment   Assessment Type Discharge Planning Assessment   Confirmed/corrected address and phone number on facesheet? Yes   Assessment information obtained from? Patient   Expected Length of Stay (days) 1   Communicated expected length of stay with patient/caregiver yes   Prior to hospitilization cognitive status: Alert/Oriented   Prior to hospitalization functional status: Independent   Current cognitive status: Alert/Oriented   Current Functional Status: Independent   Facility Arrived From: Home   Lives With spouse   Able to Return to Prior Arrangements yes   Is patient able to care for self after discharge? Yes   Who are your caregiver(s) and their phone number(s)? Spouse Mr Jeff Loredo   Patient's perception of discharge disposition home or selfcare   Readmission Within the Last 30 Days no previous admission in last 30 days   Patient currently being followed by outpatient case management? No   Patient currently receives any other outside agency services? No   Equipment Currently Used at Home none   Part D Coverage [employer]   Do you have any problems affording any of your prescribed medications? No   Is the patient taking medications as prescribed? yes   Does the patient have transportation home? Yes   Transportation Anticipated family or friend will provide   Dialysis Name and Scheduled days NA   Does the patient receive services at the Coumadin Clinic? No   Discharge Plan A Home with family   Discharge Plan B Home with family   DME Needed Upon Discharge  none

## 2020-09-16 NOTE — CONSULTS
[facility]  General Surgery  Consult Note    Consults  Subjective:     Chief Complaint/Reason for Admission: RLQ pain     History of Present Illness: Patient is 44 female admitted for observation for RLQ abdominal pain. She presented with two days of increasing RLQ pain. She states she had nausea on morning of admission. She had no other diarrhea, vomiting. CT showed no evidence of intraabdominal or pelvic abnormalities. It showed a redundant ascending colon with a normal appendix. There are multiple non obstructing renal calculi bilaterally with largest measuring 3 mm. There is also some reticular nodular opacities at the left posterior costophrenic angle. She was diagnosed with Covid three weeks ago. She had respiratory symptoms for one day and states she seemed fairly asymptomatic after words.   She is feeling better this morning. Pain is still present but much improved. CBC is within normal limits. CMP unremarkable. She has past abdominal surgical history of  and hysterectomy.     No current facility-administered medications on file prior to encounter.      Current Outpatient Medications on File Prior to Encounter   Medication Sig    acetaminophen (TYLENOL) 500 MG tablet Take 1,000 mg by mouth every 6 (six) hours as needed for Pain.    ibuprofen (ADVIL,MOTRIN) 200 MG tablet Take 400 mg by mouth every 6 (six) hours as needed for Pain.    loratadine (CLARITIN) 10 mg tablet Take 1 tablet (10 mg total) by mouth once daily.    montelukast (SINGULAIR) 10 mg tablet Take 1 tablet (10 mg total) by mouth every evening.    multivitamin (ONE DAILY MULTIVITAMIN) per tablet Take 1 tablet by mouth once daily.    simvastatin (ZOCOR) 20 MG tablet Take 20 mg by mouth every evening.    albuterol 90 mcg/actuation inhaler Inhale 2 puffs into the lungs every 6 (six) hours as needed for Wheezing.    ALPRAZolam (XANAX) 0.5 MG tablet Take 1 tablet (0.5 mg total) by mouth 3 (three) times daily as needed  for Anxiety.    augmented betamethasone dipropionate (DIPROLENE-AF) 0.05 % ointment Apply topically.    clobetasoL (TEMOVATE) 0.05 % cream Apply topically.    clobetasol 0.05% (TEMOVATE) 0.05 % Oint Apply topically to the affected area once daily under occlusion.    hydroxyzine HCL (ATARAX) 25 MG tablet Take 1 to 2 tablets by mouth every 6 hours as needed itching    sumatriptan (IMITREX) 100 MG tablet Take 1 tablet (100 mg total) by mouth every 2 (two) hours as needed for Migraine. Do not exceed 2 doses in 24 hours.       Review of patient's allergies indicates:   Allergen Reactions    Mepergan fortis Shortness Of Breath    Penicillins Rash    Sulfa (sulfonamide antibiotics) Rash       Past Medical History:   Diagnosis Date    Allergy     Anxiety     Asthma     Hyperlipidemia     PAC (premature atrial contraction)     Rosacea      Past Surgical History:   Procedure Laterality Date     SECTION      COLD KNIFE CONIZATION OF CERVIX      multiple    HYSTERECTOMY      ovaries intact    TONSILLECTOMY       Family History     Problem Relation (Age of Onset)    Asthma Mother    Diabetes Mother    Hypertension Father    Prostate cancer Father        Tobacco Use    Smoking status: Never Smoker    Smokeless tobacco: Never Used   Substance and Sexual Activity    Alcohol use: Yes     Frequency: 2-4 times a month     Drinks per session: 1 or 2     Binge frequency: Less than monthly     Comment: occ    Drug use: No    Sexual activity: Yes     Birth control/protection: See Surgical Hx, Surgical     Review of Systems   Constitutional: Negative for appetite change, chills, fever and unexpected weight change.   HENT: Negative for hearing loss, rhinorrhea, sore throat and voice change.    Eyes: Negative for photophobia and visual disturbance.   Respiratory: Negative for cough, choking and shortness of breath.    Cardiovascular: Negative for chest pain, palpitations and leg swelling.   Gastrointestinal:  Positive for abdominal pain. Negative for blood in stool, constipation, diarrhea, nausea and vomiting.   Endocrine: Negative for cold intolerance, heat intolerance, polydipsia and polyuria.   Musculoskeletal: Negative for arthralgias, back pain, joint swelling and neck stiffness.   Skin: Negative for color change, pallor and rash.   Neurological: Negative for dizziness, seizures, syncope and headaches.   Hematological: Negative for adenopathy. Does not bruise/bleed easily.   Psychiatric/Behavioral: Negative for agitation, behavioral problems and confusion.     Objective:     Vital Signs (Most Recent):  Temp: 98 °F (36.7 °C) (09/16/20 0800)  Pulse: 71 (09/16/20 0833)  Resp: 15 (09/16/20 0833)  BP: 128/61 (09/16/20 0800)  SpO2: 99 % (09/16/20 0833) Vital Signs (24h Range):  Temp:  [98 °F (36.7 °C)-98.3 °F (36.8 °C)] 98 °F (36.7 °C)  Pulse:  [71-79] 71  Resp:  [11-24] 15  SpO2:  [96 %-100 %] 99 %  BP: (119-132)/(61-71) 128/61     Weight: 63.7 kg (140 lb 8 oz)  Body mass index is 23.38 kg/m².    No intake or output data in the 24 hours ending 09/16/20 1025    Physical Exam  Constitutional:       General: She is not in acute distress.     Appearance: Normal appearance. She is well-developed. She is not toxic-appearing.   HENT:      Head: Normocephalic and atraumatic. No abrasion or laceration.      Right Ear: External ear normal.      Left Ear: External ear normal.      Nose: Nose normal.   Eyes:      Pupils: Pupils are equal, round, and reactive to light.   Neck:      Musculoskeletal: Neck supple. Normal range of motion.      Trachea: Trachea normal. No tracheal deviation.   Cardiovascular:      Rate and Rhythm: Normal rate and regular rhythm.   Pulmonary:      Effort: Pulmonary effort is normal. No tachypnea, accessory muscle usage or respiratory distress.   Abdominal:      General: There is no distension.      Palpations: Abdomen is soft. There is no mass.      Tenderness: There is abdominal tenderness in the right  lower quadrant and suprapubic area. There is no guarding or rebound. Negative signs include McBurney's sign.      Hernia: No hernia is present.      Comments: Focally tender RLQ and over adnexa but no guarding or rebound    Lymphadenopathy:      Cervical: No cervical adenopathy.   Skin:     General: Skin is warm.      Coloration: Skin is not jaundiced.   Neurological:      Mental Status: She is alert and oriented to person, place, and time.      Coordination: Coordination normal.      Gait: Gait normal.   Psychiatric:         Speech: Speech normal.         Behavior: Behavior normal.         Significant Labs:  CBC:   Recent Labs   Lab 09/16/20  0326   WBC 6.99   RBC 4.24   HGB 13.0   HCT 39.4      MCV 93   MCH 30.7   MCHC 33.0     CMP:   Recent Labs   Lab 09/16/20 0326   GLU 91   CALCIUM 8.7   ALBUMIN 3.9   PROT 6.3      K 3.6   CO2 28      BUN 10   CREATININE 0.5   ALKPHOS 48*   ALT 36   AST 22   BILITOT 1.1*       Significant Diagnostics:  CT -    1.  Redundant CT abdomen ascending colon noted with normal  appendix. No acute intra-abdominal abnormality identified.  2.  Focal reticular nodular opacities noted in the left posterior  costophrenic angle. Findings may reflect pneumonitis, infectious  rather infiltrate. Consider follow-up CT chest in 3 months.  3.  Multiple non obstructing renal calculi noted bilaterally,  largest measuring up to 3 mm in diameter.    Assessment/Plan:     Active Diagnoses:    Diagnosis Date Noted POA    Right lower quadrant abdominal pain [R10.31] 09/15/2020 Yes      Problems Resolved During this Admission:     -I am comfortable with observation. Do not have high enough suspicion for appendicitis or intestinal issues to recommend laparoscopy. Will follow clinically. OK for diet.  Thank you for your consult.     Elias Gil III, MD  General Surgery  [facility]

## 2020-09-16 NOTE — H&P
[facility] Medicine History & Physical Examination   Patient Name: Rosaura Loredo  MRN: [id number]  Patient Class: OP- Observation   Admission Date: 9/15/2020  8:57 AM  Length of Stay: 0  Attending Physician: Gabriela Jacobsen MD  Primary Care Provider: Jayy Ramos Jr, MD  Face-to-Face encounter date: 09/15/2020  Code Status: full code  Chief Complaint: Abdominal Pain and Vomiting        Patient information was obtained from patient, past medical records and ER records.   HISTORY OF PRESENT ILLNESS:   Rosaura Loredo is a 44 y.o. White female who  has a past medical history of Allergy, Anxiety, Asthma, Hyperlipidemia, PAC (premature atrial contraction), and Rosacea.. The patient presented to [facility] on 9/15/2020 with a primary complaint of Abdominal Pain and Vomiting  .       History was obtained from the patient and ER physician Sign-out. Patient presents to the ED with the complaint of RLQ abdominal pain that began 2-3 days ago. She describes the pain as intermittent sharp pain to RLQ. The pain does not radiate. She reports one episode of nausea with NBNB emesis this morning. Her last BM was at 3am and it was normal for her. She denies fever, chills, chest pain, sob, diarrhea, melena, rectal bleeding, numbness, tingling, or LOC.    In the emergency room, patient is afebrile.  CBC was unremarkable. CMP was unremarkable. BNP and troponin within reference ranges. Lipase is wnl. UA is unremarkable. CT of abdomen and pelvis negative for acute intra-abdominal abnormalities. Focal reticular nodular opacities noted to left posterior costophrenic angle. Multiple non obstructing renal calculi noted bilaterally. 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 She denies any upper respiratory symptoms today. The ED provider would like to admit the patient for observation and further evaluation.     Decision to admit was taken and patient  was informed about the plan of care.   REVIEW OF SYSTEMS:   10 Point Review of System was performed and was found to be negative except for that mentioned already in the HPI above.     PAST MEDICAL HISTORY:     Past Medical History:   Diagnosis Date    Allergy     Anxiety     Asthma     Hyperlipidemia     PAC (premature atrial contraction)     Rosacea        PAST SURGICAL HISTORY:     Past Surgical History:   Procedure Laterality Date     SECTION      COLD KNIFE CONIZATION OF CERVIX      multiple    HYSTERECTOMY      ovaries intact    TONSILLECTOMY         ALLERGIES:   Mepergan fortis, Penicillins, and Sulfa (sulfonamide antibiotics)    FAMILY HISTORY:     Family History   Problem Relation Age of Onset    Diabetes Mother     Asthma Mother     Hypertension Father     Prostate cancer Father        SOCIAL HISTORY:     Social History     Tobacco Use    Smoking status: Never Smoker    Smokeless tobacco: Never Used   Substance Use Topics    Alcohol use: Yes     Frequency: 2-4 times a month     Drinks per session: 1 or 2     Binge frequency: Less than monthly     Comment: occ        Social History     Substance and Sexual Activity   Sexual Activity Yes    Birth control/protection: See Surgical Hx, Surgical        HOME MEDICATIONS:     Prior to Admission medications    Medication Sig Start Date End Date Taking? Authorizing Provider   acetaminophen (TYLENOL) 500 MG tablet Take 1,000 mg by mouth every 6 (six) hours as needed for Pain.   Yes Historical Provider   ibuprofen (ADVIL,MOTRIN) 200 MG tablet Take 400 mg by mouth every 6 (six) hours as needed for Pain.   Yes Historical Provider   loratadine (CLARITIN) 10 mg tablet Take 1 tablet (10 mg total) by mouth once daily. 19  Yes NIEVES Humphrey MD   montelukast (SINGULAIR) 10 mg tablet Take 1 tablet (10 mg total) by mouth every evening. 19  Yes Jayy Ramos Jr., MD   multivitamin (ONE DAILY MULTIVITAMIN) per tablet Take 1 tablet by  mouth once daily.   Yes Historical Provider   simvastatin (ZOCOR) 20 MG tablet Take 20 mg by mouth every evening.   Yes Historical Provider   albuterol 90 mcg/actuation inhaler Inhale 2 puffs into the lungs every 6 (six) hours as needed for Wheezing. 11/9/17   NIEVES Humphrey MD   ALPRAZolam (XANAX) 0.5 MG tablet Take 1 tablet (0.5 mg total) by mouth 3 (three) times daily as needed for Anxiety. 12/5/19 7/30/20  Jayy Ramos Jr., MD   augmented betamethasone dipropionate (DIPROLENE-AF) 0.05 % ointment Apply topically. 4/1/20   Vignesh Wan MD   clobetasoL (TEMOVATE) 0.05 % cream Apply topically. 4/1/20   Vignesh Wan MD   clobetasol 0.05% (TEMOVATE) 0.05 % Oint Apply topically to the affected area once daily under occlusion. 4/1/20   Vignesh Wan MD   hydroxyzine HCL (ATARAX) 25 MG tablet Take 1 to 2 tablets by mouth every 6 hours as needed itching 4/1/20   Vignesh Wan MD   sumatriptan (IMITREX) 100 MG tablet Take 1 tablet (100 mg total) by mouth every 2 (two) hours as needed for Migraine. Do not exceed 2 doses in 24 hours. 12/5/19 1/4/20  Jayy Ramos Jr., MD   DULoxetine (CYMBALTA) 60 MG capsule Take 1 capsule (60 mg total) by mouth once daily. 12/5/19 9/15/20  Jayy Ramos Jr., MD   lisdexamfetamine (VYVANSE) 30 MG capsule Take 1 capsule (30 mg total) by mouth every morning. 9/5/19 9/15/20  Jayy Ramos Jr., MD   lisdexamfetamine (VYVANSE) 30 MG capsule Take 1 capsule (30 mg total) by mouth every morning. 10/3/19 9/15/20  Jayy Ramos Jr., MD   lisdexamfetamine (VYVANSE) 30 MG capsule Take 1 capsule (30 mg total) by mouth every morning. 10/31/19 9/15/20  Jayy Ramos Jr., MD   methylPREDNISolone (MEDROL DOSEPACK) 4 mg tablet Take as directed on dose pack. 3/31/20 9/15/20  Hyacinth Martin MD   simvastatin (ZOCOR) 20 MG tablet TAKE 1 TABLET BY MOUTH EVERY EVENING  Patient taking differently: 20 mg.  5/18/20 9/15/20  Jayy Ramos Jr., MD         PHYSICAL EXAM:   BP  "121/65 (BP Location: Right arm, Patient Position: Sitting)   Pulse 76   Temp 98.1 °F (36.7 °C) (Oral)   Resp (!) 24   Ht 5' 5" (1.651 m)   Wt 63.5 kg (140 lb)   SpO2 100%   BMI 23.30 kg/m²   Vitals Reviewed  General appearance: Well-developed, well-nourished, disheveled, sick appearing White female in no apparent distress.  Skin: No Rash. Warm, dry.  Neuro: Motor and sensory exams grossly intact. Good tone. Power in all 4 extremities 5/5.   HENT: Atraumatic head. Moist mucous membranes of oral cavity.  Eyes: Normal extraocular movements. PERRLA  Neck: Supple. No evidence of lymphadenopathy. No thyroidomegaly.  Lungs: Clear to auscultation bilaterally. No wheezing present.   Heart: Regular rate and rhythm. S1 and S2 present with no murmurs/gallop/rub. No pedal edema. No JVD present.   Abdomen: Soft, non-distended. Tenderness to RLQ on palpation. There is guarding. No masses or organomegaly. Bowel sounds are normal. Bladder is not palpable.   Extremities: No cyanosis, clubbing. Capillary refill less than 2 seconds.   Psych/mental status: Alert and oriented. Cooperative. Responds appropriately to questions.   EMERGENCY DEPARTMENT LABS AND IMAGING:     Labs Reviewed   COMPREHENSIVE METABOLIC PANEL - Abnormal; Notable for the following components:       Result Value    ALT 46 (*)     All other components within normal limits   URINALYSIS, REFLEX TO URINE CULTURE - Abnormal; Notable for the following components:    Ketones, UA 1+ (*)     All other components within normal limits    Narrative:     Specimen Source->Urine   CBC W/ AUTO DIFFERENTIAL   LIPASE       US Pelvis Comp with Transvag NON-OB (xpd)   Final Result      CT Abdomen Pelvis With Contrast   Final Result          ASSESSMENT & PLAN:   RLQ Abdominal pain: ?appendicitis/UTI/ovarian cysts??  CT of abdomen and pelvis negative for acute etiology  Transvaginal ultrasound with no acute etiology  Lipase wnl  Admit to Med-surg  IV hydration  NPO but the patient " insists on having food and agreed to full liquid diet. Discussed with patient that if pain is worse with food or she has nausea/vomiting she will need to be NPO.   PRN analgesics and antiemetics  Serial abdominal assessment. Will observe overnight and if no improvement or worsening pain will have patient seen by surgery.  General surgery consult    Anxiety:  Continue home Xanax    Hyperlipidemia:  Chronic; continue statin        DVT Prophylaxis: will be placed on Heparin/Lovenox for DVT prophylaxis and will be advised to be as mobile as possible and sit in a chair as tolerated.   ________________________________________________________________________________    Discharge Planning and Disposition: No mobility needs. Ambulating well. Patient will be discharged in   Face-to-Face encounter date: 09/15/2020  Encounter included review of the medical records, interviewing and examining the patient face-to-face, discussion with family and other health care providers including emergency medicine physician, admission orders, interpreting lab/test results and formulating a plan of care.   Medical Decision Making during this encounter was  [_] Low Complexity  [_] Moderate Complexity  [x] High Complexity  _________________________________________________________________________________    INPATIENT LIST OF MEDICATIONS     Current Facility-Administered Medications:     acetaminophen tablet 1,000 mg, 1,000 mg, Oral, Q6H PRN, Khushbu Rivera NP    albuterol inhaler 2 puff, 2 puff, Inhalation, Q6H PRN, Khushbu Rivera NP    ALPRAZolam tablet 0.5 mg, 0.5 mg, Oral, TID PRN, Khushbu Rivera NP    [START ON 9/16/2020] cetirizine tablet 10 mg, 10 mg, Oral, Daily, Khushbu Rivera NP    hydrOXYzine HCL tablet 25 mg, 25 mg, Oral, Q6H PRN, Khushbu Rivera NP    montelukast tablet 10 mg, 10 mg, Oral, QHS, Khushbu Rivera NP    [START ON 9/16/2020] multivitamin tablet, 1 tablet, Oral, Daily, Khushbu Rivera NP     simethicone chewable tablet 80 mg, 1 tablet, Oral, TID PRN, Khushbu Rivera NP    simvastatin tablet 20 mg, 20 mg, Oral, QHS, Khushbu Rivera NP    sumatriptan tablet 100 mg, 100 mg, Oral, Q2H PRN, Khushbu Rivera NP    Current Outpatient Medications:     acetaminophen (TYLENOL) 500 MG tablet, Take 1,000 mg by mouth every 6 (six) hours as needed for Pain., Disp: , Rfl:     ibuprofen (ADVIL,MOTRIN) 200 MG tablet, Take 400 mg by mouth every 6 (six) hours as needed for Pain., Disp: , Rfl:     loratadine (CLARITIN) 10 mg tablet, Take 1 tablet (10 mg total) by mouth once daily., Disp: 90 tablet, Rfl: 1    montelukast (SINGULAIR) 10 mg tablet, Take 1 tablet (10 mg total) by mouth every evening., Disp: 90 tablet, Rfl: 1    multivitamin (ONE DAILY MULTIVITAMIN) per tablet, Take 1 tablet by mouth once daily., Disp: , Rfl:     simvastatin (ZOCOR) 20 MG tablet, Take 20 mg by mouth every evening., Disp: , Rfl:     albuterol 90 mcg/actuation inhaler, Inhale 2 puffs into the lungs every 6 (six) hours as needed for Wheezing., Disp: 1 Inhaler, Rfl: 2    ALPRAZolam (XANAX) 0.5 MG tablet, Take 1 tablet (0.5 mg total) by mouth 3 (three) times daily as needed for Anxiety., Disp: 60 tablet, Rfl: 0    augmented betamethasone dipropionate (DIPROLENE-AF) 0.05 % ointment, Apply topically., Disp: 50 g, Rfl: 1    clobetasoL (TEMOVATE) 0.05 % cream, Apply topically., Disp: 60 g, Rfl: 1    clobetasol 0.05% (TEMOVATE) 0.05 % Oint, Apply topically to the affected area once daily under occlusion., Disp: 60 g, Rfl: 1    hydroxyzine HCL (ATARAX) 25 MG tablet, Take 1 to 2 tablets by mouth every 6 hours as needed itching, Disp: 40 tablet, Rfl: 1    sumatriptan (IMITREX) 100 MG tablet, Take 1 tablet (100 mg total) by mouth every 2 (two) hours as needed for Migraine. Do not exceed 2 doses in 24 hours., Disp: 27 tablet, Rfl: 3      Scheduled Meds:   [START ON 9/16/2020] cetirizine  10 mg Oral Daily    montelukast  10 mg Oral QHS     [START ON 9/16/2020] multivitamin  1 tablet Oral Daily    simvastatin  20 mg Oral QHS     Continuous Infusions:  PRN Meds:.acetaminophen, albuterol, ALPRAZolam, hydrOXYzine HCL, simethicone, sumatriptan      Khushbu Rivera  [facility] Hospitalist  09/15/2020

## 2020-09-16 NOTE — PLAN OF CARE
09/16/20 0833   Patient Assessment/Suction   Level of Consciousness (AVPU) alert   All Lung Fields Breath Sounds clear   PRE-TX-O2   O2 Device (Oxygen Therapy) room air   SpO2 99 %   Pulse Oximetry Type Continuous   $ Pulse Oximetry - Multiple Charge Pulse Oximetry - Multiple   Pulse 71   Resp 15   Aerosol Therapy   $ Aerosol Therapy Charges PRN treatment not required   Respiratory Evaluation   $ Care Plan Tech Time 15 min

## 2020-09-16 NOTE — NURSING
Discharge instructions reviewed with pt. Questions answered. Copy of discharge instructions given to pt. Discharge home via ambulatory to vehicle. Accompanied by .

## 2020-09-16 NOTE — DISCHARGE SUMMARY
"[facility]  Discharge Summary      Patient Name: Rosaura Loredo  MRN: [id number]  Admission Date: 9/15/2020  Hospital Length of Stay: 0 days  Discharge Date and Time:  09/16/2020 3:30 PM  Attending Physician: Polly att. providers found   Discharging Provider: Justine Corado MD  Primary Care Provider: Jayy Ramos Jr, MD      HPI:   As per admitting provider's note:    "Rosaura Loredo is a 44 y.o. White female who  has a past medical history of Allergy, Anxiety, Asthma, Hyperlipidemia, PAC (premature atrial contraction), and Rosacea.. The patient presented to [facility] on 9/15/2020 with a primary complaint of Abdominal Pain and Vomiting  .         History was obtained from the patient and ER physician Sign-out. Patient presents to the ED with the complaint of RLQ abdominal pain that began 2-3 days ago. She describes the pain as intermittent sharp pain to RLQ. The pain does not radiate. She reports one episode of nausea with NBNB emesis this morning. Her last BM was at 3am and it was normal for her. She denies fever, chills, chest pain, sob, diarrhea, melena, rectal bleeding, numbness, tingling, or LOC.     In the emergency room, patient is afebrile.  CBC was unremarkable. CMP was unremarkable. BNP and troponin within reference ranges. Lipase is wnl. UA is unremarkable. CT of abdomen and pelvis negative for acute intra-abdominal abnormalities. Focal reticular nodular opacities noted to left posterior costophrenic angle. Multiple non obstructing renal calculi noted bilaterally. 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 She denies any upper respiratory symptoms today. The ED provider would like to admit the patient for observation and further evaluation.      Decision to admit was taken and patient was informed about the plan of care. "    * No surgery found *      Hospital Course:   During her brief hospital stay " patient was admitted for right lower quadrant pain.  So far extensive workup including vaginal ultrasound, CT abdomen and pelvis with contrast, KUB did not show any revealing pathology except maybe passed ureteric stone.  She tolerated diet and majority of her presenting symptoms resolved.  She was evaluated by general surgery who cleared her for discharge.  She was given prescription of Norco and Bentyl.  Of note patient recently recovered from COVID-19 however she has no systemic signs of infection nor any respiratory symptoms.  Her CRP came back negative.  She was advised to follow-up with her PCP and Dr. Gil if needed.     Instructions provided to follow up with primary care physician as outpatient. Patient verbalized understanding and is aware to contact primary care physician or return to ED if new or worsening symptoms.    Physical exam on the day of discharge:  General: Patient resting comfortably in no acute distress.  Lungs: CTA. Good air entry.  Cor: Regular rate and rhythm. No murmurs. No pedal edema.  Abd: Soft. Nontender. Non-distended.  Neuro: A&O x3. Moving all 4 extremities equally  Ext: No clubbing. No cyanosis.     Vital signs reviewed.  Nursing notes reviewed     Consults:     No new Assessment & Plan notes have been filed under this hospital service since the last note was generated.  Service: Hospital Medicine    Final Active Diagnoses:      Problems Resolved During this Admission:    Diagnosis Date Noted Date Resolved POA    PRINCIPAL PROBLEM:  Right lower quadrant abdominal pain [R10.31] 09/15/2020 09/16/2020 Yes       Discharged Condition: good    Disposition: Home or Self Care    Follow Up:  Follow-up Information     Jayy Ramos Jr, MD In 1 week.    Specialty: Internal Medicine  Contact information:  [address]   [place] [id number]  [phone]             Elias Gil III, MD.    Specialties: General Surgery, Surgery  Why: As needed  Contact information:  [address]  [facility]  [address]  [facility] [address]  [phone]                 Patient Instructions:      Diet Adult Regular     Notify your health care provider if you experience any of the following:  temperature >100.4     Notify your health care provider if you experience any of the following:  severe uncontrolled pain     Notify your health care provider if you experience any of the following:  persistent nausea and vomiting or diarrhea     Activity as tolerated       Significant Diagnostic Studies: Labs:   BMP:   Recent Labs   Lab 09/15/20  0930 09/16/20  0326   GLU 95 91    141   K 4.1 3.6   CL 99 106   CO2 24 28   BUN 15 10   CREATININE 0.5 0.5   CALCIUM 9.5 8.7   MG 2.0  --     and CBC   Recent Labs   Lab 09/15/20  0930 09/16/20  0326   WBC 8.19 6.99   HGB 13.5 13.0   HCT 40.1 39.4    285       Pending Diagnostic Studies:     None         Medications:  Reconciled Home Medications:      Medication List      START taking these medications    dicyclomine 10 MG capsule  Commonly known as: BENTYL  Take 1 capsule (10 mg total) by mouth 3 (three) times daily as needed (abdmonial pain).     HYDROcodone-acetaminophen 5-325 mg per tablet  Commonly known as: NORCO  Take 1 tablet by mouth every 6 (six) hours as needed for Pain.        CONTINUE taking these medications    acetaminophen 500 MG tablet  Commonly known as: TYLENOL  Take 1,000 mg by mouth every 6 (six) hours as needed for Pain.     albuterol 90 mcg/actuation inhaler  Commonly known as: PROVENTIL/VENTOLIN HFA  Inhale 2 puffs into the lungs every 6 (six) hours as needed for Wheezing.     ALPRAZolam 0.5 MG tablet  Commonly known as: XANAX  Take 1 tablet (0.5 mg total) by mouth 3 (three) times daily as needed for Anxiety.     augmented betamethasone dipropionate 0.05 % ointment  Commonly known as: DIPROLENE-AF  Apply topically.     * clobetasol 0.05% 0.05 % Oint  Commonly known as: TEMOVATE  Apply topically to the affected area once daily under occlusion.     *  clobetasoL 0.05 % cream  Commonly known as: TEMOVATE  Apply topically.     hydrOXYzine HCL 25 MG tablet  Commonly known as: ATARAX  Take 1 to 2 tablets by mouth every 6 hours as needed itching     ibuprofen 200 MG tablet  Commonly known as: ADVIL,MOTRIN  Take 400 mg by mouth every 6 (six) hours as needed for Pain.     loratadine 10 mg tablet  Commonly known as: CLARITIN  Take 1 tablet (10 mg total) by mouth once daily.     montelukast 10 mg tablet  Commonly known as: SINGULAIR  Take 1 tablet (10 mg total) by mouth every evening.     ONE DAILY MULTIVITAMIN per tablet  Generic drug: multivitamin  Take 1 tablet by mouth once daily.     simvastatin 20 MG tablet  Commonly known as: ZOCOR  Take 20 mg by mouth every evening.     sumatriptan 100 MG tablet  Commonly known as: IMITREX  Take 1 tablet (100 mg total) by mouth every 2 (two) hours as needed for Migraine. Do not exceed 2 doses in 24 hours.         * This list has 2 medication(s) that are the same as other medications prescribed for you. Read the directions carefully, and ask your doctor or other care provider to review them with you.                Indwelling Lines/Drains at time of discharge:   Lines/Drains/Airways     None                 Time spent on the discharge of patient: 26 minutes  Patient was seen and examined on the date of discharge and determined to be suitable for discharge.         Justine Corado MD  Department of Hospital Medicine  [facility]

## 2020-09-16 NOTE — PLAN OF CARE
09/16/20 1205   Post-Acute Status   Post-Acute Authorization Other   Other Status No Post-Acute Service Needs   Discharge Delays None known at this time   Discharge Plan   Discharge Plan A Home with family   Discharge Plan B Home with family

## 2020-09-16 NOTE — HOSPITAL COURSE
During her brief hospital stay patient was admitted for right lower quadrant pain.  So far extensive workup including vaginal ultrasound, CT abdomen and pelvis with contrast, KUB did not show any revealing pathology except maybe passed ureteric stone.  She tolerated diet and majority of her presenting symptoms resolved.  She was evaluated by general surgery who cleared her for discharge.  She was given prescription of Norco and Bentyl.  Of note patient recently recovered from COVID-19 however she has no systemic signs of infection nor any respiratory symptoms.  Her CRP came back negative.  She was advised to follow-up with her PCP and Dr. Gil if needed.     Instructions provided to follow up with primary care physician as outpatient. Patient verbalized understanding and is aware to contact primary care physician or return to ED if new or worsening symptoms.    Physical exam on the day of discharge:  General: Patient resting comfortably in no acute distress.  Lungs: CTA. Good air entry.  Cor: Regular rate and rhythm. No murmurs. No pedal edema.  Abd: Soft. Nontender. Non-distended.  Neuro: A&O x3. Moving all 4 extremities equally  Ext: No clubbing. No cyanosis.     Vital signs reviewed.  Nursing notes reviewed

## 2020-09-16 NOTE — PLAN OF CARE
09/16/20 1206   Final Note   Assessment Type Final Discharge Note   Anticipated Discharge Disposition Home   Post-Acute Status   Post-Acute Authorization Other   Other Status No Post-Acute Service Needs   Discharge Delays None known at this time

## 2020-09-16 NOTE — HPI
"As per admitting provider's note:    "Rosaura Loredo is a 44 y.o. White female who  has a past medical history of Allergy, Anxiety, Asthma, Hyperlipidemia, PAC (premature atrial contraction), and Rosacea.. The patient presented to [facility] on 9/15/2020 with a primary complaint of Abdominal Pain and Vomiting  .         History was obtained from the patient and ER physician Sign-out. Patient presents to the ED with the complaint of RLQ abdominal pain that began 2-3 days ago. She describes the pain as intermittent sharp pain to RLQ. The pain does not radiate. She reports one episode of nausea with NBNB emesis this morning. Her last BM was at 3am and it was normal for her. She denies fever, chills, chest pain, sob, diarrhea, melena, rectal bleeding, numbness, tingling, or LOC.     In the emergency room, patient is afebrile.  CBC was unremarkable. CMP was unremarkable. BNP and troponin within reference ranges. Lipase is wnl. UA is unremarkable. CT of abdomen and pelvis negative for acute intra-abdominal abnormalities. Focal reticular nodular opacities noted to left posterior costophrenic angle. Multiple non obstructing renal calculi noted bilaterally. 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 She denies any upper respiratory symptoms today. The ED provider would like to admit the patient for observation and further evaluation.      Decision to admit was taken and patient was informed about the plan of care. "  "

## 2020-09-21 DIAGNOSIS — J30.1 CHRONIC SEASONAL ALLERGIC RHINITIS DUE TO POLLEN: ICD-10-CM

## 2020-09-21 RX ORDER — MONTELUKAST SODIUM 10 MG/1
10 TABLET ORAL NIGHTLY
Qty: 90 TABLET | Refills: 0 | Status: SHIPPED | OUTPATIENT
Start: 2020-09-21 | End: 2020-10-26 | Stop reason: SDUPTHER

## 2020-10-20 ENCOUNTER — HOSPITAL ENCOUNTER (OUTPATIENT)
Facility: HOSPITAL | Age: 45
Discharge: HOME | End: 2020-10-20
Attending: INTERNAL MEDICINE | Admitting: INTERNAL MEDICINE
Payer: COMMERCIAL

## 2020-10-20 VITALS
TEMPERATURE: 98 F | SYSTOLIC BLOOD PRESSURE: 117 MMHG | HEART RATE: 79 BPM | RESPIRATION RATE: 16 BRPM | BODY MASS INDEX: 19.99 KG/M2 | OXYGEN SATURATION: 98 % | WEIGHT: 120 LBS | HEIGHT: 65 IN | DIASTOLIC BLOOD PRESSURE: 69 MMHG

## 2020-10-20 DIAGNOSIS — R10.9 ABDOMINAL PAIN: ICD-10-CM

## 2020-10-20 PROCEDURE — 27201089 HC SNARE, DISP (ANY): Performed by: INTERNAL MEDICINE

## 2020-10-20 PROCEDURE — 99153 MOD SED SAME PHYS/QHP EA: CPT | Performed by: INTERNAL MEDICINE

## 2020-10-20 PROCEDURE — 45385 COLONOSCOPY W/LESION REMOVAL: CPT | Performed by: INTERNAL MEDICINE

## 2020-10-20 PROCEDURE — 63600175 PHARM REV CODE 636 W HCPCS: Performed by: INTERNAL MEDICINE

## 2020-10-20 PROCEDURE — 99152 MOD SED SAME PHYS/QHP 5/>YRS: CPT | Mod: 59 | Performed by: INTERNAL MEDICINE

## 2020-10-20 PROCEDURE — 27201114 HC TRAP (ANY): Performed by: INTERNAL MEDICINE

## 2020-10-20 RX ORDER — MIDAZOLAM HYDROCHLORIDE 5 MG/ML
INJECTION INTRAMUSCULAR; INTRAVENOUS
Status: COMPLETED | OUTPATIENT
Start: 2020-10-20 | End: 2020-10-20

## 2020-10-20 RX ORDER — SODIUM CHLORIDE 0.9 % (FLUSH) 0.9 %
2 SYRINGE (ML) INJECTION
Status: DISCONTINUED | OUTPATIENT
Start: 2020-10-20 | End: 2020-10-20 | Stop reason: HOSPADM

## 2020-10-20 RX ORDER — DIAZEPAM 10 MG/2ML
INJECTION INTRAMUSCULAR
Status: COMPLETED | OUTPATIENT
Start: 2020-10-20 | End: 2020-10-20

## 2020-10-20 RX ORDER — SODIUM CHLORIDE 9 MG/ML
INJECTION, SOLUTION INTRAVENOUS CONTINUOUS
Status: DISCONTINUED | OUTPATIENT
Start: 2020-10-20 | End: 2020-10-20 | Stop reason: HOSPADM

## 2020-10-20 RX ORDER — FENTANYL CITRATE 50 UG/ML
INJECTION, SOLUTION INTRAMUSCULAR; INTRAVENOUS
Status: COMPLETED | OUTPATIENT
Start: 2020-10-20 | End: 2020-10-20

## 2020-10-20 RX ADMIN — MIDAZOLAM HYDROCHLORIDE 1 MG: 5 INJECTION, SOLUTION INTRAMUSCULAR; INTRAVENOUS at 07:10

## 2020-10-20 RX ADMIN — FENTANYL CITRATE 25 MCG: 50 INJECTION INTRAMUSCULAR; INTRAVENOUS at 07:10

## 2020-10-20 RX ADMIN — MIDAZOLAM HYDROCHLORIDE 2 MG: 5 INJECTION, SOLUTION INTRAMUSCULAR; INTRAVENOUS at 07:10

## 2020-10-20 RX ADMIN — DIAZEPAM 5 MG: 5 INJECTION, SOLUTION INTRAMUSCULAR; INTRAVENOUS at 07:10

## 2020-10-20 RX ADMIN — FENTANYL CITRATE 50 MCG: 50 INJECTION INTRAMUSCULAR; INTRAVENOUS at 07:10

## 2020-10-20 NOTE — DISCHARGE INSTRUCTIONS
Recovery After Procedural Sedation (Adult)   You have been given medicine by vein to make you sleep during your surgery. This may have included both a pain medicine and sleeping medicine. Most of the effects have worn off. But you may still have some drowsiness for the next 6 to 8 hours.  Home care  Follow these guidelines when you get home:  · For the next 8 hours, you should be watched by a responsible adult. This person should make sure your condition is not getting worse.  · Don't drink any alcohol for the next 24 hours.  · Don't drive, operate dangerous machinery, or make important business or personal decisions during the next 24 hours.  · To prevent injury or falls, use caution when standing and walking for at least 24 hours after your procedure.  Note: Your healthcare provider may tell you not to take any medicine by mouth for pain or sleep in the next 4 hours. These medicines may react with the medicines you were given in the hospital. This could cause a much stronger response than usual.  Follow-up care  Follow up with your healthcare provider if you are not alert and back to your usual level of activity within 12 hours.  When to seek medical advice  Call your healthcare provider right away if any of these occur:  · Drowsiness gets worse  · Weakness or dizziness gets worse  · Repeated vomiting  · You can't be awakened  · Fever  · New rash  [employer]Well last reviewed this educational content on 9/1/2019  © 9548-0077 [employer]. [address] [place] [address]. All rights reserved. This information is not intended as a substitute for professional medical care. Always follow your healthcare professional's instructions.        Eating a High-Fiber Diet  Fiber is what gives strength and structure to plants. Most grains, beans, vegetables, and fruits contain fiber. Foods rich in fiber are often low in calories and fat, and they fill you up more. They may also reduce your risks for certain  health problems. To find out the amount of fiber in canned, packaged, or frozen foods, read the Nutrition Facts label. It tells you how much fiber is in a serving.    Types of fiber and their benefits  There are two types of fiber: insoluble and soluble. They both aid digestion and help you maintain a healthy weight.  · Insoluble fiber. This is found in whole grains, cereals, certain fruits and vegetables such as apple skin, corn, and carrots. Insoluble fiber may prevent constipation and reduce the risk for certain types of cancer.  · Soluble fiber. This type of fiber is in oats, beans, and certain fruits and vegetables such as strawberries and peas. Soluble fiber can reduce cholesterol, which may help lower the risk for heart disease. It also helps control blood sugar levels.  Look for high-fiber foods  Try these foods to add fiber to your diet:  · Whole-grain breads and cereals. Try to eat 6 to 8 ounces a day. Include wheat and oat bran cereals, whole-wheat muffins or toast, and corn tortillas in your meals.  · Fruits. Try to eat 2 cups a day. Apples, oranges, strawberries, pears, and bananas are good sources. (Note: Fruit juice is low in fiber.)  · Vegetables. Try to eat at least 2.5 cups a day. Add asparagus, carrots, broccoli, peas, and corn to your meals.  · Beans. One cup of cooked lentils gives you over 15 grams of fiber. Try navy beans, lentils, and chickpeas.  · Seeds. A small handful of seeds gives you about 3 grams of fiber. Try sunflower seeds.  Keep track of your fiber  Keep track of how much fiber you eat. Start by reading food labels. Then eat a variety of foods high in fiber. As you begin to eat more fiber, ask your healthcare provider how much water you should be drinking to keep your digestive system working smoothly.  You should aim for a certain amount of fiber in your diet each day. If you are a woman, that amount is between 25 and 28 grams per day. Men should aim for 30 to 33 grams per day.  After age 50, your daily fiber needs drop to 22 grams for women and 28 grams for men.  Before you reach for the fiber supplements, think about this. Fiber is found naturally in healthy whole foods. It gives you that feeling of fullness after you eat. Taking fiber supplements or eating fiber-enriched foods will not give you this full feeling.  Your fiber intake is a good measure for the quality of your overall diet. If you are missing out on your daily amount of fiber, you may be lacking other important nutrients as well.  Date Last Reviewed: 5/11/2015 © 2000-2017 [employer]. [address] [place] [address]. All rights reserved. This information is not intended as a substitute for professional medical care. Always follow your healthcare professional's instructions.        Understanding Colon and Rectal Polyps    The colon (also called the large intestine) is a muscular tube that forms the last part of the digestive tract. It absorbs water and stores food waste. The colon is about 4 to 6 feet long. The rectum is the last 6 inches of the colon. The colon and rectum have a smooth lining composed of millions of cells. Changes in these cells can lead to growths in the colon that can become cancerous and should be removed. Multiple tests are available to screen for colon cancer, but the colonoscopy is the most recommended test. During colonoscopy, these polyps can be removed. How often you need this test depends on many things including your condition, your family history, symptoms, and what the findings were at the previous colonoscopy.   When the colon lining changes  Changes that happen in the cells that line the colon or rectum can lead to growths called polyps. Over a period of years, polyps can turn cancerous. Removing polyps early may prevent cancer from ever forming.  Polyps  Polyps are fleshy clumps of tissue that form on the lining of the colon or rectum. Small polyps are usually benign  (not cancerous). However, over time, cells in a polyp can change and become cancerous. Certain types of polyps known as adenomatous polyps are premalignant. The risk for invasive cancer increases with the size of the polyp and certain cell and gene features. This means that they can become cancerous if they're not removed. Hyperplastic polyps are benign. They can grow quite large and not turn cancerous.   Cancer  Almost all colorectal cancers start when polyp cells begin growing abnormally. As a cancerous tumor grows, it may involve more and more of the colon or rectum. In time, cancer can also grow beyond the colon or rectum and spread to nearby organs or to glands called lymph nodes. The cells can also travel to other parts of the body. This is known as metastasis. The earlier a cancerous tumor is removed, the better the chance of preventing its spread.    Date Last Reviewed: 8/1/2016  © 6513-1918 [employer]. [address] [place] [address], [place] [address]. All rights reserved. This information is not intended as a substitute for professional medical care. Always follow your healthcare professional's instructions.

## 2020-10-20 NOTE — H&P
GASTROENTEROLOGY PRE-PROCEDURE H&P NOTE  Patient Name: Rosaura Loredo  Patient MRN: [id number]  Patient : 1975    Service date: 10/20/2020    PCP: Jayy Ramos Jr, MD    No chief complaint on file.      HPI: Patient is a 44 y.o. female with PMHx as below here for evaluation of RLQ pain.     Past Medical History:  Past Medical History:   Diagnosis Date    Allergy     Anxiety     Asthma     Hyperlipidemia     PAC (premature atrial contraction)     Rosacea         Past Surgical History:  Past Surgical History:   Procedure Laterality Date     SECTION      COLD KNIFE CONIZATION OF CERVIX      multiple    HYSTERECTOMY      ovaries intact    TONSILLECTOMY          Home Medications:  Medications Prior to Admission   Medication Sig Dispense Refill Last Dose    ALPRAZolam (XANAX) 0.5 MG tablet Take 1 tablet (0.5 mg total) by mouth 3 (three) times daily as needed for Anxiety. 60 tablet 0 10/19/2020 at Unknown time    ibuprofen (ADVIL,MOTRIN) 200 MG tablet Take 400 mg by mouth every 6 (six) hours as needed for Pain.   Past Week at Unknown time    loratadine (CLARITIN) 10 mg tablet Take 1 tablet (10 mg total) by mouth once daily. 90 tablet 1 10/19/2020 at Unknown time    montelukast (SINGULAIR) 10 mg tablet Take 1 tablet (10 mg total) by mouth every evening. 90 tablet 0 10/19/2020 at Unknown time    multivitamin (ONE DAILY MULTIVITAMIN) per tablet Take 1 tablet by mouth once daily.   10/19/2020 at Unknown time    simvastatin (ZOCOR) 20 MG tablet Take 20 mg by mouth every evening.   10/19/2020 at Unknown time    acetaminophen (TYLENOL) 500 MG tablet Take 1,000 mg by mouth every 6 (six) hours as needed for Pain.   Unknown at Unknown time    albuterol 90 mcg/actuation inhaler Inhale 2 puffs into the lungs every 6 (six) hours as needed for Wheezing. 1 Inhaler 2 More than a month at Unknown time    augmented betamethasone dipropionate (DIPROLENE-AF) 0.05 % ointment Apply topically. 50 g 1  "More than a month at Unknown time    clobetasoL (TEMOVATE) 0.05 % cream Apply topically. 60 g 1 Unknown at Unknown time    clobetasol 0.05% (TEMOVATE) 0.05 % Oint Apply topically to the affected area once daily under occlusion. 60 g 1 Unknown at Unknown time    hydroxyzine HCL (ATARAX) 25 MG tablet Take 1 to 2 tablets by mouth every 6 hours as needed itching 40 tablet 1 Unknown at Unknown time    sumatriptan (IMITREX) 100 MG tablet Take 1 tablet (100 mg total) by mouth every 2 (two) hours as needed for Migraine. Do not exceed 2 doses in 24 hours. 27 tablet 3        Inpatient Medications:    sodium chloride 0.9%    Review of patient's allergies indicates:   Allergen Reactions    Mepergan fortis Shortness Of Breath    Penicillins Rash    Sulfa (sulfonamide antibiotics) Rash       Social History:   Social History     Occupational History    Not on file   Tobacco Use    Smoking status: Never Smoker    Smokeless tobacco: Never Used   Substance and Sexual Activity    Alcohol use: Yes     Frequency: 2-4 times a month     Drinks per session: 1 or 2     Binge frequency: Less than monthly     Comment: occ    Drug use: No    Sexual activity: Yes     Birth control/protection: See Surgical Hx, Surgical       Family History:   Family History   Problem Relation Age of Onset    Diabetes Mother     Asthma Mother     Hypertension Father     Prostate cancer Father        Review of Systems:  A 10 point review of systems was performed and was normal, except as mentioned in the HPI, including constitutional, HEENT, heme, lymph, cardiovascular, respiratory, gastrointestinal, genitourinary, neurologic, endocrine, psychiatric and musculoskeletal.      OBJECTIVE:    Physical Exam:  24 Hour Vital Sign Ranges: Temp:  [98.6 °F (37 °C)] 98.6 °F (37 °C)  Pulse:  [78] 78  Resp:  [16] 16  SpO2:  [100 %] 100 %  BP: (138)/(68) 138/68  Most recent vitals: /68   Pulse 78   Temp 98.6 °F (37 °C) (Oral)   Resp 16   Ht 5' 5" " (1.651 m)   Wt 54.4 kg (120 lb)   SpO2 100%   Breastfeeding No   BMI 19.97 kg/m²    GEN: well-developed, well-nourished, awake and alert, non-toxic appearing adult  HEENT: PERRL, sclera anicteric, oral mucosa pink and moist without lesion  NECK: trachea midline; Good ROM  CV: regular rate and rhythm, no murmurs or gallops  RESP: clear to auscultation bilaterally, no wheezes, rhonci or rales  ABD: soft, non-tender, non-distended, normal bowel sounds  EXT: no swelling or edema, 2+ pulses distally  SKIN: no rashes or jaundice  PSYCH: normal affect    Labs:   No results for input(s): WBC, MCV, PLT in the last 72 hours.    Invalid input(s): HGBAU  No results for input(s): NA, K, CL, CO2, BUN, GLU in the last 72 hours.    Invalid input(s): CREA  No results for input(s): ALB in the last 72 hours.    Invalid input(s): ALKP, SGOT, SGPT, TBIL, DBIL, TPRO  No results for input(s): PT, INR, PTT in the last 72 hours.      IMPRESSION / RECOMMENDATIONS:  Colon w/ conscious sedation. Risks, benefits, alternative discussed in detail with patient / family regarding anticipated procedure and possible complications.     Sudheer Santiago III  10/20/2020  7:00 AM

## 2020-10-20 NOTE — [employer] PATIENT INSTRUCTIONS
Discharge Summary/Instructions after an Endoscopic Procedure  Patient Name: Rosaura Loredo  Patient MRN: [id number]  Patient YOB: 1975 Tuesday, October 20, 2020  Sudheer Santiago III, MD  RESTRICTIONS:  During your procedure today, you received medications for sedation.  These   medications may affect your judgment, balance and coordination.  Therefore,   for 24 hours, you have the following restrictions:   - DO NOT drive a car, operate machinery, make legal/financial decisions,   sign important papers or drink alcohol.    ACTIVITY:  Today: no heavy lifting, straining or running due to procedural   sedation/anesthesia.  The following day: return to full activity including work.  DIET:  Eat and drink normally unless instructed otherwise.     TREATMENT FOR COMMON SIDE EFFECTS:  - Mild abdominal pain, nausea, belching, bloating or excessive gas:  rest,   eat lightly and use a heating pad.  - Sore Throat: treat with throat lozenges and/or gargle with warm salt   water.  - Because air was used during the procedure, expelling large amounts of air   from your rectum or belching is normal.  - If a bowel prep was taken, you may not have a bowel movement for 1-3 days.    This is normal.  SYMPTOMS TO WATCH FOR AND REPORT TO YOUR PHYSICIAN:  1. Abdominal pain or bloating, other than gas cramps.  2. Chest pain.  3. Back pain.  4. Signs of infection such as: chills or fever occurring within 24 hours   after the procedure.  5. Rectal bleeding, which would show as bright red, maroon, or black stools.   (A tablespoon of blood from the rectum is not serious, especially if   hemorrhoids are present.)  6. Vomiting.  7. Weakness or dizziness.  GO DIRECTLY TO THE NEAREST EMERGENCY ROOM IF YOU HAVE ANY OF THE FOLLOWING:      Difficulty breathing              Chills and/or fever over 101 F   Persistent vomiting and/or vomiting blood   Severe abdominal pain   Severe chest pain   Black, tarry stools   Bleeding- more than one  tablespoon   Any other symptom or condition that you feel may need urgent attention  Your doctor recommends these additional instructions:  If any biopsies were taken, your doctors clinic will contact you in 1 to 2   weeks with any results.  - Patient has a contact number available for emergencies.  The signs and   symptoms of potential delayed complications were discussed with the   patient.  Return to normal activities tomorrow.  Written discharge   instructions were provided to the patient.   - Resume previous diet.   - Discharge patient to home (ambulatory).   - Continue present medications.   - Repeat colonoscopy in 5 years for surveillance.   - Return to GI clinic PRN.  For questions, problems or results please call your physician - Sudheer Santiago III, MD at Work:  [phone].  [facility], EMERGENCY ROOM PHONE NUMBER: [phone]  IF A COMPLICATION OR EMERGENCY SITUATION ARISES AND YOU ARE UNABLE TO REACH   YOUR PHYSICIAN - GO DIRECTLY TO THE EMERGENCY ROOM.  Sudheer Santiago III, MD  10/20/2020 8:10:20 AM  This report has been verified and signed electronically.  [employer]

## 2020-10-26 ENCOUNTER — OFFICE VISIT (OUTPATIENT)
Dept: FAMILY MEDICINE | Facility: CLINIC | Age: 45
End: 2020-10-26
Payer: COMMERCIAL

## 2020-10-26 ENCOUNTER — OFFICE VISIT (OUTPATIENT)
Dept: PODIATRY | Facility: CLINIC | Age: 45
End: 2020-10-26
Payer: COMMERCIAL

## 2020-10-26 ENCOUNTER — HOSPITAL ENCOUNTER (OUTPATIENT)
Dept: RADIOLOGY | Facility: CLINIC | Age: 45
Discharge: HOME | End: 2020-10-26
Attending: PODIATRIST
Payer: COMMERCIAL

## 2020-10-26 VITALS
SYSTOLIC BLOOD PRESSURE: 130 MMHG | TEMPERATURE: 98 F | WEIGHT: 144 LBS | DIASTOLIC BLOOD PRESSURE: 90 MMHG | HEIGHT: 65 IN | BODY MASS INDEX: 23.99 KG/M2 | RESPIRATION RATE: 16 BRPM | HEART RATE: 87 BPM

## 2020-10-26 VITALS
HEART RATE: 87 BPM | DIASTOLIC BLOOD PRESSURE: 90 MMHG | BODY MASS INDEX: 23.82 KG/M2 | HEIGHT: 65 IN | WEIGHT: 143 LBS | SYSTOLIC BLOOD PRESSURE: 130 MMHG | OXYGEN SATURATION: 97 % | TEMPERATURE: 97 F

## 2020-10-26 DIAGNOSIS — G43.009 MIGRAINE WITHOUT AURA AND WITHOUT STATUS MIGRAINOSUS, NOT INTRACTABLE: ICD-10-CM

## 2020-10-26 DIAGNOSIS — R10.31 RLQ ABDOMINAL PAIN: Primary | ICD-10-CM

## 2020-10-26 DIAGNOSIS — M79.672 PAIN OF LEFT HEEL: ICD-10-CM

## 2020-10-26 DIAGNOSIS — M72.2 PLANTAR FASCIITIS: Primary | ICD-10-CM

## 2020-10-26 DIAGNOSIS — E78.00 PURE HYPERCHOLESTEROLEMIA: ICD-10-CM

## 2020-10-26 DIAGNOSIS — F41.9 ANXIETY AND DEPRESSION: ICD-10-CM

## 2020-10-26 DIAGNOSIS — F32.A ANXIETY AND DEPRESSION: ICD-10-CM

## 2020-10-26 DIAGNOSIS — R03.0 ELEVATED BLOOD-PRESSURE READING WITHOUT DIAGNOSIS OF HYPERTENSION: ICD-10-CM

## 2020-10-26 DIAGNOSIS — G57.92 NERVE ENTRAPMENT SYNDROME OF LEFT FOOT: ICD-10-CM

## 2020-10-26 DIAGNOSIS — J30.1 CHRONIC SEASONAL ALLERGIC RHINITIS DUE TO POLLEN: ICD-10-CM

## 2020-10-26 PROCEDURE — 73630 X-RAY EXAM OF FOOT: CPT | Mod: LT,S$GLB,, | Performed by: RADIOLOGY

## 2020-10-26 PROCEDURE — 99214 PR OFFICE/OUTPT VISIT, EST, LEVL IV, 30-39 MIN: ICD-10-PCS | Mod: S$GLB,,, | Performed by: INTERNAL MEDICINE

## 2020-10-26 PROCEDURE — 99203 PR OFFICE/OUTPT VISIT, NEW, LEVL III, 30-44 MIN: ICD-10-PCS | Mod: S$GLB,,, | Performed by: PODIATRIST

## 2020-10-26 PROCEDURE — 99214 OFFICE O/P EST MOD 30 MIN: CPT | Mod: S$GLB,,, | Performed by: INTERNAL MEDICINE

## 2020-10-26 PROCEDURE — 99203 OFFICE O/P NEW LOW 30 MIN: CPT | Mod: S$GLB,,, | Performed by: PODIATRIST

## 2020-10-26 PROCEDURE — 73630 XR FOOT COMPLETE 3 VIEW LEFT: ICD-10-PCS | Mod: LT,S$GLB,, | Performed by: RADIOLOGY

## 2020-10-26 PROCEDURE — 3008F BODY MASS INDEX DOCD: CPT | Mod: CPTII,S$GLB,, | Performed by: PODIATRIST

## 2020-10-26 PROCEDURE — 3008F PR BODY MASS INDEX (BMI) DOCUMENTED: ICD-10-PCS | Mod: S$GLB,,, | Performed by: INTERNAL MEDICINE

## 2020-10-26 PROCEDURE — 3008F PR BODY MASS INDEX (BMI) DOCUMENTED: ICD-10-PCS | Mod: CPTII,S$GLB,, | Performed by: PODIATRIST

## 2020-10-26 PROCEDURE — 3008F BODY MASS INDEX DOCD: CPT | Mod: S$GLB,,, | Performed by: INTERNAL MEDICINE

## 2020-10-26 RX ORDER — ALPRAZOLAM 0.5 MG/1
0.5 TABLET ORAL 3 TIMES DAILY PRN
Qty: 60 TABLET | Refills: 0 | Status: SHIPPED | OUTPATIENT
Start: 2020-10-26 | End: 2021-03-23 | Stop reason: SDUPTHER

## 2020-10-26 RX ORDER — SUMATRIPTAN SUCCINATE 100 MG/1
100 TABLET ORAL
Qty: 27 TABLET | Refills: 3 | Status: SHIPPED | OUTPATIENT
Start: 2020-10-26 | End: 2021-03-23 | Stop reason: SDUPTHER

## 2020-10-26 RX ORDER — HYOSCYAMINE SULFATE 0.38 MG/1
1 TABLET, EXTENDED RELEASE ORAL DAILY PRN
COMMUNITY
Start: 2020-09-28 | End: 2021-03-23

## 2020-10-26 RX ORDER — MELOXICAM 15 MG/1
15 TABLET ORAL DAILY
Qty: 30 TABLET | Refills: 0 | Status: ON HOLD | OUTPATIENT
Start: 2020-10-26 | End: 2020-12-15

## 2020-10-26 RX ORDER — MONTELUKAST SODIUM 10 MG/1
10 TABLET ORAL NIGHTLY
Qty: 90 TABLET | Refills: 1 | Status: SHIPPED | OUTPATIENT
Start: 2020-10-26 | End: 2021-03-23 | Stop reason: SDUPTHER

## 2020-10-26 RX ORDER — SIMVASTATIN 20 MG/1
20 TABLET, FILM COATED ORAL NIGHTLY
Qty: 90 TABLET | Refills: 1 | Status: SHIPPED | OUTPATIENT
Start: 2020-10-26 | End: 2021-03-23 | Stop reason: SDUPTHER

## 2020-10-26 NOTE — PROGRESS NOTES
Subjective:       Patient ID: Rosaura Loredo is a 44 y.o. female.    Chief Complaint: Follow-up (hospital followup) and Hyperlipidemia (med refill)    Here for hospital follow up.  She has been having some intermittent stabbing RLQ abdominal pain for several weeks.  She had a more severe episode prompting visit to ER.  She was admitted overnight for workup.  CT did show kidney stones on the right which she reports have been there for years.  No blood in urine and pain was different than what she experienced in the past when passing stones.  Pelvic U/S was negative.  She does still have ovaries but they had normal appearance.   She has seen GI and had colonoscopy; 1 polyp removed.  Given bentyl on discharge for as needed use; it didn't seem to help.  GI changed to Levbid but she hasn't taken it yet.   She has had episodes of pain since discharge but hasn't had any episodes in last 4 days.  She did have COVID at end of August; did not have diarrhea/GI symptoms with it.      Hyperlipidemia  This is a chronic problem. The problem is controlled. Recent lipid tests were reviewed and are normal. Pertinent negatives include no chest pain, myalgias or shortness of breath. Current antihyperlipidemic treatment includes statins. The current treatment provides significant improvement of lipids. There are no compliance problems.      Review of Systems   Constitutional: Negative for activity change, chills, fatigue, fever and unexpected weight change.   HENT: Negative for congestion, hearing loss, postnasal drip, rhinorrhea, trouble swallowing and voice change.    Eyes: Negative for photophobia, discharge and visual disturbance.   Respiratory: Negative for apnea, cough, choking, chest tightness, shortness of breath and wheezing.    Cardiovascular: Positive for palpitations. Negative for chest pain and leg swelling.   Gastrointestinal: Positive for abdominal pain. Negative for blood in stool, constipation, diarrhea, nausea,  rectal pain and vomiting.   Endocrine: Negative for cold intolerance, heat intolerance, polydipsia and polyuria.   Genitourinary: Negative for decreased urine volume, difficulty urinating, dysuria, frequency, genital sores, hematuria, menstrual problem, pelvic pain, urgency, vaginal bleeding and vaginal discharge.   Musculoskeletal: Negative for arthralgias, back pain, gait problem, joint swelling, myalgias, neck pain and neck stiffness.   Skin: Negative for color change, rash and wound.   Allergic/Immunologic: Negative for environmental allergies and food allergies.   Neurological: Negative for dizziness, tremors, seizures, syncope, facial asymmetry, speech difficulty, weakness, light-headedness, numbness and headaches.   Hematological: Negative for adenopathy. Does not bruise/bleed easily.   Psychiatric/Behavioral: Positive for sleep disturbance. Negative for confusion, dysphoric mood, hallucinations and suicidal ideas. The patient is nervous/anxious.        Past Medical History:   Diagnosis Date    Allergy     Anxiety     Asthma     Hyperlipidemia     PAC (premature atrial contraction)     Rosacea       Past Surgical History:   Procedure Laterality Date     SECTION      COLD KNIFE CONIZATION OF CERVIX      multiple    COLONOSCOPY N/A 10/20/2020    Procedure: COLONOSCOPY;  Surgeon: Sudheer Santiago III, MD;  Location: [facility];  Service: Endoscopy;  Laterality: N/A;    HYSTERECTOMY      ovaries intact    TONSILLECTOMY         Family History   Problem Relation Age of Onset    Diabetes Mother     Asthma Mother     Hypertension Father     Prostate cancer Father        Social History     Socioeconomic History    Marital status:      Spouse name: Not on file    Number of children: Not on file    Years of education: Not on file    Highest education level: Not on file   Occupational History    Not on file   Social Needs    Financial resource strain: Not hard at all    Food  insecurity     Worry: Never true     Inability: Never true    Transportation needs     Medical: No     Non-medical: No   Tobacco Use    Smoking status: Never Smoker    Smokeless tobacco: Never Used   Substance and Sexual Activity    Alcohol use: Yes     Frequency: 2-4 times a month     Drinks per session: 1 or 2     Binge frequency: Less than monthly     Comment: occ    Drug use: No    Sexual activity: Yes     Birth control/protection: See Surgical Hx, Surgical   Lifestyle    Physical activity     Days per week: 0 days     Minutes per session: Not on file    Stress: To some extent   Relationships    Social connections     Talks on phone: More than three times a week     Gets together: Once a week     Attends Evangelical service: Not on file     Active member of club or organization: Yes     Attends meetings of clubs or organizations: More than 4 times per year     Relationship status:    Other Topics Concern    Not on file   Social History Narrative    Live with        Current Outpatient Medications   Medication Sig Dispense Refill    acetaminophen (TYLENOL) 500 MG tablet Take 1,000 mg by mouth every 6 (six) hours as needed for Pain.      albuterol 90 mcg/actuation inhaler Inhale 2 puffs into the lungs every 6 (six) hours as needed for Wheezing. 1 Inhaler 2    hyoscyamine (LEVBID) 0.375 mg Tb12 Take 1 tablet by mouth daily as needed.      ibuprofen (ADVIL,MOTRIN) 200 MG tablet Take 400 mg by mouth every 6 (six) hours as needed for Pain.      loratadine (CLARITIN) 10 mg tablet Take 1 tablet (10 mg total) by mouth once daily. 90 tablet 1    montelukast (SINGULAIR) 10 mg tablet Take 1 tablet (10 mg total) by mouth every evening. 90 tablet 1    multivitamin (ONE DAILY MULTIVITAMIN) per tablet Take 1 tablet by mouth once daily.      simvastatin (ZOCOR) 20 MG tablet Take 1 tablet (20 mg total) by mouth every evening. 90 tablet 1    ALPRAZolam (XANAX) 0.5 MG tablet Take 1 tablet (0.5 mg  "total) by mouth 3 (three) times daily as needed for Anxiety. 60 tablet 0    sumatriptan (IMITREX) 100 MG tablet Take 1 tablet (100 mg total) by mouth every 2 (two) hours as needed for Migraine. Do not exceed 2 doses in 24 hours. 27 tablet 3     No current facility-administered medications for this visit.        Review of patient's allergies indicates:   Allergen Reactions    Mepergan fortis Shortness Of Breath    Penicillins Rash    Sulfa (sulfonamide antibiotics) Rash     Objective:      Blood pressure (!) 130/90, pulse 87, temperature 97.4 °F (36.3 °C), temperature source Temporal, height 5' 5" (1.651 m), weight 64.9 kg (143 lb), SpO2 97 %. Body mass index is 23.8 kg/m².   Physical Exam  Vitals signs and nursing note reviewed.   Constitutional:       General: She is not in acute distress.     Appearance: She is well-developed. She is not ill-appearing, toxic-appearing or diaphoretic.   HENT:      Head: Normocephalic and atraumatic.   Eyes:      General: No scleral icterus.        Right eye: No discharge.         Left eye: No discharge.      Conjunctiva/sclera: Conjunctivae normal.   Neck:      Vascular: No carotid bruit.   Cardiovascular:      Rate and Rhythm: Normal rate and regular rhythm.      Heart sounds: Normal heart sounds. No murmur.   Pulmonary:      Effort: Pulmonary effort is normal. No respiratory distress.      Breath sounds: Normal breath sounds. No decreased breath sounds, wheezing, rhonchi or rales.   Abdominal:      General: There is no distension.      Palpations: Abdomen is soft.      Tenderness: There is abdominal tenderness (mild to deep palpation) in the right lower quadrant. There is no guarding or rebound.   Musculoskeletal:      Right lower leg: No edema.      Left lower leg: No edema.   Skin:     General: Skin is warm and dry.   Neurological:      Mental Status: She is alert.      Motor: No tremor.   Psychiatric:         Mood and Affect: Mood normal.         Speech: Speech normal.    "      Behavior: Behavior normal.           No visits with results within 1 Month(s) from this visit.   Latest known visit with results is:   Admission on 09/15/2020, Discharged on 09/16/2020   Component Date Value Ref Range Status    WBC 09/15/2020 8.19  3.90 - 12.70 K/uL Final    RBC 09/15/2020 4.50  4.00 - 5.40 M/uL Final    Hemoglobin 09/15/2020 13.5  12.0 - 16.0 g/dL Final    Hematocrit 09/15/2020 40.1  37.0 - 48.5 % Final    Mean Corpuscular Volume 09/15/2020 89  82 - 98 fL Final    Mean Corpuscular Hemoglobin 09/15/2020 30.0  27.0 - 31.0 pg Final    Mean Corpuscular Hemoglobin Conc 09/15/2020 33.7  32.0 - 36.0 g/dL Final    RDW 09/15/2020 12.8  11.5 - 14.5 % Final    Platelets 09/15/2020 323  150 - 350 K/uL Final    MPV 09/15/2020 9.5  9.2 - 12.9 fL Final    Immature Granulocytes 09/15/2020 0.5  0.0 - 0.5 % Final    Gran # (ANC) 09/15/2020 5.6  1.8 - 7.7 K/uL Final    Immature Grans (Abs) 09/15/2020 0.04  0.00 - 0.04 K/uL Final    Comment: Mild elevation in immature granulocytes is non specific and   can be seen in a variety of conditions including stress response,   acute inflammation, trauma and pregnancy. Correlation with other   laboratory and clinical findings is essential.      Lymph # 09/15/2020 1.9  1.0 - 4.8 K/uL Final    Mono # 09/15/2020 0.6  0.3 - 1.0 K/uL Final    Eos # 09/15/2020 0.1  0.0 - 0.5 K/uL Final    Baso # 09/15/2020 0.03  0.00 - 0.20 K/uL Final    nRBC 09/15/2020 0  0 /100 WBC Final    Gran% 09/15/2020 68.2  38.0 - 73.0 % Final    Lymph% 09/15/2020 22.6  18.0 - 48.0 % Final    Mono% 09/15/2020 7.2  4.0 - 15.0 % Final    Eosinophil% 09/15/2020 1.1  0.0 - 8.0 % Final    Basophil% 09/15/2020 0.4  0.0 - 1.9 % Final    Differential Method 09/15/2020 Automated   Final    Sodium 09/15/2020 137  136 - 145 mmol/L Final    Potassium 09/15/2020 4.1  3.5 - 5.1 mmol/L Final    Chloride 09/15/2020 99  95 - 110 mmol/L Final    CO2 09/15/2020 24  23 - 29 mmol/L Final     Glucose 09/15/2020 95  70 - 110 mg/dL Final    BUN, Bld 09/15/2020 15  6 - 20 mg/dL Final    Creatinine 09/15/2020 0.5  0.5 - 1.4 mg/dL Final    Calcium 09/15/2020 9.5  8.7 - 10.5 mg/dL Final    Total Protein 09/15/2020 7.4  6.0 - 8.4 g/dL Final    Albumin 09/15/2020 4.6  3.5 - 5.2 g/dL Final    Total Bilirubin 09/15/2020 0.9  0.1 - 1.0 mg/dL Final    Comment: For infants and newborns, interpretation of results should be based  on gestational age, weight and in agreement with clinical  observations.  Premature Infant recommended reference ranges:  Up to 24 hours.............<8.0 mg/dL  Up to 48 hours............<12.0 mg/dL  3-5 days..................<15.0 mg/dL  6-29 days.................<15.0 mg/dL      Alkaline Phosphatase 09/15/2020 59  55 - 135 U/L Final    AST 09/15/2020 31  10 - 40 U/L Final    ALT 09/15/2020 46* 10 - 44 U/L Final    Anion Gap 09/15/2020 14  8 - 16 mmol/L Final    eGFR if African American 09/15/2020 >60.0  >60 mL/min/1.73 m^2 Final    eGFR if non African American 09/15/2020 >60.0  >60 mL/min/1.73 m^2 Final    Comment: Calculation used to obtain the estimated glomerular filtration  rate (eGFR) is the CKD-EPI equation.       Lipase 09/15/2020 28  4 - 60 U/L Final    Specimen UA 09/15/2020 Urine, Clean Catch   Final    Color, UA 09/15/2020 Yellow  Yellow, Straw, Berenice Final    Appearance, UA 09/15/2020 Clear  Clear Final    pH, UA 09/15/2020 8.0  5.0 - 8.0 Final    Specific [place], UA 09/15/2020 1.020  1.005 - 1.030 Final    Protein, UA 09/15/2020 Negative  Negative Final    Comment: Recommend a 24 hour urine protein or a urine   protein/creatinine ratio if globulin induced proteinuria is  clinically suspected.      Glucose, UA 09/15/2020 Negative  Negative Final    Ketones, UA 09/15/2020 1+* Negative Final    Bilirubin (UA) 09/15/2020 Negative  Negative Final    Occult Blood UA 09/15/2020 Negative  Negative Final    Nitrite, UA 09/15/2020 Negative  Negative Final     Urobilinogen, UA 09/15/2020 Negative  Negative EU/dL Final    Leukocytes, UA 09/15/2020 Negative  Negative Final    Magnesium 09/15/2020 2.0  1.6 - 2.6 mg/dL Final    WBC 09/16/2020 6.99  3.90 - 12.70 K/uL Final    RBC 09/16/2020 4.24  4.00 - 5.40 M/uL Final    Hemoglobin 09/16/2020 13.0  12.0 - 16.0 g/dL Final    Hematocrit 09/16/2020 39.4  37.0 - 48.5 % Final    Mean Corpuscular Volume 09/16/2020 93  82 - 98 fL Final    Mean Corpuscular Hemoglobin 09/16/2020 30.7  27.0 - 31.0 pg Final    Mean Corpuscular Hemoglobin Conc 09/16/2020 33.0  32.0 - 36.0 g/dL Final    RDW 09/16/2020 13.0  11.5 - 14.5 % Final    Platelets 09/16/2020 285  150 - 350 K/uL Final    MPV 09/16/2020 9.7  9.2 - 12.9 fL Final    Immature Granulocytes 09/16/2020 0.6* 0.0 - 0.5 % Final    Gran # (ANC) 09/16/2020 4.2  1.8 - 7.7 K/uL Final    Immature Grans (Abs) 09/16/2020 0.04  0.00 - 0.04 K/uL Final    Comment: Mild elevation in immature granulocytes is non specific and   can be seen in a variety of conditions including stress response,   acute inflammation, trauma and pregnancy. Correlation with other   laboratory and clinical findings is essential.      Lymph # 09/16/2020 1.8  1.0 - 4.8 K/uL Final    Mono # 09/16/2020 0.8  0.3 - 1.0 K/uL Final    Eos # 09/16/2020 0.2  0.0 - 0.5 K/uL Final    Baso # 09/16/2020 0.02  0.00 - 0.20 K/uL Final    nRBC 09/16/2020 0  0 /100 WBC Final    Gran% 09/16/2020 59.4  38.0 - 73.0 % Final    Lymph% 09/16/2020 26.3  18.0 - 48.0 % Final    Mono% 09/16/2020 10.7  4.0 - 15.0 % Final    Eosinophil% 09/16/2020 2.7  0.0 - 8.0 % Final    Basophil% 09/16/2020 0.3  0.0 - 1.9 % Final    Differential Method 09/16/2020 Automated   Final    Sodium 09/16/2020 141  136 - 145 mmol/L Final    Potassium 09/16/2020 3.6  3.5 - 5.1 mmol/L Final    Chloride 09/16/2020 106  95 - 110 mmol/L Final    CO2 09/16/2020 28  23 - 29 mmol/L Final    Glucose 09/16/2020 91  70 - 110 mg/dL Final    BUN, Bld  09/16/2020 10  6 - 20 mg/dL Final    Creatinine 09/16/2020 0.5  0.5 - 1.4 mg/dL Final    Calcium 09/16/2020 8.7  8.7 - 10.5 mg/dL Final    Total Protein 09/16/2020 6.3  6.0 - 8.4 g/dL Final    Albumin 09/16/2020 3.9  3.5 - 5.2 g/dL Final    Total Bilirubin 09/16/2020 1.1* 0.1 - 1.0 mg/dL Final    Comment: For infants and newborns, interpretation of results should be based  on gestational age, weight and in agreement with clinical  observations.  Premature Infant recommended reference ranges:  Up to 24 hours.............<8.0 mg/dL  Up to 48 hours............<12.0 mg/dL  3-5 days..................<15.0 mg/dL  6-29 days.................<15.0 mg/dL      Alkaline Phosphatase 09/16/2020 48* 55 - 135 U/L Final    AST 09/16/2020 22  10 - 40 U/L Final    ALT 09/16/2020 36  10 - 44 U/L Final    Anion Gap 09/16/2020 7* 8 - 16 mmol/L Final    eGFR if African American 09/16/2020 >60.0  >60 mL/min/1.73 m^2 Final    eGFR if non African American 09/16/2020 >60.0  >60 mL/min/1.73 m^2 Final    Comment: Calculation used to obtain the estimated glomerular filtration  rate (eGFR) is the CKD-EPI equation.       CRP 09/16/2020 0.27  <0.76 mg/dL Final   ]  Assessment:       1. RLQ abdominal pain    2. Pure hypercholesterolemia    3. Anxiety and depression    4. Chronic seasonal allergic rhinitis due to pollen    5. Migraine without aura and without status migrainosus, not intractable    6. Elevated blood-pressure reading without diagnosis of hypertension        Plan:       Rosaura was seen today for follow-up and hyperlipidemia.    Diagnoses and all orders for this visit:    RLQ abdominal pain  Comments:  No pain in last 4 days.  If pain recurs, I would start Levbid daily, not PRN.  Follow up with GYN for eval also. ?adhesions ?colonic spasms    Pure hypercholesterolemia  -     simvastatin (ZOCOR) 20 MG tablet; Take 1 tablet (20 mg total) by mouth every evening.    Anxiety and depression  -     ALPRAZolam (XANAX) 0.5 MG tablet;  Take 1 tablet (0.5 mg total) by mouth 3 (three) times daily as needed for Anxiety.    Chronic seasonal allergic rhinitis due to pollen  -     montelukast (SINGULAIR) 10 mg tablet; Take 1 tablet (10 mg total) by mouth every evening.    Migraine without aura and without status migrainosus, not intractable  -     sumatriptan (IMITREX) 100 MG tablet; Take 1 tablet (100 mg total) by mouth every 2 (two) hours as needed for Migraine. Do not exceed 2 doses in 24 hours.    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

## 2020-10-26 NOTE — PROGRESS NOTES
[address] [facility] [address] [place] [address]  Phone: [phone]   [phone]    Patient's PCP:Jayy Ramos Jr, MD  Referring Provider: Aaareferral Self    Subjective:      Chief Complaint:: Foot Pain (left heel )    ALEXA Loredo is a 44 y.o. female who presents with a complaint of left heel pain lasting for six months. Onset of the symptoms was sharp pain.  Current symptoms include sharp pain.  Aggravating factors are walking and weightbearing. Symptoms have progressed.Treatment to date have included running shoes with insloes Patients rates pain 8/10 on pain scale.      Vitals:    10/26/20 1100   BP: (!) 130/90   Pulse: 87   Resp: 16   Temp: 97.6 °F (36.4 °C)     Shoe Size: 9.5    Past Surgical History:   Procedure Laterality Date     SECTION      COLD KNIFE CONIZATION OF CERVIX      multiple    COLONOSCOPY N/A 10/20/2020    Procedure: COLONOSCOPY;  Surgeon: Sudheer Santiago III, MD;  Location: [facility];  Service: Endoscopy;  Laterality: N/A;    HYSTERECTOMY      ovaries intact    TONSILLECTOMY       Past Medical History:   Diagnosis Date    Allergy     Anxiety     Asthma     Hyperlipidemia     PAC (premature atrial contraction)     Rosacea      Family History   Problem Relation Age of Onset    Diabetes Mother     Asthma Mother     Hypertension Father     Prostate cancer Father         Social History:   Marital Status:   Alcohol History:  reports current alcohol use.  Tobacco History:  reports that she has never smoked. She has never used smokeless tobacco.  Drug History:  reports no history of drug use.    Review of patient's allergies indicates:   Allergen Reactions    Mepergan fortis Shortness Of Breath    Penicillins Rash    Sulfa (sulfonamide antibiotics) Rash       Current Outpatient Medications   Medication Sig Dispense Refill    acetaminophen (TYLENOL) 500 MG tablet Take 1,000 mg by mouth every 6 (six) hours as needed for Pain.       albuterol 90 mcg/actuation inhaler Inhale 2 puffs into the lungs every 6 (six) hours as needed for Wheezing. 1 Inhaler 2    ALPRAZolam (XANAX) 0.5 MG tablet Take 1 tablet (0.5 mg total) by mouth 3 (three) times daily as needed for Anxiety. 60 tablet 0    hyoscyamine (LEVBID) 0.375 mg Tb12 Take 1 tablet by mouth daily as needed.      ibuprofen (ADVIL,MOTRIN) 200 MG tablet Take 400 mg by mouth every 6 (six) hours as needed for Pain.      loratadine (CLARITIN) 10 mg tablet Take 1 tablet (10 mg total) by mouth once daily. 90 tablet 1    meloxicam (MOBIC) 15 MG tablet Take 1 tablet (15 mg total) by mouth once daily. 30 tablet 0    montelukast (SINGULAIR) 10 mg tablet Take 1 tablet (10 mg total) by mouth every evening. 90 tablet 1    multivitamin (ONE DAILY MULTIVITAMIN) per tablet Take 1 tablet by mouth once daily.      simvastatin (ZOCOR) 20 MG tablet Take 1 tablet (20 mg total) by mouth every evening. 90 tablet 1    sumatriptan (IMITREX) 100 MG tablet Take 1 tablet (100 mg total) by mouth every 2 (two) hours as needed for Migraine. Do not exceed 2 doses in 24 hours. 27 tablet 3     No current facility-administered medications for this visit.        Review of Systems      Objective:        Physical Exam:   Foot Exam    General  General Appearance: appears stated age and healthy   Orientation: alert and oriented to person, place, and time   Affect: appropriate   Gait: antalgic       Left Foot/Ankle      Inspection and Palpation  Ecchymosis: none  Tenderness: plantar fascia and calcaneus tenderness (Mild pain by Stevenson's nerve)  Swelling: none   Arch: normal  Hallux limitus: yes (Mild deformity)  Skin Exam: skin intact;     Muscle Strength  Ankle dorsiflexion: 5  Ankle plantar flexion: 5  Ankle inversion: 5  Ankle eversion: 5  Great toe extension: 5  Great toe flexion: 5    Range of Motion    Normal left ankle ROM    Comments  Pain on palpation of the infeior medial heel. No pain present  with side to side  compression of the calcaneus. Negative tinnel's sign  at the tarsal tunnel. Positive Stevenson's nerve pain. Negative Calcaneal nerve pain. No soft tissue masses. Pain present with dorsiflexion of the ankle. No edema, erythema, or ecchymosis noted.     Physical Exam    Imaging:   AP, lateral, lateral oblique, axial calcaneal weight-bearing x-rays left foot:  No acute fractures, no bone tumors, no soft tissue masses seen.  Small inferior and posterior calcaneal exostosis present.  Normal foot structure to slightly pes cavus.  Early stage II hallux limitus.         Assessment:       1. Plantar fasciitis    2. Nerve entrapment syndrome of left foot    3. Pain of left heel      Plan:   Plantar fasciitis  -     meloxicam (MOBIC) 15 MG tablet; Take 1 tablet (15 mg total) by mouth once daily.  Dispense: 30 tablet; Refill: 0  -     Cancel: MRI Foot (Midfoot) Left Without Contrast; Future; Expected date: 10/26/2020  -     Cancel: MRI Foot (Midfoot) Left Without Contrast; Future; Expected date: 10/26/2020  -     MRI Ankle Without Contrast Left; Future; Expected date: 10/26/2020    Nerve entrapment syndrome of left foot    Pain of left heel  -     X-Ray Foot Complete Left  -     meloxicam (MOBIC) 15 MG tablet; Take 1 tablet (15 mg total) by mouth once daily.  Dispense: 30 tablet; Refill: 0  -     Cancel: MRI Foot (Midfoot) Left Without Contrast; Future; Expected date: 10/26/2020  -     Cancel: MRI Foot (Midfoot) Left Without Contrast; Future; Expected date: 10/26/2020  -     MRI Ankle Without Contrast Left; Future; Expected date: 10/26/2020     1.  Today evaluate patient with discussion of her heel pain and possible Stevenson's nerve entrapment.  Patient's history is the same problem on her right foot in approximately 4 years ago after failing all conservative care required me doing a release of Stevenson's nerve band of the plantar fascia.  That foot went on to become asymptomatic.  She has tried some of plantar fasciitis level 1 with  icing stretching running shoes inserts.  She is going to try her Cam Walker boot cast with her Spenco on placing her on Mobic 15 mg 1 pill daily with food.  2.  I am going to order MRI of the left ankle attention to plantar fascia both possible tear and/or soft tissue mass and Stevenson's nerve entrapment.  Depending on the MRI as to whether will continue with conservative care or have to consider surgery.  No follow-ups on file.    Procedures  No notes on file       Counseling:     I provided patient education verbally regarding:   Patient diagnosis, treatment options, as well as alternatives, risks, and benefits.     This note was created using Dragon voice recognition software that occasionally misinterpreted phrases or words.

## 2020-11-02 ENCOUNTER — HOSPITAL ENCOUNTER (OUTPATIENT)
Dept: RADIOLOGY | Facility: HOSPITAL | Age: 45
Discharge: HOME | End: 2020-11-02
Attending: PODIATRIST
Payer: COMMERCIAL

## 2020-11-02 DIAGNOSIS — M79.672 PAIN OF LEFT HEEL: ICD-10-CM

## 2020-11-02 DIAGNOSIS — M72.2 PLANTAR FASCIITIS: ICD-10-CM

## 2020-11-02 PROCEDURE — 73721 MRI JNT OF LWR EXTRE W/O DYE: CPT | Mod: TC,PO,LT

## 2020-11-10 ENCOUNTER — OFFICE VISIT (OUTPATIENT)
Dept: PODIATRY | Facility: CLINIC | Age: 45
End: 2020-11-10
Payer: COMMERCIAL

## 2020-11-10 VITALS
HEART RATE: 74 BPM | DIASTOLIC BLOOD PRESSURE: 72 MMHG | SYSTOLIC BLOOD PRESSURE: 132 MMHG | TEMPERATURE: 98 F | HEIGHT: 65 IN | WEIGHT: 144.5 LBS | BODY MASS INDEX: 24.07 KG/M2

## 2020-11-10 DIAGNOSIS — G57.92 NERVE ENTRAPMENT SYNDROME OF LEFT FOOT: ICD-10-CM

## 2020-11-10 DIAGNOSIS — M72.2 PLANTAR FASCIITIS: Primary | ICD-10-CM

## 2020-11-10 PROCEDURE — 99213 PR OFFICE/OUTPT VISIT, EST, LEVL III, 20-29 MIN: ICD-10-PCS | Mod: S$GLB,,, | Performed by: PODIATRIST

## 2020-11-10 PROCEDURE — 3008F BODY MASS INDEX DOCD: CPT | Mod: CPTII,S$GLB,, | Performed by: PODIATRIST

## 2020-11-10 PROCEDURE — 3008F PR BODY MASS INDEX (BMI) DOCUMENTED: ICD-10-PCS | Mod: CPTII,S$GLB,, | Performed by: PODIATRIST

## 2020-11-10 PROCEDURE — 99213 OFFICE O/P EST LOW 20 MIN: CPT | Mod: S$GLB,,, | Performed by: PODIATRIST

## 2020-11-10 NOTE — PROGRESS NOTES
[address] [facility] [address]  [place] [address]  Phone: [phone]   [phone]    Patient's PCP:Jayy Ramos Jr, MD  Referring Provider: No ref. provider found    Subjective:      Chief Complaint:: Plantar Fasciitis (MRI review)    ALEXA Loredo is a 44 y.o. female who presents with a complaint of  plantar fascitiis lasting for 6months. Onset of the symptoms was 6months.  Current symptoms include patient statespain in area, tenderness, throbbing, shooting pain.  Aggravating factors are weight bearing, sitting stationery long periods, getting out bed. . Symptoms have not improved.Treatment to date have included medication, exercises/therapy. Patients rates pain 1/*10** on pain scale.    Systemic Doctor:   Date Last Seen:   Blood Sugar:   Hemoglobin A1c:     Vitals:    11/10/20 1108   BP: 132/72   Pulse: 74   Temp: 98.2 °F (36.8 °C)     Shoe Size: 9    Past Surgical History:   Procedure Laterality Date     SECTION      COLD KNIFE CONIZATION OF CERVIX      multiple    COLONOSCOPY N/A 10/20/2020    Procedure: COLONOSCOPY;  Surgeon: Sudheer Santiago III, MD;  Location: [facility];  Service: Endoscopy;  Laterality: N/A;    HYSTERECTOMY      ovaries intact    TONSILLECTOMY       Past Medical History:   Diagnosis Date    Abnormal Pap smear of cervix     NEELAM d/t recurrent abnormals; GYN still does PAP; ASCUS neg HPV 2020    Allergy     Anxiety     Asthma     Hyperlipidemia     PAC (premature atrial contraction)     Rosacea      Family History   Problem Relation Age of Onset    Diabetes Mother     Asthma Mother     Hypertension Father     Prostate cancer Father         Social History:   Marital Status:   Alcohol History:  reports current alcohol use.  Tobacco History:  reports that she has never smoked. She has never used smokeless tobacco.  Drug History:  reports no history of drug use.    Review of patient's allergies indicates:   Allergen Reactions     Mepergan fortis Shortness Of Breath    Penicillins Rash    Sulfa (sulfonamide antibiotics) Rash       Current Outpatient Medications   Medication Sig Dispense Refill    acetaminophen (TYLENOL) 500 MG tablet Take 1,000 mg by mouth every 6 (six) hours as needed for Pain.      albuterol 90 mcg/actuation inhaler Inhale 2 puffs into the lungs every 6 (six) hours as needed for Wheezing. 1 Inhaler 2    ALPRAZolam (XANAX) 0.5 MG tablet Take 1 tablet (0.5 mg total) by mouth 3 (three) times daily as needed for Anxiety. 60 tablet 0    hyoscyamine (LEVBID) 0.375 mg Tb12 Take 1 tablet by mouth daily as needed.      ibuprofen (ADVIL,MOTRIN) 200 MG tablet Take 400 mg by mouth every 6 (six) hours as needed for Pain.      loratadine (CLARITIN) 10 mg tablet Take 1 tablet (10 mg total) by mouth once daily. 90 tablet 1    meloxicam (MOBIC) 15 MG tablet Take 1 tablet (15 mg total) by mouth once daily. 30 tablet 0    montelukast (SINGULAIR) 10 mg tablet Take 1 tablet (10 mg total) by mouth every evening. 90 tablet 1    multivitamin (ONE DAILY MULTIVITAMIN) per tablet Take 1 tablet by mouth once daily.      simvastatin (ZOCOR) 20 MG tablet Take 1 tablet (20 mg total) by mouth every evening. 90 tablet 1    sumatriptan (IMITREX) 100 MG tablet Take 1 tablet (100 mg total) by mouth every 2 (two) hours as needed for Migraine. Do not exceed 2 doses in 24 hours. 27 tablet 3     No current facility-administered medications for this visit.        Review of Systems      Objective:        Physical Exam:   Foot Exam    General  General Appearance: appears stated age and healthy   Orientation: alert and oriented to person, place, and time   Affect: appropriate   Gait: antalgic       Left Foot/Ankle      Inspection and Palpation  Ecchymosis: none  Tenderness: plantar fascia (Tenderness plantar fasciitis attachment calcaneus and by Stevenson's nerve)  Swelling: none   Arch: normal  Skin Exam: skin intact;     Neurovascular  Dorsalis pedis:  2+  Posterior tibial: 2+    Comments  Pain on palpation of the infeior medial heel. No pain present  with side to side compression of the calcaneus. Negative tinnel's sign  at the tarsal tunnel. Positive Stevenson's nerve pain. Negative Calcaneal nerve pain. No soft tissue masses. Pain present with dorsiflexion of the ankle. No edema, erythema, or ecchymosis noted.     Physical Exam   Cardiovascular:   Pulses:       Dorsalis pedis pulses are 2+ on the left side.        Posterior tibial pulses are 2+ on the left side.       Imaging:   MRI Ankle Without Contrast Left  HISTORY: Plantar fasciitis, M 72.2, left heel pain, M 79.672.    TECHNIQUE: Routine noncontrast MRI ankle protocol.    FINDINGS: No prior studies for comparison. There is mild focal  increased non-fluidlike T2 signal in the central plantar fascia at its  origin, involving up to 50% of the fascial thickness, with overlying  mild T2 hyperintense edema in the subcutaneous fat. The plantar fascia  is otherwise normal, with no abnormal bone marrow edema or cortical  irregularity of the plantar calcaneal tuberosity.    The medial and lateral flexor tendons, anterior ankle tendons, and the  Achilles tendon are normal, with no fluid distending the tendon  sheaths. The anterior and posterior talofibular and tibiofibular  ligaments are intact. The calcaneofibular ligament is not visualized.    There is no tibiotalar or subtalar chondral defect, with no joint  effusion. No periarticular erosions or significant osteophyte  formation. Bone marrow signal intensity is within normal limits. There  is normal signal intensity of the interosseous and plantar  musculature.    IMPRESSION: Plantar fasciitis.    Electronically Signed by Keegan TA on 11/2/2020 1:58 PM           Assessment:       1. Plantar fasciitis    2. Nerve entrapment syndrome of left foot      Plan:   Plantar fasciitis    Nerve entrapment syndrome of left foot     1.  Today we discussed the MRI  findings with plantar fasciitis no obvious signs of damage to abductor digiti the quinti muscle consistent with Stevenson's nerve.  But she does have pain along the medial aspect of the calcaneus by Stevenson's nerve.  Since she had the same problem on her right foot years ago and it responded well to a fasciotomy and release of Stevenson's nerve that we have made a decision to do the same procedure on the left foot.  2.Patient was educated about the entire pre, bhavani and post-operative time period.  They  were educated about the pro's and con's of surgery.  All conservative measures have been exhausted. Patient understands the particular risks involved which may occur in connection with the procedure proposed including pain, swelling, infection, stiffness, decreased ROM, recurrence, rejection, numbness, delayed healing, scar formation.    Patient was educated that their diagnosis may be surgically treated.  The patient's problem will probably advance and usually will not get better without surgery.  All of the pre-operative treatment plans have been exhausted or will no longer be successful at this point in time.  Patient was told of the possible outcomes and expectations of the surgical procedure.  They  will need to be followed-up post-operatively.  Today, pictures were drawn, questions answered.      We discussed the following surgical procedures:  1.  Plantar fasciotomy left foot 2.  Release of Stevenson's nerve left foot.  Patient was scheduled with the registered nurse and get appropriate lab tests done.    No follow-ups on file.    Procedures  No notes on file       Counseling:     I provided patient education verbally regarding:   Patient diagnosis, treatment options, as well as alternatives, risks, and benefits.     This note was created using Dragon voice recognition software that occasionally misinterpreted phrases or words.

## 2020-11-16 DIAGNOSIS — M72.2 PLANTAR FASCIITIS OF LEFT FOOT: ICD-10-CM

## 2020-11-16 DIAGNOSIS — M72.2 PLANTAR FASCIITIS: ICD-10-CM

## 2020-11-16 DIAGNOSIS — G57.92 NERVE ENTRAPMENT SYNDROME OF LEFT FOOT: Primary | ICD-10-CM

## 2020-11-16 RX ORDER — SODIUM CHLORIDE 0.9 % (FLUSH) 0.9 %
10 SYRINGE (ML) INJECTION
Status: CANCELLED | OUTPATIENT
Start: 2020-11-16

## 2020-11-19 ENCOUNTER — OFFICE VISIT (OUTPATIENT)
Dept: SURGERY | Facility: CLINIC | Age: 45
End: 2020-11-19
Payer: COMMERCIAL

## 2020-11-19 VITALS
BODY MASS INDEX: 23.99 KG/M2 | HEART RATE: 73 BPM | HEIGHT: 65 IN | WEIGHT: 144 LBS | RESPIRATION RATE: 20 BRPM | SYSTOLIC BLOOD PRESSURE: 117 MMHG | TEMPERATURE: 98 F | DIASTOLIC BLOOD PRESSURE: 77 MMHG

## 2020-11-19 DIAGNOSIS — R10.31 RIGHT LOWER QUADRANT ABDOMINAL PAIN: Primary | ICD-10-CM

## 2020-11-19 PROCEDURE — 1125F AMNT PAIN NOTED PAIN PRSNT: CPT | Mod: S$GLB,,, | Performed by: SURGERY

## 2020-11-19 PROCEDURE — 99204 OFFICE O/P NEW MOD 45 MIN: CPT | Mod: S$GLB,,, | Performed by: SURGERY

## 2020-11-19 PROCEDURE — 3008F PR BODY MASS INDEX (BMI) DOCUMENTED: ICD-10-PCS | Mod: CPTII,S$GLB,, | Performed by: SURGERY

## 2020-11-19 PROCEDURE — 3008F BODY MASS INDEX DOCD: CPT | Mod: CPTII,S$GLB,, | Performed by: SURGERY

## 2020-11-19 PROCEDURE — 99204 PR OFFICE/OUTPT VISIT, NEW, LEVL IV, 45-59 MIN: ICD-10-PCS | Mod: S$GLB,,, | Performed by: SURGERY

## 2020-11-19 PROCEDURE — 1125F PR PAIN SEVERITY QUANTIFIED, PAIN PRESENT: ICD-10-PCS | Mod: S$GLB,,, | Performed by: SURGERY

## 2020-11-19 RX ORDER — METRONIDAZOLE 500 MG/100ML
500 INJECTION, SOLUTION INTRAVENOUS
Status: CANCELLED | OUTPATIENT
Start: 2020-11-19

## 2020-11-19 NOTE — LETTER
November 19, 2020      Jayy Ramos Jr., MD  [address]  [place] [address]           [facility]  [address]  [place] [address]  Phone: [phone]  Fax: [phone]          Patient: Rosaura Loredo   MR Number: [id number]   YOB: 1975   Date of Visit: 11/19/2020       Dear Dr. Jayy Ramos Jr.:    Thank you for referring Rosaura Loredo to me for evaluation. Attached you will find relevant portions of my assessment and plan of care.    If you have questions, please do not hesitate to call me. I look forward to following Rosaura Loredo along with you.    Sincerely,    Elias Gil III, MD    Enclosure  CC:  No Recipients    If you would like to receive this communication electronically, please contact [email or web address] or [phone] to request more information on [facility] access.    For providers and/or their staff who would like to refer a patient to [facility], please contact us through our one-stop-shop provider referral line, [facility], at [phone].    If you feel you have received this communication in error or would no longer like to receive these types of communications, please e-mail [email or web address]

## 2020-11-19 NOTE — PROGRESS NOTES
Subjective:       Patient ID: Rosaura Loredo is a 44 y.o. female.    Chief Complaint: Other (Abd Pain)      HPI:  Patient is 44-year-old female with recurring significant right lower quadrant abdominal pain.  She was hospitalized a couple months ago and workup was performed. CT abd and pelvis was normal except for some left posterior costophrenic nodularity. There was no abnormality with the appendix or adnexal structures. A repeat CT was recommended. Her pain completely resolved at that stay. Since that time her pain has come and gone. It will last about one day. There are waves of severe pain. She has no associated nausea or vomiting. She has been afebrile. She is pain free today. She had colonoscopy two years ago that was unremarkable. She has history of laparoscopic hysterectomy.     Past Medical History:   Diagnosis Date    Abnormal Pap smear of cervix     NEELAM d/t recurrent abnormals; GYN still does PAP; ASCUS neg HPV 2020    Allergy     Anxiety     Asthma     Hyperlipidemia     PAC (premature atrial contraction)     Rosacea      Past Surgical History:   Procedure Laterality Date     SECTION      COLD KNIFE CONIZATION OF CERVIX      multiple    COLONOSCOPY N/A 10/20/2020    Procedure: COLONOSCOPY;  Surgeon: Sudheer Santiago III, MD;  Location: [facility];  Service: Endoscopy;  Laterality: N/A;    HYSTERECTOMY      ovaries intact    TONSILLECTOMY       Review of patient's allergies indicates:   Allergen Reactions    Mepergan fortis Shortness Of Breath    Penicillins Rash    Sulfa (sulfonamide antibiotics) Rash     Medication List with Changes/Refills   Current Medications    ACETAMINOPHEN (TYLENOL) 500 MG TABLET    Take 1,000 mg by mouth every 6 (six) hours as needed for Pain.    ALBUTEROL 90 MCG/ACTUATION INHALER    Inhale 2 puffs into the lungs every 6 (six) hours as needed for Wheezing.    ALPRAZOLAM (XANAX) 0.5 MG TABLET    Take 1 tablet (0.5 mg total) by mouth 3 (three)  times daily as needed for Anxiety.    HYOSCYAMINE (LEVBID) 0.375 MG TB12    Take 1 tablet by mouth daily as needed.    IBUPROFEN (ADVIL,MOTRIN) 200 MG TABLET    Take 400 mg by mouth every 6 (six) hours as needed for Pain.    LORATADINE (CLARITIN) 10 MG TABLET    Take 1 tablet (10 mg total) by mouth once daily.    MELOXICAM (MOBIC) 15 MG TABLET    Take 1 tablet (15 mg total) by mouth once daily.    MONTELUKAST (SINGULAIR) 10 MG TABLET    Take 1 tablet (10 mg total) by mouth every evening.    MULTIVITAMIN (ONE DAILY MULTIVITAMIN) PER TABLET    Take 1 tablet by mouth once daily.    SIMVASTATIN (ZOCOR) 20 MG TABLET    Take 1 tablet (20 mg total) by mouth every evening.    SUMATRIPTAN (IMITREX) 100 MG TABLET    Take 1 tablet (100 mg total) by mouth every 2 (two) hours as needed for Migraine. Do not exceed 2 doses in 24 hours.     Family History   Problem Relation Age of Onset    Diabetes Mother     Asthma Mother     Hypertension Father     Prostate cancer Father      Social History     Socioeconomic History    Marital status:      Spouse name: Not on file    Number of children: Not on file    Years of education: Not on file    Highest education level: Not on file   Occupational History    Not on file   Social Needs    Financial resource strain: Not hard at all    Food insecurity     Worry: Never true     Inability: Never true    Transportation needs     Medical: No     Non-medical: No   Tobacco Use    Smoking status: Never Smoker    Smokeless tobacco: Never Used   Substance and Sexual Activity    Alcohol use: Yes     Frequency: 2-4 times a month     Drinks per session: 1 or 2     Binge frequency: Less than monthly     Comment: occ    Drug use: No    Sexual activity: Yes     Birth control/protection: See Surgical Hx, Surgical   Lifestyle    Physical activity     Days per week: 0 days     Minutes per session: Not on file    Stress: To some extent   Relationships    Social connections     Talks on  phone: More than three times a week     Gets together: Once a week     Attends [employer] service: Not on file     Active member of club or organization: Yes     Attends meetings of clubs or organizations: More than 4 times per year     Relationship status:    Other Topics Concern    Not on file   Social History Narrative    Live with          Review of Systems   Constitutional: Negative for appetite change, chills, fever and unexpected weight change.   HENT: Negative for hearing loss, rhinorrhea, sore throat and voice change.    Eyes: Negative for photophobia and visual disturbance.   Respiratory: Negative for cough, choking and shortness of breath.    Cardiovascular: Negative for chest pain, palpitations and leg swelling.   Gastrointestinal: Negative for abdominal pain, blood in stool, constipation, diarrhea, nausea and vomiting.   Endocrine: Negative for cold intolerance, heat intolerance and polyphagia.   Genitourinary: Negative for dysuria.   Musculoskeletal: Negative for arthralgias and back pain.   Skin: Negative for color change.   Neurological: Negative for dizziness, seizures, syncope and headaches.   Hematological: Negative for adenopathy. Does not bruise/bleed easily.       Objective:      Physical Exam  Constitutional:       General: She is not in acute distress.     Appearance: Normal appearance. She is well-developed. She is not toxic-appearing.   HENT:      Head: Normocephalic and atraumatic. No abrasion or laceration.      Right Ear: External ear normal.      Left Ear: External ear normal.      Nose: Nose normal.   Eyes:      Pupils: Pupils are equal, round, and reactive to light.   Neck:      Musculoskeletal: Neck supple. Normal range of motion.      Trachea: Trachea and phonation normal. No tracheal deviation.   Cardiovascular:      Rate and Rhythm: Normal rate and regular rhythm.   Pulmonary:      Effort: Pulmonary effort is normal. No tachypnea, accessory muscle usage or  respiratory distress.   Abdominal:      General: A surgical scar is present. There is no distension.      Palpations: Abdomen is soft. There is no mass.      Tenderness: There is no abdominal tenderness. There is no guarding or rebound.      Hernia: No hernia is present.   Lymphadenopathy:      Cervical: No cervical adenopathy.   Skin:     General: Skin is warm.      Coloration: Skin is not jaundiced.   Neurological:      Mental Status: She is alert and oriented to person, place, and time.      Coordination: Coordination normal.      Gait: Gait normal.   Psychiatric:         Speech: Speech normal.         Behavior: Behavior normal.         Assessment/Plan:   Rosaura was seen today for other.    Diagnoses and all orders for this visit:    Right lower quadrant abdominal pain  -     CT Chest Abdomen Pelvis With Contrast; Future  -     Full code; Standing  -     Vital signs; Standing  -     Insert peripheral IV; Standing  -     Diet NPO; Standing  -     Place sequential compression device; Standing  -     Pulse Oximetry Q4H; Standing  -     Case Request Operating Room: LAPAROSCOPY, DIAGNOSTIC  -     Place in Outpatient; Standing  -     Basic metabolic panel; Future  -     CBC auto differential; Future  -     EKG 12-lead; Future  -     X-Ray Chest PA And Lateral; Future  -     Full code  -     Insert peripheral IV    Other orders  -     Progressive Mobility Protocol (mobilize patient to their highest level of functioning at least twice daily); Standing  -     IP VTE LOW RISK PATIENT; Standing  -     metronidazole IVPB 500 mg  -     ciprofloxacin lactate (CIPRO) 400 mg in dextrose 5 % 200 mL IVPB  -     Progressive Mobility Protocol (mobilize patient to their highest level of functioning at least twice daily)  -     Progressive Mobility Protocol (mobilize patient to their highest level of functioning at least twice daily)  -     Progressive Mobility Protocol (mobilize patient to their highest level of functioning at least  twice daily)      Will plan for diagnostic laparoscopy. Etiology of the pain is not clear. Will plan for appendectomy no matter what it looks like. Will also try to run the bowel, leidy adhesions if possible, check for Meckel's diverticulum, examine the adnexa.         I discussed the proposed procedures the patient including risks, benefits, indications, alternatives and special concerns.  The patient appears to understand and agrees to go ahead with surgery.  I have made no promises, warranties or verbal agreements beyond what was discussed above.

## 2020-11-20 ENCOUNTER — HOSPITAL ENCOUNTER (OUTPATIENT)
Dept: RADIOLOGY | Facility: HOSPITAL | Age: 45
Discharge: HOME | End: 2020-11-20
Attending: SURGERY
Payer: COMMERCIAL

## 2020-11-20 DIAGNOSIS — R10.31 RIGHT LOWER QUADRANT ABDOMINAL PAIN: ICD-10-CM

## 2020-11-20 PROCEDURE — 25500020 PHARM REV CODE 255: Mod: PO | Performed by: SURGERY

## 2020-11-20 PROCEDURE — 71046 X-RAY EXAM CHEST 2 VIEWS: CPT | Mod: TC,PO

## 2020-11-20 PROCEDURE — 74177 CT ABD & PELVIS W/CONTRAST: CPT | Mod: TC,PO

## 2020-11-20 RX ADMIN — IOHEXOL 100 ML: 350 INJECTION, SOLUTION INTRAVENOUS at 03:11

## 2020-11-25 ENCOUNTER — TELEPHONE (OUTPATIENT)
Dept: PODIATRY | Facility: CLINIC | Age: 45
End: 2020-11-25

## 2020-11-25 DIAGNOSIS — R93.5 ABNORMAL MAGNETIC RESONANCE IMAGING OF ABDOMEN: Primary | ICD-10-CM

## 2020-11-25 NOTE — TELEPHONE ENCOUNTER
----- Message from Tami Siu sent at 11/25/2020 11:23 AM CST -----  Patient called to ask about the status of her [employer] paperwork she gave us to fill out. Please call her back at [phone].    Thank you,  Tami

## 2020-11-25 NOTE — TELEPHONE ENCOUNTER
Patient stated [employer] did not receive her paperwork. Informed patient we faxed paperwork on 11/23/2020 but will refax.

## 2020-11-30 ENCOUNTER — LAB VISIT (OUTPATIENT)
Dept: LAB | Facility: HOSPITAL | Age: 45
End: 2020-11-30
Attending: INTERNAL MEDICINE
Payer: COMMERCIAL

## 2020-11-30 DIAGNOSIS — R93.5 ABNORMAL ABDOMINAL ULTRASOUND: Primary | ICD-10-CM

## 2020-11-30 PROCEDURE — 83993 ASSAY FOR CALPROTECTIN FECAL: CPT

## 2020-12-02 ENCOUNTER — LAB VISIT (OUTPATIENT)
Dept: LAB | Facility: HOSPITAL | Age: 45
End: 2020-12-02
Attending: PODIATRIST
Payer: COMMERCIAL

## 2020-12-02 ENCOUNTER — HOSPITAL ENCOUNTER (OUTPATIENT)
Dept: PREADMISSION TESTING | Facility: HOSPITAL | Age: 45
Discharge: HOME | End: 2020-12-02
Attending: SURGERY
Payer: COMMERCIAL

## 2020-12-02 ENCOUNTER — HOSPITAL ENCOUNTER (OUTPATIENT)
Dept: RADIOLOGY | Facility: HOSPITAL | Age: 45
Discharge: HOME | End: 2020-12-02
Attending: INTERNAL MEDICINE
Payer: COMMERCIAL

## 2020-12-02 VITALS
OXYGEN SATURATION: 98 % | RESPIRATION RATE: 17 BRPM | TEMPERATURE: 98 F | BODY MASS INDEX: 23.24 KG/M2 | DIASTOLIC BLOOD PRESSURE: 80 MMHG | SYSTOLIC BLOOD PRESSURE: 119 MMHG | HEART RATE: 75 BPM | HEIGHT: 65 IN | WEIGHT: 139.5 LBS

## 2020-12-02 DIAGNOSIS — Z01.818 PREOPERATIVE TESTING: Primary | ICD-10-CM

## 2020-12-02 DIAGNOSIS — R93.5 ABNORMAL MAGNETIC RESONANCE IMAGING OF ABDOMEN: ICD-10-CM

## 2020-12-02 DIAGNOSIS — M72.2 PLANTAR FASCIITIS: ICD-10-CM

## 2020-12-02 DIAGNOSIS — G57.92 NERVE ENTRAPMENT SYNDROME OF LEFT FOOT: ICD-10-CM

## 2020-12-02 LAB
BILIRUB UR QL STRIP: NEGATIVE
CLARITY UR: CLEAR
COLOR UR: YELLOW
GLUCOSE UR QL STRIP: NEGATIVE
HGB UR QL STRIP: NEGATIVE
KETONES UR QL STRIP: NEGATIVE
LEUKOCYTE ESTERASE UR QL STRIP: NEGATIVE
NITRITE UR QL STRIP: NEGATIVE
PH UR STRIP: 8 [PH] (ref 5–8)
PROT UR QL STRIP: NEGATIVE
SP GR UR STRIP: 1.01 (ref 1–1.03)
URN SPEC COLLECT METH UR: NORMAL
UROBILINOGEN UR STRIP-ACNC: NEGATIVE EU/DL

## 2020-12-02 PROCEDURE — 74183 MRI ABD W/O CNTR FLWD CNTR: CPT | Mod: 26,,, | Performed by: RADIOLOGY

## 2020-12-02 PROCEDURE — 81003 URINALYSIS AUTO W/O SCOPE: CPT

## 2020-12-02 PROCEDURE — 93005 ELECTROCARDIOGRAM TRACING: CPT | Performed by: INTERNAL MEDICINE

## 2020-12-02 PROCEDURE — 72197 MRI PELVIS W/O & W/DYE: CPT | Mod: 26,,, | Performed by: RADIOLOGY

## 2020-12-02 PROCEDURE — 72197 MRI PELVIS W/O & W/DYE: CPT | Mod: TC

## 2020-12-02 PROCEDURE — 25500020 PHARM REV CODE 255: Performed by: INTERNAL MEDICINE

## 2020-12-02 PROCEDURE — A9585 GADOBUTROL INJECTION: HCPCS | Performed by: INTERNAL MEDICINE

## 2020-12-02 PROCEDURE — 74183 MRI ENTEROGRAPHY: ICD-10-PCS | Mod: 26,,, | Performed by: RADIOLOGY

## 2020-12-02 PROCEDURE — 72197 MRI ENTEROGRAPHY: ICD-10-PCS | Mod: 26,,, | Performed by: RADIOLOGY

## 2020-12-02 PROCEDURE — 93010 ELECTROCARDIOGRAM REPORT: CPT | Mod: ,,, | Performed by: INTERNAL MEDICINE

## 2020-12-02 PROCEDURE — 93010 EKG 12-LEAD: ICD-10-PCS | Mod: ,,, | Performed by: INTERNAL MEDICINE

## 2020-12-02 PROCEDURE — 63600175 PHARM REV CODE 636 W HCPCS: Performed by: RADIOLOGY

## 2020-12-02 RX ORDER — GADOBUTROL 604.72 MG/ML
6 INJECTION INTRAVENOUS
Status: COMPLETED | OUTPATIENT
Start: 2020-12-02 | End: 2020-12-02

## 2020-12-02 RX ORDER — GLUCAGON 1 MG
1 KIT INJECTION ONCE
Status: COMPLETED | OUTPATIENT
Start: 2020-12-02 | End: 2020-12-02

## 2020-12-02 RX ADMIN — GLUCAGON HYDROCHLORIDE 1 MG: 1 INJECTION, POWDER, FOR SOLUTION INTRAMUSCULAR; INTRAVENOUS; SUBCUTANEOUS at 09:12

## 2020-12-02 RX ADMIN — GADOBUTROL 6 ML: 604.72 INJECTION INTRAVENOUS at 09:12

## 2020-12-02 NOTE — DISCHARGE INSTRUCTIONS
Pre-Op will call the afternoon prior to surgery between 4:00 and 6:00 PM with the final arrival time.    1 Person can come with you the day of surgery.  Enter at [address] and come through front entrance.  You will be screened/ have temperature checked.    You may have 1 visitor who will also be screened.     Check in at registration.   After registration, proceed past gift shop and through glass door ( [facility]) Check in at the nurses station to the left.   Do not eat or drink anything after midnight the night before your surgery - THIS INCLUDES  WATER, GUM, MINTS AND CANDY.  YOU MAY BRUSH YOUR TEETH BUT DO NOT SWALLOW     TAKE ONLY THESE MEDICATIONS WITH A SMALL SIP OF WATER THE MORNING OF YOUR PROCEDURE: None      PLEASE NOTE:  The surgery schedule has many variables which may affect the time of your surgery case.  Family members should be available if your surgery time changes.  Plan to be here the day of your procedure between 4-6 hours.      DO NOT TAKE THESE MEDICATIONS 5-7 DAYS PRIOR to your procedure or per your surgeon's request: ASPIRIN, ALEVE, ADVIL, IBUPROFEN,  GELY SELTZER, BC , FISH OIL , VITAMIN E, HERBALS  (May take Tylenol)      ONLY if you are prescribed any types of blood thinners such as:  Aspirin, Coumadin, Plavix, Pradaxa, Xarelto, Aggrenox, Effient, Eliquis, Savasya, Brilinta, or any other, ask your surgeon whether you should stop taking them and how long before surgery you should stop.  You may also need to verify with the prescribing physician if it is ok to stop your medication.                                                        IMPORTANT INSTRUCTIONS      · Do not smoke, vape or drink alcoholic beverages 24 hours prior to your procedure.  · Shower the night before AND the morning of your procedure with a Chlorhexidine wash such as Hibiclens or Dial antibacterial soap from the neck down.   ·  Do not get it on your face or in your eyes.  You may use your own shampoo and  face wash. This helps your skin to be as bacteria free as possible.   ·  DO NOT remove hair from the surgery site.  Do not shave the incision site unless you are given specific instructions to do so.    · Sleep in a bed with clean sheets.  Do not sleep with a pet in the bed.   · If you wear contact lenses, dentures, hearing aids or glasses, bring a container to put them in during surgery and give to a family member for safe keeping.    · Please leave all jewelry, piercing's and valuables at home.   · Wear rubber soled shoes (no flip flops).    · Make arrangements in advance for transportation home by a responsible adult.      · You must make arrangements for transportation, TAXI'S, UBER'S OR LYFTS ARE NOT ALLOWED.        If you have any questions about these instructions, call (Monday - Friday) Pre-Op Admit  Nursing  at [phone] or the Pre-Op Day Surgery Unit at [phone].

## 2020-12-03 LAB — CALPROTECTIN STL-MCNT: <16 UG/G (ref 0–120)

## 2020-12-04 ENCOUNTER — TELEPHONE (OUTPATIENT)
Dept: PODIATRY | Facility: CLINIC | Age: 45
End: 2020-12-04

## 2020-12-04 NOTE — TELEPHONE ENCOUNTER
Received VM from patient regarding her surgery scheduled for next Friday. Stated she heard about cancelling elective surgeries, wanted to know if she needed to move her surgery up to Wednesday.    S/w Dr. Alexandre and Kamila Garza who confirmed as of now surgeries are still as scheduled. If anything changes, will call patient.

## 2020-12-11 ENCOUNTER — ANESTHESIA (OUTPATIENT)
Dept: SURGERY | Facility: HOSPITAL | Age: 45
End: 2020-12-11
Payer: COMMERCIAL

## 2020-12-11 ENCOUNTER — ANESTHESIA EVENT (OUTPATIENT)
Dept: SURGERY | Facility: HOSPITAL | Age: 45
End: 2020-12-11
Payer: COMMERCIAL

## 2020-12-11 ENCOUNTER — HOSPITAL ENCOUNTER (OUTPATIENT)
Facility: HOSPITAL | Age: 45
Discharge: HOME | End: 2020-12-11
Attending: PODIATRIST | Admitting: PODIATRIST
Payer: COMMERCIAL

## 2020-12-11 VITALS
WEIGHT: 139 LBS | OXYGEN SATURATION: 99 % | DIASTOLIC BLOOD PRESSURE: 70 MMHG | HEART RATE: 77 BPM | BODY MASS INDEX: 23.16 KG/M2 | HEIGHT: 65 IN | SYSTOLIC BLOOD PRESSURE: 127 MMHG | RESPIRATION RATE: 15 BRPM | TEMPERATURE: 98 F

## 2020-12-11 DIAGNOSIS — G57.92 NERVE ENTRAPMENT SYNDROME OF LEFT FOOT: ICD-10-CM

## 2020-12-11 DIAGNOSIS — M72.2 PLANTAR FASCIITIS: ICD-10-CM

## 2020-12-11 DIAGNOSIS — M72.2 PLANTAR FASCIITIS OF LEFT FOOT: Primary | ICD-10-CM

## 2020-12-11 PROCEDURE — 25000003 PHARM REV CODE 250: Performed by: PODIATRIST

## 2020-12-11 PROCEDURE — 27000657 HC HEADREST, PRONE: Performed by: ANESTHESIOLOGY

## 2020-12-11 PROCEDURE — 27200651 HC AIRWAY, LMA: Performed by: ANESTHESIOLOGY

## 2020-12-11 PROCEDURE — 36000709 HC OR TIME LEV III EA ADD 15 MIN: Performed by: PODIATRIST

## 2020-12-11 PROCEDURE — 27202105 HC BIS BILATERAL SENSOR: Performed by: ANESTHESIOLOGY

## 2020-12-11 PROCEDURE — 63600175 PHARM REV CODE 636 W HCPCS: Performed by: PODIATRIST

## 2020-12-11 PROCEDURE — 63600175 PHARM REV CODE 636 W HCPCS: Performed by: ANESTHESIOLOGY

## 2020-12-11 PROCEDURE — 25000003 PHARM REV CODE 250: Performed by: NURSE ANESTHETIST, CERTIFIED REGISTERED

## 2020-12-11 PROCEDURE — 27000080 OPTIME MED/SURG SUP & DEVICES GENERAL CLASSIFICATION: Performed by: PODIATRIST

## 2020-12-11 PROCEDURE — 71000015 HC POSTOP RECOV 1ST HR: Performed by: PODIATRIST

## 2020-12-11 PROCEDURE — 25000003 PHARM REV CODE 250: Performed by: ANESTHESIOLOGY

## 2020-12-11 PROCEDURE — C9290 INJ, BUPIVACAINE LIPOSOME: HCPCS | Performed by: PODIATRIST

## 2020-12-11 PROCEDURE — 63600175 PHARM REV CODE 636 W HCPCS: Performed by: NURSE ANESTHETIST, CERTIFIED REGISTERED

## 2020-12-11 PROCEDURE — 27000673 HC TUBING BLOOD Y: Performed by: ANESTHESIOLOGY

## 2020-12-11 PROCEDURE — 36000708 HC OR TIME LEV III 1ST 15 MIN: Performed by: PODIATRIST

## 2020-12-11 PROCEDURE — 71000016 HC POSTOP RECOV ADDL HR: Performed by: PODIATRIST

## 2020-12-11 PROCEDURE — 28008 INCISION OF FOOT FASCIA: CPT | Mod: 51,LT,, | Performed by: PODIATRIST

## 2020-12-11 PROCEDURE — 71000039 HC RECOVERY, EACH ADD'L HOUR: Performed by: PODIATRIST

## 2020-12-11 PROCEDURE — 27000653 HC CATH, IV CATHLN: Performed by: ANESTHESIOLOGY

## 2020-12-11 PROCEDURE — 71000033 HC RECOVERY, INTIAL HOUR: Performed by: PODIATRIST

## 2020-12-11 PROCEDURE — 27201423 OPTIME MED/SURG SUP & DEVICES STERILE SUPPLY: Performed by: PODIATRIST

## 2020-12-11 PROCEDURE — 37000008 HC ANESTHESIA 1ST 15 MINUTES: Performed by: PODIATRIST

## 2020-12-11 PROCEDURE — 28035 PR TARSAL TUNNEL RELEASE: ICD-10-PCS | Mod: LT,,, | Performed by: PODIATRIST

## 2020-12-11 PROCEDURE — 28035 DECOMPRESSION OF TIBIA NERVE: CPT | Mod: LT,,, | Performed by: PODIATRIST

## 2020-12-11 PROCEDURE — 28008 PR INCISION OF FOOT/TOE FASCIA: ICD-10-PCS | Mod: 51,LT,, | Performed by: PODIATRIST

## 2020-12-11 PROCEDURE — 37000009 HC ANESTHESIA EA ADD 15 MINS: Performed by: PODIATRIST

## 2020-12-11 PROCEDURE — 27000671 HC TUBING MICROBORE EXT: Performed by: ANESTHESIOLOGY

## 2020-12-11 RX ORDER — HYDROCODONE BITARTRATE AND ACETAMINOPHEN 7.5; 325 MG/1; MG/1
1 TABLET ORAL EVERY 4 HOURS PRN
Qty: 20 TABLET | Refills: 0
Start: 2020-12-11 | End: 2021-01-05

## 2020-12-11 RX ORDER — OXYCODONE HYDROCHLORIDE 5 MG/1
10 TABLET ORAL
Status: DISCONTINUED | OUTPATIENT
Start: 2020-12-11 | End: 2020-12-15 | Stop reason: HOSPADM

## 2020-12-11 RX ORDER — DOXYCYCLINE 150 MG/1
150 TABLET ORAL EVERY 12 HOURS
Qty: 14 TABLET | Refills: 0
Start: 2020-12-11 | End: 2021-01-05

## 2020-12-11 RX ORDER — SODIUM CHLORIDE 0.9 % (FLUSH) 0.9 %
10 SYRINGE (ML) INJECTION
Status: DISCONTINUED | OUTPATIENT
Start: 2020-12-11 | End: 2020-12-15 | Stop reason: HOSPADM

## 2020-12-11 RX ORDER — ONDANSETRON 4 MG/1
4 TABLET, FILM COATED ORAL 2 TIMES DAILY
Qty: 12 TABLET | Refills: 0
Start: 2020-12-11 | End: 2021-01-05

## 2020-12-11 RX ORDER — MIDAZOLAM HYDROCHLORIDE 1 MG/ML
INJECTION INTRAMUSCULAR; INTRAVENOUS
Status: DISCONTINUED | OUTPATIENT
Start: 2020-12-11 | End: 2020-12-11

## 2020-12-11 RX ORDER — ACETAMINOPHEN 500 MG
1000 TABLET ORAL
Status: COMPLETED | OUTPATIENT
Start: 2020-12-11 | End: 2020-12-11

## 2020-12-11 RX ORDER — DEXAMETHASONE SODIUM PHOSPHATE 4 MG/ML
INJECTION, SOLUTION INTRA-ARTICULAR; INTRALESIONAL; INTRAMUSCULAR; INTRAVENOUS; SOFT TISSUE
Status: DISCONTINUED | OUTPATIENT
Start: 2020-12-11 | End: 2020-12-11 | Stop reason: HOSPADM

## 2020-12-11 RX ORDER — OXYCODONE HYDROCHLORIDE 5 MG/1
5 TABLET ORAL EVERY 4 HOURS PRN
Status: DISCONTINUED | OUTPATIENT
Start: 2020-12-11 | End: 2020-12-15 | Stop reason: HOSPADM

## 2020-12-11 RX ORDER — FAMOTIDINE 10 MG/ML
INJECTION INTRAVENOUS
Status: DISCONTINUED | OUTPATIENT
Start: 2020-12-11 | End: 2020-12-11

## 2020-12-11 RX ORDER — ONDANSETRON 4 MG/1
4 TABLET, ORALLY DISINTEGRATING ORAL ONCE
Status: COMPLETED | OUTPATIENT
Start: 2020-12-11 | End: 2020-12-11

## 2020-12-11 RX ORDER — OXYCODONE HYDROCHLORIDE 5 MG/1
10 TABLET ORAL ONCE
Status: DISCONTINUED | OUTPATIENT
Start: 2020-12-11 | End: 2020-12-15 | Stop reason: HOSPADM

## 2020-12-11 RX ORDER — DIPHENHYDRAMINE HYDROCHLORIDE 50 MG/ML
12.5 INJECTION INTRAMUSCULAR; INTRAVENOUS
Status: DISCONTINUED | OUTPATIENT
Start: 2020-12-11 | End: 2020-12-15 | Stop reason: HOSPADM

## 2020-12-11 RX ORDER — FENTANYL CITRATE 50 UG/ML
INJECTION, SOLUTION INTRAMUSCULAR; INTRAVENOUS
Status: DISCONTINUED | OUTPATIENT
Start: 2020-12-11 | End: 2020-12-11

## 2020-12-11 RX ORDER — ONDANSETRON 2 MG/ML
INJECTION INTRAMUSCULAR; INTRAVENOUS
Status: DISCONTINUED | OUTPATIENT
Start: 2020-12-11 | End: 2020-12-11

## 2020-12-11 RX ORDER — LIDOCAINE HYDROCHLORIDE 20 MG/ML
JELLY TOPICAL
Status: DISCONTINUED | OUTPATIENT
Start: 2020-12-11 | End: 2020-12-11

## 2020-12-11 RX ORDER — HYDROMORPHONE HYDROCHLORIDE 1 MG/ML
0.2 INJECTION, SOLUTION INTRAMUSCULAR; INTRAVENOUS; SUBCUTANEOUS
Status: DISCONTINUED | OUTPATIENT
Start: 2020-12-11 | End: 2020-12-15 | Stop reason: HOSPADM

## 2020-12-11 RX ORDER — LIDOCAINE HYDROCHLORIDE 20 MG/ML
INJECTION, SOLUTION EPIDURAL; INFILTRATION; INTRACAUDAL; PERINEURAL
Status: DISCONTINUED | OUTPATIENT
Start: 2020-12-11 | End: 2020-12-11

## 2020-12-11 RX ORDER — BUPIVACAINE HYDROCHLORIDE 5 MG/ML
INJECTION, SOLUTION EPIDURAL; INTRACAUDAL
Status: DISCONTINUED | OUTPATIENT
Start: 2020-12-11 | End: 2020-12-11 | Stop reason: HOSPADM

## 2020-12-11 RX ORDER — DIPHENHYDRAMINE HYDROCHLORIDE 50 MG/ML
12.5 INJECTION INTRAMUSCULAR; INTRAVENOUS EVERY 4 HOURS
Status: DISCONTINUED | OUTPATIENT
Start: 2020-12-11 | End: 2020-12-11 | Stop reason: HOSPADM

## 2020-12-11 RX ORDER — SODIUM CHLORIDE, SODIUM LACTATE, POTASSIUM CHLORIDE, CALCIUM CHLORIDE 600; 310; 30; 20 MG/100ML; MG/100ML; MG/100ML; MG/100ML
INJECTION, SOLUTION INTRAVENOUS CONTINUOUS PRN
Status: DISCONTINUED | OUTPATIENT
Start: 2020-12-11 | End: 2020-12-11

## 2020-12-11 RX ORDER — ONDANSETRON 2 MG/ML
4 INJECTION INTRAMUSCULAR; INTRAVENOUS DAILY PRN
Status: DISCONTINUED | OUTPATIENT
Start: 2020-12-11 | End: 2020-12-15 | Stop reason: HOSPADM

## 2020-12-11 RX ORDER — TRAMADOL HYDROCHLORIDE 50 MG/1
50 TABLET ORAL EVERY 8 HOURS PRN
Qty: 20 EACH | Refills: 0
Start: 2020-12-11 | End: 2021-01-05

## 2020-12-11 RX ORDER — PROPOFOL 10 MG/ML
VIAL (ML) INTRAVENOUS
Status: DISCONTINUED | OUTPATIENT
Start: 2020-12-11 | End: 2020-12-11

## 2020-12-11 RX ORDER — CEFAZOLIN SODIUM 1 G/3ML
INJECTION, POWDER, FOR SOLUTION INTRAMUSCULAR; INTRAVENOUS
Status: DISCONTINUED | OUTPATIENT
Start: 2020-12-11 | End: 2020-12-11

## 2020-12-11 RX ORDER — DEXAMETHASONE SODIUM PHOSPHATE 4 MG/ML
INJECTION, SOLUTION INTRA-ARTICULAR; INTRALESIONAL; INTRAMUSCULAR; INTRAVENOUS; SOFT TISSUE
Status: DISCONTINUED | OUTPATIENT
Start: 2020-12-11 | End: 2020-12-11

## 2020-12-11 RX ADMIN — SODIUM CHLORIDE, SODIUM LACTATE, POTASSIUM CHLORIDE, AND CALCIUM CHLORIDE: .6; .31; .03; .02 INJECTION, SOLUTION INTRAVENOUS at 06:12

## 2020-12-11 RX ADMIN — HYDROMORPHONE HYDROCHLORIDE 0.2 MG: 1 INJECTION, SOLUTION INTRAMUSCULAR; INTRAVENOUS; SUBCUTANEOUS at 08:12

## 2020-12-11 RX ADMIN — PROPOFOL 175 MG: 10 INJECTION, EMULSION INTRAVENOUS at 07:12

## 2020-12-11 RX ADMIN — ONDANSETRON 4 MG: 4 TABLET, ORALLY DISINTEGRATING ORAL at 10:12

## 2020-12-11 RX ADMIN — FENTANYL CITRATE 50 MCG: 50 INJECTION INTRAMUSCULAR; INTRAVENOUS at 07:12

## 2020-12-11 RX ADMIN — OXYCODONE HYDROCHLORIDE 10 MG: 5 TABLET ORAL at 08:12

## 2020-12-11 RX ADMIN — LIDOCAINE HYDROCHLORIDE 2 ML: 20 JELLY TOPICAL at 07:12

## 2020-12-11 RX ADMIN — DEXAMETHASONE SODIUM PHOSPHATE 8 MG: 4 INJECTION, SOLUTION INTRAMUSCULAR; INTRAVENOUS at 07:12

## 2020-12-11 RX ADMIN — CEFAZOLIN 2 G: 330 INJECTION, POWDER, FOR SOLUTION INTRAMUSCULAR; INTRAVENOUS at 07:12

## 2020-12-11 RX ADMIN — FAMOTIDINE 20 MG: 10 INJECTION, SOLUTION INTRAVENOUS at 07:12

## 2020-12-11 RX ADMIN — ACETAMINOPHEN 1000 MG: 500 TABLET, FILM COATED ORAL at 06:12

## 2020-12-11 RX ADMIN — LIDOCAINE HYDROCHLORIDE 50 MG: 20 INJECTION, SOLUTION INTRAVENOUS at 07:12

## 2020-12-11 RX ADMIN — ONDANSETRON 4 MG: 2 INJECTION INTRAMUSCULAR; INTRAVENOUS at 08:12

## 2020-12-11 RX ADMIN — MIDAZOLAM HYDROCHLORIDE 2 MG: 1 INJECTION, SOLUTION INTRAMUSCULAR; INTRAVENOUS at 07:12

## 2020-12-11 RX ADMIN — ONDANSETRON 4 MG: 2 INJECTION INTRAMUSCULAR; INTRAVENOUS at 07:12

## 2020-12-11 NOTE — DISCHARGE INSTRUCTIONS
Anesthesia: Before You Receive Anesthesia  You are scheduled for surgery. Youll receive medicine called anesthesia to keep you from feeling pain during the surgery. This sheet explains steps you may need to take to prepare for anesthesia.    Tests  Your healthcare provider may send you to have certain tests before your procedure. These may include:  · Blood tests. These help show how anesthesia may affect you.  · Electrocardiography (ECG or EKG). This helps show how your heart is working.  · Chest X-ray. This image helps show the health of your heart and lungs.  Medicines  In the weeks before your surgery:  · Tell your healthcare provider and anesthesia provider what medicines you take. This includes aspirin, other over-the-counter medicines, herbs, and vitamins. Be sure to mention if you take illegal drugs. (This will be kept confidential.) Giving this information helps to keep you safe.  · You may be told to change certain medicines you take. Or you may be told to stop taking medicines for a certain amount of time.  · Mention how much alcohol you drink and if you smoke. Also mention whether youre allergic to any medicines.  Other preparations  · Follow any directions you are given for not eating or drinking before surgery.  · If you dont talk to your anesthesia provider before surgery, you will meet the day of the procedure. He or she will explain your anesthesia and answer your questions.  · Arrange for an adult family member or friend to drive you home after the surgery.  Be sure to follow all your healthcare providers instructions. If you dont, your procedure may have to be rescheduled.   Date Last Reviewed: 12/1/2016  © 0806-4776 [employer]. [address] [place] [address], [place] [address]. All rights reserved. This information is not intended as a substitute for professional medical care. Always follow your healthcare professional's instructions.

## 2020-12-11 NOTE — OP NOTE
Operative Report     Patient name: Rosaura Loredo   MRN: [id number]  Date of surgery: 12/11/2020    Surgeon: Quinton Alexandre DPM   Assistant:  Priti Chapa DPM, PGY 1    Preoperative diagnosis:  1.  Plantar fasciitis left 2.  Entrapment of Stevenson's nerve left  Postoperative diagnosis:  Same as the above  Procedure:  1.  Release of Stevenson's nerve left foot 2.  Release of plantar fascia left foot 3.  Cam Walker boot cast  Anesthesia:  General anesthesia supplemented with postoperative local anesthetic block of 0.5% plain Marcaine and Exparel  Hemostasis:  Thigh tourniquet 300 mmHg  Estimated blood loss:  3 cc   Specimen:  None  Complications: None  Condition upon discharge: Stable    Procedure in detail:  Patient brought the operating room placed on operative table supine position had general anesthesia obtained.  After usual aseptic prep and drape of the left foot it was exsanguinated and a 5 cm longitudinal incision was made on the medial aspect of the foot approximately 2 cm distal to the tuberosity of the medial surface of the calcaneus.  There was deepened down through the superficial fascia which was incised bleeders were coagulated deep fascia and flexor retinaculum was in size medial ankle proximally.  I incised the fascia over the abductor muscle belly elevated the muscle incised the fascia over the quadratus plantae muscle freeing the tunnel where the neurovascular structures entered into the foot.  Care was taken to preserve the vessels and nerves.  I then relieve the medial band of the plantar fascia and the 1/3 of the central band releasing about half the plantar fascia.  The tourniquet was deflated superficial vessels were coagulated.  Tourniquet real of aided area was irrigated no deep closure was performed skin was closed a running 4.0 subcuticular Vicryl.  Dressed with Mastisol Steri-Strips 4x4s Conrado Ace wrap Cam Walker boot cast.  Patient had anesthesia reversed left the operating room with  stable vitals.  I did inject the foot with Marcaine and Exparel    1. Keep dressings, clean, dry, and intact to surgical extremity.  2. Rest, ice, and elevate the surgical extremity.  3.  Cam Walker to surgical extremity in operating room  4. Take all medication as discussed at discharge.  Doxycycline twice a day, Zofran 4 mg, Norco 7.5, tramadol 50 mg.  5. Contact the clinic with any postoperative concerns or complications.  6. Follow up in one week for 1st post op.

## 2020-12-11 NOTE — ANESTHESIA PROCEDURE NOTES
Intubation  Performed by: Aryan Hoffman CRNA  Authorized by: Aguila Gibbs MD     Intubation:     Induction:  Intravenous    Intubated:  Postinduction    Mask Ventilation:  Easy mask    Attempts:  1    Attempted By:  CRNA    Method of Intubation:  Other (see comments)    Difficult Airway Encountered?: No      Complications:  None    Airway Device:  Oral endotracheal tube and supraglottic airway/LMA    Airway Device Size:  4.0    Style/Cuff Inflation:  Uncuffed    Secured at:  The lips    Placement Verified By:  Capnometry    Complicating Factors:  None    Findings Post-Intubation:  BS equal bilateral and atraumatic/condition of teeth unchanged  Notes:      I-gel LMA.

## 2020-12-11 NOTE — H&P
2020  Rosaura Loredo is a 44 y.o., female.         Patient Active Problem List   Diagnosis    Hyperlipidemia    PAC (premature atrial contraction)    Chronic seasonal allergic rhinitis due to pollen    Exercise-induced asthma    Chronic anxiety    Migraine without aura and without status migrainosus, not intractable    Difficulty concentrating    Abdominal pain    Abnormal Pap smear of cervix               Past Surgical History:   Procedure Laterality Date     SECTION        COLD KNIFE CONIZATION OF CERVIX         multiple    COLONOSCOPY N/A 10/20/2020     Procedure: COLONOSCOPY;  Surgeon: Sudheer Santiago III, MD;  Location: [facility];  Service: Endoscopy;  Laterality: N/A;    HYSTERECTOMY         ovaries intact    TONSILLECTOMY             Tobacco Use:  The patient  reports that she has never smoked. She has never used smokeless tobacco.          Results for orders placed or performed during the hospital encounter of 20   EKG 12-lead     Collection Time: 20 11:36 AM     Narrative     Test Reason : M72.2,G57.92,     Vent. Rate : 072 BPM     Atrial Rate : 072 BPM     P-R Int : 184 ms          QRS Dur : 084 ms      QT Int : 416 ms       P-R-T Axes : 063 068 041 degrees     QTc Int : 455 ms     Normal sinus rhythm  Normal ECG  No previous ECGs available  Confirmed by Myra Griffith MD ([id number]) on 2020 9:49:34 AM     Referred By:  HERBER           Confirmed By:Myra Griffith MD                     Lab Results   Component Value Date     WBC 5.75 2020     HGB 13.5 2020     HCT 41.9 2020     MCV 96 2020      2020      BMP        Lab Results   Component Value Date      2020      2020     K 3.7 2020     K 3.7 2020      2020      2020     CO2 27 2020      CO2 27 12/02/2020     BUN 16 12/02/2020     BUN 16 12/02/2020     CREATININE 0.7 12/02/2020     CREATININE 0.7 12/02/2020     CALCIUM 9.1 12/02/2020     CALCIUM 9.1 12/02/2020     ANIONGAP 10 12/02/2020     ANIONGAP 10 12/02/2020      12/02/2020      12/02/2020     GLU 91 09/16/2020         No results found for this or any previous visit.        Anesthesia Evaluation    I have reviewed the Patient Summary Reports.    I have reviewed the Nursing Notes. I have reviewed the NPO Status.   I have reviewed the Medications.      Review of Systems  Anesthesia Hx:  No problems with previous Anesthesia Denies Hx of Anesthetic complications  Denies Family Hx of Anesthesia complications.   Denies Personal Hx of Anesthesia complications.   Social:  Alcohol Use, Non-Smoker    Hematology/Oncology:  Hematology Normal       -- Cancer in past history:  surgery  Oncology Comments: Skin cancer removed      EENT/Dental:   chronic allergic rhinitis   Cardiovascular:   Dysrhythmias hyperlipidemia ECG has been reviewed. PAC (premature atrial contraction  No Cardiologist at this time    Pulmonary:   Asthma mild Last inhaler use 6 months-1 year ago    Renal/:  Renal/ Normal     Hepatic/GI:  Hepatic/GI Normal    Musculoskeletal:  Musculoskeletal Normal    Neurological:   Headaches Migraine without aura and without status migrainosus, not intractable  History of Bell's Palsy years ago    Endocrine:  Endocrine Normal    Dermatological:   Rosacea  Skin cancer removed    Psych:   Psychiatric History anxiety             Physical Exam  General:  Well nourished    Airway/Jaw/Neck:  Airway Findings: Mouth Opening: Normal Tongue: Normal  General Airway Assessment: Adult  Mallampati: III  Improves to II with phonation.  TM Distance: Normal, at least 6 cm  Jaw/Neck Findings:  Neck ROM: Normal ROM      Dental:  Dental Findings: Molar caps    Chest/Lungs:  Chest/Lungs Findings: Clear to auscultation, Normal Respiratory Rate      Heart/Vascular:  Heart Findings: Rate: Normal  Rhythm: Regular Rhythm  Sounds: Normal        Mental Status:  Mental Status Findings:  Cooperative, Alert and Oriented        Diagnosis:        Plantar fasciitis [M72.2]       Nerve entrapment syndrome of left foot [G57.92]    Procedures:        FASCIOTOMY, PLANTAR (OPEN) (Left )       03321 - RELEASE OF NGUYEN'S NERVE LEFT FOOT (Left Leg Lower)

## 2020-12-11 NOTE — TRANSFER OF CARE
"Anesthesia Transfer of Care Note    Patient: Rosaura Loredo    Procedure(s) Performed: Procedure(s) (LRB):  FASCIOTOMY, PLANTAR (OPEN) (Left)  60177 - RELEASE OF NGUYEN'S NERVE LEFT FOOT (Left)    Patient location: PACU    Anesthesia Type: general    Transport from OR: Transported from OR on room air with adequate spontaneous ventilation    Post pain: adequate analgesia    Post assessment: no apparent anesthetic complications    Post vital signs: stable    Level of consciousness: awake and alert    Nausea/Vomiting: no nausea/vomiting    Complications: none    Transfer of care protocol was followed      Last vitals:   Visit Vitals  /69 (BP Location: Left arm)   Pulse 77   Temp 36.5 °C (97.7 °F) (Oral)   Resp 16   Ht 5' 5" (1.651 m)   Wt 63 kg (139 lb)   SpO2 99%   Breastfeeding No   BMI 23.13 kg/m²     "

## 2020-12-11 NOTE — PLAN OF CARE
PT TOLERATING ORAL LIQUIDS AND VOIDING WITHOUT DIFFICULTY, VITAL SIGNS STABLE, PT READY FOR DISCHARGE

## 2020-12-11 NOTE — ANESTHESIA PREPROCEDURE EVALUATION
2020  Rosaura Loredo is a 44 y.o., female.    Patient Active Problem List   Diagnosis    Hyperlipidemia    PAC (premature atrial contraction)    Chronic seasonal allergic rhinitis due to pollen    Exercise-induced asthma    Chronic anxiety    Migraine without aura and without status migrainosus, not intractable    Difficulty concentrating    Abdominal pain    Abnormal Pap smear of cervix       Past Surgical History:   Procedure Laterality Date     SECTION      COLD KNIFE CONIZATION OF CERVIX      multiple    COLONOSCOPY N/A 10/20/2020    Procedure: COLONOSCOPY;  Surgeon: Sudheer Santiago III, MD;  Location: [facility];  Service: Endoscopy;  Laterality: N/A;    HYSTERECTOMY      ovaries intact    TONSILLECTOMY          Tobacco Use:  The patient  reports that she has never smoked. She has never used smokeless tobacco.     Results for orders placed or performed during the hospital encounter of 20   EKG 12-lead    Collection Time: 20 11:36 AM    Narrative    Test Reason : M72.2,G57.92,    Vent. Rate : 072 BPM     Atrial Rate : 072 BPM     P-R Int : 184 ms          QRS Dur : 084 ms      QT Int : 416 ms       P-R-T Axes : 063 068 041 degrees     QTc Int : 455 ms    Normal sinus rhythm  Normal ECG  No previous ECGs available  Confirmed by Myra Griffith MD ([id number]) on 2020 9:49:34 AM    Referred By:  HERBER           Confirmed By:Myra Griffith MD             Lab Results   Component Value Date    WBC 5.75 2020    HGB 13.5 2020    HCT 41.9 2020    MCV 96 2020     2020     BMP  Lab Results   Component Value Date     2020     2020    K 3.7 2020    K 3.7 2020     2020     2020    CO2 27 2020    CO2 27 2020    BUN 16 2020    BUN 16 2020     CREATININE 0.7 12/02/2020    CREATININE 0.7 12/02/2020    CALCIUM 9.1 12/02/2020    CALCIUM 9.1 12/02/2020    ANIONGAP 10 12/02/2020    ANIONGAP 10 12/02/2020     12/02/2020     12/02/2020    GLU 91 09/16/2020       No results found for this or any previous visit.      Anesthesia Evaluation    I have reviewed the Patient Summary Reports.    I have reviewed the Nursing Notes. I have reviewed the NPO Status.   I have reviewed the Medications.     Review of Systems  Anesthesia Hx:  No problems with previous Anesthesia Denies Hx of Anesthetic complications  Denies Family Hx of Anesthesia complications.   Denies Personal Hx of Anesthesia complications.   Social:  Alcohol Use, Non-Smoker    Hematology/Oncology:  Hematology Normal       -- Cancer in past history:  surgery  Oncology Comments: Skin cancer removed      EENT/Dental:   chronic allergic rhinitis   Cardiovascular:   Dysrhythmias hyperlipidemia ECG has been reviewed. PAC (premature atrial contraction  No Cardiologist at this time    Pulmonary:   Asthma mild Last inhaler use 6 months-1 year ago    Renal/:  Renal/ Normal     Hepatic/GI:  Hepatic/GI Normal    Musculoskeletal:  Musculoskeletal Normal    Neurological:   Headaches Migraine without aura and without status migrainosus, not intractable  History of Bell's Palsy years ago    Endocrine:  Endocrine Normal    Dermatological:   Rosacea  Skin cancer removed    Psych:   Psychiatric History anxiety          Physical Exam  General:  Well nourished    Airway/Jaw/Neck:  Airway Findings: Mouth Opening: Normal Tongue: Normal  General Airway Assessment: Adult  Mallampati: III  Improves to II with phonation.  TM Distance: Normal, at least 6 cm  Jaw/Neck Findings:  Neck ROM: Normal ROM      Dental:  Dental Findings: Molar caps    Chest/Lungs:  Chest/Lungs Findings: Clear to auscultation, Normal Respiratory Rate     Heart/Vascular:  Heart Findings: Rate: Normal  Rhythm: Regular Rhythm  Sounds: Normal         Mental Status:  Mental Status Findings:  Cooperative, Alert and Oriented         Anesthesia Plan  Type of Anesthesia, risks & benefits discussed:  Anesthesia Type:  general  Patient's Preference:   Intra-op Monitoring Plan: standard ASA monitors  Intra-op Monitoring Plan Comments:   Post Op Pain Control Plan: multimodal analgesia, IV/PO Opioids PRN and per primary service following discharge from PACU  Post Op Pain Control Plan Comments:   Induction:   IV  Beta Blocker:  Patient is not currently on a Beta-Blocker (No further documentation required).       Informed Consent: Patient understands risks and agrees with Anesthesia plan.  Questions answered. Anesthesia consent signed with patient.  ASA Score: 2     Day of Surgery Review of History & Physical:        Anesthesia Plan Notes: General LMA Okay  Decadron 8 mg iv   Zofran 4 mg iv   Pepcid 20 mg iv L  Pre Operative Medicines: Tylenol 1000 mg po  Sugammadex         Ready For Surgery From Anesthesia Perspective.

## 2020-12-15 ENCOUNTER — OFFICE VISIT (OUTPATIENT)
Dept: PODIATRY | Facility: CLINIC | Age: 45
End: 2020-12-15
Payer: COMMERCIAL

## 2020-12-15 VITALS — BODY MASS INDEX: 23.16 KG/M2 | HEIGHT: 65 IN | RESPIRATION RATE: 16 BRPM | WEIGHT: 139 LBS

## 2020-12-15 DIAGNOSIS — G57.92 NERVE ENTRAPMENT SYNDROME OF LEFT FOOT: ICD-10-CM

## 2020-12-15 DIAGNOSIS — M72.2 PLANTAR FASCIITIS: Primary | ICD-10-CM

## 2020-12-15 PROCEDURE — 99024 POSTOP FOLLOW-UP VISIT: CPT | Mod: S$GLB,,, | Performed by: PODIATRIST

## 2020-12-15 PROCEDURE — 99024 PR POST-OP FOLLOW-UP VISIT: ICD-10-PCS | Mod: S$GLB,,, | Performed by: PODIATRIST

## 2020-12-15 PROCEDURE — 1126F PR PAIN SEVERITY QUANTIFIED, NO PAIN PRESENT: ICD-10-PCS | Mod: S$GLB,,, | Performed by: PODIATRIST

## 2020-12-15 PROCEDURE — 1126F AMNT PAIN NOTED NONE PRSNT: CPT | Mod: S$GLB,,, | Performed by: PODIATRIST

## 2020-12-15 PROCEDURE — 3008F BODY MASS INDEX DOCD: CPT | Mod: CPTII,S$GLB,, | Performed by: PODIATRIST

## 2020-12-15 PROCEDURE — 3008F PR BODY MASS INDEX (BMI) DOCUMENTED: ICD-10-PCS | Mod: CPTII,S$GLB,, | Performed by: PODIATRIST

## 2020-12-15 NOTE — PROGRESS NOTES
"  [address] [facility] [place]. [place] [address]  Phone: [phone]   [phone]    Patient's PCP:Jayy Ramos Jr, MD  Referring Provider:No ref. provider found    Subjective:      Chief Complaint: Post-Op    Date of Surgery: 2020  Procedure:  1.  Release of Stevenson's nerve left foot 2.  Release of plantar fascia left foot    HPI:   Rosaura Loredo is a 44 y.o. female who returns to the clinic today for her post-operative visit. Rosaura Loredo rates pain a 0/10 on a pain scale. Compliance of Care:  Knee scooter, non-weightbearing with boot cast, dressing intact with ace bandage.     Vitals:    12/15/20 1055   Resp: 16   Weight: 63 kg (139 lb)   Height: 5' 5" (1.651 m)   PainSc: 0-No pain        Past Surgical History:   Procedure Laterality Date     SECTION      COLD KNIFE CONIZATION OF CERVIX      multiple    COLONOSCOPY N/A 10/20/2020    Procedure: COLONOSCOPY;  Surgeon: Sudheer Santiago III, MD;  Location: [facility];  Service: Endoscopy;  Laterality: N/A;    HYSTERECTOMY      ovaries intact    NEUROPLASTY Left 2020    Procedure: NEUROPLASTY;  Surgeon: Quinton Alexandre DPM;  Location: [facility] OR;  Service: Podiatry;  Laterality: Left;    PLANTAR FASCIOTOMY Left 2020    Procedure: FASCIOTOMY, PLANTAR;  Surgeon: Quinton Alexandre DPM;  Location: [facility] OR;  Service: Podiatry;  Laterality: Left;    TONSILLECTOMY       Past Medical History:   Diagnosis Date    Abnormal Pap smear of cervix     NEELAM d/t recurrent abnormals; GYN still does PAP; ASCUS neg HPV 2020    Allergy     Anxiety     Asthma     Hyperlipidemia     PAC (premature atrial contraction)     Rosacea      Family History   Problem Relation Age of Onset    Diabetes Mother     Asthma Mother     Hypertension Father     Prostate cancer Father         Social History:   Marital Status:   Alcohol History:  reports current alcohol use.  Tobacco History:  reports that she has never " smoked. She has never used smokeless tobacco.  Drug History:  reports no history of drug use.    Review of patient's allergies indicates:   Allergen Reactions    Mepergan fortis Shortness Of Breath    Penicillins Rash    Sulfa (sulfonamide antibiotics) Rash    Sulfamethoxazole-trimethoprim Rash       No current facility-administered medications for this visit.      Current Outpatient Medications   Medication Sig Dispense Refill    doxycycline (ADOXA) 150 MG tablet Take 1 tablet (150 mg total) by mouth every 12 (twelve) hours. 14 tablet 0    HYDROcodone-acetaminophen (NORCO) 7.5-325 mg per tablet Take 1 tablet by mouth every 4 (four) hours as needed for Pain. 20 tablet 0    ondansetron (ZOFRAN) 4 MG tablet Take 1 tablet (4 mg total) by mouth 2 (two) times daily. 12 tablet 0    traMADoL (ULTRAM) 50 mg tablet Take 1 tablet (50 mg total) by mouth every 8 (eight) hours as needed for Pain. 20 each 0     Facility-Administered Medications Ordered in Other Visits   Medication Dose Route Frequency Provider Last Rate Last Dose    diphenhydrAMINE injection 12.5 mg  12.5 mg Intravenous Q15 Min PRN Aguila Gibbs MD        HYDROmorphone injection 0.2 mg  0.2 mg Intravenous Q3 Min PRN Aguila Gibbs MD   0.2 mg at 12/11/20 0841    ondansetron injection 4 mg  4 mg Intravenous Daily PRN Aguila Gibbs MD   4 mg at 12/11/20 0820    oxyCODONE immediate release tablet 10 mg  10 mg Oral Q3H PRN Aguila Gibbs MD   10 mg at 12/11/20 0818    oxyCODONE immediate release tablet 10 mg  10 mg Oral Once Aguila Gibbs MD        oxyCODONE immediate release tablet 5 mg  5 mg Oral Q4H PRN Aguila Gibbs MD        promethazine (PHENERGAN) 6.25 mg in dextrose 5 % 50 mL IVPB  6.25 mg Intravenous Q10 Min PRN Aguila Gibbs MD        sodium chloride 0.9% flush 10 mL  10 mL Intravenous PRN Quinton Alexandre DPM           Review of Systems      Objective:        Post-op surgery of the 1.  Release of Stevenson's nerve left foot 2.   Plantar fasciotomy left foot with normal healing, no signs of infection or dehiscence of wound. Hardware in place. Normal post op exam today. No redness, no drainage, no increase in local temperature, no significant swelling, sutures.steri-strips are intact.     Imaging:     Physical Exam:   Foot Exam    General  General Appearance: appears stated age and healthy   Orientation: alert and oriented to person, place, and time   Affect: appropriate   Gait: antalgic   Assistance: (Cam Walker boot cast and knee roller)          Physical Exam       Assessment:       1. Plantar fasciitis    2. Nerve entrapment syndrome of left foot      Plan:   Plantar fasciitis    Nerve entrapment syndrome of left foot     More 1 evaluated the foot shows normal healing with no signs of infection.  She may begin range-of-motion exercises help stretch the plantar fascia.  She is return to see me in 1 week for Steri-Strips removal.  Follow up in 1 week (on 12/22/2020) for Post-op.    Procedures - None    The surgical dressing was removed showing no signs of infection, excess edema or malalignment. A new dry dressing was applied and patient was instructed to leave dressing intact until next visit or until instructed to remove.     This note was created using Dragon voice recognition software that occasionally misinterpreted phrases or words.

## 2020-12-22 ENCOUNTER — OFFICE VISIT (OUTPATIENT)
Dept: PODIATRY | Facility: CLINIC | Age: 45
End: 2020-12-22
Payer: COMMERCIAL

## 2020-12-22 VITALS — BODY MASS INDEX: 23.13 KG/M2 | HEIGHT: 65 IN | RESPIRATION RATE: 16 BRPM | HEART RATE: 78 BPM

## 2020-12-22 DIAGNOSIS — M72.2 PLANTAR FASCIITIS: Primary | ICD-10-CM

## 2020-12-22 DIAGNOSIS — M79.672 PAIN OF LEFT HEEL: ICD-10-CM

## 2020-12-22 DIAGNOSIS — G57.92 NERVE ENTRAPMENT SYNDROME OF LEFT FOOT: ICD-10-CM

## 2020-12-22 PROCEDURE — 1126F PR PAIN SEVERITY QUANTIFIED, NO PAIN PRESENT: ICD-10-PCS | Mod: S$GLB,,, | Performed by: PODIATRIST

## 2020-12-22 PROCEDURE — 99024 PR POST-OP FOLLOW-UP VISIT: ICD-10-PCS | Mod: S$GLB,,, | Performed by: PODIATRIST

## 2020-12-22 PROCEDURE — 99024 POSTOP FOLLOW-UP VISIT: CPT | Mod: S$GLB,,, | Performed by: PODIATRIST

## 2020-12-22 PROCEDURE — 3008F PR BODY MASS INDEX (BMI) DOCUMENTED: ICD-10-PCS | Mod: CPTII,S$GLB,, | Performed by: PODIATRIST

## 2020-12-22 PROCEDURE — 3008F BODY MASS INDEX DOCD: CPT | Mod: CPTII,S$GLB,, | Performed by: PODIATRIST

## 2020-12-22 PROCEDURE — 1126F AMNT PAIN NOTED NONE PRSNT: CPT | Mod: S$GLB,,, | Performed by: PODIATRIST

## 2020-12-22 RX ORDER — AZITHROMYCIN 1 G/1
1 POWDER, FOR SUSPENSION ORAL ONCE
Qty: 1 PACKET | Refills: 0 | Status: SHIPPED | OUTPATIENT
Start: 2020-12-22 | End: 2020-12-22

## 2020-12-22 RX ORDER — METHYLPREDNISOLONE 4 MG/1
TABLET ORAL
Qty: 1 PACKAGE | Refills: 0 | Status: SHIPPED | OUTPATIENT
Start: 2020-12-22 | End: 2021-03-23

## 2020-12-22 RX ORDER — AZITHROMYCIN 250 MG/1
TABLET, FILM COATED ORAL
Qty: 6 TABLET | Refills: 0 | Status: SHIPPED | OUTPATIENT
Start: 2020-12-22 | End: 2020-12-27

## 2020-12-22 NOTE — PROGRESS NOTES
"  [address] [facility] [place] [place] [address]  Phone: [phone]   [phone]    Patient's PCP:Jayy Ramos Jr, MD  Referring Provider:No ref. provider found    Subjective:      Chief Complaint: Post-Op     Date of Surgery: 2020  Procedure:  1.  Release of Stevenson's nerve left foot 2.  Release of plantar fascia left foot    HPI:   Rosaura Loredo is a 45 y.o. female who returns to the clinic today for her post-operative visit. Rosaura Loredo rates pain a 0/10 on a pain scale. Compliance of Care: Knee scooter, non-weightbearing with boot cast, dressing intact with ace bandage. Patient  states she removed the steri strips at home.  Do you have a cough? no  Have you had a cough since your surgical procedure ?no  Are you short of breath?no  Have you experienced shortness of breath since your surgical procedure?no  Do you have a fever? no  Did you have a fever since your surgical procedure? no   Any redness at the surgery site? no Warm to the touch? no  Have you been tested for COVID-19 since your surgical procedure? no    Vitals:    20 1052   Pulse: 78   Resp: 16   Height: 5' 5" (1.651 m)   PainSc: 0-No pain        Past Surgical History:   Procedure Laterality Date     SECTION      COLD KNIFE CONIZATION OF CERVIX      multiple    COLONOSCOPY N/A 10/20/2020    Procedure: COLONOSCOPY;  Surgeon: Sudheer Santiago III, MD;  Location: [facility];  Service: Endoscopy;  Laterality: N/A;    HYSTERECTOMY      ovaries intact    NEUROPLASTY Left 2020    Procedure: NEUROPLASTY;  Surgeon: Quinton Alexandre DPM;  Location: [facility] OR;  Service: Podiatry;  Laterality: Left;    PLANTAR FASCIOTOMY Left 2020    Procedure: FASCIOTOMY, PLANTAR;  Surgeon: Quinton Alexandre DPM;  Location: [facility] OR;  Service: Podiatry;  Laterality: Left;    TONSILLECTOMY       Past Medical History:   Diagnosis Date    Abnormal Pap smear of cervix     NEELAM d/t recurrent abnormals; GYN still " does PAP; ASCUS neg HPV 01/2020    Allergy     Anxiety     Asthma     Hyperlipidemia     PAC (premature atrial contraction)     Rosacea      Family History   Problem Relation Age of Onset    Diabetes Mother     Asthma Mother     Hypertension Father     Prostate cancer Father         Social History:   Marital Status:   Alcohol History:  reports current alcohol use.  Tobacco History:  reports that she has never smoked. She has never used smokeless tobacco.  Drug History:  reports no history of drug use.    Review of patient's allergies indicates:   Allergen Reactions    Mepergan fortis Shortness Of Breath    Penicillins Rash    Sulfa (sulfonamide antibiotics) Rash    Sulfamethoxazole-trimethoprim Rash       Current Outpatient Medications   Medication Sig Dispense Refill    acetaminophen (TYLENOL) 500 MG tablet Take 1,000 mg by mouth every 6 (six) hours as needed for Pain.      albuterol 90 mcg/actuation inhaler Inhale 2 puffs into the lungs every 6 (six) hours as needed for Wheezing. 1 Inhaler 2    ALPRAZolam (XANAX) 0.5 MG tablet Take 1 tablet (0.5 mg total) by mouth 3 (three) times daily as needed for Anxiety. 60 tablet 0    azithromycin (ZITHROMAX) 1 gram Pack Take 1 g by mouth once. for 1 dose 1 packet 0    doxycycline (ADOXA) 150 MG tablet Take 1 tablet (150 mg total) by mouth every 12 (twelve) hours. 14 tablet 0    HYDROcodone-acetaminophen (NORCO) 7.5-325 mg per tablet Take 1 tablet by mouth every 4 (four) hours as needed for Pain. 20 tablet 0    hyoscyamine (LEVBID) 0.375 mg Tb12 Take 1 tablet by mouth daily as needed.      ibuprofen (ADVIL,MOTRIN) 200 MG tablet Take 400 mg by mouth every 6 (six) hours as needed for Pain.      loratadine (CLARITIN) 10 mg tablet Take 1 tablet (10 mg total) by mouth once daily. 90 tablet 1    methylPREDNISolone (MEDROL DOSEPACK) 4 mg tablet use as directed 1 Package 0    montelukast (SINGULAIR) 10 mg tablet Take 1 tablet (10 mg total) by mouth  every evening. 90 tablet 1    multivitamin (ONE DAILY MULTIVITAMIN) per tablet Take 1 tablet by mouth once daily.      ondansetron (ZOFRAN) 4 MG tablet Take 1 tablet (4 mg total) by mouth 2 (two) times daily. 12 tablet 0    simvastatin (ZOCOR) 20 MG tablet Take 1 tablet (20 mg total) by mouth every evening. (Patient taking differently: Take 10 mg by mouth every evening. ) 90 tablet 1    sumatriptan (IMITREX) 100 MG tablet Take 1 tablet (100 mg total) by mouth every 2 (two) hours as needed for Migraine. Do not exceed 2 doses in 24 hours. 27 tablet 3    traMADoL (ULTRAM) 50 mg tablet Take 1 tablet (50 mg total) by mouth every 8 (eight) hours as needed for Pain. 20 each 0     No current facility-administered medications for this visit.        Review of Systems      Objective:        Post-op surgery of the 1.  Release of Stevenson's nerve left foot 2.  Partial plantar fasciotomy left foot  with normal healing, no signs of infection or dehiscence of wound. Hardware in place. Normal post op exam today. No redness, no drainage, no increase in local temperature, no significant swelling, sutures.steri-strips are intact.     Imaging:     Physical Exam:   Foot Exam  Physical Exam       Assessment:       1. Plantar fasciitis    2. Pain of left heel    3. Nerve entrapment syndrome of left foot      Plan:   Plantar fasciitis  -     azithromycin (ZITHROMAX) 1 gram Pack; Take 1 g by mouth once. for 1 dose  Dispense: 1 packet; Refill: 0  -     methylPREDNISolone (MEDROL DOSEPACK) 4 mg tablet; use as directed  Dispense: 1 Package; Refill: 0    Pain of left heel  -     azithromycin (ZITHROMAX) 1 gram Pack; Take 1 g by mouth once. for 1 dose  Dispense: 1 packet; Refill: 0  -     methylPREDNISolone (MEDROL DOSEPACK) 4 mg tablet; use as directed  Dispense: 1 Package; Refill: 0    Nerve entrapment syndrome of left foot  -     azithromycin (ZITHROMAX) 1 gram Pack; Take 1 g by mouth once. for 1 dose  Dispense: 1 packet; Refill: 0  -      methylPREDNISolone (MEDROL DOSEPACK) 4 mg tablet; use as directed  Dispense: 1 Package; Refill: 0     1.  Evaluate patient everything is healing normally and I cut the end of the Vicryl suture that was sticking out the skin.  2.  She may get the foot wet and gradually begin weight-bearing with Cam Walker boot cast and Spenco insert.  3.  She can work on range of motion 4.  Return to see me in 3 weeks.  No follow-ups on file.    Procedures - None    The surgical dressing was removed showing no signs of infection, excess edema or malalignment. A new dry dressing was applied and patient was instructed to leave dressing intact until next visit or until instructed to remove.     This note was created using Dragon voice recognition software that occasionally misinterpreted phrases or words.

## 2020-12-22 NOTE — TELEPHONE ENCOUNTER
----- Message from Tami Siu sent at 12/22/2020  2:57 PM CST -----    Patient called and said Dr SAAVEDRA sent the wrong rx in for her. It was supposed to be zithromax 250 mg tablets. It goes to [facility] in [place].    Thank you,  Tami

## 2021-01-05 ENCOUNTER — OFFICE VISIT (OUTPATIENT)
Dept: PODIATRY | Facility: CLINIC | Age: 46
End: 2021-01-05
Payer: COMMERCIAL

## 2021-01-05 VITALS — HEIGHT: 65 IN | BODY MASS INDEX: 23.13 KG/M2 | RESPIRATION RATE: 16 BRPM

## 2021-01-05 DIAGNOSIS — M72.2 PLANTAR FASCIITIS: Primary | ICD-10-CM

## 2021-01-05 DIAGNOSIS — M79.672 PAIN OF LEFT HEEL: ICD-10-CM

## 2021-01-05 DIAGNOSIS — G57.92 NERVE ENTRAPMENT SYNDROME OF LEFT FOOT: ICD-10-CM

## 2021-01-05 PROCEDURE — 3008F BODY MASS INDEX DOCD: CPT | Mod: CPTII,S$GLB,, | Performed by: PODIATRIST

## 2021-01-05 PROCEDURE — 99024 PR POST-OP FOLLOW-UP VISIT: ICD-10-PCS | Mod: S$GLB,,, | Performed by: PODIATRIST

## 2021-01-05 PROCEDURE — 99024 POSTOP FOLLOW-UP VISIT: CPT | Mod: S$GLB,,, | Performed by: PODIATRIST

## 2021-01-05 PROCEDURE — 3008F PR BODY MASS INDEX (BMI) DOCUMENTED: ICD-10-PCS | Mod: CPTII,S$GLB,, | Performed by: PODIATRIST

## 2021-01-05 PROCEDURE — 1125F PR PAIN SEVERITY QUANTIFIED, PAIN PRESENT: ICD-10-PCS | Mod: S$GLB,,, | Performed by: PODIATRIST

## 2021-01-05 PROCEDURE — 1125F AMNT PAIN NOTED PAIN PRSNT: CPT | Mod: S$GLB,,, | Performed by: PODIATRIST

## 2021-01-08 DIAGNOSIS — Z12.31 ENCOUNTER FOR SCREENING MAMMOGRAM FOR MALIGNANT NEOPLASM OF BREAST: Primary | ICD-10-CM

## 2021-01-19 ENCOUNTER — TELEPHONE (OUTPATIENT)
Dept: PODIATRY | Facility: CLINIC | Age: 46
End: 2021-01-19

## 2021-01-28 ENCOUNTER — HOSPITAL ENCOUNTER (OUTPATIENT)
Dept: RADIOLOGY | Facility: HOSPITAL | Age: 46
Discharge: HOME | End: 2021-01-28
Attending: SPECIALIST
Payer: COMMERCIAL

## 2021-01-28 VITALS — WEIGHT: 138.88 LBS | HEIGHT: 65 IN | BODY MASS INDEX: 23.14 KG/M2

## 2021-01-28 DIAGNOSIS — Z12.31 ENCOUNTER FOR SCREENING MAMMOGRAM FOR MALIGNANT NEOPLASM OF BREAST: ICD-10-CM

## 2021-01-28 LAB — PAP SMEAR: NORMAL

## 2021-01-28 PROCEDURE — 77067 SCR MAMMO BI INCL CAD: CPT | Mod: TC,PO

## 2021-02-01 ENCOUNTER — OFFICE VISIT (OUTPATIENT)
Dept: PODIATRY | Facility: CLINIC | Age: 46
End: 2021-02-01
Payer: COMMERCIAL

## 2021-02-01 VITALS — HEART RATE: 89 BPM | HEIGHT: 65 IN | OXYGEN SATURATION: 99 % | BODY MASS INDEX: 23.11 KG/M2

## 2021-02-01 DIAGNOSIS — M72.2 PLANTAR FASCIITIS OF LEFT FOOT: Primary | ICD-10-CM

## 2021-02-01 DIAGNOSIS — G57.92 NERVE ENTRAPMENT SYNDROME OF LEFT FOOT: ICD-10-CM

## 2021-02-01 DIAGNOSIS — M79.672 PAIN OF LEFT HEEL: ICD-10-CM

## 2021-02-01 PROCEDURE — 3008F PR BODY MASS INDEX (BMI) DOCUMENTED: ICD-10-PCS | Mod: CPTII,S$GLB,, | Performed by: PODIATRIST

## 2021-02-01 PROCEDURE — 3008F BODY MASS INDEX DOCD: CPT | Mod: CPTII,S$GLB,, | Performed by: PODIATRIST

## 2021-02-01 PROCEDURE — 99024 PR POST-OP FOLLOW-UP VISIT: ICD-10-PCS | Mod: S$GLB,,, | Performed by: PODIATRIST

## 2021-02-01 PROCEDURE — 99024 POSTOP FOLLOW-UP VISIT: CPT | Mod: S$GLB,,, | Performed by: PODIATRIST

## 2021-02-01 PROCEDURE — 1125F AMNT PAIN NOTED PAIN PRSNT: CPT | Mod: S$GLB,,, | Performed by: PODIATRIST

## 2021-02-01 PROCEDURE — 1125F PR PAIN SEVERITY QUANTIFIED, PAIN PRESENT: ICD-10-PCS | Mod: S$GLB,,, | Performed by: PODIATRIST

## 2021-02-04 ENCOUNTER — TELEPHONE (OUTPATIENT)
Dept: PODIATRY | Facility: CLINIC | Age: 46
End: 2021-02-04

## 2021-03-01 ENCOUNTER — OFFICE VISIT (OUTPATIENT)
Dept: PODIATRY | Facility: CLINIC | Age: 46
End: 2021-03-01
Payer: COMMERCIAL

## 2021-03-01 VITALS — HEIGHT: 65 IN | BODY MASS INDEX: 22.99 KG/M2 | WEIGHT: 138 LBS

## 2021-03-01 DIAGNOSIS — G57.92 NERVE ENTRAPMENT SYNDROME OF LEFT FOOT: ICD-10-CM

## 2021-03-01 DIAGNOSIS — M79.672 PAIN OF LEFT HEEL: ICD-10-CM

## 2021-03-01 DIAGNOSIS — M72.2 PLANTAR FASCIITIS OF LEFT FOOT: Primary | ICD-10-CM

## 2021-03-01 PROCEDURE — 99024 POSTOP FOLLOW-UP VISIT: CPT | Mod: S$GLB,,, | Performed by: PODIATRIST

## 2021-03-01 PROCEDURE — 3008F BODY MASS INDEX DOCD: CPT | Mod: CPTII,S$GLB,, | Performed by: PODIATRIST

## 2021-03-01 PROCEDURE — 99024 PR POST-OP FOLLOW-UP VISIT: ICD-10-PCS | Mod: S$GLB,,, | Performed by: PODIATRIST

## 2021-03-01 PROCEDURE — 3008F PR BODY MASS INDEX (BMI) DOCUMENTED: ICD-10-PCS | Mod: CPTII,S$GLB,, | Performed by: PODIATRIST

## 2021-03-01 RX ORDER — MELOXICAM 15 MG/1
15 TABLET ORAL DAILY
Qty: 30 TABLET | Refills: 3 | Status: SHIPPED | OUTPATIENT
Start: 2021-03-01 | End: 2021-05-31

## 2021-03-23 ENCOUNTER — OFFICE VISIT (OUTPATIENT)
Dept: FAMILY MEDICINE | Facility: CLINIC | Age: 46
End: 2021-03-23
Payer: COMMERCIAL

## 2021-03-23 VITALS
TEMPERATURE: 98 F | HEIGHT: 65 IN | BODY MASS INDEX: 24.16 KG/M2 | SYSTOLIC BLOOD PRESSURE: 108 MMHG | DIASTOLIC BLOOD PRESSURE: 70 MMHG | WEIGHT: 145 LBS | OXYGEN SATURATION: 99 % | HEART RATE: 85 BPM

## 2021-03-23 DIAGNOSIS — F32.A ANXIETY AND DEPRESSION: ICD-10-CM

## 2021-03-23 DIAGNOSIS — E78.00 PURE HYPERCHOLESTEROLEMIA: ICD-10-CM

## 2021-03-23 DIAGNOSIS — I49.1 PAC (PREMATURE ATRIAL CONTRACTION): Primary | ICD-10-CM

## 2021-03-23 DIAGNOSIS — G43.009 MIGRAINE WITHOUT AURA AND WITHOUT STATUS MIGRAINOSUS, NOT INTRACTABLE: ICD-10-CM

## 2021-03-23 DIAGNOSIS — F41.9 ANXIETY AND DEPRESSION: ICD-10-CM

## 2021-03-23 DIAGNOSIS — J30.1 CHRONIC SEASONAL ALLERGIC RHINITIS DUE TO POLLEN: ICD-10-CM

## 2021-03-23 PROCEDURE — 1126F PR PAIN SEVERITY QUANTIFIED, NO PAIN PRESENT: ICD-10-PCS | Mod: S$GLB,,, | Performed by: INTERNAL MEDICINE

## 2021-03-23 PROCEDURE — 99213 PR OFFICE/OUTPT VISIT, EST, LEVL III, 20-29 MIN: ICD-10-PCS | Mod: S$GLB,,, | Performed by: INTERNAL MEDICINE

## 2021-03-23 PROCEDURE — 99213 OFFICE O/P EST LOW 20 MIN: CPT | Mod: S$GLB,,, | Performed by: INTERNAL MEDICINE

## 2021-03-23 PROCEDURE — 3008F PR BODY MASS INDEX (BMI) DOCUMENTED: ICD-10-PCS | Mod: S$GLB,,, | Performed by: INTERNAL MEDICINE

## 2021-03-23 PROCEDURE — 3008F BODY MASS INDEX DOCD: CPT | Mod: S$GLB,,, | Performed by: INTERNAL MEDICINE

## 2021-03-23 PROCEDURE — 1126F AMNT PAIN NOTED NONE PRSNT: CPT | Mod: S$GLB,,, | Performed by: INTERNAL MEDICINE

## 2021-03-23 RX ORDER — MONTELUKAST SODIUM 10 MG/1
10 TABLET ORAL NIGHTLY
Qty: 90 TABLET | Refills: 1 | Status: SHIPPED | OUTPATIENT
Start: 2021-03-23 | End: 2021-08-24 | Stop reason: SDUPTHER

## 2021-03-23 RX ORDER — ALPRAZOLAM 0.5 MG/1
0.5 TABLET ORAL 3 TIMES DAILY PRN
Qty: 60 TABLET | Refills: 0 | Status: SHIPPED | OUTPATIENT
Start: 2021-03-23 | End: 2021-08-24 | Stop reason: SDUPTHER

## 2021-03-23 RX ORDER — SUMATRIPTAN SUCCINATE 100 MG/1
100 TABLET ORAL
Qty: 27 TABLET | Refills: 3 | Status: SHIPPED | OUTPATIENT
Start: 2021-03-23 | End: 2021-08-24 | Stop reason: SDUPTHER

## 2021-03-23 RX ORDER — METOPROLOL SUCCINATE 25 MG/1
25 TABLET, EXTENDED RELEASE ORAL DAILY PRN
Qty: 30 TABLET | Refills: 3 | Status: SHIPPED | OUTPATIENT
Start: 2021-03-23 | End: 2021-08-24 | Stop reason: SDUPTHER

## 2021-03-23 RX ORDER — SIMVASTATIN 20 MG/1
20 TABLET, FILM COATED ORAL NIGHTLY
Qty: 90 TABLET | Refills: 1 | Status: SHIPPED | OUTPATIENT
Start: 2021-03-23 | End: 2021-08-24 | Stop reason: SDUPTHER

## 2021-05-05 ENCOUNTER — OFFICE VISIT (OUTPATIENT)
Dept: FAMILY MEDICINE | Facility: CLINIC | Age: 46
End: 2021-05-05
Payer: COMMERCIAL

## 2021-05-05 VITALS
HEART RATE: 83 BPM | TEMPERATURE: 98 F | SYSTOLIC BLOOD PRESSURE: 112 MMHG | DIASTOLIC BLOOD PRESSURE: 80 MMHG | HEIGHT: 65 IN | WEIGHT: 146 LBS | BODY MASS INDEX: 24.32 KG/M2 | OXYGEN SATURATION: 99 %

## 2021-05-05 DIAGNOSIS — M54.6 ACUTE LEFT-SIDED THORACIC BACK PAIN: Primary | ICD-10-CM

## 2021-05-05 PROCEDURE — 99213 PR OFFICE/OUTPT VISIT, EST, LEVL III, 20-29 MIN: ICD-10-PCS | Mod: S$GLB,,, | Performed by: INTERNAL MEDICINE

## 2021-05-05 PROCEDURE — 99213 OFFICE O/P EST LOW 20 MIN: CPT | Mod: S$GLB,,, | Performed by: INTERNAL MEDICINE

## 2021-05-05 PROCEDURE — 3008F PR BODY MASS INDEX (BMI) DOCUMENTED: ICD-10-PCS | Mod: S$GLB,,, | Performed by: INTERNAL MEDICINE

## 2021-05-05 PROCEDURE — 3008F BODY MASS INDEX DOCD: CPT | Mod: S$GLB,,, | Performed by: INTERNAL MEDICINE

## 2021-05-05 PROCEDURE — 1125F AMNT PAIN NOTED PAIN PRSNT: CPT | Mod: S$GLB,,, | Performed by: INTERNAL MEDICINE

## 2021-05-05 PROCEDURE — 1125F PR PAIN SEVERITY QUANTIFIED, PAIN PRESENT: ICD-10-PCS | Mod: S$GLB,,, | Performed by: INTERNAL MEDICINE

## 2021-05-05 RX ORDER — TIZANIDINE 4 MG/1
4 TABLET ORAL EVERY 6 HOURS PRN
Qty: 60 TABLET | Refills: 1 | Status: SHIPPED | OUTPATIENT
Start: 2021-05-05 | End: 2021-05-20

## 2021-05-05 RX ORDER — METHYLPREDNISOLONE 4 MG/1
TABLET ORAL
Qty: 21 TABLET | Refills: 0 | Status: SHIPPED | OUTPATIENT
Start: 2021-05-05 | End: 2021-06-18

## 2021-05-17 ENCOUNTER — PATIENT MESSAGE (OUTPATIENT)
Dept: FAMILY MEDICINE | Facility: CLINIC | Age: 46
End: 2021-05-17

## 2021-05-17 DIAGNOSIS — M54.6 ACUTE LEFT-SIDED THORACIC BACK PAIN: Primary | ICD-10-CM

## 2021-05-26 ENCOUNTER — OFFICE VISIT (OUTPATIENT)
Dept: SPINE | Facility: CLINIC | Age: 46
End: 2021-05-26
Payer: COMMERCIAL

## 2021-05-26 VITALS — HEIGHT: 65 IN | BODY MASS INDEX: 24.32 KG/M2 | WEIGHT: 145.94 LBS

## 2021-05-26 DIAGNOSIS — M54.6 ACUTE LEFT-SIDED THORACIC BACK PAIN: ICD-10-CM

## 2021-05-26 PROCEDURE — 99204 OFFICE O/P NEW MOD 45 MIN: CPT | Mod: S$GLB,,, | Performed by: PHYSICAL MEDICINE & REHABILITATION

## 2021-05-26 PROCEDURE — 99204 PR OFFICE/OUTPT VISIT, NEW, LEVL IV, 45-59 MIN: ICD-10-PCS | Mod: S$GLB,,, | Performed by: PHYSICAL MEDICINE & REHABILITATION

## 2021-05-26 PROCEDURE — 1126F AMNT PAIN NOTED NONE PRSNT: CPT | Mod: S$GLB,,, | Performed by: PHYSICAL MEDICINE & REHABILITATION

## 2021-05-26 PROCEDURE — 1126F PR PAIN SEVERITY QUANTIFIED, NO PAIN PRESENT: ICD-10-PCS | Mod: S$GLB,,, | Performed by: PHYSICAL MEDICINE & REHABILITATION

## 2021-05-26 PROCEDURE — 3008F BODY MASS INDEX DOCD: CPT | Mod: CPTII,S$GLB,, | Performed by: PHYSICAL MEDICINE & REHABILITATION

## 2021-05-26 PROCEDURE — 3008F PR BODY MASS INDEX (BMI) DOCUMENTED: ICD-10-PCS | Mod: CPTII,S$GLB,, | Performed by: PHYSICAL MEDICINE & REHABILITATION

## 2021-05-26 RX ORDER — GABAPENTIN 300 MG/1
300 CAPSULE ORAL NIGHTLY
Qty: 30 CAPSULE | Refills: 0 | Status: SHIPPED | OUTPATIENT
Start: 2021-05-26 | End: 2021-08-24

## 2021-06-18 ENCOUNTER — HOSPITAL ENCOUNTER (OUTPATIENT)
Dept: RADIOLOGY | Facility: HOSPITAL | Age: 46
Discharge: HOME | End: 2021-06-18
Attending: PHYSICAL MEDICINE & REHABILITATION
Payer: COMMERCIAL

## 2021-06-18 ENCOUNTER — OFFICE VISIT (OUTPATIENT)
Dept: SPINE | Facility: CLINIC | Age: 46
End: 2021-06-18
Payer: COMMERCIAL

## 2021-06-18 ENCOUNTER — PATIENT MESSAGE (OUTPATIENT)
Dept: SPINE | Facility: CLINIC | Age: 46
End: 2021-06-18

## 2021-06-18 ENCOUNTER — TELEPHONE (OUTPATIENT)
Dept: SPINE | Facility: CLINIC | Age: 46
End: 2021-06-18

## 2021-06-18 VITALS — BODY MASS INDEX: 24.32 KG/M2 | WEIGHT: 145.94 LBS | HEIGHT: 65 IN

## 2021-06-18 DIAGNOSIS — M54.6 ACUTE LEFT-SIDED THORACIC BACK PAIN: ICD-10-CM

## 2021-06-18 DIAGNOSIS — M54.6 ACUTE LEFT-SIDED THORACIC BACK PAIN: Primary | ICD-10-CM

## 2021-06-18 PROCEDURE — 72070 XR THORACIC SPINE AP LATERAL: ICD-10-PCS | Mod: 26,,, | Performed by: RADIOLOGY

## 2021-06-18 PROCEDURE — 3008F PR BODY MASS INDEX (BMI) DOCUMENTED: ICD-10-PCS | Mod: CPTII,S$GLB,, | Performed by: PHYSICAL MEDICINE & REHABILITATION

## 2021-06-18 PROCEDURE — 72070 X-RAY EXAM THORAC SPINE 2VWS: CPT | Mod: 26,,, | Performed by: RADIOLOGY

## 2021-06-18 PROCEDURE — 72070 X-RAY EXAM THORAC SPINE 2VWS: CPT | Mod: TC,FY

## 2021-06-18 PROCEDURE — 1125F AMNT PAIN NOTED PAIN PRSNT: CPT | Mod: S$GLB,,, | Performed by: PHYSICAL MEDICINE & REHABILITATION

## 2021-06-18 PROCEDURE — 99213 OFFICE O/P EST LOW 20 MIN: CPT | Mod: S$GLB,,, | Performed by: PHYSICAL MEDICINE & REHABILITATION

## 2021-06-18 PROCEDURE — 3008F BODY MASS INDEX DOCD: CPT | Mod: CPTII,S$GLB,, | Performed by: PHYSICAL MEDICINE & REHABILITATION

## 2021-06-18 PROCEDURE — 1125F PR PAIN SEVERITY QUANTIFIED, PAIN PRESENT: ICD-10-PCS | Mod: S$GLB,,, | Performed by: PHYSICAL MEDICINE & REHABILITATION

## 2021-06-18 PROCEDURE — 99213 PR OFFICE/OUTPT VISIT, EST, LEVL III, 20-29 MIN: ICD-10-PCS | Mod: S$GLB,,, | Performed by: PHYSICAL MEDICINE & REHABILITATION

## 2021-06-23 ENCOUNTER — PATIENT MESSAGE (OUTPATIENT)
Dept: SPINE | Facility: CLINIC | Age: 46
End: 2021-06-23

## 2021-06-24 ENCOUNTER — PATIENT MESSAGE (OUTPATIENT)
Dept: SPINE | Facility: CLINIC | Age: 46
End: 2021-06-24

## 2021-06-24 ENCOUNTER — TELEPHONE (OUTPATIENT)
Dept: SPINE | Facility: CLINIC | Age: 46
End: 2021-06-24

## 2021-06-24 DIAGNOSIS — M54.6 ACUTE LEFT-SIDED THORACIC BACK PAIN: Primary | ICD-10-CM

## 2021-06-24 DIAGNOSIS — M54.6 PAIN IN THORACIC SPINE: ICD-10-CM

## 2021-07-10 ENCOUNTER — HOSPITAL ENCOUNTER (OUTPATIENT)
Dept: RADIOLOGY | Facility: HOSPITAL | Age: 46
Discharge: HOME | End: 2021-07-10
Attending: PHYSICAL MEDICINE & REHABILITATION
Payer: COMMERCIAL

## 2021-07-10 DIAGNOSIS — M54.6 PAIN IN THORACIC SPINE: ICD-10-CM

## 2021-07-10 PROCEDURE — 72146 MRI CHEST SPINE W/O DYE: CPT | Mod: TC

## 2021-08-06 ENCOUNTER — IMMUNIZATION (OUTPATIENT)
Dept: PRIMARY CARE CLINIC | Facility: CLINIC | Age: 46
End: 2021-08-06
Payer: COMMERCIAL

## 2021-08-06 DIAGNOSIS — Z23 NEED FOR VACCINATION: Primary | ICD-10-CM

## 2021-08-06 PROCEDURE — 91300 COVID-19, MRNA, LNP-S, PF, 30 MCG/0.3 ML DOSE VACCINE: CPT | Mod: S$GLB,,, | Performed by: FAMILY MEDICINE

## 2021-08-06 PROCEDURE — 0001A COVID-19, MRNA, LNP-S, PF, 30 MCG/0.3 ML DOSE VACCINE: CPT | Mod: CV19,S$GLB,, | Performed by: FAMILY MEDICINE

## 2021-08-06 PROCEDURE — 0001A COVID-19, MRNA, LNP-S, PF, 30 MCG/0.3 ML DOSE VACCINE: ICD-10-PCS | Mod: CV19,S$GLB,, | Performed by: FAMILY MEDICINE

## 2021-08-06 PROCEDURE — 91300 COVID-19, MRNA, LNP-S, PF, 30 MCG/0.3 ML DOSE VACCINE: ICD-10-PCS | Mod: S$GLB,,, | Performed by: FAMILY MEDICINE

## 2021-08-13 ENCOUNTER — CLINICAL SUPPORT (OUTPATIENT)
Dept: OTHER | Facility: CLINIC | Age: 46
End: 2021-08-13
Payer: COMMERCIAL

## 2021-08-13 ENCOUNTER — PATIENT MESSAGE (OUTPATIENT)
Dept: FAMILY MEDICINE | Facility: CLINIC | Age: 46
End: 2021-08-13

## 2021-08-13 DIAGNOSIS — Z00.8 ENCOUNTER FOR OTHER GENERAL EXAMINATION: ICD-10-CM

## 2021-08-14 VITALS — HEIGHT: 65 IN | BODY MASS INDEX: 24.29 KG/M2

## 2021-08-14 LAB
HDLC SERPL-MCNC: 49 MG/DL
POC CHOLESTEROL, LDL (DOCK): 100 MG/DL
POC CHOLESTEROL, TOTAL: 167 MG/DL
POC GLUCOSE, FASTING: 109 MG/DL (ref 60–110)
TRIGL SERPL-MCNC: 87 MG/DL

## 2021-08-16 ENCOUNTER — PATIENT MESSAGE (OUTPATIENT)
Dept: SPINE | Facility: CLINIC | Age: 46
End: 2021-08-16

## 2021-08-16 DIAGNOSIS — N28.1 RENAL CYST: Primary | ICD-10-CM

## 2021-08-17 ENCOUNTER — TELEPHONE (OUTPATIENT)
Dept: SPINE | Facility: CLINIC | Age: 46
End: 2021-08-17

## 2021-08-24 ENCOUNTER — OFFICE VISIT (OUTPATIENT)
Dept: FAMILY MEDICINE | Facility: CLINIC | Age: 46
End: 2021-08-24
Payer: COMMERCIAL

## 2021-08-24 ENCOUNTER — PATIENT MESSAGE (OUTPATIENT)
Dept: FAMILY MEDICINE | Facility: CLINIC | Age: 46
End: 2021-08-24

## 2021-08-24 VITALS
OXYGEN SATURATION: 98 % | TEMPERATURE: 97 F | HEART RATE: 86 BPM | BODY MASS INDEX: 24.29 KG/M2 | DIASTOLIC BLOOD PRESSURE: 60 MMHG | SYSTOLIC BLOOD PRESSURE: 106 MMHG | HEIGHT: 65 IN

## 2021-08-24 DIAGNOSIS — J06.9 VIRAL URI WITH COUGH: Primary | ICD-10-CM

## 2021-08-24 DIAGNOSIS — E78.00 PURE HYPERCHOLESTEROLEMIA: ICD-10-CM

## 2021-08-24 DIAGNOSIS — R73.01 IMPAIRED FASTING GLUCOSE: ICD-10-CM

## 2021-08-24 DIAGNOSIS — F32.A ANXIETY AND DEPRESSION: ICD-10-CM

## 2021-08-24 DIAGNOSIS — I49.1 PAC (PREMATURE ATRIAL CONTRACTION): ICD-10-CM

## 2021-08-24 DIAGNOSIS — F41.9 ANXIETY AND DEPRESSION: ICD-10-CM

## 2021-08-24 DIAGNOSIS — H61.892 POLYP OF LEFT EAR CANAL: ICD-10-CM

## 2021-08-24 DIAGNOSIS — J30.1 CHRONIC SEASONAL ALLERGIC RHINITIS DUE TO POLLEN: ICD-10-CM

## 2021-08-24 DIAGNOSIS — G43.009 MIGRAINE WITHOUT AURA AND WITHOUT STATUS MIGRAINOSUS, NOT INTRACTABLE: ICD-10-CM

## 2021-08-24 LAB — HBA1C MFR BLD: 5.1 %

## 2021-08-24 PROCEDURE — 83036 POCT HEMOGLOBIN A1C: ICD-10-PCS | Mod: QW,,, | Performed by: INTERNAL MEDICINE

## 2021-08-24 PROCEDURE — 1126F AMNT PAIN NOTED NONE PRSNT: CPT | Mod: S$GLB,,, | Performed by: INTERNAL MEDICINE

## 2021-08-24 PROCEDURE — 1126F PR PAIN SEVERITY QUANTIFIED, NO PAIN PRESENT: ICD-10-PCS | Mod: S$GLB,,, | Performed by: INTERNAL MEDICINE

## 2021-08-24 PROCEDURE — 99214 OFFICE O/P EST MOD 30 MIN: CPT | Mod: S$GLB,,, | Performed by: INTERNAL MEDICINE

## 2021-08-24 PROCEDURE — 3008F PR BODY MASS INDEX (BMI) DOCUMENTED: ICD-10-PCS | Mod: S$GLB,,, | Performed by: INTERNAL MEDICINE

## 2021-08-24 PROCEDURE — 99214 PR OFFICE/OUTPT VISIT, EST, LEVL IV, 30-39 MIN: ICD-10-PCS | Mod: S$GLB,,, | Performed by: INTERNAL MEDICINE

## 2021-08-24 PROCEDURE — 3008F BODY MASS INDEX DOCD: CPT | Mod: S$GLB,,, | Performed by: INTERNAL MEDICINE

## 2021-08-24 PROCEDURE — 83036 HEMOGLOBIN GLYCOSYLATED A1C: CPT | Mod: QW,,, | Performed by: INTERNAL MEDICINE

## 2021-08-24 RX ORDER — ALPRAZOLAM 0.5 MG/1
0.5 TABLET ORAL 3 TIMES DAILY PRN
Qty: 60 TABLET | Refills: 1 | Status: SHIPPED | OUTPATIENT
Start: 2021-08-24 | End: 2022-01-17 | Stop reason: SDUPTHER

## 2021-08-24 RX ORDER — METOPROLOL SUCCINATE 25 MG/1
25 TABLET, EXTENDED RELEASE ORAL DAILY PRN
Qty: 90 TABLET | Refills: 0 | Status: SHIPPED | OUTPATIENT
Start: 2021-12-27 | End: 2023-01-17 | Stop reason: SDUPTHER

## 2021-08-24 RX ORDER — PSEUDOEPHEDRINE HCL 120 MG/1
120 TABLET, FILM COATED, EXTENDED RELEASE ORAL 2 TIMES DAILY
Qty: 30 TABLET | Refills: 0 | Status: SHIPPED | OUTPATIENT
Start: 2021-08-24 | End: 2021-09-08

## 2021-08-24 RX ORDER — SUMATRIPTAN SUCCINATE 100 MG/1
100 TABLET ORAL
Qty: 27 TABLET | Refills: 3 | Status: SHIPPED | OUTPATIENT
Start: 2021-08-24 | End: 2021-11-18 | Stop reason: SDUPTHER

## 2021-08-24 RX ORDER — PROMETHAZINE HYDROCHLORIDE AND DEXTROMETHORPHAN HYDROBROMIDE 6.25; 15 MG/5ML; MG/5ML
5 SYRUP ORAL EVERY 4 HOURS PRN
Qty: 180 ML | Refills: 1 | Status: SHIPPED | OUTPATIENT
Start: 2021-08-24 | End: 2021-09-03

## 2021-08-24 RX ORDER — MONTELUKAST SODIUM 10 MG/1
10 TABLET ORAL NIGHTLY
Qty: 90 TABLET | Refills: 1 | Status: SHIPPED | OUTPATIENT
Start: 2021-08-24 | End: 2022-01-17 | Stop reason: SDUPTHER

## 2021-08-24 RX ORDER — SIMVASTATIN 20 MG/1
20 TABLET, FILM COATED ORAL NIGHTLY
Qty: 90 TABLET | Refills: 1 | Status: SHIPPED | OUTPATIENT
Start: 2021-08-24 | End: 2022-10-23 | Stop reason: SDUPTHER

## 2021-08-27 ENCOUNTER — HOSPITAL ENCOUNTER (OUTPATIENT)
Dept: RADIOLOGY | Facility: HOSPITAL | Age: 46
Discharge: HOME | End: 2021-08-27
Attending: PHYSICAL MEDICINE & REHABILITATION
Payer: COMMERCIAL

## 2021-08-27 DIAGNOSIS — N28.1 RENAL CYST: ICD-10-CM

## 2021-08-27 PROCEDURE — 76770 US EXAM ABDO BACK WALL COMP: CPT | Mod: TC

## 2021-09-10 ENCOUNTER — IMMUNIZATION (OUTPATIENT)
Dept: PRIMARY CARE CLINIC | Facility: CLINIC | Age: 46
End: 2021-09-10
Payer: COMMERCIAL

## 2021-09-10 DIAGNOSIS — Z23 NEED FOR VACCINATION: Primary | ICD-10-CM

## 2021-09-10 PROCEDURE — 91300 COVID-19, MRNA, LNP-S, PF, 30 MCG/0.3 ML DOSE VACCINE: CPT | Mod: S$GLB,,, | Performed by: FAMILY MEDICINE

## 2021-09-10 PROCEDURE — 0002A COVID-19, MRNA, LNP-S, PF, 30 MCG/0.3 ML DOSE VACCINE: CPT | Mod: CV19,S$GLB,, | Performed by: FAMILY MEDICINE

## 2021-09-10 PROCEDURE — 0002A COVID-19, MRNA, LNP-S, PF, 30 MCG/0.3 ML DOSE VACCINE: ICD-10-PCS | Mod: CV19,S$GLB,, | Performed by: FAMILY MEDICINE

## 2021-09-10 PROCEDURE — 91300 COVID-19, MRNA, LNP-S, PF, 30 MCG/0.3 ML DOSE VACCINE: ICD-10-PCS | Mod: S$GLB,,, | Performed by: FAMILY MEDICINE

## 2021-11-03 ENCOUNTER — TELEPHONE (OUTPATIENT)
Dept: DERMATOLOGY | Facility: CLINIC | Age: 46
End: 2021-11-03
Payer: COMMERCIAL

## 2021-11-15 ENCOUNTER — PROCEDURE VISIT (OUTPATIENT)
Dept: DERMATOLOGY | Facility: CLINIC | Age: 46
End: 2021-11-15
Payer: COMMERCIAL

## 2021-11-15 ENCOUNTER — OFFICE VISIT (OUTPATIENT)
Dept: DERMATOLOGY | Facility: CLINIC | Age: 46
End: 2021-11-15
Payer: COMMERCIAL

## 2021-11-15 DIAGNOSIS — L90.5 SCAR: ICD-10-CM

## 2021-11-15 DIAGNOSIS — L81.4 SOLAR LENTIGO: ICD-10-CM

## 2021-11-15 DIAGNOSIS — H01.134 ECZEMATOUS DERMATITIS OF UPPER EYELIDS OF BOTH EYES: ICD-10-CM

## 2021-11-15 DIAGNOSIS — L82.1 SEBORRHEIC KERATOSES: Primary | ICD-10-CM

## 2021-11-15 DIAGNOSIS — D22.9 MULTIPLE BENIGN NEVI: ICD-10-CM

## 2021-11-15 DIAGNOSIS — H01.131 ECZEMATOUS DERMATITIS OF UPPER EYELIDS OF BOTH EYES: ICD-10-CM

## 2021-11-15 DIAGNOSIS — D36.9 ANGIOFIBROMA: ICD-10-CM

## 2021-11-15 DIAGNOSIS — G51.0 FACIAL PARALYSIS/BELLS PALSY: Primary | ICD-10-CM

## 2021-11-15 DIAGNOSIS — Z85.828 HISTORY OF NONMELANOMA SKIN CANCER: ICD-10-CM

## 2021-11-15 PROCEDURE — 3044F HG A1C LEVEL LT 7.0%: CPT | Mod: CPTII,S$GLB,, | Performed by: DERMATOLOGY

## 2021-11-15 PROCEDURE — 99499 NO LOS: ICD-10-PCS | Mod: ,,, | Performed by: DERMATOLOGY

## 2021-11-15 PROCEDURE — 99999 PR PBB SHADOW E&M-EST. PATIENT-LVL III: ICD-10-PCS | Mod: PBBFAC,,, | Performed by: DERMATOLOGY

## 2021-11-15 PROCEDURE — 99999 PR PBB SHADOW E&M-EST. PATIENT-LVL III: CPT | Mod: PBBFAC,,, | Performed by: DERMATOLOGY

## 2021-11-15 PROCEDURE — 3044F PR MOST RECENT HEMOGLOBIN A1C LEVEL <7.0%: ICD-10-PCS | Mod: CPTII,S$GLB,, | Performed by: DERMATOLOGY

## 2021-11-15 PROCEDURE — 99203 OFFICE O/P NEW LOW 30 MIN: CPT | Mod: S$GLB,,, | Performed by: DERMATOLOGY

## 2021-11-15 PROCEDURE — 99203 PR OFFICE/OUTPT VISIT, NEW, LEVL III, 30-44 MIN: ICD-10-PCS | Mod: S$GLB,,, | Performed by: DERMATOLOGY

## 2021-11-15 PROCEDURE — 99499 UNLISTED E&M SERVICE: CPT | Mod: ,,, | Performed by: DERMATOLOGY

## 2021-11-18 DIAGNOSIS — G43.009 MIGRAINE WITHOUT AURA AND WITHOUT STATUS MIGRAINOSUS, NOT INTRACTABLE: ICD-10-CM

## 2021-11-19 RX ORDER — SUMATRIPTAN SUCCINATE 100 MG/1
100 TABLET ORAL
Qty: 27 TABLET | Refills: 3 | Status: SHIPPED | OUTPATIENT
Start: 2021-11-19 | End: 2022-01-17 | Stop reason: SDUPTHER

## 2021-12-07 DIAGNOSIS — Z12.31 ENCOUNTER FOR SCREENING MAMMOGRAM FOR MALIGNANT NEOPLASM OF BREAST: Primary | ICD-10-CM

## 2022-01-17 ENCOUNTER — OFFICE VISIT (OUTPATIENT)
Dept: FAMILY MEDICINE | Facility: CLINIC | Age: 47
End: 2022-01-17
Payer: COMMERCIAL

## 2022-01-17 VITALS
BODY MASS INDEX: 25.66 KG/M2 | HEART RATE: 76 BPM | TEMPERATURE: 97 F | SYSTOLIC BLOOD PRESSURE: 104 MMHG | DIASTOLIC BLOOD PRESSURE: 58 MMHG | WEIGHT: 154 LBS | HEIGHT: 65 IN | OXYGEN SATURATION: 99 %

## 2022-01-17 DIAGNOSIS — F41.9 ANXIETY AND DEPRESSION: ICD-10-CM

## 2022-01-17 DIAGNOSIS — J30.1 CHRONIC SEASONAL ALLERGIC RHINITIS DUE TO POLLEN: ICD-10-CM

## 2022-01-17 DIAGNOSIS — G43.009 MIGRAINE WITHOUT AURA AND WITHOUT STATUS MIGRAINOSUS, NOT INTRACTABLE: ICD-10-CM

## 2022-01-17 DIAGNOSIS — F32.A ANXIETY AND DEPRESSION: ICD-10-CM

## 2022-01-17 PROCEDURE — 3078F DIAST BP <80 MM HG: CPT | Mod: S$GLB,,, | Performed by: INTERNAL MEDICINE

## 2022-01-17 PROCEDURE — 3008F PR BODY MASS INDEX (BMI) DOCUMENTED: ICD-10-PCS | Mod: S$GLB,,, | Performed by: INTERNAL MEDICINE

## 2022-01-17 PROCEDURE — 99213 PR OFFICE/OUTPT VISIT, EST, LEVL III, 20-29 MIN: ICD-10-PCS | Mod: S$GLB,,, | Performed by: INTERNAL MEDICINE

## 2022-01-17 PROCEDURE — 3078F PR MOST RECENT DIASTOLIC BLOOD PRESSURE < 80 MM HG: ICD-10-PCS | Mod: S$GLB,,, | Performed by: INTERNAL MEDICINE

## 2022-01-17 PROCEDURE — 3074F PR MOST RECENT SYSTOLIC BLOOD PRESSURE < 130 MM HG: ICD-10-PCS | Mod: S$GLB,,, | Performed by: INTERNAL MEDICINE

## 2022-01-17 PROCEDURE — 99213 OFFICE O/P EST LOW 20 MIN: CPT | Mod: S$GLB,,, | Performed by: INTERNAL MEDICINE

## 2022-01-17 PROCEDURE — 3074F SYST BP LT 130 MM HG: CPT | Mod: S$GLB,,, | Performed by: INTERNAL MEDICINE

## 2022-01-17 PROCEDURE — 3008F BODY MASS INDEX DOCD: CPT | Mod: S$GLB,,, | Performed by: INTERNAL MEDICINE

## 2022-01-17 RX ORDER — MONTELUKAST SODIUM 10 MG/1
10 TABLET ORAL NIGHTLY
Qty: 90 TABLET | Refills: 1 | Status: SHIPPED | OUTPATIENT
Start: 2022-01-17 | End: 2022-08-29 | Stop reason: SDUPTHER

## 2022-01-17 RX ORDER — ALPRAZOLAM 0.5 MG/1
0.5 TABLET ORAL 3 TIMES DAILY PRN
Qty: 30 TABLET | Refills: 1 | Status: SHIPPED | OUTPATIENT
Start: 2022-01-17 | End: 2022-07-05 | Stop reason: SDUPTHER

## 2022-01-17 RX ORDER — SUMATRIPTAN SUCCINATE 100 MG/1
100 TABLET ORAL
Qty: 27 TABLET | Refills: 3 | Status: SHIPPED | OUTPATIENT
Start: 2022-01-17 | End: 2022-08-29 | Stop reason: SDUPTHER

## 2022-01-17 RX ORDER — SPIRONOLACTONE 100 MG/1
TABLET, FILM COATED ORAL
Qty: 90 TABLET | Refills: 4 | Status: CANCELLED | OUTPATIENT
Start: 2022-01-17

## 2022-01-17 NOTE — PROGRESS NOTES
Subjective:       Patient ID: Rosaura Loredo is a 46 y.o. female.    Chief Complaint: Hypertension (6 months follow up) and Hyperlipidemia    Hyperlipidemia  This is a chronic problem. The problem is controlled. Recent lipid tests were reviewed and are normal (labs done yearly for work). Pertinent negatives include no chest pain, myalgias or shortness of breath. Current antihyperlipidemic treatment includes statins. The current treatment provides significant improvement of lipids. There are no compliance problems.      Review of Systems   Constitutional: Negative for activity change, appetite change, chills, diaphoresis, fatigue, fever and unexpected weight change.   HENT: Negative for congestion, ear discharge, ear pain, hearing loss, mouth sores, nosebleeds, postnasal drip, rhinorrhea, sinus pressure, sinus pain, sneezing, sore throat, tinnitus, trouble swallowing and voice change.    Eyes: Negative for photophobia, pain, discharge, redness, itching and visual disturbance.   Respiratory: Negative for apnea, cough, choking, chest tightness, shortness of breath and wheezing.    Cardiovascular: Negative for chest pain, palpitations and leg swelling.   Gastrointestinal: Negative for abdominal distention, abdominal pain, blood in stool, constipation, diarrhea, nausea and vomiting.   Endocrine: Negative for cold intolerance, heat intolerance, polydipsia and polyuria.   Genitourinary: Negative for decreased urine volume, difficulty urinating, dyspareunia, dysuria, enuresis, frequency, genital sores, hematuria, menstrual problem, pelvic pain, urgency, vaginal bleeding, vaginal discharge and vaginal pain.   Musculoskeletal: Negative for arthralgias, back pain, gait problem, joint swelling, myalgias, neck pain and neck stiffness.   Skin: Negative for rash and wound.   Allergic/Immunologic: Negative for environmental allergies, food allergies and immunocompromised state.   Neurological: Negative for dizziness, tremors,  seizures, syncope, facial asymmetry, speech difficulty, weakness, light-headedness, numbness and headaches.   Hematological: Negative for adenopathy. Does not bruise/bleed easily.   Psychiatric/Behavioral: Negative for confusion, decreased concentration, hallucinations, self-injury, sleep disturbance and suicidal ideas. The patient is not nervous/anxious.        Past Medical History:   Diagnosis Date    Abnormal Pap smear of cervix     NEELAM d/t recurrent abnormals; GYN still does PAP; ASCUS neg HPV 2020    Allergy     Anxiety     Asthma     Basal cell carcinoma (BCC)     R forehead    Hyperlipidemia     PAC (premature atrial contraction)     Rosacea       Past Surgical History:   Procedure Laterality Date     SECTION      COLD KNIFE CONIZATION OF CERVIX      multiple    COLONOSCOPY N/A 10/20/2020    Procedure: COLONOSCOPY;  Surgeon: Sudheer Santiago III, MD;  Location: [facility] ENDO;  Service: Endoscopy;  Laterality: N/A;    HYSTERECTOMY      ovaries intact    NEUROPLASTY Left 2020    Procedure: NEUROPLASTY;  Surgeon: Quinton Alexandre DPM;  Location: [facility] OR;  Service: Podiatry;  Laterality: Left;    PLANTAR FASCIOTOMY Left 2020    Procedure: FASCIOTOMY, PLANTAR;  Surgeon: Quinton Alexandre DPM;  Location: [facility] OR;  Service: Podiatry;  Laterality: Left;    TONSILLECTOMY         Family History   Problem Relation Age of Onset    Diabetes Mother     Asthma Mother     Hypertension Father     Prostate cancer Father     Melanoma Neg Hx        Social History     Socioeconomic History    Marital status:    Tobacco Use    Smoking status: Never Smoker    Smokeless tobacco: Never Used   Substance and Sexual Activity    Alcohol use: Yes     Comment: occ    Drug use: No    Sexual activity: Yes     Birth control/protection: See Surgical Hx, Surgical   Social History Narrative    Live with      Social Determinants of Health     Financial Resource Strain: Low  Risk     Difficulty of Paying Living Expenses: Not hard at all   Food Insecurity: No Food Insecurity    Worried About Running Out of Food in the Last Year: Never true    Ran Out of Food in the Last Year: Never true   Transportation Needs: No Transportation Needs    Lack of Transportation (Medical): No    Lack of Transportation (Non-Medical): No   Physical Activity: Unknown    Days of Exercise per Week: 0 days   Stress: Stress Concern Present    Feeling of Stress : Rather much   Social Connections: Unknown    Frequency of Communication with Friends and Family: Three times a week    Frequency of Social Gatherings with Friends and Family: Once a week    Active Member of Clubs or Organizations: No    Attends Club or Organization Meetings: Never    Marital Status:        Current Outpatient Medications   Medication Sig Dispense Refill    acetaminophen (TYLENOL) 500 MG tablet Take 1,000 mg by mouth every 6 (six) hours as needed for Pain.      albuterol 90 mcg/actuation inhaler Inhale 2 puffs into the lungs every 6 (six) hours as needed for Wheezing. 1 Inhaler 2    ibuprofen (ADVIL,MOTRIN) 200 MG tablet Take 400 mg by mouth every 6 (six) hours as needed for Pain.      loratadine (CLARITIN) 10 mg tablet Take 1 tablet (10 mg total) by mouth once daily. 90 tablet 1    metoprolol succinate (TOPROL-XL) 25 MG 24 hr tablet Take 1 tablet (25 mg total) by mouth daily as needed (palpitations). 90 tablet 0    multivitamin (THERAGRAN) per tablet Take 1 tablet by mouth once daily.      simvastatin (ZOCOR) 20 MG tablet Take 1 tablet (20 mg total) by mouth every evening. 90 tablet 1    ALPRAZolam (XANAX) 0.5 MG tablet Take 1 tablet (0.5 mg total) by mouth 3 (three) times daily as needed for Anxiety. 30 tablet 1    montelukast (SINGULAIR) 10 mg tablet Take 1 tablet (10 mg total) by mouth every evening. 90 tablet 1    sumatriptan (IMITREX) 100 MG tablet Take 1 tablet (100 mg total) by mouth every 2 (two) hours  "as needed for Migraine. Do not exceed 2 doses in 24 hours. 27 tablet 3     No current facility-administered medications for this visit.       Review of patient's allergies indicates:   Allergen Reactions    Mepergan fortis Shortness Of Breath    Penicillins Rash    Sulfa (sulfonamide antibiotics) Rash    Sulfamethoxazole-trimethoprim Rash     Objective:    HPI     Hypertension      Additional comments: 6 months follow up          Last edited by Ismael Raymond MA on 1/17/2022  2:05 PM. (History)      Blood pressure (!) 104/58, pulse 76, temperature 96.6 °F (35.9 °C), temperature source Temporal, height 5' 5" (1.651 m), weight 69.9 kg (154 lb), SpO2 99 %. Body mass index is 25.63 kg/m².   Physical Exam  Vitals and nursing note reviewed.   Constitutional:       General: She is not in acute distress.     Appearance: She is well-developed. She is not ill-appearing, toxic-appearing or diaphoretic.   HENT:      Head: Normocephalic and atraumatic.   Eyes:      General: No scleral icterus.        Right eye: No discharge.         Left eye: No discharge.      Conjunctiva/sclera: Conjunctivae normal.   Neck:      Vascular: No carotid bruit.   Cardiovascular:      Rate and Rhythm: Normal rate and regular rhythm.      Heart sounds: Normal heart sounds. No murmur heard.      Pulmonary:      Effort: Pulmonary effort is normal. No respiratory distress.      Breath sounds: Normal breath sounds. No decreased breath sounds, wheezing, rhonchi or rales.   Abdominal:      General: There is no distension.      Palpations: Abdomen is soft.      Tenderness: There is no abdominal tenderness. There is no guarding or rebound.   Musculoskeletal:      Right lower leg: No edema.      Left lower leg: No edema.   Skin:     General: Skin is warm and dry.   Neurological:      Mental Status: She is alert.      Motor: No tremor.   Psychiatric:         Mood and Affect: Mood normal.         Speech: Speech normal.         Behavior: Behavior normal. "             Assessment:       1. Anxiety and depression    2. Chronic seasonal allergic rhinitis due to pollen    3. Migraine without aura and without status migrainosus, not intractable        Plan:       Rosaura was seen today for hypertension and hyperlipidemia.    Diagnoses and all orders for this visit:    Anxiety and depression  -     ALPRAZolam (XANAX) 0.5 MG tablet; Take 1 tablet (0.5 mg total) by mouth 3 (three) times daily as needed for Anxiety.    Chronic seasonal allergic rhinitis due to pollen  -     montelukast (SINGULAIR) 10 mg tablet; Take 1 tablet (10 mg total) by mouth every evening.    Migraine without aura and without status migrainosus, not intractable  -     sumatriptan (IMITREX) 100 MG tablet; Take 1 tablet (100 mg total) by mouth every 2 (two) hours as needed for Migraine. Do not exceed 2 doses in 24 hours.    Other orders  The following orders have not been finalized:  -     Cancel: spironolactone (ALDACTONE) 100 MG tablet

## 2022-02-02 ENCOUNTER — HOSPITAL ENCOUNTER (OUTPATIENT)
Dept: RADIOLOGY | Facility: HOSPITAL | Age: 47
Discharge: HOME | End: 2022-02-02
Attending: SPECIALIST
Payer: COMMERCIAL

## 2022-02-02 VITALS — HEIGHT: 65 IN | BODY MASS INDEX: 25.68 KG/M2 | WEIGHT: 154.13 LBS

## 2022-02-02 DIAGNOSIS — Z12.31 ENCOUNTER FOR SCREENING MAMMOGRAM FOR MALIGNANT NEOPLASM OF BREAST: ICD-10-CM

## 2022-02-02 PROCEDURE — 77063 BREAST TOMOSYNTHESIS BI: CPT | Mod: TC,PO

## 2022-05-19 ENCOUNTER — TELEPHONE (OUTPATIENT)
Dept: REHABILITATION | Facility: HOSPITAL | Age: 47
End: 2022-05-19
Payer: COMMERCIAL

## 2022-05-23 ENCOUNTER — PATIENT MESSAGE (OUTPATIENT)
Dept: REHABILITATION | Facility: HOSPITAL | Age: 47
End: 2022-05-23
Payer: COMMERCIAL

## 2022-05-23 ENCOUNTER — TELEPHONE (OUTPATIENT)
Dept: REHABILITATION | Facility: HOSPITAL | Age: 47
End: 2022-05-23
Payer: COMMERCIAL

## 2022-05-24 NOTE — PROGRESS NOTES
Health  met with patient to complete initial outcomes for the Healthy Back lumbar program.  Questions were reviewed with patient and discussed with Dr. Brown. The patient will meet with Dr. Brown to determine program enrollment.     Any changes to patient answers are below in red font.  HealthyBack Questionnaire  5/23/2022   Please select the location of your worst pain:  Back or Legs   Please select the location of any additional pain: (hold down the control key, and click to select multiple responses) Mid back- Below shoulder blades    Did the pain begin after an event, injury, or accident? No   How long has this pain been going on?  3 months, and exact same location for 4ish months in 2021   Please indicate how the pain is changing.  Is not changing    What is the WORST level of pain that you have experienced in the last week?  7   What is the LEAST level of pain that you have experienced in the past week?  0   If you have any comments about your pain level, please provide below:  Worse while lying in bed, leaning back in a chair anything less than 90 degrees   What can you NOT do not that you used to be able to do?  Sleep on my back or left side, recline back in a chair   Are your limitations mainly due to your pain?  Yes   What are your additional complaints, if any? None   Is there ever a time during the day when your pain stops, even for a brief moment, before returning? Yes   If yes, when your pain stops, does it disappear completely? Is it totally gone? Yes   Does bending forward make your typical pain worse? No   Morning: Better during   Afternoon: Better during   Evening:  Worse during   Nighttime: Worse during   Standing:  Better   Lying on stomach: Better   Lying on back: Worse   Sitting:  Better   Walking: Better   Climbing stairs: Better   Emergency department  No   Health care providers (hold down the control key, and click to select multiple responses) Orthopedic, Chiropractor    Investigations done (hold down the control key, and click to select multiple responses) X-ray, MRI   Procedures (hold down the control key, and click to select multiple responses) None   Have you had any surgery on your back?  No   Please bhumi what you are experiencing. 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 College   What is your work status? Employed   How did you hear about the Healthyback program?  Employee newsletter

## 2022-05-25 ENCOUNTER — CLINICAL SUPPORT (OUTPATIENT)
Dept: REHABILITATION | Facility: HOSPITAL | Age: 47
End: 2022-05-25
Attending: PHYSICAL MEDICINE & REHABILITATION
Payer: COMMERCIAL

## 2022-05-25 ENCOUNTER — CLINICAL SUPPORT (OUTPATIENT)
Dept: REHABILITATION | Facility: HOSPITAL | Age: 47
End: 2022-05-25
Payer: COMMERCIAL

## 2022-05-25 DIAGNOSIS — M54.6 PAIN IN THORACIC SPINE: Primary | ICD-10-CM

## 2022-05-25 PROCEDURE — 97750 PHYSICAL PERFORMANCE TEST: CPT | Mod: 32,PN | Performed by: PHYSICAL MEDICINE & REHABILITATION

## 2022-05-25 NOTE — PROGRESS NOTES
SUBJECTIVE:    Patient ID: Rosaura Loredo is a 46 y.o. female.    Chief Complaint: No chief complaint on file.    This is a 46-year-old woman who sees Dr. Ramos for her primary care.  She denies any chronic major medical problems.  She has a long history of intermittent left-sided mid to lower thoracic pain.  Worsened by leaning back against a chair standing or lying down.  Improved with walking sitting up right any use of heat.  She denies any bowel or bladder dysfunction fever chills sweats or unexpected weight loss.  Her current pain level is 0/10.  She has not had any formal physical therapy for her discomfort.  I actually have seen her in the past for this issue.  MRI of the thoracic spine is benign        Past Medical History:   Diagnosis Date    Abnormal Pap smear of cervix     NEELAM d/t recurrent abnormals; GYN still does PAP; ASCUS neg HPV 01/2020    Allergy     Anxiety     Asthma     Basal cell carcinoma (BCC) 2014    R forehead    Hyperlipidemia     PAC (premature atrial contraction)     Rosacea      Social History     Socioeconomic History    Marital status:    Tobacco Use    Smoking status: Never Smoker    Smokeless tobacco: Never Used   Substance and Sexual Activity    Alcohol use: Yes     Comment: occ    Drug use: No    Sexual activity: Yes     Birth control/protection: See Surgical Hx, Surgical   Social History Narrative    Live with      Social Determinants of Health     Financial Resource Strain: Low Risk     Difficulty of Paying Living Expenses: Not hard at all   Food Insecurity: No Food Insecurity    Worried About Running Out of Food in the Last Year: Never true    Ran Out of Food in the Last Year: Never true   Transportation Needs: No Transportation Needs    Lack of Transportation (Medical): No    Lack of Transportation (Non-Medical): No   Physical Activity: Unknown    Days of Exercise per Week: 0 days   Stress: Stress Concern Present    Feeling of  Stress : Rather much   Social Connections: Unknown    Frequency of Communication with Friends and Family: Three times a week    Frequency of Social Gatherings with Friends and Family: Once a week    Active Member of Clubs or Organizations: No    Attends Club or Organization Meetings: Never    Marital Status:      Past Surgical History:   Procedure Laterality Date     SECTION      COLD KNIFE CONIZATION OF CERVIX      multiple    COLONOSCOPY N/A 10/20/2020    Procedure: COLONOSCOPY;  Surgeon: Sudheer Santiago III, MD;  Location: [facility] ENDO;  Service: Endoscopy;  Laterality: N/A;    HYSTERECTOMY      ovaries intact    NEUROPLASTY Left 2020    Procedure: NEUROPLASTY;  Surgeon: Quinton Alexandre DPM;  Location: [facility] OR;  Service: Podiatry;  Laterality: Left;    PLANTAR FASCIOTOMY Left 2020    Procedure: FASCIOTOMY, PLANTAR;  Surgeon: Quinton Alexandre DPM;  Location: [facility] OR;  Service: Podiatry;  Laterality: Left;    TONSILLECTOMY       Family History   Problem Relation Age of Onset    Diabetes Mother     Asthma Mother     Hypertension Father     Prostate cancer Father     Melanoma Neg Hx      There were no vitals filed for this visit.    Review of Systems   Constitutional: Negative for chills, diaphoresis, fatigue, fever and unexpected weight change.   HENT: Negative for trouble swallowing.    Eyes: Negative for visual disturbance.   Respiratory: Negative for shortness of breath.    Cardiovascular: Negative for chest pain.   Gastrointestinal: Negative for abdominal pain, constipation, nausea and vomiting.   Genitourinary: Negative for difficulty urinating.   Musculoskeletal: Negative for arthralgias, back pain, gait problem, joint swelling, myalgias, neck pain and neck stiffness.   Neurological: Negative for dizziness, speech difficulty, weakness, light-headedness, numbness and headaches.          Objective:      Physical Exam  Neurological:      Mental Status: She is  alert and oriented to person, place, and time.      Comments: She is awake and in no acute distress  Mild tenderness to palpation left lower thoracic spine with no palpable masses  Forward flexion and extension of the lumbar spine are normal and painless  She can heel and toe walk normally  Reflexes- +1-+2 reflexes at the following:   C5-Biceps   C6-Brachioradialis   C7-Triceps   L3/4-Patellar   S1-Achilles  Barrie sign is negative bilaterally  Strength testing- 5/5 strength in the following muscle groups:  C5-Elbow flexion  C6-Wrist extension  C7-Elbow extension  C8-Finger flexion  T1-Finger abduction  L2-Hip flexion  L3-Knee extension  L4-Ankle dorsiflexion  L5-Great toe extension  S1-Ankle plantar flexion    Straight leg raise negative bilaterally  DANIEL test negative bilaterally             Assessment:       1. Pain in thoracic spine           Plan:     The patient has had a history of low back pain with limitations in there activities of Daily living    Previous treatment has not provided relief.    The situation was discussed at length with the patient.  We discussed different causes of back pain and different treatment options.  We discussed the importance of stretching and strengthening.  We discussed posture.  We discussed the pros and cons of further diagnostic testing, alternative treatment and Medications    Based on the history, physical exam, and functional index, an active physical therapy program is recommended.  The goal is to restore the patients function and reduce pain.  A program of progressive resistance exercises, biomechanical, and mobility maneuvers, instructions in proper body mechanics, aerobic conditioning and HEP will be utilized. The program will continue as long as making improvements.    An assessment of patients progress will be made at each visit to document change in status.    The patient will be actively involved in there own treatment, and responsible for appointments and home  program    The patient's lumbar isometric strength will be tested and they will be placed in a program of isolated strength training based on 50% of their total functional torque and advanced as clinically appropriate.      Directional preference of pain will further influence the patients active rehabilitation program    The patient was instructed there might be an initial increase in discomfort    They are enrolled with good prognosis      Pain in thoracic spine  -     Ambulatory referral/consult to [facility]; Future; Expected date: 06/01/2022

## 2022-06-22 ENCOUNTER — CLINICAL SUPPORT (OUTPATIENT)
Dept: REHABILITATION | Facility: HOSPITAL | Age: 47
End: 2022-06-22
Attending: PHYSICAL MEDICINE & REHABILITATION
Payer: COMMERCIAL

## 2022-06-22 DIAGNOSIS — M54.6 PAIN IN THORACIC SPINE: ICD-10-CM

## 2022-06-22 DIAGNOSIS — M53.86 DECREASED RANGE OF MOTION OF INTERVERTEBRAL DISCS OF LUMBAR SPINE: ICD-10-CM

## 2022-06-22 DIAGNOSIS — R29.898 DECREASED STRENGTH OF TRUNK AND BACK: ICD-10-CM

## 2022-06-22 PROCEDURE — 97750 PHYSICAL PERFORMANCE TEST: CPT | Mod: 32,PN

## 2022-06-22 NOTE — PLAN OF CARE
[facility]ICAL [facility] LUMBAR [facility]ALUATION     Name: Rosaura Loredo  Clinic Number: [id number]    Therapy Diagnosis:   Encounter Diagnoses   Name Primary?    Pain in thoracic spine     Decreased strength of trunk and back     Decreased range of motion of intervertebral discs of lumbar spine      Physician: Paul Brown MD    Physician Orders: PT Eval and Treat   Medical Diagnosis from Referral: M54.6 (ICD-10-CM) - Pain in thoracic spine   Evaluation Date: 2022  Authorization Period Expiration: 2023 (EMPLOYEE BENEFIT)  Plan of Care Expiration: 2022  Reassessment Due: 2022  Visit # / Visits authorized:     Time In: 10:00  Time Out: 11:10  Total Billable Time: 20 minutes    Precautions: Standard    Pattern of pain determined: Patter 1 PEP?       Subjective   Date of onset: 2 years ago  History of current condition - Rosaura reports: Only able to sleep on right side leaning backwards. February to  year in a row it goes away. The pain that I was having is getting better. At the beginning leaning backwards was horrible. Not connected to any change of job or level of physical exertion     Medical History:   Past Medical History:   Diagnosis Date    Abnormal Pap smear of cervix     NEELAM d/t recurrent abnormals; GYN still does PAP; ASCUS neg HPV 2020    Allergy     Anxiety     Asthma     Basal cell carcinoma (BCC)     R forehead    Hyperlipidemia     PAC (premature atrial contraction)     Rosacea        Surgical History:   Rosaura Loredo  has a past surgical history that includes Hysterectomy;  section; Tonsillectomy; Cold knife conization of cervix; Colonoscopy (N/A, 10/20/2020); Plantar fasciotomy (Left, 2020); and Neuroplasty (Left, 2020).    Medications:   Rosaura has a current medication list which includes the following prescription(s): acetaminophen, albuterol, alprazolam, ibuprofen, loratadine, metoprolol  succinate, montelukast, multivitamin, simvastatin, and sumatriptan.    Allergies:   Review of patient's allergies indicates:   Allergen Reactions    Mepergan fortis Shortness Of Breath    Penicillins Rash    Sulfa (sulfonamide antibiotics) Rash    Sulfamethoxazole-trimethoprim Rash        Imaging, MRI studies: Thoracic spine      Laying on left side reproduces pain.   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 Able to perform all tasks as needed  Current Level of Function: left sided thoracic pain with laying down or sitting, Borad low back pain with extended standing at work  DME owned/used: None        Pain:  Current 0/10, worst 7/10, best 0/10   Location: left Thoracic   Left T12 and around rib cage   Description: Aching and Sharp  Aggravating Factors: Sitting and Laying (left side)   Easing Factors: pain medication, rest and Standing upright  Disturbed Sleep: Yes, sleeping 6 hours        Pattern of pain questions:  1.  Where is your pain the worst? Left thoracic spine below bra line  2.  Is your pain constant or intermittent? Intermittent  3.  Does bending forward make your typical pain worse? Yes  4.  Since the start of your back pain, has there been a change in your bowel or bladder? No  5.  What can't you do now that you use to be able to do? Lay on the left side       Pts goals: Reduce pain and be able to sleep through the night      Red Flag Screening:   Cough  Sneeze  Strain: (--)  Bladder/ bowel: (--)  Falls: (--)  Night pain: (+)  Unexplained weight loss: (--)  General health: Good    OBJECTIVE     Postural examination/scapula alignment: Rounded shoulder  Joint integrity: Firm end feeling, thoracic  spine with increased, muscle tension on the left side (denied any reproduction of symptoms)   Skin integrity: Intact  Edema: None  Sitting: forward head, round shoulders  Standing: Forward head, reduced lordosis  Correction of posture: no effect with lumbar roll    MOVEMENT LOSS    ROM Loss   Flexion within functional limits, left side bending at end range   Extension moderate loss    Side glide Right within functional limits   Side glide Left within functional limits   Rotation Right minimal loss, reproduction of left thoracic pain   Rotation Left within functional limits , reproduction of left thoracic pain       Lumbar Locked Thoracic Extension/ Rotation:  Right: 40 degrees  Left: 30 degrees active (with attempts to side-bend)- Full Assisted ROM    GAIT:  Assistive Device used: none  Level of Assistance: independent  Patient displays the following gait deviations:  no gait deviations observed.     Special Tests:   Test Name  Test Result   Prone Instability Test (--)   SI Joint Provocation Test (+)   Straight Leg Raise (--)   Neural Tension Test (--)   Crossed Straight Leg Raise (--)   Walking on toes (--)   Walking on heels  (--)       NEUROLOGICAL SCREENING     Sensory deficit: Equal light and deep touch sensation    Reflexes:    Left Right   Patella Tendon 2+ 2+   Achilles Tendon 2+ 2+   Clonus (--) (--)     REPEATED TEST MOVEMENTS:    Baseline symptoms:  Repeated Flexion in Standing no effect   Repeated Extension in Standing better , Improved right MSR to full range w/o pain. Able to tolerate left side-lying following this   Repeated Flexion in lying no effect   Repeated Extension in lying  no effect       STATIC TESTS and other movements:   Baseline symptoms:  Prone lie no effect   Prone lie on elbows no effect   Sitting slouched  no effect   Sitting erect no effect     Baseline Isometric Testing on Med X equipment: Testing administered by PT  Date of testin2022  ROM 60 deg   Max Peak Torque 135     Min Peak Torque 80    Flex/Ext Ratio 1.5/1   % below normative data 25       HEYDI: 22% dysfunction    - Goal 10% dysfunction    Treatment   Treatment Time In: 10:50  Treatment Time Out: 11:10  Total Treatment time separate from Evaluation: 20 minutes      Rosaura received therapeutic exercises to develop/improve posture, lumbar/cervical ROM, strength and muscular endurance for 20 minutes including the following exercises:     Posture over-correct x 10   Repeated standing extension x 20   Bridges x 20     [facility] 6/22/2022   Visit Number 1   VAS Pain Rating 2   Lumbar Stretches - Slouch Overcorrection 10   Extension in Standing 20   Lumbar Extension Seat Pad 0   Femur Restraint 5   Top Dead Center 24   Counterweight 243   Lumbar Flexion 60   Lumbar Extension 0   Lumbar Peak Torque 135   Min Torque 80   Test Percent Below Normative Data 25   Ice - Z Lie (in min.) 0         Written Home Exercises Provided: yes.  Exercises were reviewed and Rosaura was able to demonstrate them prior to the end of the session.  Rosaura demonstrated good  understanding of the education provided.     See EMR under Patient Instructions for exercises provided 6/22/2022.      Education provided:   - Patient received education regarding proper posture and body mechanics.  Patient was given top [facility] Visit 1 handouts which discuss what to expect in therapy, the purpose and opportunity for health coaching, the program,  wellness when discharged from therapy, back education and care specifically for posture seated, standing, lifting correctly, components of exercise, importance of nutrition and hydration, and importance of sleep.   Information on lumbar rolls provided.  - Ritu roll tried, recommended, and purchase information was provided.    - Patient received a handout regarding anticipated muscular soreness following the isometric test and strategies for management were reviewed with patient including stretching,  using ice and scheduled rest.   - Patient received education on the [facility] program, purpose of the isometric test, progression of back strengthening as well as wellness approach and systemic strengthening.  Details of the program were discussed.  Reviewed that patient should feel support/pressure from med ex restraints but no pain or discomfort and patient expressed understanding.  Med x dynamic exercise and baseline IM test performed with instructions to guide the patient safely through the isometric testing.  Patient informed to perform isometric test correctly, and safely, building best force they safely can and not pushing through pain.  Patient demonstrated understanding of information      Rosaura received cold pack for Lumbar spine minutes to 0.    Assessment   Rosaura is a 46 y.o. female referred to [facility] with a medical diagnosis of M54.6 (ICD-10-CM) - Pain in thoracic spine . Pt presents with a 2 year history of cyclic middle thoracic pain that lasted for 6 months out of the year. With initial onset of this cycle she reports an inability to sit leaning backwards or lay on the left side, needing to initially stand to watch TV. As time goes on the pain decreases until it completely subsides until the following year. Changed jobs one year ago, post dating the first onset of back pain.   Noted a slight left lateral lean at end range of forward flexion and reduced following repeated lumbar extension exercises she noted an elimination of her previously reported pain with rotation along with an increase of seated thoracic rotation motion.   Reported an HEYDI of 22% dysfunction citing sitting longer than 30 minutes, sleeping and lifting to be the most limited activities in daily life.   Rosaura tested at 25% below normative data on the med-ex machine demonstrating her weakness and need to participate in the healthy back program at this time.     Pain Pattern: Pattern 1 PEP?       Pt prognosis is  Excellent.   Pt will benefit from skilled outpatient Physical Therapy to address the deficits stated above and in the chart below, provide pt/family education, and to maximize pt's level of independence. Based on the above history and physical examination an active physical therapy program is recommended.  Pt will continue to benefit from skilled outpatient physical therapy to address the deficits listed below in the chart, provide pt/family education and to maximize pt's level of independence in the home and community environment. .       Plan of care discussed with patient: Yes  Pt's spiritual, cultural and educational needs considered and patient is agreeable to the plan of care and goals as stated below:     Anticipated Barriers for therapy: None    PT Evaluation Completed? Yes    Medical necessity is demonstrated by the following problem list.    Pt presents with the following impairments:     History  Co-morbidities and personal factors that may impact the plan of care Co-morbidities:   anxiety, difficulty sleeping and History of hysterectomy     Personal Factors:   character     low   Examination  Body Structures and Functions, activity limitations and participation restrictions that may impact the plan of care Body Regions:   back  upper extremities  trunk    Body Systems:    gross symmetry  ROM  strength  motor control  motor learning    Participation Restrictions:   None    Activity limitations:   Learning and applying knowledge  no deficits    General Tasks and Commands  no deficits    Communication  no deficits    Mobility  lifting and carrying objects  driving (bike, car, motorcycle)    Self care  no deficits    Domestic Life  shopping  cooking  doing house work (cleaning house, washing dishes, laundry)    Interactions/Relationships  no deficits    Life Areas  employment    Community and Social Life  community life  recreation and leisure         moderate   Clinical Presentation stable and uncomplicated  low   Decision Making/ Complexity Score: low       GOALS: Pt is in agreement with the following goals.    Short term goals:  6 weeks or 10 visits   1.  Pt will demonstrate increased lumbar ROM by at least 3 degrees from the initial ROM value with improvements noted in functional ROM and ability to perform ADLs.  (approp and ongoing)  2.  Pt will demonstrate increased MedX average isometric strength value  by 20% from initial test resulting in improved ability to perform bending, lifting, and carrying activities safely, confidently.  (approp and ongoing)  3.  Patient report a reduction in worst pain score by 1-2 points for improved tolerance for increased sitting time to 2 hours without any increase of pain.  (approp and ongoing)  4.  Pt able to perform HEP correctly with minimal cueing or supervision from therapist to encourage independent management of symptoms. (approp and ongoing)      Long term goals: 10 weeks or 20 visits   1. Pt will demonstrate increased lumbar ROM by at least 6 degrees from initial ROM value, resulting in improved ability to perform functional fwd bending while standing and sitting. (approp and ongoing)  2. Pt will demonstrate increased MedX average isometric strength value  by 35% from initial test resulting in improved ability to perform bending, lifting, and carrying activities safely, confidently.  (approp and ongoing)  3. Pt to demonstrate ability to independently control and reduce their pain through posture positioning and mechanical movements throughout a typical day.  (approp and ongoing)  4.  Pt will demonstrate reduced pain and improved functional outcomes as reported on the Oswestry Disability Index by reaching a score of 10% or less in order to demonstrate subjective improvement in pt's condition.    (approp and ongoing)  5. Pt will demonstrate independence with the HEP at discharge  (approp and ongoing)  6.  Rosaura will tolerate standing for 2 hours and lifting multiple objects  "from ground level without any increase of symptoms to improve her tolerance for daily activities  (approp and ongoing)      Plan   Outpatient physical therapy 2x week for 10 weeks or 20 visits to include the following:   - Patient education  - Therapeutic exercise  - Manual therapy  - Performance testing   - Neuromuscular Re-education  - Therapeutic activity   - Modalities    Pt may be seen by PTA as part of the rehabilitation team.     Therapist: Claudette Armas, PT  6/22/2022    "I certify the need for these services furnished under this plan of treatment and while under my care."    ____________________________________  Physician/Referring Practitioner    _______________  Date of Signature          "

## 2022-06-27 ENCOUNTER — CLINICAL SUPPORT (OUTPATIENT)
Dept: REHABILITATION | Facility: HOSPITAL | Age: 47
End: 2022-06-27
Payer: COMMERCIAL

## 2022-06-27 DIAGNOSIS — M53.86 DECREASED RANGE OF MOTION OF INTERVERTEBRAL DISCS OF LUMBAR SPINE: ICD-10-CM

## 2022-06-27 DIAGNOSIS — R29.898 DECREASED STRENGTH OF TRUNK AND BACK: Primary | ICD-10-CM

## 2022-06-27 PROCEDURE — 97750 PHYSICAL PERFORMANCE TEST: CPT | Mod: 32,PN

## 2022-06-27 NOTE — PROGRESS NOTES
[facility] Treatment      Name: Rosaura Arana [employer] Number: [id number]    Therapy Diagnosis:   Encounter Diagnoses   Name Primary?    Decreased strength of trunk and back Yes    Decreased range of motion of intervertebral discs of lumbar spine      Physician: Paul Brown MD    Visit Date: 2022      Physician Orders: PT Eval and Treat   Medical Diagnosis from Referral: M54.6 (ICD-10-CM) - Pain in thoracic spine   Evaluation Date: 2022  Authorization Period Expiration: 2023 (EMPLOYEE BENEFIT)  Plan of Care Expiration: 2022  Reassessment Due: 2022  Visit # / Visits authorized:       Time In: 1:00 pm  Time Out: 2:00 pm  Total Billable Time: 60 minutes    Precautions: Standard    Pattern of pain determined: Pattern 1 PEP    Subjective   Rosaura reports improvement of symptoms.  Complete reduction of pain from the left side of the thoracic spine on a daily basis and better ability to sleep through the night without any increase of symptoms.     Patient reports tolerating previous visit well  Patient reports their pain to be 0/10 on a 0-10 scale with 0 being no pain and 10 being the worst pain imaginable.  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, sleep well and eliminate the cycle of pain    Objective       Baseline Isometric Testing on Med X equipment: Testing administered by PT  Date of testin2022  ROM 60 deg   Max Peak Torque 135    Min Peak Torque 80    Flex/Ext Ratio 1.5/1   % below normative data 25          MOVEMENT LOSS     ROM Loss   Flexion within functional limits, left side bending at end range   Extension moderate loss    Side glide Right within functional limits   Side glide Left within functional limits   Rotation Right minimal loss, reproduction of left thoracic pain   Rotation Left within functional limits , reproduction of left thoracic pain           Outcomes:  Initial score:  Visit 5 score:  Goal:      Treatment    Pt was instructed in and performed the following:     Rosaura received therapeutic exercises to develop/improved posture, cardiovascular endurance, muscular endurance, lumbar/cervical ROM, strength and muscular endurance for 60 minutes including the following exercises:     Attempted foam roll w/ thoracic mobilization: Produced pain with radiating symptoms along the nerve root   Open books w/ both knees bent 5- 10 seconds x 10    - More motion with less pain observed   UE rolling - attempted. Unable to initiate  Prone lower trap patter #1 x 15     ~ intermitent tactile cues to encourage maximum scapular retraction w/o compensation - reported fatigue  Side-lying scapular setting x 20  B   - Lower trap KT at 30% tension I strip    Med-ex flowsheet    [facility] 6/27/2022   Visit Number 2   VAS Pain Rating 0   Lumbar Stretches - Slouch Overcorrection -   Extension in Standing 20   Lumbar Extension Seat Pad -   Femur Restraint -   Top Dead Center -   Counterweight -   Lumbar Flexion -   Lumbar Extension -   Lumbar Peak Torque -   Min Torque -   Test Percent Below Normative Data -   Lumbar Weight 55   Repetitions 15   Rating of Perceived Exertion 6   Ice - Z Lie (in min.) 8           Peripheral muscle strengthening which included 1 set of 15-20 repetitions at a slow, controlled 10-13 second per rep pace focused on strengthening supporting musculature for improved body mechanics and functional mobility.  Pt and therapist focused on proper form during treatment to ensure optimal strengthening of each targeted muscle group.  Machines were utilized including torso rotation, chest press, rowing, biceps, and triceps. Leg extension, leg curl, hip abd, hip add, and leg press added visit 3       Rosaura received the following manual therapy techniques: Joint mobilizations were applied to the: thoracic spine for 0 minutes.       Home Exercises Provided  and Patient Education Provided   Stretching: Open books, seated thoracic extension  Strengthening: Scapular setting  Cardio program (V5): Not yet provided  Lifting education (V11):Not yet provdied  Using Lumbar Roll:Instructed, not yet using    Education provided:   - Addition of side-lying scapular setting, importance of thoracic movement in combination with scapular movement to promote appropriate posture without the generation of pain.     Written Home Exercises Provided: yes.  Exercises were reviewed and Rosaura was able to demonstrate them prior to the end of the session.  Rosaura demonstrated good  understanding of the education provided.     See EMR under Patient Instructions for exercises provided 6/27/2022.          Assessment   Rosaura reported a reduction of symptoms, however was able to reproduce pain with localized thoracic extension over foam roll. Noted inability initiating thoracic rotation without side-bending bilaterally with the left more involved than the left. Attempted prone UE rolling but was unable to do so. Reduced difficulty to prone lower trap pattern #1 followed by side-lying scapular setting. Provided with lower trap KT to promote appropriate activation. Verbalized a reduction of stiffness and feeling better immediately following this.       Patient is making good progress towards established goals.  Pt will continue to benefit from skilled outpatient physical therapy to address the deficits stated in the impairment chart, provide pt/family education and to maximize pt's level of independence in the home and community environment.     Anticipated Barriers for therapy: None  Pt's spiritual, cultural and educational needs considered and pt agreeable to plan of care and goals as stated below:             Goals:       Short term goals:  6 weeks or 10 visits   1.  Pt will demonstrate increased lumbar ROM by at least 3 degrees from the initial ROM value with improvements noted in functional ROM and  ability to perform ADLs.  (approp and ongoing)  2.  Pt will demonstrate increased MedX average isometric strength value  by 20% from initial test resulting in improved ability to perform bending, lifting, and carrying activities safely, confidently.  (approp and ongoing)  3.  Patient report a reduction in worst pain score by 1-2 points for improved tolerance for increased sitting time to 2 hours without any increase of pain.  (approp and ongoing)  4.  Pt able to perform HEP correctly with minimal cueing or supervision from therapist to encourage independent management of symptoms. (approp and ongoing)        Long term goals: 10 weeks or 20 visits   1. Pt will demonstrate increased lumbar ROM by at least 6 degrees from initial ROM value, resulting in improved ability to perform functional fwd bending while standing and sitting. (approp and ongoing)  2. Pt will demonstrate increased MedX average isometric strength value  by 35% from initial test resulting in improved ability to perform bending, lifting, and carrying activities safely, confidently.  (approp and ongoing)  3. Pt to demonstrate ability to independently control and reduce their pain through posture positioning and mechanical movements throughout a typical day.  (approp and ongoing)  4.  Pt will demonstrate reduced pain and improved functional outcomes as reported on the Oswestry Disability Index by reaching a score of 10% or less in order to demonstrate subjective improvement in pt's condition.    (approp and ongoing)  5. Pt will demonstrate independence with the HEP at discharge  (approp and ongoing)  6.  Rosaura will tolerate standing for 2 hours and lifting multiple objects from ground level without any increase of symptoms to improve her tolerance for daily activities  (approp and ongoing)         Plan   Continue with established Plan of Care towards established PT goals.     Claudette Armas, PT , DPT

## 2022-06-27 NOTE — PATIENT INSTRUCTIONS
Slouch Correct          Practice using the postural muscles by slouching and then correcting.  Correct and hold 3 sec.  Slouch again and correct, hold 3 sec.  Do 10 times.  On the last one, over correct into very erect posture and then back off 10 % and maintain this.   Use a lumbar roll when done the exercise to make sure you are sitting tall.   Do this exercise until it is natural for you to sit tall.

## 2022-06-29 ENCOUNTER — CLINICAL SUPPORT (OUTPATIENT)
Dept: REHABILITATION | Facility: HOSPITAL | Age: 47
End: 2022-06-29
Payer: COMMERCIAL

## 2022-06-29 DIAGNOSIS — M53.86 DECREASED RANGE OF MOTION OF INTERVERTEBRAL DISCS OF LUMBAR SPINE: ICD-10-CM

## 2022-06-29 DIAGNOSIS — R29.898 DECREASED STRENGTH OF TRUNK AND BACK: Primary | ICD-10-CM

## 2022-06-29 PROCEDURE — 97750 PHYSICAL PERFORMANCE TEST: CPT | Mod: 32,PN

## 2022-06-29 NOTE — PROGRESS NOTES
[facility]      Name: Rosaura Arana [facility] Number: [id number]    Therapy Diagnosis:   No diagnosis found.  Physician: Paul Brown MD    Visit Date: 2022      Physician Orders: PT Eval and Treat   Medical Diagnosis from Referral: M54.6 (ICD-10-CM) - Pain in thoracic spine   Evaluation Date: 2022  Authorization Period Expiration: 2023 (EMPLOYEE BENEFIT)  Plan of Care Expiration: 2022  Reassessment Due: 2022  Visit # / Visits authorized: 3/ 20      Time In: 3:15 pm  Time Out: 4:15 pm  Total Billable Time: 60 minutes    Precautions: Standard    Pattern of pain determined: Pattern 1 PEP    Subjective   Rosaura reports improvement of symptoms.  Complete reduction of pain from the left side of the thoracic spine on a daily basis and better ability to sleep through the night without any increase of symptoms.     Patient reports tolerating previous visit well  Patient reports their pain to be 0/10 on a 0-10 scale with 0 being no pain and 10 being the worst pain imaginable.  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, sleep well and eliminate the cycle of pain    Objective       Baseline Isometric Testing on Med X equipment: Testing administered by PT  Date of testin2022  ROM 60 deg   Max Peak Torque 135    Min Peak Torque 80    Flex/Ext Ratio 1.5/1   % below normative data 25          MOVEMENT LOSS     ROM Loss   Flexion within functional limits, left side bending at end range   Extension moderate loss    Side glide Right within functional limits   Side glide Left within functional limits   Rotation Right minimal loss, reproduction of left thoracic pain   Rotation Left within functional limits , reproduction of left thoracic pain          Outcomes:  Initial score:  Visit 5 score:  Goal:      Treatment    Pt was instructed in and performed the following:     Rosaura vikas  therapeutic exercises to develop/improved posture, cardiovascular endurance, muscular endurance, lumbar/cervical ROM, strength and muscular endurance for 60 minutes including the following exercises:     Therapist assisted thoracic extension x 10    - Followed with seated thoracic extension over back of a chair     Open books w/ both knees bent 5- 10 seconds x 10    - More motion with less pain observed   T- Lumbar flexion stretch x 8 B   Prone Scorpion Stretch x 8 B ( to facilitate dissociation between thoracic and lumbar spine)       Prone lower trap pattern #1 x 15     ~Better initiation demonstrated w/o neck compensation  Side-lying scapular setting x 20  B   - Tape was not applied due to better initiation of appropriate muscle contraction     + Supine serratus punches w/ 7# DB 2 x 20    - Intermittent tactile cues to ensure appropriate placement of the hands with this    + Quadruped lumbar locked thoracic extension/ rotation x 12 B     Med-ex flowsheet    [facility] 6/30/2022   Visit Number 3   VAS Pain Rating 0   Lumbar Stretches - Slouch Overcorrection -   Extension in Standing -   Lumbar Extension Seat Pad -   Femur Restraint -   Top Dead Center -   Counterweight -   Lumbar Flexion -   Lumbar Extension -   Lumbar Peak Torque -   Min Torque -   Test Percent Below Normative Data -   Lumbar Weight 55   Repetitions 16   Rating of Perceived Exertion 5   Ice - Z Lie (in min.) 8           Peripheral muscle strengthening which included 1 set of 15-20 repetitions at a slow, controlled 10-13 second per rep pace focused on strengthening supporting musculature for improved body mechanics and functional mobility.  Pt and therapist focused on proper form during treatment to ensure optimal strengthening of each targeted muscle group.  Machines were utilized including torso rotation, chest press, rowing, biceps, and triceps. Leg extension, leg curl, hip abd, hip add, and leg press added visit 3       Rosaura bean  the following manual therapy techniques: Joint mobilizations were applied to the: thoracic spine for 0 minutes.       Home Exercises Provided and Patient Education Provided   Stretching: Open books, seated thoracic extension  Strengthening: Scapular setting  Cardio program (V5): Not yet provided  Lifting education (V11):Not yet provdied  Using Lumbar Roll:Instructed, not yet using    Education provided:   - Addition of side-lying scapular setting, importance of thoracic movement in combination with scapular movement to promote appropriate posture without the generation of pain.     Written Home Exercises Provided: yes.  Exercises were reviewed and Rosaura was able to demonstrate them prior to the end of the session.  Rosaura demonstrated good  understanding of the education provided.     See EMR under Patient Instructions for exercises provided 6/27/2022.          Assessment   Rosaura was able to better demonstrate thoracic extension observed in the lumbar locked quadruped position utilizing biofeedback to allow for better initiation of the appropriate sequence of movements while reducing dependence on physical assistance.   She was able to demonstrate carry-over with side-lying scapular setting, however complaining of clicking in the front of the shoulder when attempting to do these exercises in a sitting or standing position.   Additional time spent on scapular stabilization with the addition of serratus punches to reinforce and encourage this stabilization.       Patient is making good progress towards established goals.  Pt will continue to benefit from skilled outpatient physical therapy to address the deficits stated in the impairment chart, provide pt/family education and to maximize pt's level of independence in the home and community environment.     Anticipated Barriers for therapy: None  Pt's spiritual, cultural and educational needs considered and pt agreeable to plan of care and goals as stated below:              Goals:       Short term goals:  6 weeks or 10 visits   1.  Pt will demonstrate increased lumbar ROM by at least 3 degrees from the initial ROM value with improvements noted in functional ROM and ability to perform ADLs.  (approp and ongoing)  2.  Pt will demonstrate increased MedX average isometric strength value  by 20% from initial test resulting in improved ability to perform bending, lifting, and carrying activities safely, confidently.  (approp and ongoing)  3.  Patient report a reduction in worst pain score by 1-2 points for improved tolerance for increased sitting time to 2 hours without any increase of pain.  (approp and ongoing)  4.  Pt able to perform HEP correctly with minimal cueing or supervision from therapist to encourage independent management of symptoms. (approp and ongoing)        Long term goals: 10 weeks or 20 visits   1. Pt will demonstrate increased lumbar ROM by at least 6 degrees from initial ROM value, resulting in improved ability to perform functional fwd bending while standing and sitting. (approp and ongoing)  2. Pt will demonstrate increased MedX average isometric strength value  by 35% from initial test resulting in improved ability to perform bending, lifting, and carrying activities safely, confidently.  (approp and ongoing)  3. Pt to demonstrate ability to independently control and reduce their pain through posture positioning and mechanical movements throughout a typical day.  (approp and ongoing)  4.  Pt will demonstrate reduced pain and improved functional outcomes as reported on the Oswestry Disability Index by reaching a score of 10% or less in order to demonstrate subjective improvement in pt's condition.    (approp and ongoing)  5. Pt will demonstrate independence with the HEP at discharge  (approp and ongoing)  6.  Rosaura will tolerate standing for 2 hours and lifting multiple objects from ground level without any increase of symptoms to improve her tolerance for  daily activities  (approp and ongoing)         Plan   Continue with established Plan of Care towards established PT goals.     Claudette Armas, PT , DPT

## 2022-07-05 DIAGNOSIS — F32.A ANXIETY AND DEPRESSION: ICD-10-CM

## 2022-07-05 DIAGNOSIS — F41.9 ANXIETY AND DEPRESSION: ICD-10-CM

## 2022-07-06 ENCOUNTER — CLINICAL SUPPORT (OUTPATIENT)
Dept: REHABILITATION | Facility: HOSPITAL | Age: 47
End: 2022-07-06
Payer: COMMERCIAL

## 2022-07-06 DIAGNOSIS — R29.898 DECREASED STRENGTH OF TRUNK AND BACK: Primary | ICD-10-CM

## 2022-07-06 DIAGNOSIS — M53.86 DECREASED RANGE OF MOTION OF INTERVERTEBRAL DISCS OF LUMBAR SPINE: ICD-10-CM

## 2022-07-06 PROCEDURE — 97750 PHYSICAL PERFORMANCE TEST: CPT | Mod: 32,PN

## 2022-07-06 RX ORDER — ALPRAZOLAM 0.5 MG/1
0.5 TABLET ORAL 3 TIMES DAILY PRN
Qty: 30 TABLET | Refills: 0 | Status: SHIPPED | OUTPATIENT
Start: 2022-07-06 | End: 2022-08-29 | Stop reason: SDUPTHER

## 2022-07-06 NOTE — PROGRESS NOTES
[facility]      Name: Rosaura Arana [facility] Number: [id number]    Therapy Diagnosis:   Encounter Diagnoses   Name Primary?    Decreased strength of trunk and back Yes    Decreased range of motion of intervertebral discs of lumbar spine      Physician: Paul Brown MD    Visit Date: 2022      Physician Orders: PT Eval and Treat   Medical Diagnosis from Referral: M54.6 (ICD-10-CM) - Pain in thoracic spine   Evaluation Date: 2022  Authorization Period Expiration: 2023 (EMPLOYEE BENEFIT)  Plan of Care Expiration: 2022  Reassessment Due: 2022  Visit # / Visits authorized:   (total visits 5)       Time In: 12:15 pm  Time Out: 1:15 pm  Total Billable Time: 60 minutes    Precautions: Standard    Pattern of pain determined: Pattern 1 PEP    Subjective   Rosaura reports improvement of symptoms.    Still having some clicking in the front of her shoulder when performing shoulderblade squeezes. Having superior shoulder symptoms intermittently when standing for longer periods of time    Patient reports tolerating previous visit well  Patient reports their pain to be 0/10 on a 0-10 scale with 0 being no pain and 10 being the worst pain imaginable.  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, sleep well and eliminate the cycle of pain    Objective       Baseline Isometric Testing on Med X equipment: Testing administered by PT  Date of testin2022  ROM 60 deg   Max Peak Torque 135    Min Peak Torque 80    Flex/Ext Ratio 1.5/1   % below normative data 25          MOVEMENT LOSS     ROM Loss   Flexion within functional limits, left side bending at end range   Extension moderate loss    Side glide Right within functional limits   Side glide Left within functional limits   Rotation Right minimal loss, reproduction of left thoracic pain   Rotation Left within functional limits , reproduction  of left thoracic pain          Outcomes:  Initial score:  Visit 5 score:  Goal:      Treatment    Pt was instructed in and performed the following:     Rosaura received therapeutic exercises to develop/improved posture, cardiovascular endurance, muscular endurance, lumbar/cervical ROM, strength and muscular endurance for 60 minutes including the following exercises:       Therapist assisted thoracic extension x 10    - Followed with seated thoracic extension over back of a chair     Open books w/ both knees bent 5- 10 seconds x 10      T- Lumbar flexion stretch x 8 B   Prone Scorpion Stretch x 8 B (to facilitate dissociation between thoracic and lumbar spine)     + Side-lying scapular setting w/ ER 2# DB x 20 B   + Knee Plank position with rotation  Prone lower trap pattern #1 x 15     ~Better initiation demonstrated w/o neck compensation  Side-lying scapular setting x 20  B   - Tape was not applied due to better initiation of appropriate muscle contraction      Supine serratus punches w/ 7# DB 2 x 20    - Intermittent tactile cues to ensure appropriate placement of the hands with this    Quadruped lumbar locked thoracic extension/ rotation x 12 B       Med-[employer] flowsheet    [facility] 7/7/2022   Visit Number 4   VAS Pain Rating 0   Lumbar Stretches - Slouch Overcorrection -   Extension in Standing 20   Lumbar Extension Seat Pad -   Femur Restraint -   Top Dead Center -   Counterweight -   Lumbar Flexion -   Lumbar Extension -   Lumbar Peak Torque -   Min Torque -   Test Percent Below Normative Data -   Lumbar Weight 50   Repetitions 20   Rating of Perceived Exertion 7   Ice - Z Lie (in min.) 8             Peripheral muscle strengthening which included 1 set of 15-20 repetitions at a slow, controlled 10-13 second per rep pace focused on strengthening supporting musculature for improved body mechanics and functional mobility.  Pt and therapist focused on proper form during treatment to ensure optimal  strengthening of each targeted muscle group.  Machines were utilized including torso rotation, chest press, rowing, biceps, and triceps. Leg extension, leg curl, hip abd, hip add, and leg press added visit 3       Rosaura received the following manual therapy techniques: Joint mobilizations were applied to the: thoracic spine for 0 minutes.       Home Exercises Provided and Patient Education Provided   Stretching: Open books, seated thoracic extension  Strengthening: Scapular setting  Cardio program (V5): Not yet provided  Lifting education (V11):Not yet provdied  Using Lumbar Roll:Instructed, not yet using    Education provided:   - Addition of side-lying scapular setting, importance of thoracic movement in combination with scapular movement to promote appropriate posture without the generation of pain.     Written Home Exercises Provided: yes.  Exercises were reviewed and Rosaura was able to demonstrate them prior to the end of the session.  Rosaura demonstrated good  understanding of the education provided.     See EMR under Patient Instructions for exercises provided 6/27/2022.          Assessment   Rosaura was able to better demonstrate thoracic extension observed in the lumbar locked quadruped position utilizing biofeedback to allow for better initiation of the appropriate sequence of movements while reducing dependence on physical assistance.   She was able to demonstrate carry-over with side-lying scapular setting, however complaining of clicking in the front of the shoulder when attempting to do these exercises in a sitting or standing position.   Additional time spent on scapular stabilization with the addition of serratus punches to reinforce and encourage this stabilization.       Patient is making good progress towards established goals.  Pt will continue to benefit from skilled outpatient physical therapy to address the deficits stated in the impairment chart, provide pt/family education and to maximize pt's  level of independence in the home and community environment.     Anticipated Barriers for therapy: None  Pt's spiritual, cultural and educational needs considered and pt agreeable to plan of care and goals as stated below:             Goals:       Short term goals:  6 weeks or 10 visits   1.  Pt will demonstrate increased lumbar ROM by at least 3 degrees from the initial ROM value with improvements noted in functional ROM and ability to perform ADLs.  (approp and ongoing)  2.  Pt will demonstrate increased MedX average isometric strength value  by 20% from initial test resulting in improved ability to perform bending, lifting, and carrying activities safely, confidently.  (approp and ongoing)  3.  Patient report a reduction in worst pain score by 1-2 points for improved tolerance for increased sitting time to 2 hours without any increase of pain.  (approp and ongoing)  4.  Pt able to perform HEP correctly with minimal cueing or supervision from therapist to encourage independent management of symptoms. (approp and ongoing)        Long term goals: 10 weeks or 20 visits   1. Pt will demonstrate increased lumbar ROM by at least 6 degrees from initial ROM value, resulting in improved ability to perform functional fwd bending while standing and sitting. (approp and ongoing)  2. Pt will demonstrate increased MedX average isometric strength value  by 35% from initial test resulting in improved ability to perform bending, lifting, and carrying activities safely, confidently.  (approp and ongoing)  3. Pt to demonstrate ability to independently control and reduce their pain through posture positioning and mechanical movements throughout a typical day.  (approp and ongoing)  4.  Pt will demonstrate reduced pain and improved functional outcomes as reported on the Oswestry Disability Index by reaching a score of 10% or less in order to demonstrate subjective improvement in pt's condition.    (approp and ongoing)  5. Pt will  demonstrate independence with the HEP at discharge  (approp and ongoing)  6.  Rosaura will tolerate standing for 2 hours and lifting multiple objects from ground level without any increase of symptoms to improve her tolerance for daily activities  (approp and ongoing)         Plan   Continue with established Plan of Care towards established PT goals.     Claudette Armas, PT , DPT

## 2022-07-07 ENCOUNTER — CLINICAL SUPPORT (OUTPATIENT)
Dept: REHABILITATION | Facility: HOSPITAL | Age: 47
End: 2022-07-07
Payer: COMMERCIAL

## 2022-07-07 DIAGNOSIS — M53.86 DECREASED RANGE OF MOTION OF INTERVERTEBRAL DISCS OF LUMBAR SPINE: ICD-10-CM

## 2022-07-07 DIAGNOSIS — R29.898 DECREASED STRENGTH OF TRUNK AND BACK: Primary | ICD-10-CM

## 2022-07-07 PROCEDURE — 97750 PHYSICAL PERFORMANCE TEST: CPT | Mod: 32,PN

## 2022-07-07 NOTE — PROGRESS NOTES
[facility]      Name: Rosaura Arana [facility] Number: [id number]    Therapy Diagnosis:   Encounter Diagnoses   Name Primary?    Decreased strength of trunk and back Yes    Decreased range of motion of intervertebral discs of lumbar spine      Physician: Paul Brown MD    Visit Date: 2022      Physician Orders: PT Eval and Treat   Medical Diagnosis from Referral: M54.6 (ICD-10-CM) - Pain in thoracic spine   Evaluation Date: 2022  Authorization Period Expiration: 2023 (EMPLOYEE BENEFIT)  Plan of Care Expiration: 2022  Reassessment Due: 2022  Visit # / Visits authorized: 3/ 20      Time In: 3:15 pm  Time Out: 4:15 pm  Total Billable Time: 60 minutes    Precautions: Standard    Pattern of pain determined: Pattern 1 PEP    Subjective   Rosaura reports improvement of symptoms.      Right sided upper trap soreness 5/10 noted since she left yesterday and when standing for an extended period of time, however denied any pain at the beginning of this session, only stiffness was a result.    Patient reports tolerating previous visit well  Patient reports their pain to be 0/10 on a 0-10 scale with 0 being no pain and 10 being the worst pain imaginable.  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, sleep well and eliminate the cycle of pain    Objective       Baseline Isometric Testing on Med X equipment: Testing administered by PT  Date of testin2022  ROM 60 deg   Max Peak Torque 135    Min Peak Torque 80    Flex/Ext Ratio 1.5/1   % below normative data 25          MOVEMENT LOSS     ROM Loss   Flexion within functional limits, left side bending at end range   Extension moderate loss    Side glide Right within functional limits   Side glide Left within functional limits   Rotation Right minimal loss, reproduction of left thoracic pain   Rotation Left within functional limits , reproduction  of left thoracic pain          Outcomes:  Initial score:  Visit 5 score:  Goal:      Treatment    Pt was instructed in and performed the following:     Rosaura received therapeutic exercises to develop/improved posture, cardiovascular endurance, muscular endurance, lumbar/cervical ROM, strength and muscular endurance for 60 minutes including the following exercises:       Seated thoracic extension over ball x 15 (elbows forwards to prevent compensation with    - Followed with seated thoracic extension over back of a chair   Table reverse rows x 20 w/ body weight     Open books w/ both knees bent 5- 10 seconds x 10      T- Lumbar flexion stretch x 8 B   Prone Scorpion Stretch x 8 B ( to facilitate dissociation between thoracic and lumbar spine)     Side-lying scapular setting w/ ER 2# DB x 20 B    - Pin and stretch incorporated to reduce stiffness and excessive activation of the levator scapulae  Knee Plank position with rotation  Prone lower trap pattern #1 x 15     ~Better initiation demonstrated w/o neck compensation  Side-lying scapular setting x 20  B   - Tape was not applied due to better initiation of appropriate muscle contraction      Supine serratus punches w/ 7# DB 2 x 20    - Intermittent tactile cues to ensure appropriate placement of the hands with this    Quadruped lumbar locked thoracic extension/ rotation x 18 B   Theracord resisted backwards walking Doubled Black and blue x 10 w/ 5x squat and 5x mini jumps      Med-ex flowsheet    [facility] 7/7/2022   Visit Number 5   VAS Pain Rating 0   Lumbar Stretches - Slouch Overcorrection -   Extension in Standing 20   Lumbar Extension Seat Pad -   Femur Restraint -   Top Dead Center -   Counterweight -   Lumbar Flexion -   Lumbar Extension -   Lumbar Peak Torque -   Min Torque -   Test Percent Below Normative Data -   Lumbar Weight 50   Repetitions 20   Rating of Perceived Exertion 7   Ice - Z Lie (in min.) 8             Peripheral muscle  strengthening which included 1 set of 15-20 repetitions at a slow, controlled 10-13 second per rep pace focused on strengthening supporting musculature for improved body mechanics and functional mobility.  Pt and therapist focused on proper form during treatment to ensure optimal strengthening of each targeted muscle group.  Machines were utilized including torso rotation, chest press, rowing, biceps, and triceps. Leg extension, leg curl, hip abd, hip add, and leg press added visit 3       Rosaura received the following manual therapy techniques: Joint mobilizations were applied to the: thoracic spine for 0 minutes.       Home Exercises Provided and Patient Education Provided   Stretching: Open books, seated thoracic extension  Strengthening: Scapular setting  Cardio program (V5): Not yet provided  Lifting education (V11):Not yet provdied  Using Lumbar Roll:Instructed, not yet using    Education provided:   - Addition of side-lying scapular setting, importance of thoracic movement in combination with scapular movement to promote appropriate posture without the generation of pain.     Written Home Exercises Provided: yes.  Exercises were reviewed and Rosaura was able to demonstrate them prior to the end of the session.  Rosaura demonstrated good  understanding of the education provided.     See EMR under Patient Instructions for exercises provided 6/27/2022.          Assessment   Rosaura still notes less ease with muscle activation with right scapular setting with increased trigger point tenderness noted this date. This was reduced and resolved with combined pin and stretch of the levator scapulae and UT along with scapular PNF and shoulder ER.   Additional time spent on thoracic stretching due to complaints of soreness. Global core and back extension exercise added with resistance applied via double over black and blue theracord.       Patient is making good progress towards established goals.  Pt will continue to benefit  from skilled outpatient physical therapy to address the deficits stated in the impairment chart, provide pt/family education and to maximize pt's level of independence in the home and community environment.     Anticipated Barriers for therapy: None  Pt's spiritual, cultural and educational needs considered and pt agreeable to plan of care and goals as stated below:             Goals:       Short term goals:  6 weeks or 10 visits   1.  Pt will demonstrate increased lumbar ROM by at least 3 degrees from the initial ROM value with improvements noted in functional ROM and ability to perform ADLs.  (approp and ongoing)  2.  Pt will demonstrate increased MedX average isometric strength value  by 20% from initial test resulting in improved ability to perform bending, lifting, and carrying activities safely, confidently.  (approp and ongoing)  3.  Patient report a reduction in worst pain score by 1-2 points for improved tolerance for increased sitting time to 2 hours without any increase of pain.  (approp and ongoing)  4.  Pt able to perform HEP correctly with minimal cueing or supervision from therapist to encourage independent management of symptoms. (approp and ongoing)        Long term goals: 10 weeks or 20 visits   1. Pt will demonstrate increased lumbar ROM by at least 6 degrees from initial ROM value, resulting in improved ability to perform functional fwd bending while standing and sitting. (approp and ongoing)  2. Pt will demonstrate increased MedX average isometric strength value  by 35% from initial test resulting in improved ability to perform bending, lifting, and carrying activities safely, confidently.  (approp and ongoing)  3. Pt to demonstrate ability to independently control and reduce their pain through posture positioning and mechanical movements throughout a typical day.  (approp and ongoing)  4.  Pt will demonstrate reduced pain and improved functional outcomes as reported on the Oswestry Disability  Index by reaching a score of 10% or less in order to demonstrate subjective improvement in pt's condition.    (approp and ongoing)  5. Pt will demonstrate independence with the HEP at discharge  (approp and ongoing)  6.  Rosaura will tolerate standing for 2 hours and lifting multiple objects from ground level without any increase of symptoms to improve her tolerance for daily activities  (approp and ongoing)         Plan   Continue with established Plan of Care towards established PT goals.     Claudette Armas, PT , DPT

## 2022-07-20 ENCOUNTER — CLINICAL SUPPORT (OUTPATIENT)
Dept: REHABILITATION | Facility: HOSPITAL | Age: 47
End: 2022-07-20
Payer: COMMERCIAL

## 2022-07-20 ENCOUNTER — DOCUMENTATION ONLY (OUTPATIENT)
Dept: REHABILITATION | Facility: HOSPITAL | Age: 47
End: 2022-07-20
Payer: COMMERCIAL

## 2022-07-20 DIAGNOSIS — M53.86 DECREASED RANGE OF MOTION OF INTERVERTEBRAL DISCS OF LUMBAR SPINE: ICD-10-CM

## 2022-07-20 DIAGNOSIS — R29.898 DECREASED STRENGTH OF TRUNK AND BACK: Primary | ICD-10-CM

## 2022-07-20 PROCEDURE — 97750 PHYSICAL PERFORMANCE TEST: CPT | Mod: 32,PN

## 2022-07-20 NOTE — PROGRESS NOTES
[facility]      Name: Rosaura Arana [facility] Number: [id number]    Therapy Diagnosis:   Encounter Diagnoses   Name Primary?    Decreased strength of trunk and back Yes    Decreased range of motion of intervertebral discs of lumbar spine      Physician: Paul Brown MD    Visit Date: 2022      Physician Orders: PT Eval and Treat   Medical Diagnosis from Referral: M54.6 (ICD-10-CM) - Pain in thoracic spine   Evaluation Date: 2022  Authorization Period Expiration: 2023 (EMPLOYEE BENEFIT)  Plan of Care Expiration: 2022  Reassessment Due: 2022  Visit # / Visits authorized: 3/ 20      Time In: 13:45 pm  Time Out: 14:25 pm  Total Billable Time: 40 minutes    Precautions: Standard    Pattern of pain determined: Pattern 1 PEP    Subjective   Rosaura reports improvement of symptoms.  She just returned from a trip to [facility] where she was very active including hiking, white water rafting, swimming, ziplining, and horseback riding. She reported no limitations with any of these activities. No pain at start of treatment.        Patient reports tolerating previous visit well  Patient reports their pain to be 0/10 on a 0-10 scale with 0 being no pain and 10 being the worst pain imaginable.  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, sleep well and eliminate the cycle of pain    Objective       Baseline Isometric Testing on Med X equipment: Testing administered by PT  Date of testin2022  ROM 60 deg   Max Peak Torque 135    Min Peak Torque 80    Flex/Ext Ratio 1.5/1   % below normative data 25          MOVEMENT LOSS     ROM Loss   Flexion within functional limits, left side bending at end range   Extension moderate loss    Side glide Right within functional limits   Side glide Left within functional limits   Rotation Right minimal loss, reproduction of left thoracic pain   Rotation  Left within functional limits , reproduction of left thoracic pain      Outcomes:  Initial score:  Visit 5 score:  Goal:    Treatment    Pt was instructed in and performed the following:     Rosaura received therapeutic exercises to develop/improved posture, cardiovascular endurance, muscular endurance, lumbar/cervical ROM, strength and muscular endurance for 60 minutes including the following exercises:       Seated thoracic extension over ball x 15 (elbows forwards to prevent compensation with    - Followed with seated thoracic extension over back of a chair   Table reverse rows x 20 w/ body weight     Open books w/ both knees bent 5- 10 seconds x 15     T- Lumbar flexion stretch x 8 B   Prone Scorpion Stretch x 8 B ( to facilitate dissociation between thoracic and lumbar spine)     Side-lying scapular setting w/ ER 2# DB x 20 B    - Pin and stretch incorporated to reduce stiffness and excessive activation of the levator scapulae  Knee Plank position with rotation  Prone lower trap pattern #1 x 15     ~Better initiation demonstrated w/o neck compensation  Side-lying scapular setting x 20  B   - Tape was not applied due to better initiation of appropriate muscle contraction      Supine serratus punches w/ 7# DB 2 x 20    - Intermittent tactile cues to ensure appropriate placement of the hands with this    Quadruped lumbar locked thoracic extension/ rotation x 20  B   Theracord resisted backwards walking Doubled Black and blue x 10 w/ 5x squat and 5x mini jumps Not performed today       Med-ex flowsheet  [facility] 7/20/2022   Visit Number 6   VAS Pain Rating 0   Lumbar Stretches - Slouch Overcorrection -   Extension in Standing 20   Lumbar Extension Seat Pad -   Femur Restraint -   Top Dead Center -   Counterweight -   Lumbar Flexion -   Lumbar Extension -   Lumbar Peak Torque -   Min Torque -   Test Percent Below Normative Data -   Lumbar Weight 50   Repetitions 20   Rating of Perceived Exertion 7   Ice - Z  Lie (in min.) 0       Peripheral muscle strengthening which included 1 set of 15-20 repetitions at a slow, controlled 10-13 second per rep pace focused on strengthening supporting musculature for improved body mechanics and functional mobility.  Pt and therapist focused on proper form during treatment to ensure optimal strengthening of each targeted muscle group.  Machines were utilized including torso rotation, chest press, rowing, biceps, and triceps. Leg extension, leg curl, hip abd, hip add, and leg press added visit 3       Rosaura received the following manual therapy techniques: Joint mobilizations were applied to the: thoracic spine for 0 minutes.       Home Exercises Provided and Patient Education Provided   Stretching: Open books, seated thoracic extension  Strengthening: Scapular setting  Cardio program (V5): Not yet provided  Lifting education (V11):Not yet provdied  Using Lumbar Roll:Instructed, not yet using    Education provided:   - Addition of side-lying scapular setting, importance of thoracic movement in combination with scapular movement to promote appropriate posture without the generation of pain.     Written Home Exercises Provided: yes.  Exercises were reviewed and Rosaura was able to demonstrate them prior to the end of the session.  Rosaura demonstrated good  understanding of the education provided.     See EMR under Patient Instructions for exercises provided 6/27/2022.      Assessment   Rosaura was able to resume all recommended therapeutic exercises without onset of pain. She requires mod cues in order to ensure correct performance of all exercises. She was unable to stay for peripheral strengthening today due to work demands. She should be appropriate for advancement at next treatment visit.     Patient is making good progress towards established goals.  Pt will continue to benefit from skilled outpatient physical therapy to address the deficits stated in the impairment chart, provide pt/family  education and to maximize pt's level of independence in the home and community environment.     Anticipated Barriers for therapy: None  Pt's spiritual, cultural and educational needs considered and pt agreeable to plan of care and goals as stated below:     Goals:     Short term goals:  6 weeks or 10 visits   1.  Pt will demonstrate increased lumbar ROM by at least 3 degrees from the initial ROM value with improvements noted in functional ROM and ability to perform ADLs.  (approp and ongoing)  2.  Pt will demonstrate increased MedX average isometric strength value  by 20% from initial test resulting in improved ability to perform bending, lifting, and carrying activities safely, confidently.  (approp and ongoing)  3.  Patient report a reduction in worst pain score by 1-2 points for improved tolerance for increased sitting time to 2 hours without any increase of pain.  (approp and ongoing)  4.  Pt able to perform HEP correctly with minimal cueing or supervision from therapist to encourage independent management of symptoms. (approp and ongoing)        Long term goals: 10 weeks or 20 visits   1. Pt will demonstrate increased lumbar ROM by at least 6 degrees from initial ROM value, resulting in improved ability to perform functional fwd bending while standing and sitting. (approp and ongoing)  2. Pt will demonstrate increased MedX average isometric strength value  by 35% from initial test resulting in improved ability to perform bending, lifting, and carrying activities safely, confidently.  (approp and ongoing)  3. Pt to demonstrate ability to independently control and reduce their pain through posture positioning and mechanical movements throughout a typical day.  (approp and ongoing)  4.  Pt will demonstrate reduced pain and improved functional outcomes as reported on the Oswestry Disability Index by reaching a score of 10% or less in order to demonstrate subjective improvement in pt's condition.    (approp and  ongoing)  5. Pt will demonstrate independence with the HEP at discharge  (approp and ongoing)  6.  Rosaura will tolerate standing for 2 hours and lifting multiple objects from ground level without any increase of symptoms to improve her tolerance for daily activities  (approp and ongoing)         Plan   Continue with established Plan of Care towards established PT goals.     Ivis Claudio, PTA ,

## 2022-07-21 ENCOUNTER — CLINICAL SUPPORT (OUTPATIENT)
Dept: REHABILITATION | Facility: HOSPITAL | Age: 47
End: 2022-07-21
Payer: COMMERCIAL

## 2022-07-21 DIAGNOSIS — M53.86 DECREASED RANGE OF MOTION OF INTERVERTEBRAL DISCS OF LUMBAR SPINE: ICD-10-CM

## 2022-07-21 DIAGNOSIS — R29.898 DECREASED STRENGTH OF TRUNK AND BACK: Primary | ICD-10-CM

## 2022-07-21 PROCEDURE — 97750 PHYSICAL PERFORMANCE TEST: CPT | Mod: 32,PN

## 2022-07-21 NOTE — PROGRESS NOTES
[facility]      Name: Rosaura Arana [facility] Number: [id number]    Therapy Diagnosis:   Encounter Diagnoses   Name Primary?    Decreased strength of trunk and back Yes    Decreased range of motion of intervertebral discs of lumbar spine      Physician: Paul Brown MD    Visit Date: 2022      Physician Orders: PT Eval and Treat   Medical Diagnosis from Referral: M54.6 (ICD-10-CM) - Pain in thoracic spine   Evaluation Date: 2022  Authorization Period Expiration: 2023 (EMPLOYEE BENEFIT)  Plan of Care Expiration: 2022  Reassessment Due: 2022  Visit # / Visits authorized:       Time In: 11:15 am  Time Out: 12:15 pm  Total Billable Time: 45 minutes    Precautions: Standard    Pattern of pain determined: Pattern 1 PEP    Subjective   Rosaura reports improvement of symptoms.  Middle back pain is completely gone. Still feeling some clicking in the shoulder and having some pain across the UTs following the level of the CT junction.     Patient reports tolerating previous visit well  Patient reports their pain to be 0/10 on a 0-10 scale with 0 being no pain and 10 being the worst pain imaginable.  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, sleep well and eliminate the cycle of pain    Objective       Baseline Isometric Testing on Med X equipment: Testing administered by PT  Date of testin2022  ROM 60 deg   Max Peak Torque 135    Min Peak Torque 80    Flex/Ext Ratio 1.5/1   % below normative data 25          MOVEMENT LOSS     ROM Loss   Flexion within functional limits, left side bending at end range   Extension moderate loss    Side glide Right within functional limits   Side glide Left within functional limits   Rotation Right minimal loss, reproduction of left thoracic pain   Rotation Left within functional limits , reproduction of left thoracic pain      Outcomes:  Initial  score:  Visit 5 score:  Goal:    Treatment    Pt was instructed in and performed the following:     Rosaura received therapeutic exercises to develop/improved posture, cardiovascular endurance, muscular endurance, lumbar/cervical ROM, strength and muscular endurance for 60 minutes including the following exercises:       Seated thoracic extension over ball x 15 (elbows forwards to prevent compensation with    - Followed with seated thoracic extension over back of a chair   Table reverse rows x 20 w/ body weight     Open books w/ both knees bent 5- 10 seconds x 15     T- Lumbar flexion stretch x 8 B   Prone Scorpion Stretch x 8 B ( to facilitate dissociation between thoracic and lumbar spine)     Side-lying scapular setting w/ ER 2# DB x 20 B    - Pin and stretch incorporated to reduce stiffness and excessive activation of the levator scapulae  Knee Plank position with rotation  Prone lower trap pattern #1 x 15     ~Better initiation demonstrated w/o neck compensation  Side-lying scapular setting x 20  B   - Tape was not applied due to better initiation of appropriate muscle contraction      Supine serratus punches w/ 7# DB 2 x 20    - Intermittent tactile cues to ensure appropriate placement of the hands with this    Quadruped lumbar locked thoracic extension/ rotation x 20  B   Theracord resisted backwards walking Doubled Black and blue x 10 w/ 5x squat and 5x mini jumps Not performed today       Med-ex flowsheet  [facility] 7/20/2022   Visit Number 6   VAS Pain Rating 0   Lumbar Stretches - Slouch Overcorrection -   Extension in Standing 20   Lumbar Extension Seat Pad -   Femur Restraint -   Top Dead Center -   Counterweight -   Lumbar Flexion -   Lumbar Extension -   Lumbar Peak Torque -   Min Torque -   Test Percent Below Normative Data -   Lumbar Weight 50   Repetitions 20   Rating of Perceived Exertion 7   Ice - Z Lie (in min.) 0       Peripheral muscle strengthening which included 1 set of 15-20  repetitions at a slow, controlled 10-13 second per rep pace focused on strengthening supporting musculature for improved body mechanics and functional mobility.  Pt and therapist focused on proper form during treatment to ensure optimal strengthening of each targeted muscle group.  Machines were utilized including torso rotation, chest press, rowing, biceps, and triceps. Leg extension, leg curl, hip abd, hip add, and leg press added visit 3       Rosaura received the following manual therapy techniques: Joint mobilizations were applied to the: thoracic spine for 0 minutes.       Home Exercises Provided and Patient Education Provided   Stretching: Open books, seated thoracic extension  Strengthening: Scapular setting  Cardio program (V5): Not yet provided  Lifting education (V11):Not yet provdied  Using Lumbar Roll:Instructed, not yet using    Education provided:   - Value of continued scapular and thoracic/ postural treatment.   - Anatomy and biomechanics of the shoulder  - Rotator cuff anatomy     Written Home Exercises Provided: yes.  Exercises were reviewed and Rosaura was able to demonstrate them prior to the end of the session.  Rosaura demonstrated good  understanding of the education provided.     See EMR under Patient Instructions for exercises provided 6/27/2022.      Assessment   Rosaura was concerned about her right left rotator cuff citing pain across the back following the CT junction and having specific difficulty reaching behind her. Education concerning this provided concerning the etiology of this pain connected with poor sitting posture, thoracic mobility and scapular stability which are all currently being addressed. HK and empty can test positive for reproduction of symptoms. Continued focus on thoracic mobility and stability with positive results. Better activation of posterior shoulder ERs and serratus anterior musculature noted during this session. Continue to note difficulty with provided weight on  the med-ex machine.       Patient is making good progress towards established goals.  Pt will continue to benefit from skilled outpatient physical therapy to address the deficits stated in the impairment chart, provide pt/family education and to maximize pt's level of independence in the home and community environment.     Anticipated Barriers for therapy: None  Pt's spiritual, cultural and educational needs considered and pt agreeable to plan of care and goals as stated below:     Goals:     Short term goals:  6 weeks or 10 visits   1.  Pt will demonstrate increased lumbar ROM by at least 3 degrees from the initial ROM value with improvements noted in functional ROM and ability to perform ADLs.  (approp and ongoing)  2.  Pt will demonstrate increased MedX average isometric strength value  by 20% from initial test resulting in improved ability to perform bending, lifting, and carrying activities safely, confidently.  (approp and ongoing)  3.  Patient report a reduction in worst pain score by 1-2 points for improved tolerance for increased sitting time to 2 hours without any increase of pain.  (approp and ongoing)  4.  Pt able to perform HEP correctly with minimal cueing or supervision from therapist to encourage independent management of symptoms. (approp and ongoing)        Long term goals: 10 weeks or 20 visits   1. Pt will demonstrate increased lumbar ROM by at least 6 degrees from initial ROM value, resulting in improved ability to perform functional fwd bending while standing and sitting. (approp and ongoing)  2. Pt will demonstrate increased MedX average isometric strength value  by 35% from initial test resulting in improved ability to perform bending, lifting, and carrying activities safely, confidently.  (approp and ongoing)  3. Pt to demonstrate ability to independently control and reduce their pain through posture positioning and mechanical movements throughout a typical day.  (approp and ongoing)  4.  Pt  will demonstrate reduced pain and improved functional outcomes as reported on the Oswestry Disability Index by reaching a score of 10% or less in order to demonstrate subjective improvement in pt's condition.    (approp and ongoing)  5. Pt will demonstrate independence with the HEP at discharge  (approp and ongoing)  6.  Rosaura will tolerate standing for 2 hours and lifting multiple objects from ground level without any increase of symptoms to improve her tolerance for daily activities  (approp and ongoing)         Plan   Continue with established Plan of Care towards established PT goals.     Claudette Armas, PT , DPT

## 2022-07-25 ENCOUNTER — CLINICAL SUPPORT (OUTPATIENT)
Dept: REHABILITATION | Facility: HOSPITAL | Age: 47
End: 2022-07-25
Payer: COMMERCIAL

## 2022-07-25 DIAGNOSIS — R29.898 DECREASED STRENGTH OF TRUNK AND BACK: Primary | ICD-10-CM

## 2022-07-25 DIAGNOSIS — M53.86 DECREASED RANGE OF MOTION OF INTERVERTEBRAL DISCS OF LUMBAR SPINE: ICD-10-CM

## 2022-07-25 PROCEDURE — 97750 PHYSICAL PERFORMANCE TEST: CPT | Mod: 32,PN

## 2022-07-25 NOTE — PROGRESS NOTES
[facility]      Name: Rosaura Arana [facility] Number: [id number]    Therapy Diagnosis:   Encounter Diagnoses   Name Primary?    Decreased strength of trunk and back Yes    Decreased range of motion of intervertebral discs of lumbar spine      Physician: Paul Brown MD    Visit Date: 2022      Physician Orders: PT Eval and Treat   Medical Diagnosis from Referral: M54.6 (ICD-10-CM) - Pain in thoracic spine   Evaluation Date: 2022  Authorization Period Expiration: 2023 (EMPLOYEE BENEFIT)  Plan of Care Expiration: 2022  Reassessment Due: 2022  Visit # / Visits authorized:       Time In: 11:15 am  Time Out: 12:15 pm  Total Billable Time: 45 minutes    Precautions: Standard    Pattern of pain determined: Pattern 1 PEP    Subjective   Rosaura reports improvement of symptoms. No significant complaint of pain at start of treatment.   Patient reports tolerating previous visit well  Patient reports their pain to be 0/10 on a 0-10 scale with 0 being no pain and 10 being the worst pain imaginable.  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, sleep well and eliminate the cycle of pain    Objective       Baseline Isometric Testing on Med X equipment: Testing administered by PT  Date of testin2022  ROM 60 deg   Max Peak Torque 135    Min Peak Torque 80    Flex/Ext Ratio 1.5/1   % below normative data 25          MOVEMENT LOSS     ROM Loss   Flexion within functional limits, left side bending at end range   Extension moderate loss    Side glide Right within functional limits   Side glide Left within functional limits   Rotation Right minimal loss, reproduction of left thoracic pain   Rotation Left within functional limits , reproduction of left thoracic pain      Outcomes:  Initial score:  Visit 5 score:  Goal:    Treatment    Pt was instructed in and performed the following:     Rosaura  received therapeutic exercises to develop/improved posture, cardiovascular endurance, muscular endurance, lumbar/cervical ROM, strength and muscular endurance for 40 minutes including the following exercises:     Standing lumbar extension with hips braced on desk 20 x     Seated thoracic extension over ball x 15 (elbows forwards to prevent compensation with    - Followed with seated thoracic extension over back of a chair   Table reverse rows x 20 w/ body weight     Open books w/ both knees bent 5- 10 seconds x 15     T- Lumbar flexion stretch x 8 B   Prone Scorpion Stretch x 8 B ( to facilitate dissociation between thoracic and lumbar spine)     Side-lying scapular setting w/ ER 2# DB x 20 B    - Pin and stretch incorporated to reduce stiffness and excessive activation of the levator scapulae  Knee Plank position with rotation  Prone lower trap pattern #1 x 15     ~Better initiation demonstrated w/o neck compensation  Side-lying scapular setting x 20  B Not performed today    - Tape was not applied due to better initiation of appropriate muscle contraction      Supine serratus punches w/ 7# DB 2 x 20    - Intermittent tactile cues to ensure appropriate placement of the hands with this    Quadruped lumbar locked thoracic extension/ rotation x 20  B   Theracord resisted backwards walking Doubled Black and blue x 10 w/ 5x squat and 5x mini jumps Not performed today       Med-ex flowsheet  [facility] 7/25/2022   Visit Number 7   VAS Pain Rating 0   Lumbar Stretches - Slouch Overcorrection -   Extension in Standing 20   Lumbar Extension Seat Pad -   Femur Restraint -   Top Dead Center -   Counterweight -   Lumbar Flexion -   Lumbar Extension -   Lumbar Peak Torque -   Min Torque -   Test Percent Below Normative Data -   Lumbar Weight 50   Repetitions 20   Rating of Perceived Exertion 7   Ice - Z Lie (in min.) 0     Peripheral muscle strengthening which included 1 set of 15-20 repetitions at a slow, controlled  10-13 second per rep pace focused on strengthening supporting musculature for improved body mechanics and functional mobility.  Pt and therapist focused on proper form during treatment to ensure optimal strengthening of each targeted muscle group.  Machines were utilized including torso rotation, chest press, rowing, biceps, and triceps. Leg extension, leg curl, hip abd, hip add, and leg press added visit 3       Rosaura received the following manual therapy techniques: Joint mobilizations were applied to the: thoracic spine for 0 minutes.       Home Exercises Provided and Patient Education Provided   Stretching: Open books, seated thoracic extension  Strengthening: Scapular setting  Cardio program (V5): Not yet provided  Lifting education (V11):Not yet provdied  Using Lumbar Roll:Instructed, not yet using    Education provided:   - Value of continued scapular and thoracic/ postural treatment.   - Anatomy and biomechanics of the shoulder  - Rotator cuff anatomy     Written Home Exercises Provided: yes.  Exercises were reviewed and Rosaura was able to demonstrate them prior to the end of the session.  Rosaura demonstrated good  understanding of the education provided.     See EMR under Patient Instructions for exercises provided 6/27/2022.      Assessment     Today treatment was limited by pt experiencing cardiac distress during treatment. She tracks her HR on a wrist monitor and reported that the monitor read her as being in A-fib. Treatment was modified at this point to only perform med x machine. All peripheral equipment was held today. No improvement of exertion noted with med x despite being at same weight and repetitions for several visits.     Patient is making good progress towards established goals.  Pt will continue to benefit from skilled outpatient physical therapy to address the deficits stated in the impairment chart, provide pt/family education and to maximize pt's level of independence in the home and  community environment.     Anticipated Barriers for therapy: None  Pt's spiritual, cultural and educational needs considered and pt agreeable to plan of care and goals as stated below:     Goals:     Short term goals:  6 weeks or 10 visits   1.  Pt will demonstrate increased lumbar ROM by at least 3 degrees from the initial ROM value with improvements noted in functional ROM and ability to perform ADLs.  (approp and ongoing)  2.  Pt will demonstrate increased MedX average isometric strength value  by 20% from initial test resulting in improved ability to perform bending, lifting, and carrying activities safely, confidently.  (approp and ongoing)  3.  Patient report a reduction in worst pain score by 1-2 points for improved tolerance for increased sitting time to 2 hours without any increase of pain.  (approp and ongoing)  4.  Pt able to perform HEP correctly with minimal cueing or supervision from therapist to encourage independent management of symptoms. (approp and ongoing)        Long term goals: 10 weeks or 20 visits   1. Pt will demonstrate increased lumbar ROM by at least 6 degrees from initial ROM value, resulting in improved ability to perform functional fwd bending while standing and sitting. (approp and ongoing)  2. Pt will demonstrate increased MedX average isometric strength value  by 35% from initial test resulting in improved ability to perform bending, lifting, and carrying activities safely, confidently.  (approp and ongoing)  3. Pt to demonstrate ability to independently control and reduce their pain through posture positioning and mechanical movements throughout a typical day.  (approp and ongoing)  4.  Pt will demonstrate reduced pain and improved functional outcomes as reported on the Oswestry Disability Index by reaching a score of 10% or less in order to demonstrate subjective improvement in pt's condition.    (approp and ongoing)  5. Pt will demonstrate independence with the HEP at discharge   (approp and ongoing)  6.  Rosaura will tolerate standing for 2 hours and lifting multiple objects from ground level without any increase of symptoms to improve her tolerance for daily activities  (approp and ongoing)         Plan   Continue with established Plan of Care towards established PT goals.     Ivis Claudio, PTA ,

## 2022-07-27 ENCOUNTER — CLINICAL SUPPORT (OUTPATIENT)
Dept: REHABILITATION | Facility: HOSPITAL | Age: 47
End: 2022-07-27
Payer: COMMERCIAL

## 2022-07-27 DIAGNOSIS — M53.86 DECREASED RANGE OF MOTION OF INTERVERTEBRAL DISCS OF LUMBAR SPINE: ICD-10-CM

## 2022-07-27 DIAGNOSIS — R29.898 DECREASED STRENGTH OF TRUNK AND BACK: Primary | ICD-10-CM

## 2022-07-27 PROCEDURE — 97750 PHYSICAL PERFORMANCE TEST: CPT | Mod: 32,PN

## 2022-07-27 NOTE — PROGRESS NOTES
[facility]      Name: Rosaura Arana [facility] Number: [id number]    Therapy Diagnosis:   Encounter Diagnoses   Name Primary?    Decreased strength of trunk and back Yes    Decreased range of motion of intervertebral discs of lumbar spine      Physician: Paul Brown MD    Visit Date: 2022      Physician Orders: PT Eval and Treat   Medical Diagnosis from Referral: M54.6 (ICD-10-CM) - Pain in thoracic spine   Evaluation Date: 2022  Authorization Period Expiration: 2023 (EMPLOYEE BENEFIT)  Plan of Care Expiration: 2022  Reassessment Due: 2022  Visit # / Visits authorized:       Time In: 11:15 am  Time Out: 12:15 pm  Total Billable Time: 45 minutes    Precautions: Standard    Pattern of pain determined: Pattern 1 PEP    Subjective   Rosaura reports improvement of symptoms. No significant complaint of pain at start of treatment.   Patient reports tolerating previous visit well  Patient reports their pain to be 0/10 on a 0-10 scale with 0 being no pain and 10 being the worst pain imaginable.  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, sleep well and eliminate the cycle of pain    Objective       Baseline Isometric Testing on Med X equipment: Testing administered by PT  Date of testin2022  ROM 60 deg   Max Peak Torque 135    Min Peak Torque 80    Flex/Ext Ratio 1.5/1   % below normative data 25          MOVEMENT LOSS     ROM Loss   Flexion within functional limits, left side bending at end range   Extension moderate loss    Side glide Right within functional limits   Side glide Left within functional limits   Rotation Right minimal loss, reproduction of left thoracic pain   Rotation Left within functional limits , reproduction of left thoracic pain      Outcomes:  Initial score:  Visit 5 score:  Goal:    Treatment    Pt was instructed in and performed the following:     Rosaura  received therapeutic exercises to develop/improved posture, cardiovascular endurance, muscular endurance, lumbar/cervical ROM, strength and muscular endurance for 40 minutes including the following exercises:     Standing lumbar extension with hips braced on desk 20 x     Seated thoracic extension over ball x 15 (elbows forwards to prevent compensation with    - Followed with seated thoracic extension over back of a chair   Table reverse rows x 20 w/ body weight     Open books w/ both knees bent 5- 10 seconds x 15 RTB   [place] and arrow with RTB 15 x each side      T- Lumbar flexion stretch x 8 B   Prone Scorpion Stretch x 8 B ( to facilitate dissociation between thoracic and lumbar spine)     Side-lying scapular setting w/ ER 2# DB x 20 B    - Pin and stretch incorporated to reduce stiffness and excessive activation of the levator scapulae  Knee Plank position with rotation 2 x 5   Prone lower trap pattern #1 x 15     ~Better initiation demonstrated w/o neck compensation  Side-lying scapular setting x 20  B Not performed today    - Tape was not applied due to better initiation of appropriate muscle contraction      Supine serratus punches w/ 7# DB 2 x 20    - Intermittent tactile cues to ensure appropriate placement of the hands with this    Quadruped lumbar locked thoracic extension/ rotation x 20  B   Theracord resisted backwards walking Doubled Black and blue x 10 w/ 5x squat and 5x mini jumps Not performed today     Med-ex flowsheet  [facility] 7/27/2022   Visit Number 8   VAS Pain Rating 0   Lumbar Stretches - Slouch Overcorrection -   Extension in Standing 20   Lumbar Extension Seat Pad -   Femur Restraint -   Top Dead Center -   Counterweight -   Lumbar Flexion -   Lumbar Extension -   Lumbar Peak Torque -   Min Torque -   Test Percent Below Normative Data -   Lumbar Weight 50   Repetitions 20   Rating of Perceived Exertion 6   Ice - Z Lie (in min.) 0     Peripheral muscle strengthening which included  1 set of 15-20 repetitions at a slow, controlled 10-13 second per rep pace focused on strengthening supporting musculature for improved body mechanics and functional mobility.  Pt and therapist focused on proper form during treatment to ensure optimal strengthening of each targeted muscle group.  Machines were utilized including torso rotation, chest press, rowing, biceps, and triceps. Leg extension, leg curl, hip abd, hip add, and leg press added visit 3       Rosaura received the following manual therapy techniques: Joint mobilizations were applied to the: thoracic spine for 0 minutes.       Home Exercises Provided and Patient Education Provided   Stretching: Open books, seated thoracic extension  Strengthening: Scapular setting  Cardio program (V5): Not yet provided  Lifting education (V11):Not yet provdied  Using Lumbar Roll:Instructed, not yet using    Education provided:   - Value of continued scapular and thoracic/ postural treatment.   - Anatomy and biomechanics of the shoulder  - Rotator cuff anatomy     Written Home Exercises Provided: yes.  Exercises were reviewed and Rosaura was able to demonstrate them prior to the end of the session.  Rosaura demonstrated good  understanding of the education provided.     See EMR under Patient Instructions for exercises provided 6/27/2022.      Assessment     Today Pt was able to resume full treatment without onset of cardiac distress. She required min cues to ensure correct performance of planks and sidelying external rotation. She was able to perform and advance some of the peripheral machines without difficulty however she continues to report high exertion rate on the same weight on med x for several visits.   Patient is making good progress towards established goals.  Pt will continue to benefit from skilled outpatient physical therapy to address the deficits stated in the impairment chart, provide pt/family education and to maximize pt's level of independence in the  home and community environment.     Anticipated Barriers for therapy: None  Pt's spiritual, cultural and educational needs considered and pt agreeable to plan of care and goals as stated below:     Goals:     Short term goals:  6 weeks or 10 visits   1.  Pt will demonstrate increased lumbar ROM by at least 3 degrees from the initial ROM value with improvements noted in functional ROM and ability to perform ADLs.  (approp and ongoing)  2.  Pt will demonstrate increased MedX average isometric strength value  by 20% from initial test resulting in improved ability to perform bending, lifting, and carrying activities safely, confidently.  (approp and ongoing)  3.  Patient report a reduction in worst pain score by 1-2 points for improved tolerance for increased sitting time to 2 hours without any increase of pain.  (approp and ongoing)  4.  Pt able to perform HEP correctly with minimal cueing or supervision from therapist to encourage independent management of symptoms. (approp and ongoing)        Long term goals: 10 weeks or 20 visits   1. Pt will demonstrate increased lumbar ROM by at least 6 degrees from initial ROM value, resulting in improved ability to perform functional fwd bending while standing and sitting. (approp and ongoing)  2. Pt will demonstrate increased MedX average isometric strength value  by 35% from initial test resulting in improved ability to perform bending, lifting, and carrying activities safely, confidently.  (approp and ongoing)  3. Pt to demonstrate ability to independently control and reduce their pain through posture positioning and mechanical movements throughout a typical day.  (approp and ongoing)  4.  Pt will demonstrate reduced pain and improved functional outcomes as reported on the Oswestry Disability Index by reaching a score of 10% or less in order to demonstrate subjective improvement in pt's condition.    (approp and ongoing)  5. Pt will demonstrate independence with the HEP at  discharge  (approp and ongoing)  6.  Rosaura will tolerate standing for 2 hours and lifting multiple objects from ground level without any increase of symptoms to improve her tolerance for daily activities  (approp and ongoing)     Plan   Continue with established Plan of Care towards established PT goals.     Ivis Claudio, PTA

## 2022-08-01 ENCOUNTER — CLINICAL SUPPORT (OUTPATIENT)
Dept: REHABILITATION | Facility: HOSPITAL | Age: 47
End: 2022-08-01
Payer: COMMERCIAL

## 2022-08-01 DIAGNOSIS — R29.898 DECREASED STRENGTH OF TRUNK AND BACK: Primary | ICD-10-CM

## 2022-08-01 DIAGNOSIS — M53.86 DECREASED RANGE OF MOTION OF INTERVERTEBRAL DISCS OF LUMBAR SPINE: ICD-10-CM

## 2022-08-01 PROCEDURE — 97750 PHYSICAL PERFORMANCE TEST: CPT | Mod: 32,PN

## 2022-08-03 ENCOUNTER — CLINICAL SUPPORT (OUTPATIENT)
Dept: REHABILITATION | Facility: HOSPITAL | Age: 47
End: 2022-08-03
Payer: COMMERCIAL

## 2022-08-03 DIAGNOSIS — R29.898 DECREASED STRENGTH OF TRUNK AND BACK: Primary | ICD-10-CM

## 2022-08-03 DIAGNOSIS — M53.86 DECREASED RANGE OF MOTION OF INTERVERTEBRAL DISCS OF LUMBAR SPINE: ICD-10-CM

## 2022-08-03 PROCEDURE — 97750 PHYSICAL PERFORMANCE TEST: CPT | Mod: 32,PN

## 2022-08-03 NOTE — PROGRESS NOTES
[facility]      Name: Rosaura [facility]ana [facility] Number: [id number]    Therapy Diagnosis:   Encounter Diagnoses   Name Primary?    Decreased strength of trunk and back Yes    Decreased range of motion of intervertebral discs of lumbar spine      Physician: Paul Brown MD    Visit Date: 8/3/2022      Physician Orders: PT Eval and Treat   Medical Diagnosis from Referral: M54.6 (ICD-10-CM) - Pain in thoracic spine   Evaluation Date: 2022  Authorization Period Expiration: 2023 (EMPLOYEE BENEFIT)  Plan of Care Expiration: 2022  Reassessment Due: 2022  Visit # / Visits authorized: 10/ 20 (total visits 11)       Time In: 2:45 pm  Time Out: 3:15 pm  Total Billable Time: 45 minutes    Precautions: Standard    Pattern of pain determined: Pattern 1 PEP    Subjective   Rosaura reports improvement of symptoms. No significant complaint of pain at start of treatment.   Patient reports tolerating previous visit well  Patient reports their pain to be 0/10 on a 0-10 scale with 0 being no pain and 10 being the worst pain imaginable.  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, sleep well and eliminate the cycle of pain    Objective       Baseline Isometric Testing on Med X equipment: Testing administered by PT  Date of testin2022  ROM 60 deg   Max Peak Torque 135    Min Peak Torque 80    Flex/Ext Ratio 1.5/1   % below normative data 25        Visit 10 Isometric Testing on Med X equipment: Testing administered by PT  Date of testin2022  ROM 72 deg   Max Peak Torque 166   Min Peak Torque 105   Flex/Ext Ratio 1.5/1   % below normative data 3       MOVEMENT LOSS     ROM Loss   Flexion within functional limits, left side bending at end range   Extension moderate loss    Side glide Right within functional limits   Side glide Left within functional limits   Rotation Right minimal loss, reproduction  of left thoracic pain   Rotation Left within functional limits , reproduction of left thoracic pain      Outcomes:  Initial score:  Visit 5 score:  Goal:    Treatment    Pt was instructed in and performed the following:     Rosaura received therapeutic exercises to develop/improved posture, cardiovascular endurance, muscular endurance, lumbar/cervical ROM, strength and muscular endurance for 40 minutes including the following exercises:       + UE rolling supine to prone    - Therapist provided overpressure x 10   + Seated scapular retraction x 20    ~ Constant tactile cues for appropriate muscle activation  Seated thoracic extension over ball x 15 (elbows forwards to prevent compensation with    - Followed with seated thoracic extension over back of a chair   Table reverse rows x 20 w/ body weight     Open books w/ both knees bent 5- 10 seconds x 15 RTB Not performed today   [place] and arrow with GTB 15 x each side      T- Lumbar flexion stretch x 8 B   Prone Scorpion Stretch x 8 B ( to facilitate dissociation between thoracic and lumbar spine)     Side-lying scapular setting w/ ER 2# DB x 20 B    - Pin and stretch incorporated to reduce stiffness and excessive activation of the levator scapulae  Knee Plank position with rotation 2 x 5   Prone lower trap pattern #1 x 15      Plank serratus push-ups 2 x 10       Quadruped lumbar locked thoracic extension/ rotation x 20  B         Med-ex flowsheet    [facility] 8/3/2022   Visit Number 10   VAS Pain Rating 0   Lumbar Stretches - Slouch Overcorrection -   Extension in Lying 15   Extension in Standing 10   Lumbar Extension Seat Pad -   Femur Restraint -   Top Dead Center -   Counterweight -   Lumbar Flexion 72   Lumbar Extension 0   Lumbar Peak Torque 166   Min Torque 105   Test Percent Below Normative Data 3   Test Percent Gain in Strength from Initial  29   Lumbar Weight -   Repetitions -   Rating of Perceived Exertion -   Ice - Z Lie (in min.) 0          Peripheral muscle strengthening which included 1 set of 15-20 repetitions at a slow, controlled 10-13 second per rep pace focused on strengthening supporting musculature for improved body mechanics and functional mobility.  Pt and therapist focused on proper form during treatment to ensure optimal strengthening of each targeted muscle group.  Machines were utilized including torso rotation, chest press, rowing, biceps, and triceps. Leg extension, leg curl, hip abd, hip add, and leg press added visit 3       Rosaura received the following manual therapy techniques: Joint mobilizations were applied to the: thoracic spine for 0 minutes.       Home Exercises Provided and Patient Education Provided   Stretching: Open books, seated thoracic extension  Strengthening: Scapular setting  Cardio program (V5): Not yet provided  Lifting education (V11):Not yet provdied  Using Lumbar Roll:Instructed, not yet using    Education provided:   - follow through with thoracic mobilization and stabilization based exercises as she is still unable to completely utilize her middle and lower trapezius musculature in a prone position  - encouraged to do in front of a mirror for additional visual feedback       Written Home Exercises Provided: yes.  Exercises were reviewed and Rosaura was able to demonstrate them prior to the end of the session.  Rosaura demonstrated good  understanding of the education provided.     See EMR under Patient Instructions for exercises provided 6/27/2022.      Assessment     Re-assessment performed on the med-ex at 29% improvement in strength compared to self with an increase from 60 degrees of lumbar flexion to 72 degrees without any onset of pain. Better posture noted, although still demonstrates a partial forward head, rounded shoulders posture in both sitting and standing.   Progressed to UE rolling from supine to prone requiring consistent hand over hand therapist assistance to generate rotation from upper  thoracic to the lumbar spine with slight carry-over observed with this. All STGs met and progressing well towards her LTGs.       Patient is making good progress towards established goals.  Pt will continue to benefit from skilled outpatient physical therapy to address the deficits stated in the impairment chart, provide pt/family education and to maximize pt's level of independence in the home and community environment.       Anticipated Barriers for therapy: None  Pt's spiritual, cultural and educational needs considered and pt agreeable to plan of care and goals as stated below:     Goals:     Short term goals:  6 weeks or 10 visits   1.  Pt will demonstrate increased lumbar ROM by at least 3 degrees from the initial ROM value with improvements noted in functional ROM and ability to perform ADLs.  MET  2.  Pt will demonstrate increased MedX average isometric strength value  by 20% from initial test resulting in improved ability to perform bending, lifting, and carrying activities safely, confidently.  MET  3.  Patient report a reduction in worst pain score by 1-2 points for improved tolerance for increased sitting time to 2 hours without any increase of pain.  MET  4.  Pt able to perform HEP correctly with minimal cueing or supervision from therapist to encourage independent management of symptoms. MET        Long term goals: 10 weeks or 20 visits   1. Pt will demonstrate increased lumbar ROM by at least 6 degrees from initial ROM value, resulting in improved ability to perform functional fwd bending while standing and sitting. (approp and ongoing)  2. Pt will demonstrate increased MedX average isometric strength value  by 35% from initial test resulting in improved ability to perform bending, lifting, and carrying activities safely, confidently.  (approp and ongoing)  3. Pt to demonstrate ability to independently control and reduce their pain through posture positioning and mechanical movements throughout a  typical day.  (approp and ongoing)  4.  Pt will demonstrate reduced pain and improved functional outcomes as reported on the Oswestry Disability Index by reaching a score of 10% or less in order to demonstrate subjective improvement in pt's condition.    (approp and ongoing)  5. Pt will demonstrate independence with the HEP at discharge  (approp and ongoing)  6.  Rosaura will tolerate standing for 2 hours and lifting multiple objects from ground level without any increase of symptoms to improve her tolerance for daily activities  (approp and ongoing)     Plan   Continue with established Plan of Care towards established PT goals.     Claudette Armas, PT , DPT

## 2022-08-08 ENCOUNTER — CLINICAL SUPPORT (OUTPATIENT)
Dept: REHABILITATION | Facility: HOSPITAL | Age: 47
End: 2022-08-08
Payer: COMMERCIAL

## 2022-08-08 DIAGNOSIS — M53.86 DECREASED RANGE OF MOTION OF INTERVERTEBRAL DISCS OF LUMBAR SPINE: ICD-10-CM

## 2022-08-08 DIAGNOSIS — R29.898 DECREASED STRENGTH OF TRUNK AND BACK: Primary | ICD-10-CM

## 2022-08-08 PROCEDURE — 97750 PHYSICAL PERFORMANCE TEST: CPT | Mod: 32,PN

## 2022-08-08 NOTE — PROGRESS NOTES
[facility]      Name: Rosaura Arana [facility] Number: [id number]    Therapy Diagnosis:   Encounter Diagnoses   Name Primary?    Decreased strength of trunk and back Yes    Decreased range of motion of intervertebral discs of lumbar spine      Physician: Paul Brown MD    Visit Date: 2022      Physician Orders: PT Eval and Treat   Medical Diagnosis from Referral: M54.6 (ICD-10-CM) - Pain in thoracic spine   Evaluation Date: 2022  Authorization Period Expiration: 2023 (EMPLOYEE BENEFIT)  Plan of Care Expiration: 2022  Reassessment Due: 2022  Visit # / Visits authorized:  (total visits 12)       Time In: 2:45 pm  Time Out: 3:15 pm  Total Billable Time: 45 minutes    Precautions: Standard    Pattern of pain determined: Pattern 1 PEP    Subjective   Rosaura reports improvement of symptoms.   Still noticing some clicking. Having better initial set -up with seated scapular retractions and is still having clicking in her shoulder    Patient reports tolerating previous visit well  Patient reports their pain to be 0/10 on a 0-10 scale with 0 being no pain and 10 being the worst pain imaginable.  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, sleep well and eliminate the cycle of pain    Objective       Baseline Isometric Testing on Med X equipment: Testing administered by PT  Date of testin2022  ROM 60 deg   Max Peak Torque 135    Min Peak Torque 80    Flex/Ext Ratio 1.5/1   % below normative data 25        Visit 10 Isometric Testing on Med X equipment: Testing administered by PT  Date of testin2022  ROM 72 deg   Max Peak Torque 166   Min Peak Torque 105   Flex/Ext Ratio 1.5/1   % below normative data 3       MOVEMENT LOSS     ROM Loss   Flexion within functional limits, left side bending at end range   Extension moderate loss    Side glide Right within functional limits    Side glide Left within functional limits   Rotation Right minimal loss, reproduction of left thoracic pain   Rotation Left within functional limits , reproduction of left thoracic pain      Outcomes:  Initial score:  Visit 5 score:  Goal:    Treatment    Pt was instructed in and performed the following:     Rosaura received therapeutic exercises to develop/improved posture, cardiovascular endurance, muscular endurance, lumbar/cervical ROM, strength and muscular endurance for 40 minutes including the following exercises:      UE rolling supine to prone    - Therapist provided overpressure x 10   Seated scapular retraction x 20    ~ Constant tactile cues for appropriate muscle activation  Prone 1/2 shoulder clocks x 4 B    - intermittent verbal cues to prevent cervical extension or truncal extension/ rotation    [place] and arrow with GTB 15 x each side      T- Lumbar flexion stretch x 8 B   Prone Scorpion Stretch x 8 B ( to facilitate dissociation between thoracic and lumbar spine)     Side-lying scapular setting w/ ER 2# DB x 20 B    Knee Plank position with rotation 2 x 5     Prone lower trap pattern #1 x 15      Plank serratus push-ups 2 x 10 (tactile cues to encourage end range motion)       Quadruped lumbar locked thoracic extension/ rotation x 20  B       Weighted ball throws 2# MB x 1 minute B   Lateral (5#DB), posterior rows 9# DB 2 x 12 B   Overhead press 9# DB 2 x 10 B   Reverse Black TB walks w/ 10# water ball press-ups with end range shoulder flexion x 20    - Needed a rest break after the initial       [email or web address] flowsheet    [facility] 8/8/2022   Visit Number 12   VAS Pain Rating 0   Lumbar Stretches - Slouch Overcorrection -   Extension in Lying -   Extension in Standing -   Lumbar Extension Seat Pad -   Femur Restraint -   Top Dead Center -   Counterweight -   Lumbar Flexion -   Lumbar Extension -   Lumbar Peak Torque -   Min Torque -   Test Percent Below Normative Data -   Test Percent Gain in  Strength from Initial  -   Lumbar Weight 56   Repetitions 20   Rating of Perceived Exertion 3   Ice - Z Lie (in min.) 0           Peripheral muscle strengthening which included 1 set of 15-20 repetitions at a slow, controlled 10-13 second per rep pace focused on strengthening supporting musculature for improved body mechanics and functional mobility.  Pt and therapist focused on proper form during treatment to ensure optimal strengthening of each targeted muscle group.  Machines were utilized including torso rotation, chest press, rowing, biceps, and triceps. Leg extension, leg curl, hip abd, hip add, and leg press added visit 3       Rosaura received the following manual therapy techniques: Joint mobilizations were applied to the: thoracic spine for 0 minutes.       Home Exercises Provided and Patient Education Provided   Stretching: Open books, seated thoracic extension  Strengthening: Scapular setting  Cardio program (V5): Not yet provided  Lifting education (V11):Not yet provdied  Using Lumbar Roll:Instructed, not yet using    Education provided:   - follow through with thoracic mobilization and stabilization based exercises as she is still unable to completely utilize her middle and lower trapezius musculature in a prone position  - encouraged to do in front of a mirror for additional visual feedback       Written Home Exercises Provided: yes.  Exercises were reviewed and Rosaura was able to demonstrate them prior to the end of the session.  Rosaura demonstrated good  understanding of the education provided.     See EMR under Patient Instructions for exercises provided 6/27/2022.      Assessment     Rosaura was increased on weight on the med-REM ENTERPRISE machine and all stretching exercises were terminated in substitution for scapular stabilization based exercises to encourage better rotator cuff, posterior shoulder and scapular stabilization muscles to engage and support. Tolerated all exercises well with only reports of  fatigue as a result and is progressing well towards all goals.       Patient is making good progress towards established goals.  Pt will continue to benefit from skilled outpatient physical therapy to address the deficits stated in the impairment chart, provide pt/family education and to maximize pt's level of independence in the home and community environment.       Anticipated Barriers for therapy: None  Pt's spiritual, cultural and educational needs considered and pt agreeable to plan of care and goals as stated below:     Goals:     Short term goals:  6 weeks or 10 visits   1.  Pt will demonstrate increased lumbar ROM by at least 3 degrees from the initial ROM value with improvements noted in functional ROM and ability to perform ADLs.  MET  2.  Pt will demonstrate increased MedX average isometric strength value  by 20% from initial test resulting in improved ability to perform bending, lifting, and carrying activities safely, confidently.  MET  3.  Patient report a reduction in worst pain score by 1-2 points for improved tolerance for increased sitting time to 2 hours without any increase of pain.  MET  4.  Pt able to perform HEP correctly with minimal cueing or supervision from therapist to encourage independent management of symptoms. MET        Long term goals: 10 weeks or 20 visits   1. Pt will demonstrate increased lumbar ROM by at least 6 degrees from initial ROM value, resulting in improved ability to perform functional fwd bending while standing and sitting. (approp and ongoing)  2. Pt will demonstrate increased MedX average isometric strength value  by 35% from initial test resulting in improved ability to perform bending, lifting, and carrying activities safely, confidently.  (approp and ongoing)  3. Pt to demonstrate ability to independently control and reduce their pain through posture positioning and mechanical movements throughout a typical day.  (approp and ongoing)  4.  Pt will demonstrate  reduced pain and improved functional outcomes as reported on the Oswestry Disability Index by reaching a score of 10% or less in order to demonstrate subjective improvement in pt's condition.    (approp and ongoing)  5. Pt will demonstrate independence with the HEP at discharge  (approp and ongoing)  6.  Rosaura will tolerate standing for 2 hours and lifting multiple objects from ground level without any increase of symptoms to improve her tolerance for daily activities  (approp and ongoing)     Plan   Continue with established Plan of Care towards established PT goals.     Claudette Armas, PT , DPT

## 2022-08-10 ENCOUNTER — CLINICAL SUPPORT (OUTPATIENT)
Dept: REHABILITATION | Facility: HOSPITAL | Age: 47
End: 2022-08-10
Payer: COMMERCIAL

## 2022-08-10 DIAGNOSIS — M53.86 DECREASED RANGE OF MOTION OF INTERVERTEBRAL DISCS OF LUMBAR SPINE: ICD-10-CM

## 2022-08-10 DIAGNOSIS — R29.898 DECREASED STRENGTH OF TRUNK AND BACK: Primary | ICD-10-CM

## 2022-08-10 PROCEDURE — 97750 PHYSICAL PERFORMANCE TEST: CPT | Mod: 32,PN

## 2022-08-10 NOTE — PROGRESS NOTES
[facility]      Name: Rosaura Arana [facility] Number: [id number]    Therapy Diagnosis:   Encounter Diagnoses   Name Primary?    Decreased strength of trunk and back Yes    Decreased range of motion of intervertebral discs of lumbar spine      Physician: Paul Brown MD    Visit Date: 8/10/2022      Physician Orders: PT Eval and Treat   Medical Diagnosis from Referral: M54.6 (ICD-10-CM) - Pain in thoracic spine   Evaluation Date: 2022  Authorization Period Expiration: 2023 (EMPLOYEE BENEFIT)  Plan of Care Expiration: 2022  Reassessment Due: 2022  Visit # / Visits authorized:  (total visits 13)       Time In: 3:45 pm  Time Out: 4:30 pm  Total Billable Time: 45 minutes    Precautions: Standard    Pattern of pain determined: Pattern 1 PEP    Subjective   Rosaura reports improvement of symptoms.   Still noticing some clicking. Having better initial set -up with seated scapular retractions and is still having clicking in her shoulder    Patient reports tolerating previous visit well  Patient reports their pain to be 0/10 on a 0-10 scale with 0 being no pain and 10 being the worst pain imaginable.  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, sleep well and eliminate the cycle of pain    Objective       Baseline Isometric Testing on Med X equipment: Testing administered by PT  Date of testin2022  ROM 60 deg   Max Peak Torque 135    Min Peak Torque 80    Flex/Ext Ratio 1.5/1   % below normative data 25        Visit 10 Isometric Testing on Med X equipment: Testing administered by PT  Date of testin2022  ROM 72 deg   Max Peak Torque 166   Min Peak Torque 105   Flex/Ext Ratio 1.5/1   % below normative data 3       MOVEMENT LOSS     ROM Loss   Flexion within functional limits, left side bending at end range   Extension moderate loss    Side glide Right within functional limits    Side glide Left within functional limits   Rotation Right minimal loss, reproduction of left thoracic pain   Rotation Left within functional limits , reproduction of left thoracic pain      Outcomes:  Initial score:  Visit 5 score:  Goal:    Treatment    Pt was instructed in and performed the following:     Rosaura received therapeutic exercises to develop/improved posture, cardiovascular endurance, muscular endurance, lumbar/cervical ROM, strength and muscular endurance for 40 minutes including the following exercises:      UE rolling supine to prone    - Therapist provided overpressure x 10   Seated scapular retraction x 20    ~ Constant tactile cues for appropriate muscle activation  Prone 1/2 shoulder clocks x 4 B    - intermittent verbal cues to prevent cervical extension or truncal extension/ rotation    [place] and arrow with GTB 15 x each side      T- Lumbar flexion stretch x 8 B   Prone Scorpion Stretch x 8 B ( to facilitate dissociation between thoracic and lumbar spine)     Side-lying scapular setting w/ ER 2# DB x 20 B    Knee Plank position with rotation 2 x 5     Prone lower trap pattern #1 x 15       Quadruped lumbar locked thoracic extension/ rotation x 20  B       Weighted ball throws 2# MB x 1 minute B   Lateral (5#DB), posterior rows 9# DB 2 x 12 B   Overhead press 9# DB 2 x 10 B   Reverse Black TB walks w/ 10# water ball press-ups with end range shoulder flexion x 20    - Needed a rest break after the initial       Shoulder blade circuit   - Scapular pnf in side-lying    - Un weighted serratus punches     - Prone straight arm serratus lift-up        Med-ex flowsheet    [facility] 8/10/2022   Visit Number 13   VAS Pain Rating 0   Lumbar Stretches - Slouch Overcorrection -   Extension in Lying 20   Extension in Standing -   Lumbar Extension Seat Pad -   Femur Restraint -   Top Dead Center -   Counterweight -   Lumbar Flexion -   Lumbar Extension -   Lumbar Peak Torque -   Min Torque -   Test  Percent Below Normative Data -   Test Percent Gain in Strength from Initial  -   Lumbar Weight 59   Repetitions 20   Rating of Perceived Exertion 8   Ice - Z Lie (in min.) 0             Peripheral muscle strengthening which included 1 set of 15-20 repetitions at a slow, controlled 10-13 second per rep pace focused on strengthening supporting musculature for improved body mechanics and functional mobility.  Pt and therapist focused on proper form during treatment to ensure optimal strengthening of each targeted muscle group.  Machines were utilized including torso rotation, chest press, rowing, biceps, and triceps. Leg extension, leg curl, hip abd, hip add, and leg press added visit 3       Rosaura received the following manual therapy techniques: Joint mobilizations were applied to the: thoracic spine for 0 minutes.       Home Exercises Provided and Patient Education Provided   Stretching: Open books, seated thoracic extension  Strengthening: Scapular setting  Cardio program (V5): Not yet provided  Lifting education (V11):Not yet provdied  Using Lumbar Roll:Instructed, not yet using    Education provided:   - follow through with thoracic mobilization and stabilization based exercises as she is still unable to completely utilize her middle and lower trapezius musculature in a prone position  - encouraged to do in front of a mirror for additional visual feedback       Written Home Exercises Provided: yes.  Exercises were reviewed and Rosaura was able to demonstrate them prior to the end of the session.  Rosaura demonstrated good  understanding of the education provided.     See EMR under Patient Instructions for exercises provided 6/27/2022.      Assessment     Rosaura was continue to complain of clicking in her shoulders with the left shoulder more involved than the right and followed up her shoulder blade circuit with the target and goal to improve intrascapular stability. Followed up with backwards shoulder rolls to  promote combined shoulder extension with thoracic extension to reduce the amount of anterior translation for the humerus within the GHJ. Verbalized understanding as the patient continues to demonstrate a significant rounded shoulder posture, however doing better with initiating scapular retraction in sitting, however still observing limitation in prone.       Patient is making good progress towards established goals.  Pt will continue to benefit from skilled outpatient physical therapy to address the deficits stated in the impairment chart, provide pt/family education and to maximize pt's level of independence in the home and community environment.       Anticipated Barriers for therapy: None  Pt's spiritual, cultural and educational needs considered and pt agreeable to plan of care and goals as stated below:     Goals:     Short term goals:  6 weeks or 10 visits   1.  Pt will demonstrate increased lumbar ROM by at least 3 degrees from the initial ROM value with improvements noted in functional ROM and ability to perform ADLs.  MET  2.  Pt will demonstrate increased MedX average isometric strength value  by 20% from initial test resulting in improved ability to perform bending, lifting, and carrying activities safely, confidently.  MET  3.  Patient report a reduction in worst pain score by 1-2 points for improved tolerance for increased sitting time to 2 hours without any increase of pain.  MET  4.  Pt able to perform HEP correctly with minimal cueing or supervision from therapist to encourage independent management of symptoms. MET        Long term goals: 10 weeks or 20 visits   1. Pt will demonstrate increased lumbar ROM by at least 6 degrees from initial ROM value, resulting in improved ability to perform functional fwd bending while standing and sitting. (approp and ongoing)  2. Pt will demonstrate increased MedX average isometric strength value  by 35% from initial test resulting in improved ability to perform  bending, lifting, and carrying activities safely, confidently.  (approp and ongoing)  3. Pt to demonstrate ability to independently control and reduce their pain through posture positioning and mechanical movements throughout a typical day.  (approp and ongoing)  4.  Pt will demonstrate reduced pain and improved functional outcomes as reported on the Oswestry Disability Index by reaching a score of 10% or less in order to demonstrate subjective improvement in pt's condition.    (approp and ongoing)  5. Pt will demonstrate independence with the HEP at discharge  (approp and ongoing)  6.  Rosaura will tolerate standing for 2 hours and lifting multiple objects from ground level without any increase of symptoms to improve her tolerance for daily activities  (approp and ongoing)     Plan   Continue with established Plan of Care towards established PT goals.     Claudette Armas, PT , DPT

## 2022-08-12 ENCOUNTER — CLINICAL SUPPORT (OUTPATIENT)
Dept: OTHER | Facility: CLINIC | Age: 47
End: 2022-08-12

## 2022-08-12 DIAGNOSIS — Z00.8 ENCOUNTER FOR OTHER GENERAL EXAMINATION: ICD-10-CM

## 2022-08-13 VITALS
BODY MASS INDEX: 23.49 KG/M2 | SYSTOLIC BLOOD PRESSURE: 120 MMHG | HEIGHT: 65 IN | WEIGHT: 141 LBS | DIASTOLIC BLOOD PRESSURE: 65 MMHG

## 2022-08-13 LAB
HDLC SERPL-MCNC: 62 MG/DL
POC CHOLESTEROL, LDL (DOCK): 73.57 MG/DL
POC CHOLESTEROL, TOTAL: 151 MG/DL
POC GLUCOSE, FASTING: 90 MG/DL (ref 60–110)
TRIGL SERPL-MCNC: 81 MG/DL

## 2022-08-13 NOTE — PROGRESS NOTES
Biometrics completed.    Results reviewed with a Registered Nurse; understanding of results and   educational materials was verbalized.  
Patient/surrogate refused vaccine

## 2022-08-15 ENCOUNTER — CLINICAL SUPPORT (OUTPATIENT)
Dept: REHABILITATION | Facility: HOSPITAL | Age: 47
End: 2022-08-15
Payer: COMMERCIAL

## 2022-08-15 DIAGNOSIS — M53.86 DECREASED RANGE OF MOTION OF INTERVERTEBRAL DISCS OF LUMBAR SPINE: ICD-10-CM

## 2022-08-15 DIAGNOSIS — R29.898 DECREASED STRENGTH OF TRUNK AND BACK: Primary | ICD-10-CM

## 2022-08-15 PROCEDURE — 97750 PHYSICAL PERFORMANCE TEST: CPT | Mod: 32,PN

## 2022-08-15 NOTE — PROGRESS NOTES
[facility]      Name: Rosaura Arana [facility] Number: [id number]    Therapy Diagnosis:   Encounter Diagnoses   Name Primary?    Decreased strength of trunk and back Yes    Decreased range of motion of intervertebral discs of lumbar spine      Physician: Paul Brown MD    Visit Date: 8/15/2022      Physician Orders: PT Eval and Treat   Medical Diagnosis from Referral: M54.6 (ICD-10-CM) - Pain in thoracic spine   Evaluation Date: 2022  Authorization Period Expiration: 2023 (EMPLOYEE BENEFIT)  Plan of Care Expiration: 2022  Reassessment Due: 2022  Visit # / Visits authorized:  (total visits 14)       Time In: 3:45 pm  Time Out: 4:30 pm  Total Billable Time: 45 minutes    Precautions: Standard    Pattern of pain determined: Pattern 1 PEP    Subjective   Rosaura reports improvement of symptoms.     Endorsed being able to do both forwards and backwards shoulder rolls without any episodes of clicking    Patient reports tolerating previous visit well  Patient reports their pain to be 0/10 on a 0-10 scale with 0 being no pain and 10 being the worst pain imaginable.  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, sleep well and eliminate the cycle of pain    Objective       Baseline Isometric Testing on Med X equipment: Testing administered by PT  Date of testin2022  ROM 60 deg   Max Peak Torque 135    Min Peak Torque 80    Flex/Ext Ratio 1.5/1   % below normative data 25        Visit 10 Isometric Testing on Med X equipment: Testing administered by PT  Date of testin2022  ROM 72 deg   Max Peak Torque 166   Min Peak Torque 105   Flex/Ext Ratio 1.5/1   % below normative data 3       MOVEMENT LOSS     ROM Loss   Flexion within functional limits, left side bending at end range   Extension moderate loss    Side glide Right within functional limits   Side glide Left within  functional limits   Rotation Right minimal loss, reproduction of left thoracic pain   Rotation Left within functional limits , reproduction of left thoracic pain      Outcomes:  Initial score:  Visit 5 score:  Goal:    Treatment    Pt was instructed in and performed the following:     Rosaura received therapeutic exercises to develop/improved posture, cardiovascular endurance, muscular endurance, lumbar/cervical ROM, strength and muscular endurance for 40 minutes including the following exercises:      UE rolling supine to prone    - Therapist provided overpressure x 10   Seated scapular retraction x 20    ~ Constant tactile cues for appropriate muscle activation  Prone 1/2 shoulder clocks x 4 B    - intermittent verbal cues to prevent cervical extension or truncal extension/ rotation    [place] and arrow with GTB 15 x each side      T- Lumbar flexion stretch x 8 B   Prone Scorpion Stretch x 8 B ( to facilitate dissociation between thoracic and lumbar spine)     Side-lying scapular setting w/ ER 2# DB x 20 B    Knee Plank position with rotation 2 x 5     Prone lower trap pattern #1 x 15       Quadruped lumbar locked thoracic extension/ rotation x 20  B       Weighted ball throws 2# MB x 1 minute B   Lateral (5#DB), posterior rows 9# DB 2 x 12 B   Overhead press 9# DB 2 x 10 B   Reverse Black TB walks w/ 10# water ball press-ups with end range shoulder flexion x 20    - Needed a rest break after the initial       Shoulder blade circuit   - Scapular pnf in side-lying    - Un weighted serratus punches     - Prone straight arm serratus lift-up        Med-ex flowsheet    [facility] 8/15/2022   Visit Number 14   VAS Pain Rating 0   Lumbar Stretches - Slouch Overcorrection -   Extension in Lying 20   Extension in Standing -   Lumbar Extension Seat Pad -   Femur Restraint -   Top Dead Center -   Counterweight -   Lumbar Flexion -   Lumbar Extension -   Lumbar Peak Torque -   Min Torque -   Test Percent Below Normative  Data -   Test Percent Gain in Strength from Initial  -   Lumbar Weight 59   Repetitions 20   Rating of Perceived Exertion 8   Ice - Z Lie (in min.) 0             Peripheral muscle strengthening which included 1 set of 15-20 repetitions at a slow, controlled 10-13 second per rep pace focused on strengthening supporting musculature for improved body mechanics and functional mobility.  Pt and therapist focused on proper form during treatment to ensure optimal strengthening of each targeted muscle group.  Machines were utilized including torso rotation, chest press, rowing, biceps, and triceps. Leg extension, leg curl, hip abd, hip add, and leg press added visit 3       Rosaura received the following manual therapy techniques: Joint mobilizations were applied to the: thoracic spine for 0 minutes.       Home Exercises Provided and Patient Education Provided   Stretching: Open books, seated thoracic extension  Strengthening: Scapular setting  Cardio program (V5): Not yet provided  Lifting education (V11):Not yet provdied  Using Lumbar Roll:Instructed, not yet using    Education provided:   - follow through with thoracic mobilization and stabilization based exercises as she is still unable to completely utilize her middle and lower trapezius musculature in a prone position  - encouraged to do in front of a mirror for additional visual feedback       Written Home Exercises Provided: yes.  Exercises were reviewed and Rosaura was able to demonstrate them prior to the end of the session.  Rosaura demonstrated good  understanding of the education provided.     See EMR under Patient Instructions for exercises provided 6/27/2022.      Assessment     Rosaura still demonstrated poor patterning with prone scapular retraction for serratus lift off and started initially in supine w/ 10# DB serratus punches. Following this she was able to appropriately contract musculature with serratus lift-off in prone and rows. Progressed to standing  shoulder ER with YTB needing constant therapist hand over hand tactile cues for appropriate sequencing.       Patient is making good progress towards established goals.  Pt will continue to benefit from skilled outpatient physical therapy to address the deficits stated in the impairment chart, provide pt/family education and to maximize pt's level of independence in the home and community environment.       Anticipated Barriers for therapy: None  Pt's spiritual, cultural and educational needs considered and pt agreeable to plan of care and goals as stated below:     Goals:     Short term goals:  6 weeks or 10 visits   1.  Pt will demonstrate increased lumbar ROM by at least 3 degrees from the initial ROM value with improvements noted in functional ROM and ability to perform ADLs.  MET  2.  Pt will demonstrate increased MedX average isometric strength value  by 20% from initial test resulting in improved ability to perform bending, lifting, and carrying activities safely, confidently.  MET  3.  Patient report a reduction in worst pain score by 1-2 points for improved tolerance for increased sitting time to 2 hours without any increase of pain.  MET  4.  Pt able to perform HEP correctly with minimal cueing or supervision from therapist to encourage independent management of symptoms. MET        Long term goals: 10 weeks or 20 visits   1. Pt will demonstrate increased lumbar ROM by at least 6 degrees from initial ROM value, resulting in improved ability to perform functional fwd bending while standing and sitting. (approp and ongoing)  2. Pt will demonstrate increased MedX average isometric strength value  by 35% from initial test resulting in improved ability to perform bending, lifting, and carrying activities safely, confidently.  (approp and ongoing)  3. Pt to demonstrate ability to independently control and reduce their pain through posture positioning and mechanical movements throughout a typical day.  (approp  and ongoing)  4.  Pt will demonstrate reduced pain and improved functional outcomes as reported on the Oswestry Disability Index by reaching a score of 10% or less in order to demonstrate subjective improvement in pt's condition.    (approp and ongoing)  5. Pt will demonstrate independence with the HEP at discharge  (approp and ongoing)  6.  Rosaura will tolerate standing for 2 hours and lifting multiple objects from ground level without any increase of symptoms to improve her tolerance for daily activities  (approp and ongoing)     Plan   Continue with established Plan of Care towards established PT goals.     Claudette Armas, PT , DPT

## 2022-08-22 ENCOUNTER — CLINICAL SUPPORT (OUTPATIENT)
Dept: REHABILITATION | Facility: HOSPITAL | Age: 47
End: 2022-08-22
Payer: COMMERCIAL

## 2022-08-22 ENCOUNTER — PATIENT MESSAGE (OUTPATIENT)
Dept: FAMILY MEDICINE | Facility: CLINIC | Age: 47
End: 2022-08-22

## 2022-08-22 DIAGNOSIS — R29.898 DECREASED STRENGTH OF TRUNK AND BACK: Primary | ICD-10-CM

## 2022-08-22 DIAGNOSIS — M53.86 DECREASED RANGE OF MOTION OF INTERVERTEBRAL DISCS OF LUMBAR SPINE: ICD-10-CM

## 2022-08-22 PROCEDURE — 97750 PHYSICAL PERFORMANCE TEST: CPT | Mod: 32,PN

## 2022-08-22 RX ORDER — VALACYCLOVIR HYDROCHLORIDE 1 G/1
2000 TABLET, FILM COATED ORAL EVERY 12 HOURS PRN
Qty: 30 TABLET | Refills: 3 | Status: SHIPPED | OUTPATIENT
Start: 2022-08-22

## 2022-08-22 NOTE — PROGRESS NOTES
[facility]      Name: Rosaura Arana [facility] Number: [id number]    Therapy Diagnosis:   Encounter Diagnoses   Name Primary?    Decreased strength of trunk and back Yes    Decreased range of motion of intervertebral discs of lumbar spine      Physician: Paul Brown MD    Visit Date: 2022      Physician Orders: PT Eval and Treat   Medical Diagnosis from Referral: M54.6 (ICD-10-CM) - Pain in thoracic spine   Evaluation Date: 2022  Authorization Period Expiration: 2023 (EMPLOYEE BENEFIT)  Plan of Care Expiration: 2022  Reassessment Due: 2022  Visit # / Visits authorized:  (total visits 15)       Time In: 3:45 pm  Time Out: 4:30 pm  Total Billable Time: 45 minutes    Precautions: Standard    Pattern of pain determined: Pattern 1 PEP    Subjective   Rosaura reports improvement of symptoms.     Endorsed being able to do both forwards and backwards shoulder rolls without any episodes of clicking. Has been able to perform her exercises much easier without pain.     Patient reports tolerating previous visit well  Patient reports their pain to be 0/10 on a 0-10 scale with 0 being no pain and 10 being the worst pain imaginable.  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, sleep well and eliminate the cycle of pain    Objective       Baseline Isometric Testing on Med X equipment: Testing administered by PT  Date of testin2022  ROM 60 deg   Max Peak Torque 135    Min Peak Torque 80    Flex/Ext Ratio 1.5/1   % below normative data 25        Visit 10 Isometric Testing on Med X equipment: Testing administered by PT  Date of testin2022  ROM 72 deg   Max Peak Torque 166   Min Peak Torque 105   Flex/Ext Ratio 1.5/1   % below normative data 3       MOVEMENT LOSS     ROM Loss   Flexion within functional limits, left side bending at end range   Extension moderate loss    Side  glide Right within functional limits   Side glide Left within functional limits   Rotation Right minimal loss, reproduction of left thoracic pain   Rotation Left within functional limits , reproduction of left thoracic pain      Outcomes:  Initial score:  Visit 5 score:  Goal:    Treatment    Pt was instructed in and performed the following:     Rosaura received therapeutic exercises to develop/improved posture, cardiovascular endurance, muscular endurance, lumbar/cervical ROM, strength and muscular endurance for 40 minutes including the following exercises:      UE rolling supine to prone    - Therapist provided overpressure x 10   Seated scapular retraction x 20    ~ Constant tactile cues for appropriate muscle activation  Prone 1/2 shoulder clocks x 4 B    - intermittent verbal cues to prevent cervical extension or truncal extension/ rotation    [place] and arrow with GTB 15 x each side      Side-lying scapular setting w/ ER 2# DB x 20 B    Knee Plank position with rotation 2 x 5     Prone lower trap pattern #1 x 15       Quadruped lumbar locked thoracic extension/ rotation x 20  B     Lateral (5#DB), posterior rows 9# DB 2 x 12 B   Overhead press 9# DB 2 x 10 B   Reverse Black TB walks w/ 10# water ball press-ups with end range shoulder flexion x 20    - Needed a rest break after the initial     Shoulder blade circuit   - Un weighted serratus punches     - Prone straight arm serratus lift-up w/ 5# DB (tactile cues to prevent end range scapular protraction)         Med-ex flowsheet    [facility] 8/22/2022   Visit Number 15   VAS Pain Rating 0   Lumbar Stretches - Slouch Overcorrection -   Extension in Lying 20   Extension in Standing -   Lumbar Extension Seat Pad -   Femur Restraint -   Top Dead Center -   Counterweight -   Lumbar Flexion -   Lumbar Extension -   Lumbar Peak Torque -   Min Torque -   Test Percent Below Normative Data -   Test Percent Gain in Strength from Initial  -   Lumbar Weight 59    Repetitions 20   Rating of Perceived Exertion 7   Ice - Z Lie (in min.) 0         Peripheral muscle strengthening which included 1 set of 15-20 repetitions at a slow, controlled 10-13 second per rep pace focused on strengthening supporting musculature for improved body mechanics and functional mobility.  Pt and therapist focused on proper form during treatment to ensure optimal strengthening of each targeted muscle group.  Machines were utilized including torso rotation, chest press, rowing, biceps, and triceps. Leg extension, leg curl, hip abd, hip add, and leg press added visit 3       Rosaura received the following manual therapy techniques: Joint mobilizations were applied to the: thoracic spine for 0 minutes.       Home Exercises Provided and Patient Education Provided   Stretching: Open books, seated thoracic extension  Strengthening: Scapular setting  Cardio program (V5): Not yet provided  Lifting education (V11):Not yet provdied  Using Lumbar Roll:Instructed, not yet using    Education provided:   - follow through with thoracic mobilization and stabilization based exercises as she is still unable to completely utilize her middle and lower trapezius musculature in a prone position  - encouraged to do in front of a mirror for additional visual feedback       Written Home Exercises Provided: yes.  Exercises were reviewed and Rosaura was able to demonstrate them prior to the end of the session.  Rosaura demonstrated good  understanding of the education provided.     See EMR under Patient Instructions for exercises provided 6/27/2022.      Assessment     Rosaura was able to better elicit and utilize intrascapular musculature and demonstrated better upright posture with thoracic extension    Patient is making good progress towards established goals.  Pt will continue to benefit from skilled outpatient physical therapy to address the deficits stated in the impairment chart, provide pt/family education and to maximize  pt's level of independence in the home and community environment.       Anticipated Barriers for therapy: None  Pt's spiritual, cultural and educational needs considered and pt agreeable to plan of care and goals as stated below:     Goals:     Short term goals:  6 weeks or 10 visits   1.  Pt will demonstrate increased lumbar ROM by at least 3 degrees from the initial ROM value with improvements noted in functional ROM and ability to perform ADLs.  MET  2.  Pt will demonstrate increased MedX average isometric strength value  by 20% from initial test resulting in improved ability to perform bending, lifting, and carrying activities safely, confidently.  MET  3.  Patient report a reduction in worst pain score by 1-2 points for improved tolerance for increased sitting time to 2 hours without any increase of pain.  MET  4.  Pt able to perform HEP correctly with minimal cueing or supervision from therapist to encourage independent management of symptoms. MET        Long term goals: 10 weeks or 20 visits   1. Pt will demonstrate increased lumbar ROM by at least 6 degrees from initial ROM value, resulting in improved ability to perform functional fwd bending while standing and sitting. (approp and ongoing)  2. Pt will demonstrate increased MedX average isometric strength value  by 35% from initial test resulting in improved ability to perform bending, lifting, and carrying activities safely, confidently.  (approp and ongoing)  3. Pt to demonstrate ability to independently control and reduce their pain through posture positioning and mechanical movements throughout a typical day.  (approp and ongoing)  4.  Pt will demonstrate reduced pain and improved functional outcomes as reported on the Oswestry Disability Index by reaching a score of 10% or less in order to demonstrate subjective improvement in pt's condition.    (approp and ongoing)  5. Pt will demonstrate independence with the HEP at discharge  (approp and  ongoing)  6.  Rosaura will tolerate standing for 2 hours and lifting multiple objects from ground level without any increase of symptoms to improve her tolerance for daily activities  (approp and ongoing)     Plan   Continue with established Plan of Care towards established PT goals.     Claudette Armas, PT , DPT

## 2022-08-25 ENCOUNTER — CLINICAL SUPPORT (OUTPATIENT)
Dept: REHABILITATION | Facility: HOSPITAL | Age: 47
End: 2022-08-25
Payer: COMMERCIAL

## 2022-08-25 DIAGNOSIS — R29.898 DECREASED STRENGTH OF TRUNK AND BACK: Primary | ICD-10-CM

## 2022-08-25 DIAGNOSIS — M53.86 DECREASED RANGE OF MOTION OF INTERVERTEBRAL DISCS OF LUMBAR SPINE: ICD-10-CM

## 2022-08-25 PROCEDURE — 97750 PHYSICAL PERFORMANCE TEST: CPT | Mod: 32,PN

## 2022-08-25 NOTE — PROGRESS NOTES
[facility]      Name: Rosaura Arana [facility] Number: [id number]    Therapy Diagnosis:   Encounter Diagnoses   Name Primary?    Decreased strength of trunk and back Yes    Decreased range of motion of intervertebral discs of lumbar spine      Physician: Paul Brown MD    Visit Date: 2022      Physician Orders: PT Eval and Treat   Medical Diagnosis from Referral: M54.6 (ICD-10-CM) - Pain in thoracic spine   Evaluation Date: 2022  Authorization Period Expiration: 2023 (EMPLOYEE BENEFIT)  Plan of Care Expiration: 2022  Reassessment Due: 2022  Visit # / Visits authorized: 15/ 20 (total visits 16)       Time In: 3:30 pm  Time Out: 4:30 pm  Total Billable Time: 45 minutes    Precautions: Standard    Pattern of pain determined: Pattern 1 PEP    Subjective   Rosaura reports improvement of symptoms.     Endorsed being able to do both forwards and backwards shoulder rolls without any episodes of clicking. Has been able to perform her exercises much easier without pain. Has been noticing an ability to stand up straighter without having to think about it.    Patient reports tolerating previous visit well  Patient reports their pain to be  0 /10 on a 0-10 scale with 0 being no pain and 10 being the worst pain imaginable.  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, sleep well and eliminate the cycle of pain    Objective       Baseline Isometric Testing on Med X equipment: Testing administered by PT  Date of testin2022  ROM 60 deg   Max Peak Torque 135    Min Peak Torque 80    Flex/Ext Ratio 1.5/1   % below normative data 25        Visit 10 Isometric Testing on Med X equipment: Testing administered by PT  Date of testin2022  ROM 72 deg   Max Peak Torque 166   Min Peak Torque 105   Flex/Ext Ratio 1.5/1   % below normative data 3       MOVEMENT LOSS     ROM Loss   Flexion within  functional limits, left side bending at end range   Extension moderate loss    Side glide Right within functional limits   Side glide Left within functional limits   Rotation Right minimal loss, reproduction of left thoracic pain   Rotation Left within functional limits , reproduction of left thoracic pain      Outcomes:  Initial score:  Visit 5 score:  Goal:    Treatment    Pt was instructed in and performed the following:     Rosaura received therapeutic exercises to develop/improved posture, cardiovascular endurance, muscular endurance, lumbar/cervical ROM, strength and muscular endurance for 40 minutes including the following exercises:      UE rolling supine to prone    - Therapist provided overpressure x 10   Seated scapular retraction x 20    ~ Constant tactile cues for appropriate muscle activation  Prone 1/2 shoulder clocks x 4 B    - intermittent verbal cues to prevent cervical extension or truncal extension/ rotation    [place] and arrow with GTB 15 x each side      Side-lying scapular setting w/ ER 2# DB x 20 B    Knee Plank position with rotation 2 x 5     Prone lower trap pattern #1 x 15       Quadruped lumbar locked thoracic extension/ rotation x 20  B     Lateral (5#DB), posterior rows 9# DB 2 x 12 B   Overhead press 9# DB 2 x 10 B   Reverse Black TB walks w/ 10# water ball press-ups with end range shoulder flexion x 20    - Needed a rest break after the initial     Shoulder blade circuit   - Un weighted serratus punches     - Prone straight arm serratus lift-up w/ 5# DB (tactile cues to prevent end range scapular protraction)         Med-shopa flowsheet    [facility] 8/25/2022   Visit Number 16   VAS Pain Rating 0   Lumbar Stretches - Slouch Overcorrection -   Extension in Lying 20   Extension in Standing -   Lumbar Extension Seat Pad -   Femur Restraint -   Top Dead Center -   Counterweight -   Lumbar Flexion -   Lumbar Extension -   Lumbar Peak Torque -   Min Torque -   Test Percent Below  Normative Data -   Test Percent Gain in Strength from Initial  -   Lumbar Weight 61   Repetitions 16   Rating of Perceived Exertion 7   Ice - Z Lie (in min.) 0           Peripheral muscle strengthening which included 1 set of 15-20 repetitions at a slow, controlled 10-13 second per rep pace focused on strengthening supporting musculature for improved body mechanics and functional mobility.  Pt and therapist focused on proper form during treatment to ensure optimal strengthening of each targeted muscle group.  Machines were utilized including torso rotation, chest press, rowing, biceps, and triceps. Leg extension, leg curl, hip abd, hip add, and leg press added visit 3       Rosaura received the following manual therapy techniques: Joint mobilizations were applied to the: thoracic spine for 0 minutes.       Home Exercises Provided and Patient Education Provided   Stretching: Open books, seated thoracic extension  Strengthening: Scapular setting  Cardio program (V5): Provided  Lifting education (V11):Not yet provdied  Using Lumbar Roll:Instructed, not yet using    Education provided:   - follow through with thoracic mobilization and stabilization based exercises as she is still unable to completely utilize her middle and lower trapezius musculature in a prone position  - encouraged to do in front of a mirror for additional visual feedback       Written Home Exercises Provided: yes.  Exercises were reviewed and Rosaura was able to demonstrate them prior to the end of the session.  Rosaura demonstrated good  understanding of the education provided.     See EMR under Patient Instructions for exercises provided 6/27/2022.      Assessment     Rosaura was able to better elicit and utilize intrascapular musculature and demonstrated better upright posture with thoracic extension. Noted good scapular positioning for recruitment of shoulder surrounding with overhead press and flys requiring minimal therapist verbal or tactile cues to  perform appropriately. She is progressing well and is firmly in the recovery of function and stability based categories.       Patient is making good progress towards established goals.  Pt will continue to benefit from skilled outpatient physical therapy to address the deficits stated in the impairment chart, provide pt/family education and to maximize pt's level of independence in the home and community environment.       Anticipated Barriers for therapy: None  Pt's spiritual, cultural and educational needs considered and pt agreeable to plan of care and goals as stated below:     Goals:     Short term goals:  6 weeks or 10 visits   1.  Pt will demonstrate increased lumbar ROM by at least 3 degrees from the initial ROM value with improvements noted in functional ROM and ability to perform ADLs.  MET  2.  Pt will demonstrate increased MedX average isometric strength value  by 20% from initial test resulting in improved ability to perform bending, lifting, and carrying activities safely, confidently.  MET  3.  Patient report a reduction in worst pain score by 1-2 points for improved tolerance for increased sitting time to 2 hours without any increase of pain.  MET  4.  Pt able to perform HEP correctly with minimal cueing or supervision from therapist to encourage independent management of symptoms. MET        Long term goals: 10 weeks or 20 visits   1. Pt will demonstrate increased lumbar ROM by at least 6 degrees from initial ROM value, resulting in improved ability to perform functional fwd bending while standing and sitting. (approp and ongoing)  2. Pt will demonstrate increased MedX average isometric strength value  by 35% from initial test resulting in improved ability to perform bending, lifting, and carrying activities safely, confidently.  (approp and ongoing)  3. Pt to demonstrate ability to independently control and reduce their pain through posture positioning and mechanical movements throughout a typical  day.  (approp and ongoing)  4.  Pt will demonstrate reduced pain and improved functional outcomes as reported on the Oswestry Disability Index by reaching a score of 10% or less in order to demonstrate subjective improvement in pt's condition.    (approp and ongoing)  5. Pt will demonstrate independence with the HEP at discharge  (approp and ongoing)  6.  Rosaura will tolerate standing for 2 hours and lifting multiple objects from ground level without any increase of symptoms to improve her tolerance for daily activities  (approp and ongoing)     Plan   Continue with established Plan of Care towards established PT goals.     Claudette Armas, PT , DPT

## 2022-08-29 ENCOUNTER — CLINICAL SUPPORT (OUTPATIENT)
Dept: REHABILITATION | Facility: HOSPITAL | Age: 47
End: 2022-08-29
Payer: COMMERCIAL

## 2022-08-29 ENCOUNTER — OFFICE VISIT (OUTPATIENT)
Dept: FAMILY MEDICINE | Facility: CLINIC | Age: 47
End: 2022-08-29
Payer: COMMERCIAL

## 2022-08-29 VITALS
HEIGHT: 65 IN | HEART RATE: 84 BPM | SYSTOLIC BLOOD PRESSURE: 120 MMHG | TEMPERATURE: 98 F | BODY MASS INDEX: 24.16 KG/M2 | WEIGHT: 145 LBS | OXYGEN SATURATION: 98 % | RESPIRATION RATE: 18 BRPM | DIASTOLIC BLOOD PRESSURE: 72 MMHG

## 2022-08-29 DIAGNOSIS — E78.00 PURE HYPERCHOLESTEROLEMIA: ICD-10-CM

## 2022-08-29 DIAGNOSIS — G43.009 MIGRAINE WITHOUT AURA AND WITHOUT STATUS MIGRAINOSUS, NOT INTRACTABLE: ICD-10-CM

## 2022-08-29 DIAGNOSIS — Z11.59 NEED FOR HEPATITIS C SCREENING TEST: ICD-10-CM

## 2022-08-29 DIAGNOSIS — F32.A ANXIETY AND DEPRESSION: ICD-10-CM

## 2022-08-29 DIAGNOSIS — J45.990 EXERCISE-INDUCED ASTHMA: ICD-10-CM

## 2022-08-29 DIAGNOSIS — F41.9 ANXIETY AND DEPRESSION: ICD-10-CM

## 2022-08-29 DIAGNOSIS — R29.898 DECREASED STRENGTH OF TRUNK AND BACK: Primary | ICD-10-CM

## 2022-08-29 DIAGNOSIS — Z11.4 ENCOUNTER FOR SCREENING FOR HIV: ICD-10-CM

## 2022-08-29 DIAGNOSIS — M53.86 DECREASED RANGE OF MOTION OF INTERVERTEBRAL DISCS OF LUMBAR SPINE: ICD-10-CM

## 2022-08-29 DIAGNOSIS — F90.0 ATTENTION DEFICIT HYPERACTIVITY DISORDER (ADHD), PREDOMINANTLY INATTENTIVE TYPE: Primary | ICD-10-CM

## 2022-08-29 DIAGNOSIS — J30.1 CHRONIC SEASONAL ALLERGIC RHINITIS DUE TO POLLEN: ICD-10-CM

## 2022-08-29 PROCEDURE — 3008F BODY MASS INDEX DOCD: CPT | Mod: CPTII,S$GLB,, | Performed by: INTERNAL MEDICINE

## 2022-08-29 PROCEDURE — 99213 PR OFFICE/OUTPT VISIT, EST, LEVL III, 20-29 MIN: ICD-10-PCS | Mod: S$GLB,,, | Performed by: INTERNAL MEDICINE

## 2022-08-29 PROCEDURE — 97750 PHYSICAL PERFORMANCE TEST: CPT | Mod: 32,PN

## 2022-08-29 PROCEDURE — 99213 OFFICE O/P EST LOW 20 MIN: CPT | Mod: S$GLB,,, | Performed by: INTERNAL MEDICINE

## 2022-08-29 PROCEDURE — 1159F PR MEDICATION LIST DOCUMENTED IN MEDICAL RECORD: ICD-10-PCS | Mod: CPTII,S$GLB,, | Performed by: INTERNAL MEDICINE

## 2022-08-29 PROCEDURE — 3074F PR MOST RECENT SYSTOLIC BLOOD PRESSURE < 130 MM HG: ICD-10-PCS | Mod: CPTII,S$GLB,, | Performed by: INTERNAL MEDICINE

## 2022-08-29 PROCEDURE — 3078F PR MOST RECENT DIASTOLIC BLOOD PRESSURE < 80 MM HG: ICD-10-PCS | Mod: CPTII,S$GLB,, | Performed by: INTERNAL MEDICINE

## 2022-08-29 PROCEDURE — 1159F MED LIST DOCD IN RCRD: CPT | Mod: CPTII,S$GLB,, | Performed by: INTERNAL MEDICINE

## 2022-08-29 PROCEDURE — 3008F PR BODY MASS INDEX (BMI) DOCUMENTED: ICD-10-PCS | Mod: CPTII,S$GLB,, | Performed by: INTERNAL MEDICINE

## 2022-08-29 PROCEDURE — 3074F SYST BP LT 130 MM HG: CPT | Mod: CPTII,S$GLB,, | Performed by: INTERNAL MEDICINE

## 2022-08-29 PROCEDURE — 3078F DIAST BP <80 MM HG: CPT | Mod: CPTII,S$GLB,, | Performed by: INTERNAL MEDICINE

## 2022-08-29 RX ORDER — SUMATRIPTAN SUCCINATE 100 MG/1
100 TABLET ORAL
Qty: 27 TABLET | Refills: 3 | Status: SHIPPED | OUTPATIENT
Start: 2022-08-29 | End: 2023-09-13 | Stop reason: SDUPTHER

## 2022-08-29 RX ORDER — LISDEXAMFETAMINE DIMESYLATE 40 MG/1
40 CAPSULE ORAL DAILY
Qty: 30 CAPSULE | Refills: 0 | Status: SHIPPED | OUTPATIENT
Start: 2022-08-29 | End: 2022-09-19 | Stop reason: SDUPTHER

## 2022-08-29 RX ORDER — HYDROCORTISONE 25 MG/G
CREAM TOPICAL 2 TIMES DAILY
Qty: 30 G | Refills: 1 | Status: SHIPPED | OUTPATIENT
Start: 2022-08-29

## 2022-08-29 RX ORDER — ALBUTEROL SULFATE 90 UG/1
2 AEROSOL, METERED RESPIRATORY (INHALATION) EVERY 6 HOURS PRN
Qty: 18 G | Refills: 3 | Status: SHIPPED | OUTPATIENT
Start: 2022-08-29

## 2022-08-29 RX ORDER — ALPRAZOLAM 0.5 MG/1
0.5 TABLET ORAL 3 TIMES DAILY PRN
Qty: 30 TABLET | Refills: 1 | Status: SHIPPED | OUTPATIENT
Start: 2022-08-29 | End: 2023-02-23 | Stop reason: SDUPTHER

## 2022-08-29 RX ORDER — MONTELUKAST SODIUM 10 MG/1
10 TABLET ORAL NIGHTLY
Qty: 90 TABLET | Refills: 1 | Status: SHIPPED | OUTPATIENT
Start: 2022-08-29 | End: 2023-02-23 | Stop reason: SDUPTHER

## 2022-08-29 RX ORDER — CYCLOBENZAPRINE HCL 10 MG
10 TABLET ORAL 3 TIMES DAILY PRN
Qty: 30 TABLET | Refills: 1 | Status: SHIPPED | OUTPATIENT
Start: 2022-08-29 | End: 2023-05-18

## 2022-08-29 NOTE — PROGRESS NOTES
Subjective:       Patient ID: Rosaura Loredo is a 46 y.o. female.    Chief Complaint: Anxiety (refill)    Here for routine follow up.  She would like to discuss resuming meds for ADHD.  She underwent formal evaluation several years ago and was placed on vyvanse but she didn't find it all that helpful.  However, she was on a lower dose (30mg).  She is working in a different area at work and her coworkers are getting frustrated because they keep having to repeat things for her.      Anxiety  Presents for follow-up visit. Patient reports no chest pain, confusion, decreased concentration, dizziness, nausea, nervous/anxious behavior, palpitations, shortness of breath or suicidal ideas. The severity of symptoms is mild.       Hyperlipidemia  This is a chronic problem. The problem is controlled. Recent lipid tests were reviewed and are normal (labs done yearly for work). Pertinent negatives include no chest pain, myalgias or shortness of breath. Current antihyperlipidemic treatment includes statins. The current treatment provides significant improvement of lipids. There are no compliance problems.  Review of Systems   Constitutional: Negative.  Negative for activity change, appetite change, chills, diaphoresis, fatigue, fever and unexpected weight change.   HENT: Negative.  Negative for congestion, ear discharge, ear pain, hearing loss, mouth sores, nosebleeds, postnasal drip, rhinorrhea, sinus pressure, sinus pain, sneezing, sore throat, tinnitus, trouble swallowing and voice change.    Eyes: Negative.  Negative for photophobia, pain, discharge, redness, itching and visual disturbance.   Respiratory: Negative.  Negative for apnea, cough, choking, chest tightness, shortness of breath and wheezing.    Cardiovascular: Negative.  Negative for chest pain, palpitations and leg swelling.   Gastrointestinal: Negative.  Negative for abdominal distention, abdominal pain, blood in stool, constipation, diarrhea, nausea and  vomiting.   Endocrine: Negative.  Negative for cold intolerance, heat intolerance, polydipsia and polyuria.   Genitourinary: Negative.  Negative for decreased urine volume, difficulty urinating, dyspareunia, dysuria, enuresis, frequency, genital sores, hematuria, menstrual problem, pelvic pain, urgency, vaginal bleeding, vaginal discharge and vaginal pain.   Musculoskeletal: Negative.  Negative for arthralgias, back pain, gait problem, joint swelling, myalgias, neck pain and neck stiffness.   Skin: Negative.  Negative for rash and wound.   Allergic/Immunologic: Negative.  Negative for environmental allergies, food allergies and immunocompromised state.   Neurological: Negative.  Negative for dizziness, tremors, seizures, syncope, facial asymmetry, speech difficulty, weakness, light-headedness, numbness and headaches.   Hematological: Negative.  Negative for adenopathy. Does not bruise/bleed easily.   Psychiatric/Behavioral: Negative.  Negative for confusion, decreased concentration, hallucinations, self-injury, sleep disturbance and suicidal ideas. The patient is not nervous/anxious.      Past Medical History:   Diagnosis Date    Abnormal Pap smear of cervix     NEELAM d/t recurrent abnormals/mild dysplasia; negative pathology after hysterctomy    Allergy     Anxiety     Asthma     Basal cell carcinoma (BCC)     R forehead    Hyperlipidemia     PAC (premature atrial contraction)     Rosacea       Past Surgical History:   Procedure Laterality Date     SECTION      COLD KNIFE CONIZATION OF CERVIX      multiple    COLONOSCOPY N/A 10/20/2020    Procedure: COLONOSCOPY;  Surgeon: Sudheer Santiago III, MD;  [facility];  Service: Endoscopy;  Laterality: N/A;    DENTAL SURGERY  03/15/2022    HYSTERECTOMY  2015    ovaries intact    NEUROPLASTY Left 2020    Procedure: NEUROPLASTY;  Surgeon: Quinton Alexandre DPM;  Location: [facility] OR;  Service: Podiatry;  Laterality: Left;    PLANTAR FASCIOTOMY  Left 12/11/2020    Procedure: FASCIOTOMY, PLANTAR;  Surgeon: Quinton Alexandre DPM;  Location: [facility] OR;  Service: Podiatry;  Laterality: Left;    TONSILLECTOMY         Family History   Problem Relation Age of Onset    Diabetes Mother     Asthma Mother     Hypertension Father     Prostate cancer Father     Melanoma Neg Hx        Social History     Socioeconomic History    Marital status:    Tobacco Use    Smoking status: Never    Smokeless tobacco: Never   Substance and Sexual Activity    Alcohol use: Yes     Comment: occ    Drug use: No    Sexual activity: Yes     Birth control/protection: See Surgical Hx, Surgical   Social History Narrative    Live with        Current Outpatient Medications   Medication Sig Dispense Refill    acetaminophen (TYLENOL) 500 MG tablet Take 1,000 mg by mouth every 6 (six) hours as needed for Pain.      ibuprofen (ADVIL,MOTRIN) 200 MG tablet Take 400 mg by mouth every 6 (six) hours as needed for Pain.      loratadine (CLARITIN) 10 mg tablet Take 1 tablet (10 mg total) by mouth once daily. 90 tablet 1    metoprolol succinate (TOPROL-XL) 25 MG 24 hr tablet Take 1 tablet (25 mg total) by mouth daily as needed (palpitations). 90 tablet 0    multivitamin (THERAGRAN) per tablet Take 1 tablet by mouth once daily.      simvastatin (ZOCOR) 20 MG tablet Take 1 tablet (20 mg total) by mouth every evening. 90 tablet 1    valACYclovir (VALTREX) 1000 MG tablet Take 2 tablets (2,000 mg total) by mouth every 12 (twelve) hours as needed (fever blister). 30 tablet 3    albuterol (PROVENTIL/VENTOLIN HFA) 90 mcg/actuation inhaler Inhale 2 puffs into the lungs every 6 (six) hours as needed for Wheezing. 18 g 3    ALPRAZolam (XANAX) 0.5 MG tablet Take 1 tablet (0.5 mg total) by mouth 3 (three) times daily as needed for Anxiety. 30 tablet 1    cyclobenzaprine (FLEXERIL) 10 MG tablet Take 1 tablet (10 mg total) by mouth 3 (three) times daily as needed for Muscle spasms. 30 tablet 1     "hydrocortisone 2.5 % cream Apply topically 2 (two) times daily. 20 g 1    lisdexamfetamine (VYVANSE) 40 MG Cap Take 1 capsule (40 mg total) by mouth once daily. 30 capsule 0    montelukast (SINGULAIR) 10 mg tablet Take 1 tablet (10 mg total) by mouth every evening. 90 tablet 1    sumatriptan (IMITREX) 100 MG tablet Take 1 tablet (100 mg total) by mouth every 2 (two) hours as needed for Migraine. Do not exceed 2 doses in 24 hours. 27 tablet 3     No current facility-administered medications for this visit.       Review of patient's allergies indicates:   Allergen Reactions    Mepergan fortis Shortness Of Breath    Penicillins Rash    Sulfa (sulfonamide antibiotics) Rash    Sulfamethoxazole-trimethoprim Rash     Objective:    HPI     Anxiety     Additional comments: refill          Last edited by Ismael Raymond MA on 8/29/2022  2:01 PM.      Blood pressure 120/72, pulse 84, temperature 98.1 °F (36.7 °C), resp. rate 18, height 5' 5" (1.651 m), weight 65.8 kg (145 lb), SpO2 98 %. Body mass index is 24.13 kg/m².   Physical Exam  Vitals and nursing note reviewed.   Constitutional:       General: She is not in acute distress.     Appearance: She is well-developed. She is not ill-appearing, toxic-appearing or diaphoretic.   HENT:      Head: Normocephalic and atraumatic.   Eyes:      General: No scleral icterus.        Right eye: No discharge.         Left eye: No discharge.      Conjunctiva/sclera: Conjunctivae normal.   Neck:      Vascular: No carotid bruit.   Cardiovascular:      Rate and Rhythm: Normal rate and regular rhythm.      Heart sounds: Normal heart sounds. No murmur heard.  Pulmonary:      Effort: Pulmonary effort is normal. No respiratory distress.      Breath sounds: Normal breath sounds. No decreased breath sounds, wheezing, rhonchi or rales.   Abdominal:      General: There is no distension.      Palpations: Abdomen is soft.      Tenderness: There is no abdominal tenderness. There is no guarding or " rebound.   Musculoskeletal:      Right lower leg: No edema.      Left lower leg: No edema.   Skin:     General: Skin is warm and dry.   Neurological:      Mental Status: She is alert.      Motor: No tremor.   Psychiatric:         Mood and Affect: Mood normal.         Speech: Speech normal.         Behavior: Behavior normal.         Clinical Support on 08/12/2022   Component Date Value Ref Range Status    POC Cholesterol, Total 08/12/2022 151  <240 MG/DL Final    POC Cholesterol, HDL 08/12/2022 62  MG/DL Final    POC Cholesterol, LDL 08/12/2022 73.57  <160 MG/DL Final    POC Triglycerides 08/12/2022 81  <160 MG/DL Final    POC Glucose, Fasting 08/12/2022 90  60 - 110 MG/DL Final   ]  Assessment:       1. Attention deficit hyperactivity disorder (ADHD), predominantly inattentive type    2. Migraine without aura and without status migrainosus, not intractable    3. Chronic seasonal allergic rhinitis due to pollen    4. Anxiety and depression    5. Exercise-induced asthma    6. Encounter for screening for HIV    7. Need for hepatitis C screening test    8. Pure hypercholesterolemia        Plan:       Rosaura was seen today for anxiety.    Diagnoses and all orders for this visit:    Attention deficit hyperactivity disorder (ADHD), predominantly inattentive type  Comments:  Try vyvanse 40mg and titrate from there.  Discussed that stimulants can increase anxiety  Orders:  -     lisdexamfetamine (VYVANSE) 40 MG Cap; Take 1 capsule (40 mg total) by mouth once daily.    Migraine without aura and without status migrainosus, not intractable  -     sumatriptan (IMITREX) 100 MG tablet; Take 1 tablet (100 mg total) by mouth every 2 (two) hours as needed for Migraine. Do not exceed 2 doses in 24 hours.    Chronic seasonal allergic rhinitis due to pollen  -     montelukast (SINGULAIR) 10 mg tablet; Take 1 tablet (10 mg total) by mouth every evening.    Anxiety and depression  -     ALPRAZolam (XANAX) 0.5 MG tablet; Take 1 tablet (0.5  mg total) by mouth 3 (three) times daily as needed for Anxiety.    Exercise-induced asthma  -     albuterol (PROVENTIL/VENTOLIN HFA) 90 mcg/actuation inhaler; Inhale 2 puffs into the lungs every 6 (six) hours as needed for Wheezing.    Encounter for screening for HIV  -     HIV 1/2 Ag/Ab (4th Gen); Future    Need for hepatitis C screening test  -     Hepatitis C Antibody; Future    Pure hypercholesterolemia  -     Comprehensive Metabolic Panel; Future    Other orders  -     cyclobenzaprine (FLEXERIL) 10 MG tablet; Take 1 tablet (10 mg total) by mouth 3 (three) times daily as needed for Muscle spasms.  -     hydrocortisone 2.5 % cream; Apply topically 2 (two) times daily.

## 2022-08-29 NOTE — PROGRESS NOTES
[facility]      Name: Rosaura Arana [facility] Number: [id number]    Therapy Diagnosis:   Encounter Diagnoses   Name Primary?    Decreased strength of trunk and back Yes    Decreased range of motion of intervertebral discs of lumbar spine      Physician: Paul Brown MD    Visit Date: 2022      Physician Orders: PT Eval and Treat   Medical Diagnosis from Referral: M54.6 (ICD-10-CM) - Pain in thoracic spine   Evaluation Date: 2022  Authorization Period Expiration: 2023 (EMPLOYEE BENEFIT)  Plan of Care Expiration: 2022  Reassessment Due: 2022  Visit # / Visits authorized: 15/ 20 (total visits 16)       Time In: 3:05 pm  Time Out: 4:05 pm  Total Billable Time: 45 minutes    Precautions: Standard    Pattern of pain determined: Pattern 1 PEP    Subjective   Rosaura reports improvement of symptoms.     Was weeding out of her bushes for several hours Saturday and continues to have stiffness and tightness across the middle back. Denied doing any stretches to try and assist. Has been taking ibprophen since Saturday    Patient reports tolerating previous visit well  Patient reports their pain to be  0 /10 on a 0-10 scale with 0 being no pain and 10 being the worst pain imaginable.  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, sleep well and eliminate the cycle of pain    Objective       Baseline Isometric Testing on Med X equipment: Testing administered by PT  Date of testin2022  ROM 60 deg   Max Peak Torque 135    Min Peak Torque 80    Flex/Ext Ratio 1.5/1   % below normative data 25        Visit 10 Isometric Testing on Med X equipment: Testing administered by PT  Date of testin2022  ROM 72 deg   Max Peak Torque 166   Min Peak Torque 105   Flex/Ext Ratio 1.5/1   % below normative data 3       MOVEMENT LOSS     ROM Loss   Flexion within functional limits, left side bending at  "end range   Extension moderate loss    Side glide Right within functional limits   Side glide Left within functional limits   Rotation Right minimal loss, reproduction of left thoracic pain   Rotation Left within functional limits , reproduction of left thoracic pain      Outcomes:  Initial score:  Visit 5 score:  Goal:    Treatment    Pt was instructed in and performed the following:     Rosaura received therapeutic exercises to develop/improved posture, cardiovascular endurance, muscular endurance, lumbar/cervical ROM, strength and muscular endurance for 40 minutes including the following exercises:      UE rolling supine to prone    - Therapist provided overpressure x 10   [place] and arrow with GTB 2 x 10 each side       Knee Plank position with rotation 2 x 5     Prone lower trap pattern #1 x 15       Quadruped lumbar locked thoracic extension/ rotation x 20  B     Lateral (5#DB), posterior rows 9# DB 2 x 12 B   Overhead press 9# DB 2 x 10 B   Reverse Black TB walks w/ 10# water ball press-ups with end range shoulder flexion x 20    - Better backwards motion achieved to end range shoulder flexion    Shoulder blade circuit   - Un weighted serratus punches     - Prone straight arm serratus lift-up w/ 5# DB (tactile cues to prevent end range scapular protraction)     + Standing sustained lumbar extension 10" x 10 over treatment table as fulcrum   + Seated thoracic extension w/ partial therapist provided overpressure x 12   + Open books biased towards middle thoracic spine 10 x 10"     Med-ex flowsheet    [facility] 8/29/2022   Visit Number 17   VAS Pain Rating 2   Lumbar Stretches - Slouch Overcorrection -   Extension in Lying 20   Extension in Standing -   Lumbar Extension Seat Pad -   Femur Restraint -   Top Dead Center -   Counterweight -   Lumbar Flexion -   Lumbar Extension -   Lumbar Peak Torque -   Min Torque -   Test Percent Below Normative Data -   Test Percent Gain in Strength from Initial  -   Lumbar " Weight 61   Repetitions 15   Rating of Perceived Exertion 9   Ice - Z Lie (in min.) 0            Peripheral muscle strengthening which included 1 set of 15-20 repetitions at a slow, controlled 10-13 second per rep pace focused on strengthening supporting musculature for improved body mechanics and functional mobility.  Pt and therapist focused on proper form during treatment to ensure optimal strengthening of each targeted muscle group.  Machines were utilized including torso rotation, chest press, rowing, biceps, and triceps. Leg extension, leg curl, hip abd, hip add, and leg press added visit 3       Rosaura received the following manual therapy techniques: Joint mobilizations were applied to the: thoracic spine for 0 minutes.       Home Exercises Provided and Patient Education Provided   Stretching: Open books, seated thoracic extension  Strengthening: Scapular setting  Cardio program (V5): Provided  Lifting education (V11):Not yet provdied  Using Lumbar Roll:Instructed, not yet using    Education provided:   - follow through with thoracic mobilization and stabilization based exercises as she is still unable to completely utilize her middle and lower trapezius musculature in a prone position  - encouraged to do in front of a mirror for additional visual feedback       Written Home Exercises Provided: yes.  Exercises were reviewed and Rosaura was able to demonstrate them prior to the end of the session.  Rosaura demonstrated good  understanding of the education provided.     See EMR under Patient Instructions for exercises provided 6/27/2022.      Assessment     Rosaura was able to achieved improvement in middle back pain and motion with continued repeated lumbar and thoracic extension based stretches. Immediate appropriate set up observed with scapular retraction and thoracic extension. Able to increase the weight with several exercises and is progressing well towards her goals.       Patient is making good progress  towards established goals.  Pt will continue to benefit from skilled outpatient physical therapy to address the deficits stated in the impairment chart, provide pt/family education and to maximize pt's level of independence in the home and community environment.       Anticipated Barriers for therapy: None  Pt's spiritual, cultural and educational needs considered and pt agreeable to plan of care and goals as stated below:     Goals:     Short term goals:  6 weeks or 10 visits   1.  Pt will demonstrate increased lumbar ROM by at least 3 degrees from the initial ROM value with improvements noted in functional ROM and ability to perform ADLs.  MET  2.  Pt will demonstrate increased MedX average isometric strength value  by 20% from initial test resulting in improved ability to perform bending, lifting, and carrying activities safely, confidently.  MET  3.  Patient report a reduction in worst pain score by 1-2 points for improved tolerance for increased sitting time to 2 hours without any increase of pain.  MET  4.  Pt able to perform HEP correctly with minimal cueing or supervision from therapist to encourage independent management of symptoms. MET        Long term goals: 10 weeks or 20 visits   1. Pt will demonstrate increased lumbar ROM by at least 6 degrees from initial ROM value, resulting in improved ability to perform functional fwd bending while standing and sitting. (approp and ongoing)  2. Pt will demonstrate increased MedX average isometric strength value  by 35% from initial test resulting in improved ability to perform bending, lifting, and carrying activities safely, confidently.  (approp and ongoing)  3. Pt to demonstrate ability to independently control and reduce their pain through posture positioning and mechanical movements throughout a typical day.  (approp and ongoing)  4.  Pt will demonstrate reduced pain and improved functional outcomes as reported on the Oswestry Disability Index by reaching a  score of 10% or less in order to demonstrate subjective improvement in pt's condition.    (approp and ongoing)  5. Pt will demonstrate independence with the HEP at discharge  (approp and ongoing)  6.  Rosaura will tolerate standing for 2 hours and lifting multiple objects from ground level without any increase of symptoms to improve her tolerance for daily activities  (approp and ongoing)     Plan   Continue with established Plan of Care towards established PT goals.     Claudette Armas, PT , DPT

## 2022-08-31 ENCOUNTER — CLINICAL SUPPORT (OUTPATIENT)
Dept: REHABILITATION | Facility: HOSPITAL | Age: 47
End: 2022-08-31
Payer: COMMERCIAL

## 2022-08-31 ENCOUNTER — LAB VISIT (OUTPATIENT)
Dept: LAB | Facility: HOSPITAL | Age: 47
End: 2022-08-31
Attending: INTERNAL MEDICINE
Payer: COMMERCIAL

## 2022-08-31 DIAGNOSIS — E78.00 PURE HYPERCHOLESTEROLEMIA: ICD-10-CM

## 2022-08-31 DIAGNOSIS — M53.86 DECREASED RANGE OF MOTION OF INTERVERTEBRAL DISCS OF LUMBAR SPINE: ICD-10-CM

## 2022-08-31 DIAGNOSIS — Z11.59 NEED FOR HEPATITIS C SCREENING TEST: ICD-10-CM

## 2022-08-31 DIAGNOSIS — Z11.4 ENCOUNTER FOR SCREENING FOR HIV: ICD-10-CM

## 2022-08-31 DIAGNOSIS — R29.898 DECREASED STRENGTH OF TRUNK AND BACK: Primary | ICD-10-CM

## 2022-08-31 LAB
ALBUMIN SERPL BCP-MCNC: 4.6 G/DL (ref 3.5–5.2)
ALP SERPL-CCNC: 55 U/L (ref 55–135)
ALT SERPL W/O P-5'-P-CCNC: 16 U/L (ref 10–44)
ANION GAP SERPL CALC-SCNC: 8 MMOL/L (ref 8–16)
AST SERPL-CCNC: 18 U/L (ref 10–40)
BILIRUB SERPL-MCNC: 0.8 MG/DL (ref 0.1–1)
BUN SERPL-MCNC: 16 MG/DL (ref 6–20)
CALCIUM SERPL-MCNC: 9 MG/DL (ref 8.7–10.5)
CHLORIDE SERPL-SCNC: 101 MMOL/L (ref 95–110)
CO2 SERPL-SCNC: 27 MMOL/L (ref 23–29)
CREAT SERPL-MCNC: 0.6 MG/DL (ref 0.5–1.4)
EST. GFR  (NO RACE VARIABLE): >60 ML/MIN/1.73 M^2
GLUCOSE SERPL-MCNC: 93 MG/DL (ref 70–110)
POTASSIUM SERPL-SCNC: 3.5 MMOL/L (ref 3.5–5.1)
PROT SERPL-MCNC: 7.5 G/DL (ref 6–8.4)
SODIUM SERPL-SCNC: 136 MMOL/L (ref 136–145)

## 2022-08-31 PROCEDURE — 87389 HIV-1 AG W/HIV-1&-2 AB AG IA: CPT | Performed by: INTERNAL MEDICINE

## 2022-08-31 PROCEDURE — 80053 COMPREHEN METABOLIC PANEL: CPT | Performed by: INTERNAL MEDICINE

## 2022-08-31 PROCEDURE — 86803 HEPATITIS C AB TEST: CPT | Performed by: INTERNAL MEDICINE

## 2022-08-31 PROCEDURE — 36415 COLL VENOUS BLD VENIPUNCTURE: CPT | Performed by: INTERNAL MEDICINE

## 2022-08-31 PROCEDURE — 97750 PHYSICAL PERFORMANCE TEST: CPT | Mod: 32,PN

## 2022-08-31 NOTE — PROGRESS NOTES
[facility]      Name: Rosaura Arana [facility] Number: [id number]    Therapy Diagnosis:   Encounter Diagnoses   Name Primary?    Decreased strength of trunk and back Yes    Decreased range of motion of intervertebral discs of lumbar spine      Physician: Paul Brown MD    Visit Date: 2022      Physician Orders: PT Eval and Treat   Medical Diagnosis from Referral: M54.6 (ICD-10-CM) - Pain in thoracic spine   Evaluation Date: 2022  Authorization Period Expiration: 2023 (EMPLOYEE BENEFIT)  Plan of Care Expiration: 2022  Reassessment Due: 2022  Visit # / Visits authorized:  (total visits 18)       Time In: 3:45 pm  Time Out: 4:45 pm  Total Billable Time: 45 minutes    Precautions: Standard    Pattern of pain determined: Pattern 1 PEP    Subjective   Rosaura reports improvement of symptoms.     Was weeding out of her bushes for several hours Saturday and continues to have stiffness and tightness across the middle back. Denied doing any stretches to try and assist. Has been taking ibprophen since Saturday    Patient reports tolerating previous visit well  Patient reports their pain to be  0 /10 on a 0-10 scale with 0 being no pain and 10 being the worst pain imaginable.  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, sleep well and eliminate the cycle of pain    Objective       Baseline Isometric Testing on Med X equipment: Testing administered by PT  Date of testin2022  ROM 60 deg   Max Peak Torque 135    Min Peak Torque 80    Flex/Ext Ratio 1.5/1   % below normative data 25        Visit 10 Isometric Testing on Med X equipment: Testing administered by PT  Date of testin2022  ROM 72 deg   Max Peak Torque 166   Min Peak Torque 105   Flex/Ext Ratio 1.5/1   % below normative data 3       MOVEMENT LOSS     ROM Loss   Flexion within functional limits, left side bending at  "end range   Extension moderate loss    Side glide Right within functional limits   Side glide Left within functional limits   Rotation Right minimal loss, reproduction of left thoracic pain   Rotation Left within functional limits , reproduction of left thoracic pain      Outcomes:  Initial score:  Visit 5 score:  Goal:    Treatment    Pt was instructed in and performed the following:     Rosaura received therapeutic exercises to develop/improved posture, cardiovascular endurance, muscular endurance, lumbar/cervical ROM, strength and muscular endurance for 40 minutes including the following exercises:      UE rolling supine to prone    - Therapist provided overpressure x 10   [place] and arrow with GTB 2 x 10 each side       Knee Plank position with rotation 2 x 5       Quadruped lumbar locked thoracic extension/ rotation x 20  B     Superset Row, lateral row and overhead press 9# DBS  Reverse flys on treatment table   Super set shoulder ER, elbow extension and turning the body YTB tube x 10 B   - Alternating side to side      Reverse Black TB walks w/ 10# water ball press-ups with end range shoulder flexion x 20    - Better backwards motion achieved to end range shoulder flexion     Standing sustained lumbar extension 10" x 10 over treatment table as fulcrum   Seated thoracic extension w/ partial therapist provided overpressure x 12   Open books biased towards middle thoracic spine 10 x 10"     [place] squat and throw 8 #MB 2 x 10 w/ rest break in between         Med-ex flowsheet  [facility] 8/31/2022   Visit Number 18   VAS Pain Rating 0   Lumbar Stretches - Slouch Overcorrection -   Extension in Lying 0   Extension in Standing -   Lumbar Extension Seat Pad -   Femur Restraint -   Top Dead Center -   Counterweight -   Lumbar Flexion -   Lumbar Extension -   Lumbar Peak Torque -   Min Torque -   Test Percent Below Normative Data -   Test Percent Gain in Strength from Initial  -   Lumbar Weight 58 "   Repetitions 20   Rating of Perceived Exertion 8   Ice - Z Lie (in min.) 0               Peripheral muscle strengthening which included 1 set of 15-20 repetitions at a slow, controlled 10-13 second per rep pace focused on strengthening supporting musculature for improved body mechanics and functional mobility.  Pt and therapist focused on proper form during treatment to ensure optimal strengthening of each targeted muscle group.  Machines were utilized including torso rotation, chest press, rowing, biceps, and triceps. Leg extension, leg curl, hip abd, hip add, and leg press added visit 3       Rosaura received the following manual therapy techniques: Joint mobilizations were applied to the: thoracic spine for 0 minutes.       Home Exercises Provided and Patient Education Provided   Stretching: Open books, seated thoracic extension  Strengthening: Scapular setting  Cardio program (V5): Provided  Lifting education (V11):Not yet provdied  Using Lumbar Roll:Instructed, not yet using    Education provided:   - follow through with thoracic mobilization and stabilization based exercises as she is still unable to completely utilize her middle and lower trapezius musculature in a prone position  - encouraged to do in front of a mirror for additional visual feedback       Written Home Exercises Provided: yes.  Exercises were reviewed and Rosaura was able to demonstrate them prior to the end of the session.  Rosaura demonstrated good  understanding of the education provided.     See EMR under Patient Instructions for exercises provided 6/27/2022.      Assessment     Rosaura was able to tolerate the addition of several difficult exercises to improve her ability to tolerate multiple level exercises in terms of endurance and stability. Reported fatigue but was able to tolerate these exercises well. Required slight verbal cues to encourage sequencing but is moving well towards her goals.     Patient is making good progress towards  established goals.  Pt will continue to benefit from skilled outpatient physical therapy to address the deficits stated in the impairment chart, provide pt/family education and to maximize pt's level of independence in the home and community environment.       Anticipated Barriers for therapy: None  Pt's spiritual, cultural and educational needs considered and pt agreeable to plan of care and goals as stated below:     Goals:     Short term goals:  6 weeks or 10 visits   1.  Pt will demonstrate increased lumbar ROM by at least 3 degrees from the initial ROM value with improvements noted in functional ROM and ability to perform ADLs.  MET  2.  Pt will demonstrate increased MedX average isometric strength value  by 20% from initial test resulting in improved ability to perform bending, lifting, and carrying activities safely, confidently.  MET  3.  Patient report a reduction in worst pain score by 1-2 points for improved tolerance for increased sitting time to 2 hours without any increase of pain.  MET  4.  Pt able to perform HEP correctly with minimal cueing or supervision from therapist to encourage independent management of symptoms. MET        Long term goals: 10 weeks or 20 visits   1. Pt will demonstrate increased lumbar ROM by at least 6 degrees from initial ROM value, resulting in improved ability to perform functional fwd bending while standing and sitting. (approp and ongoing)  2. Pt will demonstrate increased MedX average isometric strength value  by 35% from initial test resulting in improved ability to perform bending, lifting, and carrying activities safely, confidently.  (approp and ongoing)  3. Pt to demonstrate ability to independently control and reduce their pain through posture positioning and mechanical movements throughout a typical day.  (approp and ongoing)  4.  Pt will demonstrate reduced pain and improved functional outcomes as reported on the Oswestry Disability Index by reaching a score of  10% or less in order to demonstrate subjective improvement in pt's condition.    (approp and ongoing)  5. Pt will demonstrate independence with the HEP at discharge  (approp and ongoing)  6.  Rosaura will tolerate standing for 2 hours and lifting multiple objects from ground level without any increase of symptoms to improve her tolerance for daily activities  (approp and ongoing)     Plan   Continue with established Plan of Care towards established PT goals.     Claudette Armas, PT , DPT

## 2022-09-01 LAB
HCV AB S/CO SERPL IA: 0.1 S/CO RATIO (ref 0–0.9)
HIV 1+2 AB+HIV1 P24 AG SERPL QL IA: NON REACTIVE

## 2022-09-06 ENCOUNTER — CLINICAL SUPPORT (OUTPATIENT)
Dept: REHABILITATION | Facility: HOSPITAL | Age: 47
End: 2022-09-06
Payer: COMMERCIAL

## 2022-09-06 DIAGNOSIS — R29.898 DECREASED STRENGTH OF TRUNK AND BACK: Primary | ICD-10-CM

## 2022-09-06 DIAGNOSIS — M53.86 DECREASED RANGE OF MOTION OF INTERVERTEBRAL DISCS OF LUMBAR SPINE: ICD-10-CM

## 2022-09-06 PROCEDURE — 97750 PHYSICAL PERFORMANCE TEST: CPT | Mod: 32,PN

## 2022-09-06 NOTE — PROGRESS NOTES
[facility] Treatment      Name: Rosaura Arana [facility] Number: [id number]    Therapy Diagnosis:   Encounter Diagnoses   Name Primary?    Decreased strength of trunk and back Yes    Decreased range of motion of intervertebral discs of lumbar spine        Physician: Paul Brown MD    Visit Date: 2022      Physician Orders: PT Eval and Treat   Medical Diagnosis from Referral: M54.6 (ICD-10-CM) - Pain in thoracic spine   Evaluation Date: 2022  Authorization Period Expiration: 2023 (EMPLOYEE BENEFIT)  Plan of Care Expiration: 2022  Reassessment Due: 2022  Visit # / Visits authorized:  (total visits 19)     Time In: 1615  Time Out: 1720  Total Billable Time: 60 minutes    Precautions: Standard    Pattern of pain determined: Pattern 1 PEP    Subjective   Rosaura reports R upper trap and levator pain when she is wearing her purse on the L shoulder.    Patient reports tolerating previous visit well  Patient reports their pain to be  0 /10 on a 0-10 scale with 0 being no pain and 10 being the worst pain imaginable.  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, sleep well and eliminate the cycle of pain    Objective     Baseline Isometric Testing on Med X equipment: Testing administered by PT  Date of testin2022  ROM 60 deg   Max Peak Torque 135    Min Peak Torque 80    Flex/Ext Ratio 1.5/1   % below normative data 25        Visit 10 Isometric Testing on Med X equipment: Testing administered by PT  Date of testin2022  ROM 72 deg   Max Peak Torque 166   Min Peak Torque 105   Flex/Ext Ratio 1.5/1   % below normative data 3       MOVEMENT LOSS     ROM Loss   Flexion within functional limits, left side bending at end range   Extension moderate loss    Side glide Right within functional limits   Side glide Left within functional limits   Rotation Right minimal loss, reproduction of  "left thoracic pain   Rotation Left within functional limits , reproduction of left thoracic pain      Outcomes:  Initial score:  Visit 5 score:  Goal:    Treatment    Pt was instructed in and performed the following:     Rosaura received therapeutic exercises to develop/improved posture, cardiovascular endurance, muscular endurance, lumbar/cervical ROM, strength and muscular endurance for 60 minutes including the following exercises:      UE rolling prone to supine 5x each side    Tactile and verbal cues for proper form, difficulty with lower trap activation   LE rolling prone to supine, good form 5x each side   Standing Y at wall 2 x 10 each UE   Prone Y 2 x 10 1lb  Prone T 2 x 10   Prone row 4lbs DB 2 x 10   SL ER 2 lb DB 2 x 10   Prone lower trap BUEs 2 x 10   Kitchen sink stretch 5 x 5 sec   No money RTB 20x      NP:   Knee Plank position with rotation 2 x 5    Quadruped lumbar locked thoracic extension/ rotation x 20  B   Superset Row, lateral row and overhead press 9# DBS  Reverse flys on treatment table   Super set shoulder ER, elbow extension and turning the body YTB tube x 10 B   Reverse Black TB walks w/ 10# water ball press-ups with end range shoulder flexion x 20    - Better backwards motion achieved to end range shoulder flexion  Standing sustained lumbar extension 10" x 10 over treatment table as fulcrum   Seated thoracic extension w/ partial therapist provided overpressure x 12   Open books biased towards middle thoracic spine 10 x 10"   [place] squat and throw 8 #MB 2 x 10 w/ rest break in between     [facility] 9/6/2022   Visit Number 19   VAS Pain Rating 0   Treadmill Time (in min.) 11.5   Lumbar Stretches - Slouch Overcorrection -   Extension in Lying -   Extension in Standing -   Lumbar Extension Seat Pad -   Femur Restraint -   Top Dead Center -   Counterweight -   Lumbar Flexion -   Lumbar Extension -   Lumbar Peak Torque -   Min Torque -   Test Percent Below Normative Data - "   Test Percent Gain in Strength from Initial  -   Lumbar Weight 61   Repetitions 18   Rating of Perceived Exertion 6   Ice - Z Lie (in min.) 0     Peripheral muscle strengthening which included 1 set of 15-20 repetitions at a slow, controlled 10-13 second per rep pace focused on strengthening supporting musculature for improved body mechanics and functional mobility.  Pt and therapist focused on proper form during treatment to ensure optimal strengthening of each targeted muscle group.  Machines were utilized including torso rotation, chest press, rowing, biceps, and triceps. Leg extension, leg curl, hip abd, hip add, and leg press added visit 3       Rosaura received the following manual therapy techniques: Joint mobilizations were applied to the: thoracic spine for 0 minutes.       Home Exercises Provided and Patient Education Provided   Stretching: Open books, seated thoracic extension  Strengthening: Scapular setting  Cardio program (V5): Provided  Lifting education (V11):Not yet provdied  Using Lumbar Roll:Instructed, not yet using    Education provided:   - follow through with thoracic mobilization and stabilization based exercises as she is still unable to completely utilize her middle and lower trapezius musculature in a prone position  - encouraged to do in front of a mirror for additional visual feedback       Written Home Exercises Provided: yes.  Exercises were reviewed and Rosaura was able to demonstrate them prior to the end of the session.  Rosaura demonstrated good  understanding of the education provided.     See EMR under Patient Instructions for exercises provided 6/27/2022.      Assessment     Good tolerance to tx session. No pain upon arrival. Reports her main symptoms are located in the R upper trap levator region. Tx session focused on bhavani scapular strengthening of Rue to address this. She demonstrate slight depression of R shoulder but more depression of L shoulder. Reports symptoms only present  when she has something on the L shoulder.  She tolerated tx session well. She continues to have difficulty with prone to supine rolling of Ues due to decreased lower trap strength and paraspinal strength leading to decreased shoulder and thoracic extension with rolling. Gave pt HEP with updated exercises as well as review of others.     Patient is making good progress towards established goals.  Pt will continue to benefit from skilled outpatient physical therapy to address the deficits stated in the impairment chart, provide pt/family education and to maximize pt's level of independence in the home and community environment.       Anticipated Barriers for therapy: None  Pt's spiritual, cultural and educational needs considered and pt agreeable to plan of care and goals as stated below:     Goals:     Short term goals:  6 weeks or 10 visits   1.  Pt will demonstrate increased lumbar ROM by at least 3 degrees from the initial ROM value with improvements noted in functional ROM and ability to perform ADLs.  MET  2.  Pt will demonstrate increased MedX average isometric strength value  by 20% from initial test resulting in improved ability to perform bending, lifting, and carrying activities safely, confidently.  MET  3.  Patient report a reduction in worst pain score by 1-2 points for improved tolerance for increased sitting time to 2 hours without any increase of pain.  MET  4.  Pt able to perform HEP correctly with minimal cueing or supervision from therapist to encourage independent management of symptoms. MET        Long term goals: 10 weeks or 20 visits   1. Pt will demonstrate increased lumbar ROM by at least 6 degrees from initial ROM value, resulting in improved ability to perform functional fwd bending while standing and sitting. (approp and ongoing)  2. Pt will demonstrate increased MedX average isometric strength value  by 35% from initial test resulting in improved ability to perform bending, lifting, and  carrying activities safely, confidently.  (approp and ongoing)  3. Pt to demonstrate ability to independently control and reduce their pain through posture positioning and mechanical movements throughout a typical day.  (approp and ongoing)  4.  Pt will demonstrate reduced pain and improved functional outcomes as reported on the Oswestry Disability Index by reaching a score of 10% or less in order to demonstrate subjective improvement in pt's condition.    (approp and ongoing)  5. Pt will demonstrate independence with the HEP at discharge  (approp and ongoing)  6.  Rosaura will tolerate standing for 2 hours and lifting multiple objects from ground level without any increase of symptoms to improve her tolerance for daily activities  (approp and ongoing)     Plan   Continue with established Plan of Care towards established PT goals.     Hermila Lee, PT , DPT

## 2022-09-08 ENCOUNTER — CLINICAL SUPPORT (OUTPATIENT)
Dept: REHABILITATION | Facility: HOSPITAL | Age: 47
End: 2022-09-08
Payer: COMMERCIAL

## 2022-09-08 DIAGNOSIS — M53.86 DECREASED RANGE OF MOTION OF INTERVERTEBRAL DISCS OF LUMBAR SPINE: ICD-10-CM

## 2022-09-08 DIAGNOSIS — R29.898 DECREASED STRENGTH OF TRUNK AND BACK: Primary | ICD-10-CM

## 2022-09-08 PROCEDURE — 97750 PHYSICAL PERFORMANCE TEST: CPT | Mod: 32,PN

## 2022-09-08 NOTE — PROGRESS NOTES
[facility]      Name: Rosaura Arana [facility] Number: [id number]    Therapy Diagnosis:   Encounter Diagnoses   Name Primary?    Decreased strength of trunk and back Yes    Decreased range of motion of intervertebral discs of lumbar spine        Physician: Paul Brown MD    Visit Date: 2022      Physician Orders: PT Eval and Treat   Medical Diagnosis from Referral: M54.6 (ICD-10-CM) - Pain in thoracic spine   Evaluation Date: 2022  Authorization Period Expiration: 2023 (EMPLOYEE BENEFIT)  Plan of Care Expiration: 2022  Reassessment Due: 2022  Visit # / Visits authorized:  (total visits 20)     Time In: 1536  Time Out: 1620  Total Billable Time: 44 minutes    Precautions: Standard    Pattern of pain determined: Pattern 1 PEP    Subjective   Rosaura reports she has not had R upper trap pain since last tx session. She has not had the original pain she came in for since the beginning. Reports she is comfortable with all of her exercises. Patient is interested in wellness program.     Patient reports tolerating previous visit well  Patient reports their pain to be  0 /10 on a 0-10 scale with 0 being no pain and 10 being the worst pain imaginable.  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, sleep well and eliminate the cycle of pain    Objective     Baseline Isometric Testing on Med X equipment: Testing administered by PT  Date of testin2022  ROM 60 deg   Max Peak Torque 135    Min Peak Torque 80    Flex/Ext Ratio 1.5/1   % below normative data 25      Visit 10 Isometric Testing on Med X equipment: Testing administered by PT  Date of testin2022  ROM 72 deg   Max Peak Torque 166   Min Peak Torque 105   Flex/Ext Ratio 1.5/1   % below normative data 3     Discharge Isometric Testing on Med X equipment: Testing administered by PT  Date of testin2022  ROM 72 deg    Max Peak Torque 178   Min Peak Torque 103   Flex/Ext Ratio 1.7   % below normative data 3      Outcomes:    HEYDI   Initial score: 11  Visit 5 score: NA  Goal: 3    AQoL  Initial score: 5  Discharge score: 4    Treatment    Pt was instructed in and performed the following:     Rosaura received therapeutic exercises to develop/improved posture, cardiovascular endurance, muscular endurance, lumbar/cervical ROM, strength and muscular endurance for 44 minutes including the following exercises:  [facility] 9/8/2022   Visit Number 20   VAS Pain Rating 0   Treadmill Time (in min.) 5   Speed 2.5   Lumbar Stretches - Slouch Overcorrection -   Extension in Lying -   Extension in Standing -   Lumbar Extension Seat Pad -   Femur Restraint -   Top Dead Center -   Counterweight -   Lumbar Flexion 72   Lumbar Extension 0   Lumbar Peak Torque 178   Min Torque 103   Test Percent Below Normative Data -   Test Percent Gain in Strength from Initial  39   Lumbar Weight -   Repetitions -   Rating of Perceived Exertion -   Ice - Z Lie (in min.) -     Peripheral muscle strengthening which included 1 set of 15-20 repetitions at a slow, controlled 10-13 second per rep pace focused on strengthening supporting musculature for improved body mechanics and functional mobility.  Pt and therapist focused on proper form during treatment to ensure optimal strengthening of each targeted muscle group.  Machines were utilized including torso rotation, chest press, rowing, biceps, and triceps. Leg extension, leg curl, hip abd, hip add, and leg press added visit 3     Education provided:   - follow through with thoracic mobilization and stabilization based exercises as she is still unable to completely utilize her middle and lower trapezius musculature in a prone position  - encouraged to do in front of a mirror for additional visual feedback     Written Home Exercises Provided: yes.  Exercises were reviewed and Rosaura was able to demonstrate them  prior to the end of the session.  Rosaura demonstrated good  understanding of the education provided.     See EMR under Patient Instructions for exercises provided 6/27/2022.      Assessment   See discharge note.     Plan      See discharge note.     Hermila Lee PT , DPT

## 2022-09-08 NOTE — PLAN OF CARE
[facility] Discharge Summary     Name: Rosaura Arana [facility] Number: [id number]    Therapy Diagnosis:   Encounter Diagnoses   Name Primary?    Decreased strength of trunk and back Yes    Decreased range of motion of intervertebral discs of lumbar spine      Physician: Paul Brown MD    Physician Orders: PT Eval and Treat   Medical Diagnosis from Referral: M54.6 (ICD-10-CM) - Pain in thoracic spine   Evaluation Date: 2022    Date of Last visit: 2022  Total Visits Received: 20    Time In: 1536  Time Out: 1620  Total Billable Time: 44 minutes     Precautions: Standard     Pattern of pain determined: Pattern 1 PEP       Subjective   Kaela reports she has not had R upper trap pain since last tx session. She has not had the original pain she came in for since the beginning. Reports she is comfortable with all of her exercises. Patient is interested in wellness program.      Patient reports tolerating previous visit well, no upper trap / shoulder pain since last tx session   Patient reports their pain to be 0/10 on a 0-10 scale with 0 being no pain and 10 being the worst pain imaginable.  Pain Location: Central low back     Work and leisure: Endoscopy nurse  Pt goals: Reduce pain, sleep well and eliminate the cycle of pain     Objective    Baseline Isometric Testing on Med X equipment: Testing administered by PT  Date of testin2022  ROM 60 deg   Max Peak Torque 135    Min Peak Torque 80    Flex/Ext Ratio 1.5/1   % below normative data 25      Visit 10 Isometric Testing on Med X equipment: Testing administered by PT  Date of testin2022  ROM 72 deg   Max Peak Torque 166   Min Peak Torque 105   Flex/Ext Ratio 1.5/1   % below normative data 3      Discharge Isometric Testing on Med X equipment: Testing administered by PT  Date of testin2022  ROM 72 deg   Max Peak Torque 178   Min Peak Torque 103   Flex/Ext Ratio 1.7   % below normative data 3       Outcomes:     HEYDI   Initial score: 11  Visit 5 score: NA  Discharge: 3     AQoL  Initial score: 5  Discharge score: 4    Assessment    Goals:     Short term goals:  6 weeks or 10 visits   1.  Pt will demonstrate increased lumbar ROM by at least 3 degrees from the initial ROM value with improvements noted in functional ROM and ability to perform ADLs.  MET  2.  Pt will demonstrate increased MedX average isometric strength value  by 20% from initial test resulting in improved ability to perform bending, lifting, and carrying activities safely, confidently.  MET  3.  Patient report a reduction in worst pain score by 1-2 points for improved tolerance for increased sitting time to 2 hours without any increase of pain.  MET  4.  Pt able to perform HEP correctly with minimal cueing or supervision from therapist to encourage independent management of symptoms. MET        Long term goals: 10 weeks or 20 visits   1. Pt will demonstrate increased lumbar ROM by at least 6 degrees from initial ROM value, resulting in improved ability to perform functional fwd bending while standing and sitting. Met 9/8/2022  2. Pt will demonstrate increased MedX average isometric strength value  by 35% from initial test resulting in improved ability to perform bending, lifting, and carrying activities safely, confidently.  Met 9/8/2022  3. Pt to demonstrate ability to independently control and reduce their pain through posture positioning and mechanical movements throughout a typical day.  MET 9/8/2022  4.  Pt will demonstrate reduced pain and improved functional outcomes as reported on the Oswestry Disability Index by reaching a score of 10% or less in order to demonstrate subjective improvement in pt's condition.  met 9/8/2022  5. Pt will demonstrate independence with the HEP at discharge  MET 9/8/2022  6.  Rosaura will tolerate standing for 2 hours and lifting multiple objects from ground level without any increase of symptoms to improve her  tolerance for daily activities  Met 9/8/2022    Patient has attended 20 visits of the [facility] program for aerobic work, med ex isometric testing with dynamic strengthening on med ex equipment for spine, whole body strengthening on med ex equipment, HEP, education.  Patient demonstrates improvement in ability to reduce symptoms, improved posture, improved ROM and improved strength.   Reviewed HEP, and importance of maintaining a healthy spine through continued stretching and performance of HEP, good posture, good ergonomics, good body mech with ADL, leisure, and work.  Discharge handout with HEP given, and discussed aspects of exercise program, cardio, strengthening, and stretching.    Patient expressed understanding information given.      -Improved posture,   NA using lumbar roll  -Improved 12 ROM,  initially 60 on med ex test and currently 72  -Improved strength on lumbar med ex IM test with 39 average improvement noted and reduced pain noted by patient  -Improved outcome measure, scoring 11 on initial and 3 at discharge indicating overall improved function and perception of abilities    Discharge reason: Patient is now asymptomatic, Patient has met all of his/her goals, Patient has reached the maximum rehab potential for the present time, and Patient has completed allowable visits authorized by insurance    Plan   This patient is discharged from Physical Therapy    Hermila Lee, PT

## 2022-09-14 ENCOUNTER — DOCUMENTATION ONLY (OUTPATIENT)
Dept: REHABILITATION | Facility: HOSPITAL | Age: 47
End: 2022-09-14
Attending: PHYSICAL MEDICINE & REHABILITATION
Payer: COMMERCIAL

## 2022-09-14 NOTE — PROGRESS NOTES
Health  Wellness Visit Note    Name: Rosaura Arana [facility] Number: [id number]  Physician: Paul Brown MD  Diagnosis: No diagnosis found.  Past Medical History:   Diagnosis Date    Abnormal Pap smear of cervix     NEELAM d/t recurrent abnormals/mild dysplasia; negative pathology after hysterctomy    Allergy     Anxiety     Asthma     Basal cell carcinoma (BCC) 2014    R forehead    Hyperlipidemia     PAC (premature atrial contraction)     Rosacea      Visit Number: 21  Precautions: Standard      1st PT visit:  6/22/2022  Year of care end date:  6/22/2023  6 Month test  performed:  12 Month test performed:  Mind body plan:9 mos emp contract  Patient level:C    Time In: 1330  Time Out: 1430  Total Treatment Time: 60    [employer] 2022  Handout on this week's wellness topic Burnout Awareness provided  along with a discussion on what it means, the benefits, and suggestions for practice.    Subjective:   Patient reports she has been doing her back exercises, but not much else.    Objective:   Rosaura completed therapeutic stretches (Prone Y 2 x 10 1lb, Prone T 2 x 10, Prone row 4lbs DB 2 x 10, SL ER 2 lb DB 2 x 10, No money RTB 20x) and the following MedX exercise machines: core lumbar, torso rotation l/r, leg extension, leg curl, upright row, chest press, biceps curl, triceps extension, leg press    See exercise log in patient folder for rate of exertion and repetitions completed.       Fitness Machine Education Key:  E=education on equipment initiated and further follow up and education needed  I=independent with  and exercise.  The patient:  Adjusts machines to his/her settings  Uses equipment levers, pins, weights safely  Maintains safe and correct posture while exercising  Moves through exercise with correct pace and control  Gets on and off equipment safely      Lumbar Ext. E Torso Rotation E Leg Press E   Leg Extension E Seated Leg Curl E Chest Press E   Seated Row E Hip ADD E Hip  ABD E   Triceps Extension E Bicep Curl E Other:        Assessment:   Patient tolerated warm up on treadmill, mat exercises, and strengthening with peripheral machines without difficulty    Plan:  Continue with established plan of care towards wellness goals.     Health  : Nelda Crespo  9/14/2022

## 2022-09-19 ENCOUNTER — DOCUMENTATION ONLY (OUTPATIENT)
Dept: REHABILITATION | Facility: HOSPITAL | Age: 47
End: 2022-09-19
Payer: COMMERCIAL

## 2022-09-19 DIAGNOSIS — F90.0 ATTENTION DEFICIT HYPERACTIVITY DISORDER (ADHD), PREDOMINANTLY INATTENTIVE TYPE: ICD-10-CM

## 2022-09-19 RX ORDER — LISDEXAMFETAMINE DIMESYLATE 40 MG/1
40 CAPSULE ORAL DAILY
Qty: 30 CAPSULE | Refills: 0 | Status: SHIPPED | OUTPATIENT
Start: 2022-09-26 | End: 2022-11-07

## 2022-10-14 ENCOUNTER — TELEPHONE (OUTPATIENT)
Dept: REHABILITATION | Facility: HOSPITAL | Age: 47
End: 2022-10-14
Payer: COMMERCIAL

## 2022-11-04 ENCOUNTER — PATIENT MESSAGE (OUTPATIENT)
Dept: FAMILY MEDICINE | Facility: CLINIC | Age: 47
End: 2022-11-04

## 2022-11-04 DIAGNOSIS — M25.542 ARTHRALGIA OF BOTH HANDS: Primary | ICD-10-CM

## 2022-11-04 DIAGNOSIS — M25.541 ARTHRALGIA OF BOTH HANDS: Primary | ICD-10-CM

## 2022-11-07 ENCOUNTER — LAB VISIT (OUTPATIENT)
Dept: LAB | Facility: HOSPITAL | Age: 47
End: 2022-11-07
Attending: INTERNAL MEDICINE
Payer: COMMERCIAL

## 2022-11-07 ENCOUNTER — OFFICE VISIT (OUTPATIENT)
Dept: FAMILY MEDICINE | Facility: CLINIC | Age: 47
End: 2022-11-07
Payer: COMMERCIAL

## 2022-11-07 VITALS
OXYGEN SATURATION: 96 % | BODY MASS INDEX: 25.33 KG/M2 | WEIGHT: 152 LBS | TEMPERATURE: 99 F | DIASTOLIC BLOOD PRESSURE: 80 MMHG | HEART RATE: 87 BPM | SYSTOLIC BLOOD PRESSURE: 130 MMHG | HEIGHT: 65 IN | RESPIRATION RATE: 18 BRPM

## 2022-11-07 DIAGNOSIS — R00.2 INTERMITTENT PALPITATIONS: Primary | ICD-10-CM

## 2022-11-07 DIAGNOSIS — M25.542 ARTHRALGIA OF BOTH HANDS: ICD-10-CM

## 2022-11-07 DIAGNOSIS — R00.2 INTERMITTENT PALPITATIONS: ICD-10-CM

## 2022-11-07 DIAGNOSIS — M25.541 ARTHRALGIA OF BOTH HANDS: ICD-10-CM

## 2022-11-07 DIAGNOSIS — R22.31 NODULE OF FINGER OF RIGHT HAND: ICD-10-CM

## 2022-11-07 LAB
BASOPHILS # BLD AUTO: 0.04 K/UL (ref 0–0.2)
BASOPHILS NFR BLD: 0.6 % (ref 0–1.9)
DIFFERENTIAL METHOD: NORMAL
EOSINOPHIL # BLD AUTO: 0.2 K/UL (ref 0–0.5)
EOSINOPHIL NFR BLD: 2.8 % (ref 0–8)
ERYTHROCYTE [DISTWIDTH] IN BLOOD BY AUTOMATED COUNT: 13.6 % (ref 11.5–14.5)
ERYTHROCYTE [SEDIMENTATION RATE] IN BLOOD BY WESTERGREN METHOD: 1 MM/HR (ref 0–20)
HCT VFR BLD AUTO: 40.8 % (ref 37–48.5)
HGB BLD-MCNC: 13.2 G/DL (ref 12–16)
IMM GRANULOCYTES # BLD AUTO: 0.03 K/UL (ref 0–0.04)
IMM GRANULOCYTES NFR BLD AUTO: 0.4 % (ref 0–0.5)
LYMPHOCYTES # BLD AUTO: 2 K/UL (ref 1–4.8)
LYMPHOCYTES NFR BLD: 27.2 % (ref 18–48)
MCH RBC QN AUTO: 30.5 PG (ref 27–31)
MCHC RBC AUTO-ENTMCNC: 32.4 G/DL (ref 32–36)
MCV RBC AUTO: 94 FL (ref 82–98)
MONOCYTES # BLD AUTO: 0.6 K/UL (ref 0.3–1)
MONOCYTES NFR BLD: 8.1 % (ref 4–15)
NEUTROPHILS # BLD AUTO: 4.4 K/UL (ref 1.8–7.7)
NEUTROPHILS NFR BLD: 60.9 % (ref 38–73)
NRBC BLD-RTO: 0 /100 WBC
PLATELET # BLD AUTO: 260 K/UL (ref 150–450)
PMV BLD AUTO: 9.8 FL (ref 9.2–12.9)
RBC # BLD AUTO: 4.33 M/UL (ref 4–5.4)
TSH SERPL DL<=0.005 MIU/L-ACNC: 1.62 UIU/ML (ref 0.34–5.6)
WBC # BLD AUTO: 7.24 K/UL (ref 3.9–12.7)

## 2022-11-07 PROCEDURE — 3079F DIAST BP 80-89 MM HG: CPT | Mod: CPTII,S$GLB,, | Performed by: INTERNAL MEDICINE

## 2022-11-07 PROCEDURE — 86431 RHEUMATOID FACTOR QUANT: CPT | Performed by: INTERNAL MEDICINE

## 2022-11-07 PROCEDURE — 36415 COLL VENOUS BLD VENIPUNCTURE: CPT | Performed by: INTERNAL MEDICINE

## 2022-11-07 PROCEDURE — 3075F PR MOST RECENT SYSTOLIC BLOOD PRESS GE 130-139MM HG: ICD-10-PCS | Mod: CPTII,S$GLB,, | Performed by: INTERNAL MEDICINE

## 2022-11-07 PROCEDURE — 99214 PR OFFICE/OUTPT VISIT, EST, LEVL IV, 30-39 MIN: ICD-10-PCS | Mod: S$GLB,,, | Performed by: INTERNAL MEDICINE

## 2022-11-07 PROCEDURE — 3008F BODY MASS INDEX DOCD: CPT | Mod: CPTII,S$GLB,, | Performed by: INTERNAL MEDICINE

## 2022-11-07 PROCEDURE — 85651 RBC SED RATE NONAUTOMATED: CPT | Performed by: INTERNAL MEDICINE

## 2022-11-07 PROCEDURE — 99214 OFFICE O/P EST MOD 30 MIN: CPT | Mod: S$GLB,,, | Performed by: INTERNAL MEDICINE

## 2022-11-07 PROCEDURE — 85025 COMPLETE CBC W/AUTO DIFF WBC: CPT | Performed by: INTERNAL MEDICINE

## 2022-11-07 PROCEDURE — 3008F PR BODY MASS INDEX (BMI) DOCUMENTED: ICD-10-PCS | Mod: CPTII,S$GLB,, | Performed by: INTERNAL MEDICINE

## 2022-11-07 PROCEDURE — 3079F PR MOST RECENT DIASTOLIC BLOOD PRESSURE 80-89 MM HG: ICD-10-PCS | Mod: CPTII,S$GLB,, | Performed by: INTERNAL MEDICINE

## 2022-11-07 PROCEDURE — 3075F SYST BP GE 130 - 139MM HG: CPT | Mod: CPTII,S$GLB,, | Performed by: INTERNAL MEDICINE

## 2022-11-07 PROCEDURE — 84443 ASSAY THYROID STIM HORMONE: CPT | Performed by: INTERNAL MEDICINE

## 2022-11-07 NOTE — PROGRESS NOTES
Subjective:       Patient ID: Rosaura Loredo is a 46 y.o. female.    Chief Complaint: Knuckle nodules and Palpitations    Here for evaluation.  She originally made appointment for painful nodule on her knuckle that she noted about a month ago.   She wanted to be tested for RA to be safe.  She doesn't have joint stiffness and usually only has pain when something bumps the knot.     Palpitations   This is a new problem. The current episode started yesterday. The problem occurs intermittently (occurred 6-8 times last night). The problem has been waxing and waning. On average, each episode lasts 1 minute. The symptoms are aggravated by alcohol (did have mixed drink last night). Associated symptoms include anxiety, dizziness, an irregular heartbeat, malaise/fatigue, near-syncope (felt lightheaded), shortness of breath and weakness. Pertinent negatives include no chest pain, coughing, diaphoresis, fever, nausea, numbness or vomiting. Associated symptoms comments: Her Apple which alerted her to fast HR multiple times, one of which was in the 170s.  She has the rhythm strips on her phone.  Narrow complex, terminated spontaneously.  Most look regular but the fastest one was more irregular.  Hard to tell if there a P waves on the phone tracing.. She has tried beta blockers (she has been taking her toprol for PACs in the last month) for the symptoms. The treatment provided no relief (spontaneously converted). Risk factors: long h/o PACs treated with beta blockers;  recently tried stimulant for ADHD but stopped it several weeks ago because it did seem to increase PACs and made her feel funny.   Review of Systems   Constitutional:  Positive for malaise/fatigue. Negative for activity change, appetite change, chills, diaphoresis, fatigue, fever and unexpected weight change.   HENT: Negative.  Negative for congestion, ear discharge, ear pain, hearing loss, mouth sores, nosebleeds, postnasal drip, rhinorrhea, sinus pressure,  sinus pain, sneezing, sore throat, tinnitus, trouble swallowing and voice change.    Eyes: Negative.  Negative for photophobia, pain, discharge, redness, itching and visual disturbance.   Respiratory:  Positive for chest tightness and shortness of breath. Negative for apnea, cough, choking and wheezing.    Cardiovascular:  Positive for palpitations and near-syncope (felt lightheaded). Negative for chest pain and leg swelling.   Gastrointestinal: Negative.  Negative for abdominal distention, abdominal pain, blood in stool, constipation, diarrhea, nausea and vomiting.   Endocrine: Negative.  Negative for cold intolerance, heat intolerance, polydipsia and polyuria.   Genitourinary: Negative.  Negative for decreased urine volume, difficulty urinating, dyspareunia, dysuria, enuresis, frequency, genital sores, hematuria, menstrual problem, pelvic pain, urgency, vaginal bleeding, vaginal discharge and vaginal pain.   Musculoskeletal:  Positive for arthralgias, back pain, joint swelling (nodule r index finger) and neck pain. Negative for gait problem, myalgias and neck stiffness.   Skin: Negative.  Negative for rash and wound.   Allergic/Immunologic: Negative.  Negative for environmental allergies, food allergies and immunocompromised state.   Neurological:  Positive for dizziness, syncope, weakness and light-headedness. Negative for tremors, seizures, facial asymmetry, speech difficulty, numbness and headaches.   Hematological:  Negative for adenopathy. Does not bruise/bleed easily.   Psychiatric/Behavioral:  Positive for sleep disturbance. Negative for confusion, decreased concentration, hallucinations, self-injury and suicidal ideas. The patient is nervous/anxious.      Past Medical History:   Diagnosis Date    Abnormal Pap smear of cervix     NEELAM d/t recurrent abnormals/mild dysplasia; negative pathology after hysterctomy    Allergy     Anxiety     Asthma     Basal cell carcinoma (BCC) 2014    R forehead     Hyperlipidemia     PAC (premature atrial contraction)     Rosacea       Past Surgical History:   Procedure Laterality Date     SECTION      COLD KNIFE CONIZATION OF CERVIX      multiple    COLONOSCOPY N/A 10/20/2020    Procedure: COLONOSCOPY;  Surgeon: Sudheer Santiago III, MD;  Location: [facility] ENDO;  Service: Endoscopy;  Laterality: N/A;    DENTAL SURGERY  03/15/2022    HYSTERECTOMY  2015    ovaries intact    NEUROPLASTY Left 2020    Procedure: NEUROPLASTY;  Surgeon: Quinton Alexandre DPM;  Location: [facility] OR;  Service: Podiatry;  Laterality: Left;    PLANTAR FASCIOTOMY Left 2020    Procedure: FASCIOTOMY, PLANTAR;  Surgeon: Quinton Alexandre DPM;  Location: [facility] OR;  Service: Podiatry;  Laterality: Left;    TONSILLECTOMY         Family History   Problem Relation Age of Onset    Diabetes Mother     Asthma Mother     Hypertension Father     Prostate cancer Father     Melanoma Neg Hx        Social History     Socioeconomic History    Marital status:    Tobacco Use    Smoking status: Never    Smokeless tobacco: Never   Substance and Sexual Activity    Alcohol use: Yes     Comment: occ    Drug use: No    Sexual activity: Yes     Birth control/protection: See Surgical Hx, Surgical   Social History Narrative    Live with        Current Outpatient Medications   Medication Sig Dispense Refill    acetaminophen (TYLENOL) 500 MG tablet Take 1,000 mg by mouth every 6 (six) hours as needed for Pain.      albuterol (PROVENTIL/VENTOLIN HFA) 90 mcg/actuation inhaler Inhale 2 puffs into the lungs every 6 (six) hours as needed for Wheezing. 18 g 3    ALPRAZolam (XANAX) 0.5 MG tablet Take 1 tablet (0.5 mg total) by mouth 3 (three) times daily as needed for Anxiety. 30 tablet 1    hydrocortisone 2.5 % cream Apply topically 2 (two) times daily. 30 g 1    ibuprofen (ADVIL,MOTRIN) 200 MG tablet Take 400 mg by mouth every 6 (six) hours as needed for Pain.      loratadine (CLARITIN) 10 mg tablet  "Take 1 tablet (10 mg total) by mouth once daily. 90 tablet 1    metoprolol succinate (TOPROL-XL) 25 MG 24 hr tablet Take 1 tablet (25 mg total) by mouth daily as needed (palpitations). 90 tablet 0    montelukast (SINGULAIR) 10 mg tablet Take 1 tablet (10 mg total) by mouth every evening. 90 tablet 1    multivitamin (THERAGRAN) per tablet Take 1 tablet by mouth once daily.      simvastatin (ZOCOR) 20 MG tablet Take 1 tablet (20 mg total) by mouth every evening. 90 tablet 1    sumatriptan (IMITREX) 100 MG tablet Take 1 tablet (100 mg total) by mouth every 2 (two) hours as needed for Migraine. Do not exceed 2 doses in 24 hours. 27 tablet 3    valACYclovir (VALTREX) 1000 MG tablet Take 2 tablets (2,000 mg total) by mouth every 12 (twelve) hours as needed (fever blister). 30 tablet 3     No current facility-administered medications for this visit.       Review of patient's allergies indicates:   Allergen Reactions    Mepergan fortis Shortness Of Breath    Penicillins Rash    Sulfa (sulfonamide antibiotics) Rash    Sulfamethoxazole-trimethoprim Rash     Objective:      Blood pressure 130/80, pulse 87, temperature 99.3 °F (37.4 °C), resp. rate 18, height 5' 5" (1.651 m), weight 68.9 kg (152 lb), SpO2 96 %. Body mass index is 25.29 kg/m².   Physical Exam  Vitals and nursing note reviewed.   Constitutional:       General: She is not in acute distress.     Appearance: She is well-developed. She is not ill-appearing, toxic-appearing or diaphoretic.      Comments: Patient sitting on floor when I entered room because she has been having episodes of lightheadedness even though she reports feeling fine currently   HENT:      Head: Normocephalic and atraumatic.   Eyes:      General: No scleral icterus.        Right eye: No discharge.         Left eye: No discharge.      Conjunctiva/sclera: Conjunctivae normal.   Neck:      Vascular: No carotid bruit.   Cardiovascular:      Rate and Rhythm: Normal rate and regular rhythm. Occasional " Extrasystoles are present.     Heart sounds: Normal heart sounds. No murmur heard.  Pulmonary:      Effort: Pulmonary effort is normal. No respiratory distress.      Breath sounds: Normal breath sounds. No decreased breath sounds, wheezing, rhonchi or rales.   Abdominal:      General: There is no distension.      Palpations: Abdomen is soft.      Tenderness: There is no abdominal tenderness. There is no guarding or rebound.   Musculoskeletal:      Right hand: Deformity (tiny nodule dorsal right index finger over DIP) present.      Right lower leg: No edema.      Left lower leg: No edema.   Skin:     General: Skin is warm and dry.   Neurological:      Mental Status: She is alert.      Motor: No tremor.   Psychiatric:         Mood and Affect: Mood normal.         Speech: Speech normal.         Behavior: Behavior normal.           Assessment:       1. Intermittent palpitations    2. Nodule of finger of right hand        Plan:       Rosaura was seen today for knuckle nodules and palpitations.    Diagnoses and all orders for this visit:    Intermittent palpitations  Comments:  SVT vs Afib.  Encouraged to go to ER if recurs given that she was clearly symptomatic.  Will get Holter.    Orders:  -     CBC Auto Differential; Future  -     TSH; Future  -     POCT EKG 12-LEAD (NOT FOR [facility] USE)  -     Holter monitor - 24 hour; Future    Nodule of finger of right hand  Comments:  Cyst vs node.  Observation

## 2022-11-08 LAB — RHEUMATOID FACT SERPL-ACNC: <10 IU/ML

## 2022-12-16 DIAGNOSIS — Z12.31 ENCOUNTER FOR SCREENING MAMMOGRAM FOR MALIGNANT NEOPLASM OF BREAST: Primary | ICD-10-CM

## 2023-01-05 ENCOUNTER — TELEPHONE (OUTPATIENT)
Dept: CARDIOLOGY | Facility: HOSPITAL | Age: 48
End: 2023-01-05

## 2023-01-06 ENCOUNTER — CLINICAL SUPPORT (OUTPATIENT)
Dept: CARDIOLOGY | Facility: HOSPITAL | Age: 48
End: 2023-01-06
Attending: INTERNAL MEDICINE
Payer: COMMERCIAL

## 2023-01-06 DIAGNOSIS — R00.2 INTERMITTENT PALPITATIONS: ICD-10-CM

## 2023-01-06 PROCEDURE — 93227 XTRNL ECG REC<48 HR R&I: CPT | Mod: ,,, | Performed by: INTERNAL MEDICINE

## 2023-01-06 PROCEDURE — 93227 HOLTER MONITOR - 24 HOUR (CUPID ONLY): ICD-10-PCS | Mod: ,,, | Performed by: INTERNAL MEDICINE

## 2023-01-06 PROCEDURE — 93225 XTRNL ECG REC<48 HRS REC: CPT

## 2023-01-11 LAB
OHS CV EVENT MONITOR DAY: 1
OHS CV HOLTER LENGTH DECIMAL HOURS: 48
OHS CV HOLTER LENGTH HOURS: 24
OHS CV HOLTER LENGTH MINUTES: 0
OHS CV HOLTER SINUS AVERAGE HR: 80
OHS CV HOLTER SINUS MAX HR: 117
OHS CV HOLTER SINUS MIN HR: 57

## 2023-01-16 ENCOUNTER — PATIENT MESSAGE (OUTPATIENT)
Dept: FAMILY MEDICINE | Facility: CLINIC | Age: 48
End: 2023-01-16

## 2023-01-16 DIAGNOSIS — I49.1 PAC (PREMATURE ATRIAL CONTRACTION): ICD-10-CM

## 2023-01-17 RX ORDER — METOPROLOL SUCCINATE 25 MG/1
25 TABLET, EXTENDED RELEASE ORAL 2 TIMES DAILY
Qty: 180 TABLET | Refills: 1 | Status: SHIPPED | OUTPATIENT
Start: 2023-01-17 | End: 2023-09-13 | Stop reason: SDUPTHER

## 2023-02-06 ENCOUNTER — HOSPITAL ENCOUNTER (OUTPATIENT)
Dept: RADIOLOGY | Facility: HOSPITAL | Age: 48
Discharge: HOME | End: 2023-02-06
Attending: SPECIALIST
Payer: COMMERCIAL

## 2023-02-06 VITALS — HEIGHT: 65 IN | BODY MASS INDEX: 25.3 KG/M2 | WEIGHT: 151.88 LBS

## 2023-02-06 DIAGNOSIS — Z12.31 ENCOUNTER FOR SCREENING MAMMOGRAM FOR MALIGNANT NEOPLASM OF BREAST: ICD-10-CM

## 2023-02-06 PROCEDURE — 77067 SCR MAMMO BI INCL CAD: CPT | Mod: TC,PO

## 2023-02-07 DIAGNOSIS — R92.8 ABNORMAL MAMMOGRAM: Primary | ICD-10-CM

## 2023-02-14 ENCOUNTER — TELEPHONE (OUTPATIENT)
Dept: FAMILY MEDICINE | Facility: CLINIC | Age: 48
End: 2023-02-14

## 2023-02-14 RX ORDER — PROMETHAZINE HYDROCHLORIDE AND DEXTROMETHORPHAN HYDROBROMIDE 6.25; 15 MG/5ML; MG/5ML
5 SYRUP ORAL EVERY 4 HOURS PRN
Qty: 180 ML | Refills: 1 | Status: SHIPPED | OUTPATIENT
Start: 2023-02-14 | End: 2023-02-24

## 2023-02-14 NOTE — TELEPHONE ENCOUNTER
Pt called and said she tested positive for Covid at [facility] today. Reviewed OTC home care, and she was inquiring if something can be called in for cough, because it's keeping her from sleeping at night, and would like to know if antiviral tx is still being prescribed. Her symptoms are fever, body aches, sinus and chest congestion.Would like anything prescribed sent to [facility] [employer].

## 2023-02-22 ENCOUNTER — OFFICE VISIT (OUTPATIENT)
Dept: CARDIOLOGY | Facility: CLINIC | Age: 48
End: 2023-02-22
Payer: COMMERCIAL

## 2023-02-22 VITALS
HEIGHT: 65 IN | HEART RATE: 83 BPM | BODY MASS INDEX: 26.66 KG/M2 | DIASTOLIC BLOOD PRESSURE: 80 MMHG | WEIGHT: 160 LBS | RESPIRATION RATE: 16 BRPM | OXYGEN SATURATION: 98 % | SYSTOLIC BLOOD PRESSURE: 132 MMHG

## 2023-02-22 DIAGNOSIS — U07.1 COVID-19: ICD-10-CM

## 2023-02-22 DIAGNOSIS — I47.10 SUPRAVENTRICULAR TACHYCARDIA: Primary | ICD-10-CM

## 2023-02-22 DIAGNOSIS — I47.19 AVNRT (AV NODAL RE-ENTRY TACHYCARDIA): ICD-10-CM

## 2023-02-22 DIAGNOSIS — R55 NEAR SYNCOPE: ICD-10-CM

## 2023-02-22 DIAGNOSIS — E78.2 MIXED HYPERLIPIDEMIA: ICD-10-CM

## 2023-02-22 DIAGNOSIS — G43.009 MIGRAINE WITHOUT AURA AND WITHOUT STATUS MIGRAINOSUS, NOT INTRACTABLE: ICD-10-CM

## 2023-02-22 DIAGNOSIS — I49.8 ATRIAL BIGEMINY: ICD-10-CM

## 2023-02-22 DIAGNOSIS — F41.9 CHRONIC ANXIETY: ICD-10-CM

## 2023-02-22 PROCEDURE — 1159F MED LIST DOCD IN RCRD: CPT | Mod: CPTII,S$GLB,, | Performed by: INTERNAL MEDICINE

## 2023-02-22 PROCEDURE — 93000 EKG 12-LEAD: ICD-10-PCS | Mod: S$GLB,,, | Performed by: INTERNAL MEDICINE

## 2023-02-22 PROCEDURE — 99999 PR PBB SHADOW E&M-EST. PATIENT-LVL IV: ICD-10-PCS | Mod: PBBFAC,,, | Performed by: INTERNAL MEDICINE

## 2023-02-22 PROCEDURE — 3079F PR MOST RECENT DIASTOLIC BLOOD PRESSURE 80-89 MM HG: ICD-10-PCS | Mod: CPTII,S$GLB,, | Performed by: INTERNAL MEDICINE

## 2023-02-22 PROCEDURE — 99205 OFFICE O/P NEW HI 60 MIN: CPT | Mod: 25,S$GLB,, | Performed by: INTERNAL MEDICINE

## 2023-02-22 PROCEDURE — 1160F RVW MEDS BY RX/DR IN RCRD: CPT | Mod: CPTII,S$GLB,, | Performed by: INTERNAL MEDICINE

## 2023-02-22 PROCEDURE — 3075F PR MOST RECENT SYSTOLIC BLOOD PRESS GE 130-139MM HG: ICD-10-PCS | Mod: CPTII,S$GLB,, | Performed by: INTERNAL MEDICINE

## 2023-02-22 PROCEDURE — 1159F PR MEDICATION LIST DOCUMENTED IN MEDICAL RECORD: ICD-10-PCS | Mod: CPTII,S$GLB,, | Performed by: INTERNAL MEDICINE

## 2023-02-22 PROCEDURE — 99205 PR OFFICE/OUTPT VISIT, NEW, LEVL V, 60-74 MIN: ICD-10-PCS | Mod: 25,S$GLB,, | Performed by: INTERNAL MEDICINE

## 2023-02-22 PROCEDURE — 3079F DIAST BP 80-89 MM HG: CPT | Mod: CPTII,S$GLB,, | Performed by: INTERNAL MEDICINE

## 2023-02-22 PROCEDURE — 3008F BODY MASS INDEX DOCD: CPT | Mod: CPTII,S$GLB,, | Performed by: INTERNAL MEDICINE

## 2023-02-22 PROCEDURE — 3008F PR BODY MASS INDEX (BMI) DOCUMENTED: ICD-10-PCS | Mod: CPTII,S$GLB,, | Performed by: INTERNAL MEDICINE

## 2023-02-22 PROCEDURE — 99999 PR PBB SHADOW E&M-EST. PATIENT-LVL IV: CPT | Mod: PBBFAC,,, | Performed by: INTERNAL MEDICINE

## 2023-02-22 PROCEDURE — 1160F PR REVIEW ALL MEDS BY PRESCRIBER/CLIN PHARMACIST DOCUMENTED: ICD-10-PCS | Mod: CPTII,S$GLB,, | Performed by: INTERNAL MEDICINE

## 2023-02-22 PROCEDURE — 3075F SYST BP GE 130 - 139MM HG: CPT | Mod: CPTII,S$GLB,, | Performed by: INTERNAL MEDICINE

## 2023-02-22 PROCEDURE — 93000 ELECTROCARDIOGRAM COMPLETE: CPT | Mod: S$GLB,,, | Performed by: INTERNAL MEDICINE

## 2023-02-22 NOTE — PROGRESS NOTES
Subjective:    Patient ID:  Rosaura Loredo is a 47 y.o. female     Chief Complaint   Patient presents with    Shortness of Breath    PAC       HPI:  Ms Rosaura Loredo is a 47 y.o. female is here for initial consultation.    Patient has been having intermittent episodes of palpitations.  Mostly it is PAC and occasional PVCs.  She has had this in the past but in the past few weeks it has been more frequent.  Patient has the recent episode of COVID and she seems to be having more frequent PACs.  In fact patient has increased her metoprolol to 25 mg p.o. Q b.i.d..  PACs do make her uncomfortable.  It also creates a state of anxiety for her and she is very sensitive to the PACs.  And she feels like she also has shortness of breath associated with the PACs.  She has recorded episodes where she would have 3 or 4 to 5 beats of PACs in a row and she had a run of SVT 1st time she noticed it was in November 2022 , also in January of 2023 and she recently had another run of SVT.  Intermittently she does have episodes of atrial bigeminy also.  Patient denies any classic chest pain or tightness or heaviness.  But she does have intermittent shortness of breath.  And since COVID she is been having intermittent cough and some dyspnea on exertion.    Patient does have near syncopal episode kind of scenario where she starts seeing stars and that is when she usually sits down but she has denied having any episodes of jeanna loss of consciousness.  She is taking all her medications regularly.    Review of patient's allergies indicates:   Allergen Reactions    Mepergan fortis Shortness Of Breath    Penicillins Rash    Sulfa (sulfonamide antibiotics) Rash    Sulfamethoxazole-trimethoprim Rash       Past Medical History:   Diagnosis Date    Abnormal Pap smear of cervix     NEELAM d/t recurrent abnormals/mild dysplasia; negative pathology after hysterctomy    Allergy     Anxiety     Asthma     Basal cell carcinoma (BCC) 2014    R  forehead    Hyperlipidemia     PAC (premature atrial contraction)     Rosacea      Past Surgical History:   Procedure Laterality Date     SECTION      COLD KNIFE CONIZATION OF CERVIX      multiple    COLONOSCOPY N/A 10/20/2020    Procedure: COLONOSCOPY;  Surgeon: Sudheer Santiago III, MD;  Location: [facility] ENDO;  Service: Endoscopy;  Laterality: N/A;    DENTAL SURGERY  03/15/2022    HYSTERECTOMY  2015    ovaries intact    NEUROPLASTY Left 2020    Procedure: NEUROPLASTY;  Surgeon: Quinton Alexandre DPM;  Location: [facility] OR;  Service: Podiatry;  Laterality: Left;    PLANTAR FASCIOTOMY Left 2020    Procedure: FASCIOTOMY, PLANTAR;  Surgeon: Quinton Alexandre DPM;  Location: [facility] OR;  Service: Podiatry;  Laterality: Left;    TONSILLECTOMY       Social History     Tobacco Use    Smoking status: Never    Smokeless tobacco: Never   Substance Use Topics    Alcohol use: Yes     Comment: occ    Drug use: No     Family History   Problem Relation Age of Onset    Diabetes Mother     Asthma Mother     Hypertension Father     Prostate cancer Father     Melanoma Neg Hx         Review of Systems:   Constitution: Negative for diaphoresis and fever.   HEENT: Negative for nosebleeds.    Cardiovascular: Negative for chest pain       No dyspnea on exertion       No leg swelling        Recurrent palpitations +episodes of SVT  Respiratory: Negative for shortness of breath and wheezing.    Hematologic/Lymphatic: Negative for bleeding problem. Does not bruise/bleed easily.   Skin: Negative for color change and rash.   Musculoskeletal: Negative for falls and myalgias.   Gastrointestinal: Negative for hematemesis and hematochezia.   Genitourinary: Negative for hematuria.   Neurological: Negative for dizziness and light-headedness.   Psychiatric/Behavioral: Negative for altered mental status and memory loss.          Objective:        Vitals:    23 1601   BP: 132/80   Pulse: 83   Resp: 16       Lab Results    Component Value Date    WBC 7.24 11/07/2022    HGB 13.2 11/07/2022    HCT 40.8 11/07/2022     11/07/2022    CHOL 160 11/04/2019    TRIG 97 11/04/2019    HDL 55 11/04/2019    ALT 16 08/31/2022    AST 18 08/31/2022     08/31/2022    K 3.5 08/31/2022     08/31/2022    CREATININE 0.6 08/31/2022    BUN 16 08/31/2022    CO2 27 08/31/2022    TSH 1.620 11/07/2022        ECHOCARDIOGRAM RESULTS  No results found for this or any previous visit.        CURRENT/PREVIOUS VISIT EKG  Results for orders placed or performed during the hospital encounter of 12/02/20   EKG 12-lead    Collection Time: 12/02/20 11:36 AM    Narrative    Test Reason : M72.2,G57.92,    Vent. Rate : 072 BPM     Atrial Rate : 072 BPM     P-R Int : 184 ms          QRS Dur : 084 ms      QT Int : 416 ms       P-R-T Axes : 063 068 041 degrees     QTc Int : 455 ms    Normal sinus rhythm  Normal ECG  No previous ECGs available  Confirmed by Myar Griffith MD ([id number]) on 12/5/2020 9:49:34 AM    Referred By:  HERBER           Confirmed By:Myra Griffith MD     No valid procedures specified.   No results found for this or any previous visit.      Physical Exam:  CONSTITUTIONAL: No fever, no chills  HEENT: Normocephalic, atraumatic,pupils reactive to light                 NECK:  No JVD no carotid bruit  CVS: S1S2+, RRR, no murmurs,   LUNGS: Clear  ABDOMEN: Soft, NT, BS+  EXTREMITIES: No cyanosis, edema  : No castillo catheter  NEURO: AAO X 3  PSY: Normal affect      Medication List with Changes/Refills   Current Medications    ACETAMINOPHEN (TYLENOL) 500 MG TABLET    Take 1,000 mg by mouth every 6 (six) hours as needed for Pain.    ALBUTEROL (PROVENTIL/VENTOLIN HFA) 90 MCG/ACTUATION INHALER    Inhale 2 puffs into the lungs every 6 (six) hours as needed for Wheezing.    ALPRAZOLAM (XANAX) 0.5 MG TABLET    Take 1 tablet (0.5 mg total) by mouth 3 (three) times daily as needed for Anxiety.    HYDROCORTISONE 2.5 % CREAM    Apply topically 2 (two)  times daily.    IBUPROFEN (ADVIL,MOTRIN) 200 MG TABLET    Take 400 mg by mouth every 6 (six) hours as needed for Pain.    LORATADINE (CLARITIN) 10 MG TABLET    Take 1 tablet (10 mg total) by mouth once daily.    METOPROLOL SUCCINATE (TOPROL-XL) 25 MG 24 HR TABLET    Take 1 tablet (25 mg total) by mouth 2 (two) times daily.    MONTELUKAST (SINGULAIR) 10 MG TABLET    Take 1 tablet (10 mg total) by mouth every evening.    MULTIVITAMIN (THERAGRAN) PER TABLET    Take 1 tablet by mouth once daily.    PROMETHAZINE-DEXTROMETHORPHAN (PROMETHAZINE-DM) 6.25-15 MG/5 ML SYRP    Take 5 mLs by mouth every 4 (four) hours as needed (cough).    SIMVASTATIN (ZOCOR) 20 MG TABLET    Take 1 tablet (20 mg total) by mouth every evening.    SUMATRIPTAN (IMITREX) 100 MG TABLET    Take 1 tablet (100 mg total) by mouth every 2 (two) hours as needed for Migraine. Do not exceed 2 doses in 24 hours.    VALACYCLOVIR (VALTREX) 1000 MG TABLET    Take 2 tablets (2,000 mg total) by mouth every 12 (twelve) hours as needed (fever blister).   Discontinued Medications    ESCITALOPRAM OXALATE (LEXAPRO) 20 MG TABLET    TAKE 1 TABLET BY MOUTH ONCE DAILY             Assessment:       1. Supraventricular tachycardia    2. AVNRT (AV kenneth re-entry tachycardia)    3. Atrial bigeminy    4. Near syncope    5. Chronic anxiety    6. Migraine without aura and without status migrainosus, not intractable    7. COVID-19    8. Mixed hyperlipidemia         Plan:   1. SVT/AVNRT  Patient has a recorded episode of SVT with retrograde P waves.  And at the present time she is on metoprolol succinate 25 mg p.o. b.i.d. patient to continue the same.  It is holding up her blood pressure also.    2. Patient's potassium which was done in 2022 was consistently low and the previous 2 years also it was at 3.6 and 3.5.  Discussed with patient that will replace potassium and she will be on potassium chloride 8 mEq twice a week.  Discussed with patient that will also start her on  magnesium oxide 400 mg p.o. Q HS.  3. Will also do a 2D echocardiogram for LV function ejection fraction and wall motion abnormality especially given the fact that she recently had a COVID-19.  4. Will also recheck her blood work including potassium level and as well as magnesium level and will also check a thyroid function test as well as free T3 and a free T4 levels.  5. She had a recent Holter monitor done which was positive for PACs and rare PVCs.  6. Patient had recent COVID will also check her chest x-ray PA and lateral views since she has been having recurrent cough and bronchospasm.  Discussed with patient that she would benefit from mental late id inhaler.  7. EKG  Reviewed EKG independently patient is in normal sinus rhythm with heart rate of 83 beats per minute normal intervals and essentially normal EKG no acute ST T-wave changes.    8. Patient to see me back in the office in 4-6 weeks time.      Problem List Items Addressed This Visit          Neuro    Migraine without aura and without status migrainosus, not intractable       Psychiatric    Chronic anxiety       Cardiac/Vascular    Hyperlipidemia     Other Visit Diagnoses       Supraventricular tachycardia    -  Primary    AVNRT (AV kenneth re-entry tachycardia)        Atrial bigeminy        Near syncope        COVID-19                No follow-ups on file.

## 2023-02-23 ENCOUNTER — HOSPITAL ENCOUNTER (OUTPATIENT)
Dept: RADIOLOGY | Facility: HOSPITAL | Age: 48
Discharge: HOME | End: 2023-02-23
Attending: INTERNAL MEDICINE
Payer: COMMERCIAL

## 2023-02-23 DIAGNOSIS — U07.1 COVID-19: ICD-10-CM

## 2023-02-23 DIAGNOSIS — E78.2 MIXED HYPERLIPIDEMIA: ICD-10-CM

## 2023-02-23 DIAGNOSIS — I49.8 ATRIAL BIGEMINY: ICD-10-CM

## 2023-02-23 DIAGNOSIS — G43.009 MIGRAINE WITHOUT AURA AND WITHOUT STATUS MIGRAINOSUS, NOT INTRACTABLE: ICD-10-CM

## 2023-02-23 DIAGNOSIS — F41.9 CHRONIC ANXIETY: ICD-10-CM

## 2023-02-23 DIAGNOSIS — R55 NEAR SYNCOPE: ICD-10-CM

## 2023-02-23 DIAGNOSIS — J30.1 CHRONIC SEASONAL ALLERGIC RHINITIS DUE TO POLLEN: ICD-10-CM

## 2023-02-23 DIAGNOSIS — I47.19 AVNRT (AV NODAL RE-ENTRY TACHYCARDIA): ICD-10-CM

## 2023-02-23 DIAGNOSIS — F41.9 ANXIETY AND DEPRESSION: ICD-10-CM

## 2023-02-23 DIAGNOSIS — I47.10 SUPRAVENTRICULAR TACHYCARDIA: ICD-10-CM

## 2023-02-23 DIAGNOSIS — F32.A ANXIETY AND DEPRESSION: ICD-10-CM

## 2023-02-23 PROCEDURE — 71046 X-RAY EXAM CHEST 2 VIEWS: CPT | Mod: TC

## 2023-02-23 RX ORDER — POTASSIUM CHLORIDE 600 MG/1
8 TABLET, FILM COATED, EXTENDED RELEASE ORAL
Qty: 24 TABLET | Refills: 3 | Status: SHIPPED | OUTPATIENT
Start: 2023-02-23 | End: 2023-03-17

## 2023-02-23 RX ORDER — MONTELUKAST SODIUM 10 MG/1
10 TABLET ORAL NIGHTLY
Qty: 90 TABLET | Refills: 1 | Status: SHIPPED | OUTPATIENT
Start: 2023-02-23 | End: 2023-09-05 | Stop reason: SDUPTHER

## 2023-02-23 RX ORDER — LANOLIN ALCOHOL/MO/W.PET/CERES
400 CREAM (GRAM) TOPICAL NIGHTLY
Qty: 90 TABLET | Refills: 3 | Status: SHIPPED | OUTPATIENT
Start: 2023-02-23 | End: 2024-02-04 | Stop reason: SDUPTHER

## 2023-02-23 RX ORDER — ALPRAZOLAM 0.5 MG/1
0.5 TABLET ORAL 3 TIMES DAILY PRN
Qty: 30 TABLET | Refills: 1 | Status: SHIPPED | OUTPATIENT
Start: 2023-02-23 | End: 2023-09-13 | Stop reason: SDUPTHER

## 2023-03-01 ENCOUNTER — HOSPITAL ENCOUNTER (OUTPATIENT)
Dept: RADIOLOGY | Facility: HOSPITAL | Age: 48
Discharge: HOME | End: 2023-03-01
Attending: SPECIALIST
Payer: COMMERCIAL

## 2023-03-01 DIAGNOSIS — R92.8 ABNORMAL MAMMOGRAM: ICD-10-CM

## 2023-03-01 PROCEDURE — 77065 DX MAMMO INCL CAD UNI: CPT | Mod: TC,PO,LT

## 2023-03-01 PROCEDURE — 76642 ULTRASOUND BREAST LIMITED: CPT | Mod: TC,PO,LT

## 2023-03-10 ENCOUNTER — LAB VISIT (OUTPATIENT)
Dept: LAB | Facility: HOSPITAL | Age: 48
End: 2023-03-10
Attending: INTERNAL MEDICINE
Payer: COMMERCIAL

## 2023-03-10 DIAGNOSIS — G43.009 MIGRAINE WITHOUT AURA AND WITHOUT STATUS MIGRAINOSUS, NOT INTRACTABLE: ICD-10-CM

## 2023-03-10 DIAGNOSIS — F41.9 CHRONIC ANXIETY: ICD-10-CM

## 2023-03-10 DIAGNOSIS — I47.10 SUPRAVENTRICULAR TACHYCARDIA: ICD-10-CM

## 2023-03-10 DIAGNOSIS — I47.19 AVNRT (AV NODAL RE-ENTRY TACHYCARDIA): ICD-10-CM

## 2023-03-10 DIAGNOSIS — U07.1 COVID-19: ICD-10-CM

## 2023-03-10 DIAGNOSIS — I49.8 ATRIAL BIGEMINY: ICD-10-CM

## 2023-03-10 DIAGNOSIS — E78.2 MIXED HYPERLIPIDEMIA: ICD-10-CM

## 2023-03-10 DIAGNOSIS — R55 NEAR SYNCOPE: ICD-10-CM

## 2023-03-10 LAB
ANION GAP SERPL CALC-SCNC: 5 MMOL/L (ref 8–16)
BUN SERPL-MCNC: 16 MG/DL (ref 6–20)
CALCIUM SERPL-MCNC: 9.2 MG/DL (ref 8.7–10.5)
CHLORIDE SERPL-SCNC: 105 MMOL/L (ref 95–110)
CO2 SERPL-SCNC: 29 MMOL/L (ref 23–29)
CREAT SERPL-MCNC: 0.7 MG/DL (ref 0.5–1.4)
EST. GFR  (NO RACE VARIABLE): >60 ML/MIN/1.73 M^2
GLUCOSE SERPL-MCNC: 111 MG/DL (ref 70–110)
MAGNESIUM SERPL-MCNC: 2 MG/DL (ref 1.6–2.6)
POTASSIUM SERPL-SCNC: 3.9 MMOL/L (ref 3.5–5.1)
SODIUM SERPL-SCNC: 139 MMOL/L (ref 136–145)
T4 FREE SERPL-MCNC: 1.03 NG/DL (ref 0.71–1.51)
TSH SERPL DL<=0.005 MIU/L-ACNC: 1.75 UIU/ML (ref 0.34–5.6)

## 2023-03-10 PROCEDURE — 84480 ASSAY TRIIODOTHYRONINE (T3): CPT | Performed by: INTERNAL MEDICINE

## 2023-03-10 PROCEDURE — 80048 BASIC METABOLIC PNL TOTAL CA: CPT | Performed by: INTERNAL MEDICINE

## 2023-03-10 PROCEDURE — 36415 COLL VENOUS BLD VENIPUNCTURE: CPT | Performed by: INTERNAL MEDICINE

## 2023-03-10 PROCEDURE — 83735 ASSAY OF MAGNESIUM: CPT | Performed by: INTERNAL MEDICINE

## 2023-03-10 PROCEDURE — 84443 ASSAY THYROID STIM HORMONE: CPT | Performed by: INTERNAL MEDICINE

## 2023-03-10 PROCEDURE — 84439 ASSAY OF FREE THYROXINE: CPT | Performed by: INTERNAL MEDICINE

## 2023-03-11 LAB — T3 SERPL-MCNC: 81 NG/DL (ref 71–180)

## 2023-03-16 ENCOUNTER — PATIENT MESSAGE (OUTPATIENT)
Dept: CARDIOLOGY | Facility: CLINIC | Age: 48
End: 2023-03-16
Payer: COMMERCIAL

## 2023-03-16 DIAGNOSIS — R00.2 PALPITATIONS: Primary | ICD-10-CM

## 2023-03-17 RX ORDER — POTASSIUM CHLORIDE 600 MG/1
8 TABLET, FILM COATED, EXTENDED RELEASE ORAL DAILY
Qty: 90 TABLET | Refills: 3 | Status: SHIPPED | OUTPATIENT
Start: 2023-03-17 | End: 2023-09-13 | Stop reason: SDUPTHER

## 2023-03-24 ENCOUNTER — HOSPITAL ENCOUNTER (OUTPATIENT)
Dept: CARDIOLOGY | Facility: HOSPITAL | Age: 48
Discharge: HOME | End: 2023-03-24
Attending: INTERNAL MEDICINE
Payer: COMMERCIAL

## 2023-03-24 VITALS — WEIGHT: 160 LBS | HEIGHT: 65 IN | BODY MASS INDEX: 26.66 KG/M2

## 2023-03-24 DIAGNOSIS — U07.1 COVID-19: ICD-10-CM

## 2023-03-24 DIAGNOSIS — E78.2 MIXED HYPERLIPIDEMIA: ICD-10-CM

## 2023-03-24 DIAGNOSIS — G43.009 MIGRAINE WITHOUT AURA AND WITHOUT STATUS MIGRAINOSUS, NOT INTRACTABLE: ICD-10-CM

## 2023-03-24 DIAGNOSIS — I49.8 ATRIAL BIGEMINY: ICD-10-CM

## 2023-03-24 DIAGNOSIS — I47.10 SUPRAVENTRICULAR TACHYCARDIA: ICD-10-CM

## 2023-03-24 DIAGNOSIS — R55 NEAR SYNCOPE: ICD-10-CM

## 2023-03-24 DIAGNOSIS — F41.9 CHRONIC ANXIETY: ICD-10-CM

## 2023-03-24 DIAGNOSIS — I47.19 AVNRT (AV NODAL RE-ENTRY TACHYCARDIA): ICD-10-CM

## 2023-03-24 PROCEDURE — 93306 TTE W/DOPPLER COMPLETE: CPT

## 2023-03-24 PROCEDURE — 93306 TTE W/DOPPLER COMPLETE: CPT | Mod: 26,,, | Performed by: INTERNAL MEDICINE

## 2023-03-24 PROCEDURE — 93306 ECHO (CUPID ONLY): ICD-10-PCS | Mod: 26,,, | Performed by: INTERNAL MEDICINE

## 2023-03-29 LAB
AORTIC ROOT ANNULUS: 2.9 CM
AORTIC VALVE CUSP SEPERATION: 1.7 CM
AV INDEX (PROSTH): 1.13
AV MEAN GRADIENT: 5 MMHG
AV PEAK GRADIENT: 8 MMHG
AV VALVE AREA: 3.54 CM2
AV VELOCITY RATIO: 1
BSA FOR ECHO PROCEDURE: 1.82 M2
CV ECHO LV RWT: 0.37 CM
DOP CALC AO PEAK VEL: 1.44 M/S
DOP CALC AO VTI: 31.1 CM
DOP CALC LVOT AREA: 3.1 CM2
DOP CALC LVOT DIAMETER: 2 CM
DOP CALC LVOT PEAK VEL: 1.44 M/S
DOP CALC LVOT STROKE VOLUME: 110.21 CM3
DOP CALCLVOT PEAK VEL VTI: 35.1 CM
E WAVE DECELERATION TIME: 194 MSEC
E/A RATIO: 1
E/E' RATIO: 7.3 M/S
ECHO LV POSTERIOR WALL: 0.78 CM (ref 0.6–1.1)
EJECTION FRACTION: 65 %
FRACTIONAL SHORTENING: 34 % (ref 28–44)
INTERVENTRICULAR SEPTUM: 0.76 CM (ref 0.6–1.1)
IVRT: 113 MSEC
LEFT INTERNAL DIMENSION IN SYSTOLE: 2.77 CM (ref 2.1–4)
LEFT VENTRICLE DIASTOLIC VOLUME INDEX: 43.39 ML/M2
LEFT VENTRICLE DIASTOLIC VOLUME: 78.1 ML
LEFT VENTRICLE MASS INDEX: 53 G/M2
LEFT VENTRICLE SYSTOLIC VOLUME INDEX: 16 ML/M2
LEFT VENTRICLE SYSTOLIC VOLUME: 28.8 ML
LEFT VENTRICULAR INTERNAL DIMENSION IN DIASTOLE: 4.19 CM (ref 3.5–6)
LEFT VENTRICULAR MASS: 95.92 G
LV LATERAL E/E' RATIO: 6.46 M/S
LV SEPTAL E/E' RATIO: 8.4 M/S
LVOT MG: 5 MMHG
LVOT MV: 1.08 CM/S
MV PEAK A VEL: 0.84 M/S
MV PEAK E VEL: 0.84 M/S
MV STENOSIS PRESSURE HALF TIME: 72 MS
MV VALVE AREA P 1/2 METHOD: 3.06 CM2
PISA TR MAX VEL: 2.14 M/S
RIGHT VENTRICULAR END-DIASTOLIC DIMENSION: 1.47 CM
TDI LATERAL: 0.13 M/S
TDI SEPTAL: 0.1 M/S
TDI: 0.12 M/S
TR MAX PG: 18 MMHG

## 2023-04-26 ENCOUNTER — LAB VISIT (OUTPATIENT)
Dept: LAB | Facility: HOSPITAL | Age: 48
End: 2023-04-26
Attending: NURSE PRACTITIONER
Payer: COMMERCIAL

## 2023-04-26 DIAGNOSIS — R00.2 PALPITATIONS: ICD-10-CM

## 2023-04-26 LAB
ANION GAP SERPL CALC-SCNC: 9 MMOL/L (ref 8–16)
BUN SERPL-MCNC: 21 MG/DL (ref 6–20)
CALCIUM SERPL-MCNC: 9.3 MG/DL (ref 8.7–10.5)
CHLORIDE SERPL-SCNC: 104 MMOL/L (ref 95–110)
CO2 SERPL-SCNC: 24 MMOL/L (ref 23–29)
CREAT SERPL-MCNC: 1.2 MG/DL (ref 0.5–1.4)
EST. GFR  (NO RACE VARIABLE): 55.5 ML/MIN/1.73 M^2
GLUCOSE SERPL-MCNC: 98 MG/DL (ref 70–110)
POTASSIUM SERPL-SCNC: 4.2 MMOL/L (ref 3.5–5.1)
SODIUM SERPL-SCNC: 137 MMOL/L (ref 136–145)

## 2023-04-26 PROCEDURE — 80048 BASIC METABOLIC PNL TOTAL CA: CPT | Performed by: NURSE PRACTITIONER

## 2023-04-26 PROCEDURE — 36415 COLL VENOUS BLD VENIPUNCTURE: CPT | Performed by: NURSE PRACTITIONER

## 2023-05-02 ENCOUNTER — PATIENT MESSAGE (OUTPATIENT)
Dept: CARDIOLOGY | Facility: CLINIC | Age: 48
End: 2023-05-02
Payer: COMMERCIAL

## 2023-05-02 DIAGNOSIS — I49.8 ATRIAL BIGEMINY: Primary | ICD-10-CM

## 2023-05-17 ENCOUNTER — OFFICE VISIT (OUTPATIENT)
Dept: DERMATOLOGY | Facility: CLINIC | Age: 48
End: 2023-05-17
Payer: COMMERCIAL

## 2023-05-17 VITALS — BODY MASS INDEX: 24.16 KG/M2 | WEIGHT: 145 LBS | HEIGHT: 65 IN

## 2023-05-17 DIAGNOSIS — D18.01 CHERRY ANGIOMA: ICD-10-CM

## 2023-05-17 DIAGNOSIS — D48.5 NEOPLASM OF UNCERTAIN BEHAVIOR OF SKIN: Primary | ICD-10-CM

## 2023-05-17 DIAGNOSIS — Z86.69 HISTORY OF BELL'S PALSY: ICD-10-CM

## 2023-05-17 DIAGNOSIS — L91.8 INFLAMED SKIN TAG: ICD-10-CM

## 2023-05-17 DIAGNOSIS — L98.8 RHYTIDES: ICD-10-CM

## 2023-05-17 DIAGNOSIS — L81.4 LENTIGO: ICD-10-CM

## 2023-05-17 DIAGNOSIS — Z85.828 HISTORY OF NONMELANOMA SKIN CANCER: ICD-10-CM

## 2023-05-17 DIAGNOSIS — L90.5 SCAR: ICD-10-CM

## 2023-05-17 DIAGNOSIS — L82.1 SEBORRHEIC KERATOSES: ICD-10-CM

## 2023-05-17 DIAGNOSIS — D22.9 MULTIPLE BENIGN NEVI: ICD-10-CM

## 2023-05-17 PROCEDURE — 17110 DESTRUCTION B9 LES UP TO 14: CPT | Mod: S$GLB,,, | Performed by: STUDENT IN AN ORGANIZED HEALTH CARE EDUCATION/TRAINING PROGRAM

## 2023-05-17 PROCEDURE — 88305 TISSUE EXAM BY PATHOLOGIST: CPT | Performed by: PATHOLOGY

## 2023-05-17 PROCEDURE — 3008F PR BODY MASS INDEX (BMI) DOCUMENTED: ICD-10-PCS | Mod: CPTII,S$GLB,, | Performed by: STUDENT IN AN ORGANIZED HEALTH CARE EDUCATION/TRAINING PROGRAM

## 2023-05-17 PROCEDURE — 11102 TANGNTL BX SKIN SINGLE LES: CPT | Mod: XS,S$GLB,, | Performed by: STUDENT IN AN ORGANIZED HEALTH CARE EDUCATION/TRAINING PROGRAM

## 2023-05-17 PROCEDURE — 99213 OFFICE O/P EST LOW 20 MIN: CPT | Mod: 25,S$GLB,, | Performed by: STUDENT IN AN ORGANIZED HEALTH CARE EDUCATION/TRAINING PROGRAM

## 2023-05-17 PROCEDURE — 88305 TISSUE EXAM BY PATHOLOGIST: ICD-10-PCS | Mod: 26,,, | Performed by: PATHOLOGY

## 2023-05-17 PROCEDURE — 99999 PR PBB SHADOW E&M-EST. PATIENT-LVL IV: CPT | Mod: PBBFAC,,, | Performed by: STUDENT IN AN ORGANIZED HEALTH CARE EDUCATION/TRAINING PROGRAM

## 2023-05-17 PROCEDURE — 88305 TISSUE EXAM BY PATHOLOGIST: CPT | Mod: 26,,, | Performed by: PATHOLOGY

## 2023-05-17 PROCEDURE — 1160F PR REVIEW ALL MEDS BY PRESCRIBER/CLIN PHARMACIST DOCUMENTED: ICD-10-PCS | Mod: CPTII,S$GLB,, | Performed by: STUDENT IN AN ORGANIZED HEALTH CARE EDUCATION/TRAINING PROGRAM

## 2023-05-17 PROCEDURE — 1159F MED LIST DOCD IN RCRD: CPT | Mod: CPTII,S$GLB,, | Performed by: STUDENT IN AN ORGANIZED HEALTH CARE EDUCATION/TRAINING PROGRAM

## 2023-05-17 PROCEDURE — 1160F RVW MEDS BY RX/DR IN RCRD: CPT | Mod: CPTII,S$GLB,, | Performed by: STUDENT IN AN ORGANIZED HEALTH CARE EDUCATION/TRAINING PROGRAM

## 2023-05-17 PROCEDURE — 99213 PR OFFICE/OUTPT VISIT, EST, LEVL III, 20-29 MIN: ICD-10-PCS | Mod: 25,S$GLB,, | Performed by: STUDENT IN AN ORGANIZED HEALTH CARE EDUCATION/TRAINING PROGRAM

## 2023-05-17 PROCEDURE — 3008F BODY MASS INDEX DOCD: CPT | Mod: CPTII,S$GLB,, | Performed by: STUDENT IN AN ORGANIZED HEALTH CARE EDUCATION/TRAINING PROGRAM

## 2023-05-17 PROCEDURE — 99999 PR PBB SHADOW E&M-EST. PATIENT-LVL IV: ICD-10-PCS | Mod: PBBFAC,,, | Performed by: STUDENT IN AN ORGANIZED HEALTH CARE EDUCATION/TRAINING PROGRAM

## 2023-05-17 PROCEDURE — 1159F PR MEDICATION LIST DOCUMENTED IN MEDICAL RECORD: ICD-10-PCS | Mod: CPTII,S$GLB,, | Performed by: STUDENT IN AN ORGANIZED HEALTH CARE EDUCATION/TRAINING PROGRAM

## 2023-05-17 PROCEDURE — 17110 PR DESTRUCTION BENIGN LESIONS UP TO 14: ICD-10-PCS | Mod: S$GLB,,, | Performed by: STUDENT IN AN ORGANIZED HEALTH CARE EDUCATION/TRAINING PROGRAM

## 2023-05-17 PROCEDURE — 11102 PR TANGENTIAL BIOPSY, SKIN, SINGLE LESION: ICD-10-PCS | Mod: XS,S$GLB,, | Performed by: STUDENT IN AN ORGANIZED HEALTH CARE EDUCATION/TRAINING PROGRAM

## 2023-05-17 NOTE — PROGRESS NOTES
Subjective:      Patient ID:  Rosaura Loredo is a 47 y.o. female who presents for   Chief Complaint   Patient presents with    Skin Check     TBSC      LOV 11/15/21- Rey    Patient here today for TBSC  C/O multiple skin tags would like to discuss removal.    Derm hx:   BCC R forehead 2014 MOHS DR [place]   Denies fhx of MM           Review of Systems   Constitutional:  Negative for fever, chills and fatigue.   Respiratory:  Negative for cough and shortness of breath.    Skin:  Positive for dry skin and activity-related sunscreen use. Negative for itching, rash and daily sunscreen use.     Objective:   Physical Exam   Constitutional: She appears well-developed and well-nourished. No distress.   Neurological: She is alert and oriented to person, place, and time. She is not disoriented.   Psychiatric: She has a normal mood and affect.   Skin:   Areas Examined (abnormalities noted in diagram):   Scalp / Hair Palpated and Inspected  Head / Face Inspection Performed  Neck Inspection Performed  Chest / Axilla Inspection Performed  Abdomen Inspection Performed  Genitals / Buttocks / Groin Inspection Performed  Back Inspection Performed  RUE Inspected  LUE Inspection Performed  RLE Inspected  LLE Inspection Performed  Nails and Digits Inspection Performed                   Diagram Legend     Erythematous scaling macule/papule c/w actinic keratosis       Vascular papule c/w angioma      Pigmented verrucoid papule/plaque c/w seborrheic keratosis      Yellow umbilicated papule c/w sebaceous hyperplasia      Irregularly shaped tan macule c/w lentigo     1-2 mm smooth white papules consistent with Milia      Movable subcutaneous cyst with punctum c/w epidermal inclusion cyst      Subcutaneous movable cyst c/w pilar cyst      Firm pink to brown papule c/w dermatofibroma      Pedunculated fleshy papule(s) c/w skin tag(s)      Evenly pigmented macule c/w junctional nevus     Mildly variegated pigmented, slightly  irregular-bordered macule c/w mildly atypical nevus      Flesh colored to evenly pigmented papule c/w intradermal nevus       Pink pearly papule/plaque c/w basal cell carcinoma      Erythematous hyperkeratotic cursted plaque c/w SCC      Surgical scar with no sign of skin cancer recurrence      Open and closed comedones      Inflammatory papules and pustules      Verrucoid papule consistent consistent with wart     Erythematous eczematous patches and plaques     Dystrophic onycholytic nail with subungual debris c/w onychomycosis     Umbilicated papule    Erythematous-base heme-crusted tan verrucoid plaque consistent with inflamed seborrheic keratosis     Erythematous Silvery Scaling Plaque c/w Psoriasis     See annotation        Assessment / Plan:      Pathology Orders:       Normal Orders This Visit    Specimen to Pathology, Dermatology     Questions:    Procedure Type: Dermatology and skin neoplasms    Number of Specimens: 1    ------------------------: -------------------------    Spec 1 Procedure: Biopsy    Spec 1 Clinical Impression: IDN r/o BCC    Spec 1 Source: right upper cutaneous lip    Release to patient:           Neoplasm of uncertain behavior of skin  -     Specimen to Pathology, Dermatology  Shave biopsy procedure note:    Shave biopsy performed after verbal consent including risk of infection, scar, recurrence, need for additional treatment of site. Area prepped with alcohol, anesthetized with approximately 1.0cc of 1% lidocaine with epinephrine. Lesional tissue shaved with razor blade. Hemostasis achieved with application of aluminum chloride followed by hyfrecation. No complications. Dressing applied. Wound care explained.    Seborrheic keratoses  These are benign inherited growths without a malignant potential. Reassurance given to patient. No treatment is necessary.     Cherry angioma  This is a benign vascular lesion. Reassurance given. No treatment required.     Multiple benign nevi  Careful  dermoscopy evaluation of nevi performed with none identified as needing biopsy today  Monitor for new mole or moles that are becoming bigger, darker, irritated, or developing irregular borders.     History of nonmelanoma skin cancer  Scar  Area of previous NMSC examined. Site well healed with no signs of recurrence.    Total body skin examination performed today including at least 12 points as noted in physical examination. Suspicious lesions noted.  Patient instructed in importance in daily broad spectrum sun protection of at least spf 30. Mineral sunscreen ingredients preferred (Zinc +/- Titanium) and can be found OTC.   Patient encouraged to wear hat for all outdoor exposure.   Also discussed sun avoidance and use of protective clothing.    Lentigo  This is a benign hyperpigmented sun induced lesion. Daily sun protection will reduce the number of new lesions. Treatment of these benign lesions are considered cosmetic.    Rhytides  History of Bell's palsy  Discussed botox for glabella    Inflamed skin tag  - left ac fossa  Cryosurgery procedure note:    Verbal consent from the patient is obtained. Liquid nitrogen cryosurgery is applied to 2 lesions to produce a freeze injury. The patient is aware that blisters may form and is instructed on wound care with gentle cleansing and use of vaseline ointment to keep moist until healed. The patient is supplied a handout on cryosurgery and is instructed to call if lesions do not completely resolve.           1 year or sooner pending biopsy  No follow-ups on file.

## 2023-05-17 NOTE — PATIENT INSTRUCTIONS
Cerave tinted mineral  Zinc oxide or titanium dioxide  Vanicream mineral sunscreen    [facility]     Shave Biopsy Wound Care    Your doctor has performed a shave biopsy today.  A band aid and vaseline ointment has been placed over the site.  This should remain in place for 24 hours.  It is recommended that you keep the area dry for the first 24 hours.  After 24 hours, you may remove the band aid and wash the area with warm soap and water and apply Vaseline jelly.  Many patients prefer to use Neosporin or Bacitracin ointment.  This is acceptable; however, know that you can develop an allergy to this medication even if you have used it safely for years.  It is important to keep the area moist.  Letting it dry out and get air slows healing time, and will worsen the scar.  Band aid is optional after first 24 hours.      If you notice increasing redness, tenderness, pain, or yellow drainage at the biopsy site, please notify your doctor.  These are signs of an infection.    If your biopsy site is bleeding, apply firm pressure for 15 minutes straight.  Repeat for another 15 minutes, if it is still bleeding.   If the surgical site continues to bleed, then please contact your doctor.      [facility] [facility] [place] [address]/ [phone] FAX/ [email or web address]

## 2023-05-23 LAB
FINAL PATHOLOGIC DIAGNOSIS: NORMAL
GROSS: NORMAL
Lab: NORMAL
MICROSCOPIC EXAM: NORMAL

## 2023-09-05 DIAGNOSIS — J30.1 CHRONIC SEASONAL ALLERGIC RHINITIS DUE TO POLLEN: ICD-10-CM

## 2023-09-05 RX ORDER — MONTELUKAST SODIUM 10 MG/1
10 TABLET ORAL NIGHTLY
Qty: 90 TABLET | Refills: 1 | Status: SHIPPED | OUTPATIENT
Start: 2023-09-05 | End: 2024-02-04 | Stop reason: SDUPTHER

## 2023-09-13 ENCOUNTER — OFFICE VISIT (OUTPATIENT)
Dept: FAMILY MEDICINE | Facility: CLINIC | Age: 48
End: 2023-09-13
Payer: COMMERCIAL

## 2023-09-13 VITALS
HEART RATE: 72 BPM | OXYGEN SATURATION: 98 % | TEMPERATURE: 99 F | SYSTOLIC BLOOD PRESSURE: 114 MMHG | WEIGHT: 145 LBS | BODY MASS INDEX: 24.16 KG/M2 | DIASTOLIC BLOOD PRESSURE: 70 MMHG | HEIGHT: 65 IN

## 2023-09-13 DIAGNOSIS — F32.A ANXIETY AND DEPRESSION: ICD-10-CM

## 2023-09-13 DIAGNOSIS — Z00.00 ENCOUNTER FOR PREVENTATIVE ADULT HEALTH CARE EXAMINATION: ICD-10-CM

## 2023-09-13 DIAGNOSIS — E78.00 PURE HYPERCHOLESTEROLEMIA: Primary | ICD-10-CM

## 2023-09-13 DIAGNOSIS — F41.9 ANXIETY AND DEPRESSION: ICD-10-CM

## 2023-09-13 DIAGNOSIS — G43.009 MIGRAINE WITHOUT AURA AND WITHOUT STATUS MIGRAINOSUS, NOT INTRACTABLE: ICD-10-CM

## 2023-09-13 DIAGNOSIS — I49.1 PAC (PREMATURE ATRIAL CONTRACTION): ICD-10-CM

## 2023-09-13 PROCEDURE — 3078F PR MOST RECENT DIASTOLIC BLOOD PRESSURE < 80 MM HG: ICD-10-PCS | Mod: CPTII,S$GLB,, | Performed by: INTERNAL MEDICINE

## 2023-09-13 PROCEDURE — 3078F DIAST BP <80 MM HG: CPT | Mod: CPTII,S$GLB,, | Performed by: INTERNAL MEDICINE

## 2023-09-13 PROCEDURE — 99213 OFFICE O/P EST LOW 20 MIN: CPT | Mod: S$GLB,,, | Performed by: INTERNAL MEDICINE

## 2023-09-13 PROCEDURE — 1159F PR MEDICATION LIST DOCUMENTED IN MEDICAL RECORD: ICD-10-PCS | Mod: CPTII,S$GLB,, | Performed by: INTERNAL MEDICINE

## 2023-09-13 PROCEDURE — 1159F MED LIST DOCD IN RCRD: CPT | Mod: CPTII,S$GLB,, | Performed by: INTERNAL MEDICINE

## 2023-09-13 PROCEDURE — 3008F BODY MASS INDEX DOCD: CPT | Mod: CPTII,S$GLB,, | Performed by: INTERNAL MEDICINE

## 2023-09-13 PROCEDURE — 3074F SYST BP LT 130 MM HG: CPT | Mod: CPTII,S$GLB,, | Performed by: INTERNAL MEDICINE

## 2023-09-13 PROCEDURE — 3008F PR BODY MASS INDEX (BMI) DOCUMENTED: ICD-10-PCS | Mod: CPTII,S$GLB,, | Performed by: INTERNAL MEDICINE

## 2023-09-13 PROCEDURE — 3074F PR MOST RECENT SYSTOLIC BLOOD PRESSURE < 130 MM HG: ICD-10-PCS | Mod: CPTII,S$GLB,, | Performed by: INTERNAL MEDICINE

## 2023-09-13 PROCEDURE — 99213 PR OFFICE/OUTPT VISIT, EST, LEVL III, 20-29 MIN: ICD-10-PCS | Mod: S$GLB,,, | Performed by: INTERNAL MEDICINE

## 2023-09-13 RX ORDER — OMEPRAZOLE 20 MG/1
20 TABLET, DELAYED RELEASE ORAL DAILY
COMMUNITY
End: 2023-09-13 | Stop reason: ALTCHOICE

## 2023-09-13 RX ORDER — SIMVASTATIN 20 MG/1
20 TABLET, FILM COATED ORAL NIGHTLY
Qty: 90 TABLET | Refills: 1 | Status: SHIPPED | OUTPATIENT
Start: 2023-09-13

## 2023-09-13 RX ORDER — METOPROLOL SUCCINATE 25 MG/1
25 TABLET, EXTENDED RELEASE ORAL 2 TIMES DAILY
Qty: 180 TABLET | Refills: 1 | Status: SHIPPED | OUTPATIENT
Start: 2023-09-13 | End: 2024-02-16

## 2023-09-13 RX ORDER — PANTOPRAZOLE SODIUM 40 MG/1
40 TABLET, DELAYED RELEASE ORAL DAILY
Qty: 90 TABLET | Refills: 0 | Status: SHIPPED | OUTPATIENT
Start: 2023-09-13 | End: 2023-12-18

## 2023-09-13 RX ORDER — POTASSIUM CHLORIDE 600 MG/1
8 TABLET, FILM COATED, EXTENDED RELEASE ORAL DAILY
Qty: 90 TABLET | Refills: 3 | Status: SHIPPED | OUTPATIENT
Start: 2023-09-13 | End: 2024-01-18 | Stop reason: SDUPTHER

## 2023-09-13 RX ORDER — SUMATRIPTAN SUCCINATE 100 MG/1
100 TABLET ORAL
Qty: 27 TABLET | Refills: 3 | Status: SHIPPED | OUTPATIENT
Start: 2023-09-13

## 2023-09-13 RX ORDER — ALPRAZOLAM 0.5 MG/1
0.5 TABLET ORAL 3 TIMES DAILY PRN
Qty: 30 TABLET | Refills: 1 | Status: SHIPPED | OUTPATIENT
Start: 2023-09-13 | End: 2024-03-15 | Stop reason: SDUPTHER

## 2023-09-13 NOTE — PROGRESS NOTES
Subjective:       Patient ID: Rosaura Loredo is a 47 y.o. female.    Chief Complaint: Medication Refill    Here for routine follow up; last visit note, most recent available labs, and health maintenance topics reviewed.       Anxiety  Presents for follow-up visit. Patient reports no chest pain, confusion, decreased concentration, dizziness, nausea, nervous/anxious behavior, palpitations, shortness of breath or suicidal ideas. The severity of symptoms is mild.       Hyperlipidemia  This is a chronic problem. The problem is controlled. Recent lipid tests were reviewed and are normal (labs done yearly for work). Pertinent negatives include no chest pain, myalgias or shortness of breath. Current antihyperlipidemic treatment includes statins. The current treatment provides significant improvement of lipids. There are no compliance problems.      Review of Systems   Constitutional:  Negative for activity change, appetite change, chills, diaphoresis, fatigue, fever and unexpected weight change.   HENT:  Negative for congestion, ear discharge, ear pain, hearing loss, mouth sores, nosebleeds, postnasal drip, rhinorrhea, sinus pressure, sinus pain, sneezing, sore throat, tinnitus, trouble swallowing and voice change.    Eyes:  Negative for photophobia, pain, discharge, redness, itching and visual disturbance.   Respiratory:  Negative for apnea, cough, choking, chest tightness, shortness of breath and wheezing.    Cardiovascular:  Negative for chest pain, palpitations and leg swelling.   Gastrointestinal:  Negative for abdominal distention, abdominal pain, blood in stool, constipation, diarrhea, nausea and vomiting.   Endocrine: Negative for cold intolerance, heat intolerance, polydipsia and polyuria.   Genitourinary:  Negative for decreased urine volume, difficulty urinating, dyspareunia, dysuria, enuresis, frequency, genital sores, hematuria, menstrual problem, pelvic pain, urgency, vaginal bleeding, vaginal discharge  and vaginal pain.   Musculoskeletal:  Negative for arthralgias, back pain, gait problem, joint swelling, myalgias, neck pain and neck stiffness.   Skin:  Negative for rash and wound.   Allergic/Immunologic: Negative for environmental allergies, food allergies and immunocompromised state.   Neurological:  Negative for dizziness, tremors, seizures, syncope, facial asymmetry, speech difficulty, weakness, light-headedness, numbness and headaches.   Hematological:  Negative for adenopathy. Does not bruise/bleed easily.   Psychiatric/Behavioral:  Negative for confusion, decreased concentration, hallucinations, self-injury, sleep disturbance and suicidal ideas. The patient is not nervous/anxious.        Past Medical History:   Diagnosis Date    Abnormal Pap smear of cervix     NEELAM d/t recurrent abnormals/mild dysplasia; negative pathology after hysterctomy    Allergy     Anxiety     Asthma     Basal cell carcinoma (BCC)     R forehead    Hyperlipidemia     PAC (premature atrial contraction)     Rosacea       Past Surgical History:   Procedure Laterality Date     SECTION      COLD KNIFE CONIZATION OF CERVIX      multiple    COLONOSCOPY N/A 10/20/2020    Procedure: COLONOSCOPY;  Surgeon: Sudheer Santiago III, MD;  Location: [facility] ENDO;  Service: Endoscopy;  Laterality: N/A;    DENTAL SURGERY  03/15/2022    HYSTERECTOMY  2015    ovaries intact    NEUROPLASTY Left 2020    Procedure: NEUROPLASTY;  Surgeon: Quinton Alexandre DPM;  Location: [facility] OR;  Service: Podiatry;  Laterality: Left;    PLANTAR FASCIOTOMY Left 2020    Procedure: FASCIOTOMY, PLANTAR;  Surgeon: Quinton Alexandre DPM;  Location: [facility] OR;  Service: Podiatry;  Laterality: Left;    TONSILLECTOMY         Family History   Problem Relation Age of Onset    Diabetes Mother     Asthma Mother     Hypertension Father     Prostate cancer Father     Melanoma Neg Hx        Social History     Socioeconomic History    Marital status:     Tobacco Use    Smoking status: Never    Smokeless tobacco: Never   Substance and Sexual Activity    Alcohol use: Yes     Comment: occ    Drug use: No    Sexual activity: Yes     Birth control/protection: See Surgical Hx, Surgical   Social History Narrative    Live with      Social Determinants of Health     Financial Resource Strain: Low Risk  (8/24/2021)    Overall Financial Resource Strain (CARDIA)     Difficulty of Paying Living Expenses: Not hard at all   Food Insecurity: No Food Insecurity (8/24/2021)    Hunger Vital Sign     Worried About Running Out of Food in the Last Year: Never true     Ran Out of Food in the Last Year: Never true   Transportation Needs: No Transportation Needs (8/24/2021)    PRAPARE - Transportation     Lack of Transportation (Medical): No     Lack of Transportation (Non-Medical): No   Physical Activity: Unknown (8/24/2021)    Exercise Vital Sign     Days of Exercise per Week: 0 days   Stress: Stress Concern Present (8/24/2021)    [place] citizen [employer] - Occupational Stress Questionnaire     Feeling of Stress : Rather much   Social Connections: Unknown (8/24/2021)    Social Connection and Isolation Panel [NHANES]     Frequency of Communication with Friends and Family: Three times a week     Frequency of Social Gatherings with Friends and Family: Once a week     Active Member of Clubs or Organizations: No     Attends Club or Organization Meetings: Never     Marital Status:        Current Outpatient Medications   Medication Sig Dispense Refill    albuterol (PROVENTIL/VENTOLIN HFA) 90 mcg/actuation inhaler Inhale 2 puffs into the lungs every 6 (six) hours as needed for Wheezing. 18 g 3    hydrocortisone 2.5 % cream Apply topically 2 (two) times daily. 30 g 1    ibuprofen (ADVIL,MOTRIN) 200 MG tablet Take 400 mg by mouth every 6 (six) hours as needed for Pain.      loratadine (CLARITIN) 10 mg tablet Take 1 tablet (10 mg total) by mouth once daily. 90 tablet  "1    magnesium oxide (MAG-OX) 400 mg (241.3 mg magnesium) tablet Take 1 tablet (400 mg total) by mouth every evening. 90 tablet 3    montelukast (SINGULAIR) 10 mg tablet Take 1 tablet (10 mg total) by mouth every evening. 90 tablet 1    multivitamin (THERAGRAN) per tablet Take 1 tablet by mouth once daily.      omeprazole (PRILOSEC OTC) 20 MG tablet Take 20 mg by mouth once daily.      semaglutide (OZEMPIC) 2 mg/dose (8 mg/3 mL) PnIj Inject into the skin every 7 days.      valACYclovir (VALTREX) 1000 MG tablet Take 2 tablets (2,000 mg total) by mouth every 12 (twelve) hours as needed (fever blister). 30 tablet 3    ALPRAZolam (XANAX) 0.5 MG tablet Take 1 tablet (0.5 mg total) by mouth 3 (three) times daily as needed for Anxiety. 30 tablet 1    metoprolol succinate (TOPROL-XL) 25 MG 24 hr tablet Take 1 tablet (25 mg total) by mouth 2 (two) times daily. 180 tablet 1    potassium chloride (KLOR-CON) 8 MEQ TbSR Take 1 tablet (8 mEq total) by mouth once daily. 90 tablet 3    simvastatin (ZOCOR) 20 MG tablet Take 1 tablet (20 mg total) by mouth every evening. 90 tablet 1    sumatriptan (IMITREX) 100 MG tablet Take 1 tablet (100 mg total) by mouth every 2 (two) hours as needed for Migraine. Do not exceed 2 doses in 24 hours. 27 tablet 3     No current facility-administered medications for this visit.       Review of patient's allergies indicates:   Allergen Reactions    Mepergan fortis Shortness Of Breath    Penicillins Rash    Sulfa (sulfonamide antibiotics) Rash    Sulfamethoxazole-trimethoprim Rash     Objective:      Blood pressure 114/70, pulse 72, temperature 98.5 °F (36.9 °C), temperature source Temporal, height 5' 5" (1.651 m), weight 65.8 kg (145 lb), SpO2 98 %. Body mass index is 24.13 kg/m².   Physical Exam  Vitals and nursing note reviewed.   Constitutional:       General: She is not in acute distress.     Appearance: She is well-developed. She is not ill-appearing, toxic-appearing or diaphoretic.   HENT:      " Head: Normocephalic and atraumatic.   Eyes:      General: No scleral icterus.        Right eye: No discharge.         Left eye: No discharge.      Conjunctiva/sclera: Conjunctivae normal.   Neck:      Vascular: No carotid bruit.   Cardiovascular:      Rate and Rhythm: Normal rate and regular rhythm.      Heart sounds: Normal heart sounds. No murmur heard.  Pulmonary:      Effort: Pulmonary effort is normal. No respiratory distress.      Breath sounds: Normal breath sounds. No decreased breath sounds, wheezing, rhonchi or rales.   Abdominal:      General: There is no distension.      Palpations: Abdomen is soft.      Tenderness: There is no abdominal tenderness. There is no guarding or rebound.   Musculoskeletal:      Right lower leg: No edema.      Left lower leg: No edema.   Skin:     General: Skin is warm and dry.   Neurological:      Mental Status: She is alert.      Motor: No tremor.   Psychiatric:         Mood and Affect: Mood normal.         Speech: Speech normal.         Behavior: Behavior normal.             Assessment:       1. Pure hypercholesterolemia    2. Anxiety and depression    3. Migraine without aura and without status migrainosus, not intractable    4. PAC (premature atrial contraction)    5. Encounter for preventative adult health care examination        Plan:       Rosaura was seen today for medication refill.    Diagnoses and all orders for this visit:    Pure hypercholesterolemia  -     simvastatin (ZOCOR) 20 MG tablet; Take 1 tablet (20 mg total) by mouth every evening.    Anxiety and depression  -     ALPRAZolam (XANAX) 0.5 MG tablet; Take 1 tablet (0.5 mg total) by mouth 3 (three) times daily as needed for Anxiety.    Migraine without aura and without status migrainosus, not intractable  -     sumatriptan (IMITREX) 100 MG tablet; Take 1 tablet (100 mg total) by mouth every 2 (two) hours as needed for Migraine. Do not exceed 2 doses in 24 hours.    PAC (premature atrial contraction)  -      metoprolol succinate (TOPROL-XL) 25 MG 24 hr tablet; Take 1 tablet (25 mg total) by mouth 2 (two) times daily.    Encounter for preventative adult health care examination  -     Comprehensive Metabolic Panel; Future  -     Lipid Panel; Future    Other orders  -     potassium chloride (KLOR-CON) 8 MEQ TbSR; Take 1 tablet (8 mEq total) by mouth once daily.             Discussed risks of non FDA approved semaglutide (she is ordering compounded medication online, not actual ozempic or wegovy)

## 2023-10-20 ENCOUNTER — TELEPHONE (OUTPATIENT)
Dept: DERMATOLOGY | Facility: CLINIC | Age: 48
End: 2023-10-20
Payer: COMMERCIAL

## 2023-10-20 NOTE — TELEPHONE ENCOUNTER
----- Message from aDija Carrasco sent at 10/20/2023  3:41 PM CDT -----  Contact: self  Type:  Needs Medical Advice, SOONER APPT REQUEST    Who Called: self  Symptoms (please be specific): pt needs to see dr for her rash on nose and eyelid. Pt sts she was told to call clinic and she can get an earlier appt rather than waiting months to get in. I told her the next available was Feb 8, and she said she saw the same time on the portal as well but still wants to see can she possibly be seen earlier.    Would the patient rather a call back or a response via [facility]? call  Best Call Back Number: [phone] (home)     Additional Information: please advise and thank you.

## 2023-10-25 ENCOUNTER — LAB VISIT (OUTPATIENT)
Dept: LAB | Facility: HOSPITAL | Age: 48
End: 2023-10-25
Attending: INTERNAL MEDICINE
Payer: COMMERCIAL

## 2023-10-25 DIAGNOSIS — Z00.00 ENCOUNTER FOR PREVENTATIVE ADULT HEALTH CARE EXAMINATION: ICD-10-CM

## 2023-10-25 LAB
ALBUMIN SERPL BCP-MCNC: 4.5 G/DL (ref 3.5–5.2)
ALP SERPL-CCNC: 46 U/L (ref 55–135)
ALT SERPL W/O P-5'-P-CCNC: 11 U/L (ref 10–44)
ANION GAP SERPL CALC-SCNC: 5 MMOL/L (ref 8–16)
AST SERPL-CCNC: 15 U/L (ref 10–40)
BILIRUB SERPL-MCNC: 0.9 MG/DL (ref 0.1–1)
BUN SERPL-MCNC: 16 MG/DL (ref 6–20)
CALCIUM SERPL-MCNC: 9.9 MG/DL (ref 8.7–10.5)
CHLORIDE SERPL-SCNC: 103 MMOL/L (ref 95–110)
CHOLEST SERPL-MCNC: 156 MG/DL (ref 120–199)
CHOLEST/HDLC SERPL: 2.7 {RATIO} (ref 2–5)
CO2 SERPL-SCNC: 29 MMOL/L (ref 23–29)
CREAT SERPL-MCNC: 0.7 MG/DL (ref 0.5–1.4)
EST. GFR  (NO RACE VARIABLE): >60 ML/MIN/1.73 M^2
GLUCOSE SERPL-MCNC: 95 MG/DL (ref 70–110)
HDLC SERPL-MCNC: 57 MG/DL (ref 40–75)
HDLC SERPL: 36.5 % (ref 20–50)
LDLC SERPL CALC-MCNC: 81 MG/DL (ref 63–159)
NONHDLC SERPL-MCNC: 99 MG/DL
POTASSIUM SERPL-SCNC: 3.7 MMOL/L (ref 3.5–5.1)
PROT SERPL-MCNC: 7.1 G/DL (ref 6–8.4)
SODIUM SERPL-SCNC: 137 MMOL/L (ref 136–145)
TRIGL SERPL-MCNC: 90 MG/DL (ref 30–150)

## 2023-10-25 PROCEDURE — 36415 COLL VENOUS BLD VENIPUNCTURE: CPT | Performed by: INTERNAL MEDICINE

## 2023-10-25 PROCEDURE — 80061 LIPID PANEL: CPT | Performed by: INTERNAL MEDICINE

## 2023-10-25 PROCEDURE — 80053 COMPREHEN METABOLIC PANEL: CPT | Performed by: INTERNAL MEDICINE

## 2023-11-16 NOTE — ANESTHESIA POSTPROCEDURE EVALUATION
Anesthesia Post Evaluation    Patient: Rosaura Loredo    Procedure(s) Performed: Procedure(s) (LRB):  FASCIOTOMY, PLANTAR (OPEN) (Left)  40228 - RELEASE OF NGUYEN'S NERVE LEFT FOOT (Left)    Final Anesthesia Type: general    Patient location during evaluation: PACU  Patient participation: Yes- Able to Participate  Level of consciousness: awake and alert  Post-procedure vital signs: reviewed and stable  Pain management: adequate  Airway patency: patent    PONV status at discharge: No PONV  Anesthetic complications: no      Cardiovascular status: blood pressure returned to baseline  Respiratory status: unassisted  Hydration status: euvolemic  Follow-up not needed.          Vitals Value Taken Time   /69 12/11/20 0915   Temp 36.3 °C (97.4 °F) 12/11/20 0900   Pulse 79 12/11/20 0915   Resp 13 12/11/20 0915   SpO2 94 % 12/11/20 0915   Vitals shown include unvalidated device data.      Event Time   Out of Recovery 09:16:29         Pain/Cam Score: Pain Rating Prior to Med Admin: 8 (12/11/2020  8:41 AM)  Cam Score: 10 (12/11/2020  9:00 AM)         Detail Level: Simple Detail Level: Generalized Detail Level: Zone

## 2023-11-17 ENCOUNTER — PATIENT MESSAGE (OUTPATIENT)
Dept: FAMILY MEDICINE | Facility: CLINIC | Age: 48
End: 2023-11-17

## 2023-11-17 RX ORDER — OSELTAMIVIR PHOSPHATE 75 MG/1
75 CAPSULE ORAL DAILY
Qty: 10 CAPSULE | Refills: 0 | Status: SHIPPED | OUTPATIENT
Start: 2023-11-17 | End: 2023-11-27

## 2023-12-12 ENCOUNTER — OFFICE VISIT (OUTPATIENT)
Dept: DERMATOLOGY | Facility: CLINIC | Age: 48
End: 2023-12-12
Payer: COMMERCIAL

## 2023-12-12 DIAGNOSIS — H01.133 EYELID DERMATITIS, ECZEMATOUS, RIGHT: Primary | ICD-10-CM

## 2023-12-12 DIAGNOSIS — L65.9 HAIR LOSS: ICD-10-CM

## 2023-12-12 DIAGNOSIS — B07.9 VERRUCA VULGARIS: ICD-10-CM

## 2023-12-12 DIAGNOSIS — L71.9 ROSACEA: ICD-10-CM

## 2023-12-12 PROCEDURE — 99214 OFFICE O/P EST MOD 30 MIN: CPT | Mod: 25,S$GLB,, | Performed by: STUDENT IN AN ORGANIZED HEALTH CARE EDUCATION/TRAINING PROGRAM

## 2023-12-12 PROCEDURE — 99214 PR OFFICE/OUTPT VISIT, EST, LEVL IV, 30-39 MIN: ICD-10-PCS | Mod: 25,S$GLB,, | Performed by: STUDENT IN AN ORGANIZED HEALTH CARE EDUCATION/TRAINING PROGRAM

## 2023-12-12 PROCEDURE — 1160F PR REVIEW ALL MEDS BY PRESCRIBER/CLIN PHARMACIST DOCUMENTED: ICD-10-PCS | Mod: CPTII,S$GLB,, | Performed by: STUDENT IN AN ORGANIZED HEALTH CARE EDUCATION/TRAINING PROGRAM

## 2023-12-12 PROCEDURE — 1160F RVW MEDS BY RX/DR IN RCRD: CPT | Mod: CPTII,S$GLB,, | Performed by: STUDENT IN AN ORGANIZED HEALTH CARE EDUCATION/TRAINING PROGRAM

## 2023-12-12 PROCEDURE — 1159F MED LIST DOCD IN RCRD: CPT | Mod: CPTII,S$GLB,, | Performed by: STUDENT IN AN ORGANIZED HEALTH CARE EDUCATION/TRAINING PROGRAM

## 2023-12-12 PROCEDURE — 17110 DESTRUCTION B9 LES UP TO 14: CPT | Mod: S$GLB,,, | Performed by: STUDENT IN AN ORGANIZED HEALTH CARE EDUCATION/TRAINING PROGRAM

## 2023-12-12 PROCEDURE — 17110 PR DESTRUCTION BENIGN LESIONS UP TO 14: ICD-10-PCS | Mod: S$GLB,,, | Performed by: STUDENT IN AN ORGANIZED HEALTH CARE EDUCATION/TRAINING PROGRAM

## 2023-12-12 PROCEDURE — 1159F PR MEDICATION LIST DOCUMENTED IN MEDICAL RECORD: ICD-10-PCS | Mod: CPTII,S$GLB,, | Performed by: STUDENT IN AN ORGANIZED HEALTH CARE EDUCATION/TRAINING PROGRAM

## 2023-12-12 RX ORDER — TRIAMCINOLONE ACETONIDE 0.25 MG/G
OINTMENT TOPICAL 2 TIMES DAILY
Qty: 15 G | Refills: 1 | Status: SHIPPED | OUTPATIENT
Start: 2023-12-12

## 2023-12-12 NOTE — PATIENT INSTRUCTIONS
Vanicream ointment or cream   Use triple cream twice daily x 8 weeks then use nightly   CRYOSURGERY      Your doctor has used a method called cryosurgery to treat your skin condition. Cryosurgery refers to the use of very cold substances to treat a variety of skin conditions such as warts, pre-skin cancers, molluscum contagiosum, sun spots, and several benign growths. The substance we use in cryosurgery is liquid nitrogen and is so cold (-195 degrees Celsius) that is burns when administered.     Following treatment in the office, the skin may immediately burn and become red. You may find the area around the lesion is affected as well. It is sometimes necessary to treat not only the lesion, but a small area of the surrounding normal skin to achieve a good response.     A blister, and even a blood filled blister, may form after treatment.   This is a normal response. If the blister is painful, it is acceptable to sterilize a needle and with rubbing alcohol and gently pop the blister. It is important that you gently wash the area with soap and warm water as the blister fluid may contain wart virus if a wart was treated. Do no remove the roof of the blister.     The area treated can take anywhere from 1-3 weeks to heal. Healing time depends on the kind skin lesion treated, the location, and how aggressively the lesion was treated. It is recommended that the areas treated are covered with Vaseline or bacitracin ointment and a band-aid. If a band-aid is not practical, just ointment applied several times per day will do. Keeping these areas moist will speed the healing time.    Treatment with liquid nitrogen can leave a scar. In dark skin, it may be a light or dark scar, in light skin it may be a white or pink scar. These will generally fade with time, but may never go away completely.     If you have any concerns after your treatment, please feel free to call the office.       [address] [facility], [place]  [address]/ [phone] FAX/ [email or web address]

## 2023-12-12 NOTE — PROGRESS NOTES
Subjective:      Patient ID:  Rosaura Loredo is a 47 y.o. female who presents for   Chief Complaint   Patient presents with    Rash     face     LOV 5/17/23    Patient here today for rash on face. Hx of rosacea. Patient states it comes and goes. Has tried topicals and Doxycycline in the past, no improvement. No current treatment.   Has used metronidazole cream.    Also complains of toenails not growing. States it may be due to poor blood circulation.     Also complains of spot on right eyelid x weeks. Patient states it will be crusty in the morning. Applies steroid cream and will resolve.   Paint nails regularly.   Son with hx of eczema, she has hx of dry skin    Derm hx:   [facility]   Denies fhx of MM           Review of Systems   Constitutional:  Negative for fever, chills and fatigue.   Respiratory:  Negative for cough and shortness of breath.    Gastrointestinal:  Negative for nausea and vomiting.   Skin:  Positive for dry skin and activity-related sunscreen use. Negative for itching, rash and daily sunscreen use.       Objective:   Physical Exam   Constitutional: She appears well-developed and well-nourished.   Neurological: She is alert and oriented to person, place, and time.   Psychiatric: She has a normal mood and affect.   Skin:   Areas Examined (abnormalities noted in diagram):   Head / Face Inspection Performed  RLE Inspected                 Diagram Legend     Erythematous scaling macule/papule c/w actinic keratosis       Vascular papule c/w angioma      Pigmented verrucoid papule/plaque c/w seborrheic keratosis      Yellow umbilicated papule c/w sebaceous hyperplasia      Irregularly shaped tan macule c/w lentigo     1-2 mm smooth white papules consistent with Milia      Movable subcutaneous cyst with punctum c/w epidermal inclusion cyst      Subcutaneous movable cyst c/w pilar cyst      Firm pink to brown papule c/w dermatofibroma      Pedunculated fleshy papule(s) c/w  skin tag(s)      Evenly pigmented macule c/w junctional nevus     Mildly variegated pigmented, slightly irregular-bordered macule c/w mildly atypical nevus      Flesh colored to evenly pigmented papule c/w intradermal nevus       Pink pearly papule/plaque c/w basal cell carcinoma      Erythematous hyperkeratotic cursted plaque c/w SCC      Surgical scar with no sign of skin cancer recurrence      Open and closed comedones      Inflammatory papules and pustules      Verrucoid papule consistent consistent with wart     Erythematous eczematous patches and plaques     Dystrophic onycholytic nail with subungual debris c/w onychomycosis     Umbilicated papule    Erythematous-base heme-crusted tan verrucoid plaque consistent with inflamed seborrheic keratosis     Erythematous Silvery Scaling Plaque c/w Psoriasis     See annotation      Assessment / Plan:        Eyelid dermatitis, eczematous, right  -     triamcinolone acetonide 0.025% (KENALOG) 0.025 % Oint; Apply topically 2 (two) times daily.  Dispense: 15 g; Refill: 1  Use tac ointment x 5-7 days when eruption appears  Recommend vanicream ointment or cream or aquaphor daily to prevent recurrence  Dx: atopic derm vs. ACD vs ICD  Consider patch test if not improving    Verruca vulgaris  Cryosurgery procedure note:    Verbal consent from the patient is obtained. Liquid nitrogen cryosurgery is applied to 1 lesions to produce a freeze injury. The patient is aware that blisters may form and is instructed on wound care with gentle cleansing and use of vaseline ointment to keep moist until healed. The patient is supplied a handout on cryosurgery and is instructed to call if lesions do not completely resolve.    Rosacea  Has component of papulopustular and erythematotelangiectatic rosacea  Recommend daily sun protection- vanicream sunscreen, sample of neutrogena mineral given  sent Rx for triple cream to [facility] - use on face qday (30g $45/60g $70)  Use triple cream BID  x 8 weeks then use once nightly  Also recommend laser treatment, hand out given, will need at least 3 sessions    Hair loss  Will set up separate visit to address hair loss           No follow-ups on file.

## 2023-12-18 ENCOUNTER — OFFICE VISIT (OUTPATIENT)
Dept: FAMILY MEDICINE | Facility: CLINIC | Age: 48
End: 2023-12-18
Payer: COMMERCIAL

## 2023-12-18 VITALS
OXYGEN SATURATION: 97 % | SYSTOLIC BLOOD PRESSURE: 120 MMHG | BODY MASS INDEX: 25.23 KG/M2 | DIASTOLIC BLOOD PRESSURE: 70 MMHG | WEIGHT: 151.44 LBS | HEART RATE: 81 BPM | TEMPERATURE: 97 F | HEIGHT: 65 IN

## 2023-12-18 DIAGNOSIS — Z12.31 VISIT FOR SCREENING MAMMOGRAM: ICD-10-CM

## 2023-12-18 DIAGNOSIS — G44.52 NEW DAILY PERSISTENT HEADACHE: ICD-10-CM

## 2023-12-18 DIAGNOSIS — I49.1 PAC (PREMATURE ATRIAL CONTRACTION): Primary | ICD-10-CM

## 2023-12-18 PROCEDURE — 3078F PR MOST RECENT DIASTOLIC BLOOD PRESSURE < 80 MM HG: ICD-10-PCS | Mod: CPTII,S$GLB,, | Performed by: INTERNAL MEDICINE

## 2023-12-18 PROCEDURE — 1159F MED LIST DOCD IN RCRD: CPT | Mod: CPTII,S$GLB,, | Performed by: INTERNAL MEDICINE

## 2023-12-18 PROCEDURE — 3074F SYST BP LT 130 MM HG: CPT | Mod: CPTII,S$GLB,, | Performed by: INTERNAL MEDICINE

## 2023-12-18 PROCEDURE — 99999 PR PBB SHADOW E&M-EST. PATIENT-LVL IV: ICD-10-PCS | Mod: PBBFAC,,, | Performed by: INTERNAL MEDICINE

## 2023-12-18 PROCEDURE — 1159F PR MEDICATION LIST DOCUMENTED IN MEDICAL RECORD: ICD-10-PCS | Mod: CPTII,S$GLB,, | Performed by: INTERNAL MEDICINE

## 2023-12-18 PROCEDURE — 3074F PR MOST RECENT SYSTOLIC BLOOD PRESSURE < 130 MM HG: ICD-10-PCS | Mod: CPTII,S$GLB,, | Performed by: INTERNAL MEDICINE

## 2023-12-18 PROCEDURE — 3078F DIAST BP <80 MM HG: CPT | Mod: CPTII,S$GLB,, | Performed by: INTERNAL MEDICINE

## 2023-12-18 PROCEDURE — 3008F PR BODY MASS INDEX (BMI) DOCUMENTED: ICD-10-PCS | Mod: CPTII,S$GLB,, | Performed by: INTERNAL MEDICINE

## 2023-12-18 PROCEDURE — 99213 PR OFFICE/OUTPT VISIT, EST, LEVL III, 20-29 MIN: ICD-10-PCS | Mod: S$GLB,,, | Performed by: INTERNAL MEDICINE

## 2023-12-18 PROCEDURE — 99999 PR PBB SHADOW E&M-EST. PATIENT-LVL IV: CPT | Mod: PBBFAC,,, | Performed by: INTERNAL MEDICINE

## 2023-12-18 PROCEDURE — 3008F BODY MASS INDEX DOCD: CPT | Mod: CPTII,S$GLB,, | Performed by: INTERNAL MEDICINE

## 2023-12-18 PROCEDURE — 99213 OFFICE O/P EST LOW 20 MIN: CPT | Mod: S$GLB,,, | Performed by: INTERNAL MEDICINE

## 2023-12-18 RX ORDER — BUTALBITAL, ACETAMINOPHEN AND CAFFEINE 50; 325; 40 MG/1; MG/1; MG/1
1 TABLET ORAL EVERY 4 HOURS PRN
Qty: 30 TABLET | Refills: 1 | Status: SHIPPED | OUTPATIENT
Start: 2023-12-18 | End: 2024-01-17

## 2023-12-18 NOTE — PROGRESS NOTES
Subjective:       Patient ID: Rosaura Loredo is a 47 y.o. female.    Chief Complaint: Allergic Reaction (Reaction to flu shot)    Here to discuss possible reaction to flu shot.  She reports she developed a rash the day after getting the shot and has noted increase in headaches, PACs, and had an episode of SVT.  She has a h/o PACs and SVTs but has been well controlled recently until a day or so after the flu shot.  Having a headache every day since the flu shot.  She has a h/o migraines but these are totally different; occipital dull ache.  She did try imitrex but didn't help.  She had a medrol dose medina at home which she took.  The rash improved but continues with other symptoms.   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      Review of Systems   Constitutional:  Negative for activity change, appetite change, chills, diaphoresis, fatigue, fever and unexpected weight change.   HENT:  Positive for postnasal drip and rhinorrhea. Negative for congestion, ear discharge, ear pain, hearing loss, mouth sores, nosebleeds, sinus pressure, sinus pain, sneezing, sore throat, tinnitus, trouble swallowing and voice change.    Eyes:  Negative for photophobia, pain, discharge, redness, itching and visual disturbance.   Respiratory:  Positive for shortness of breath. Negative for apnea, cough, choking, chest tightness and wheezing.    Cardiovascular:  Positive for palpitations. Negative for chest pain and leg swelling.   Gastrointestinal:  Negative for abdominal distention, abdominal pain, blood in stool, constipation, diarrhea, nausea and vomiting.   Endocrine: Negative for cold intolerance, heat intolerance, polydipsia and polyuria.   Genitourinary:  Negative for decreased urine volume, difficulty urinating, dyspareunia, dysuria, enuresis, frequency, genital sores, hematuria, menstrual problem, pelvic pain, urgency, vaginal bleeding, vaginal discharge and vaginal  pain.   Musculoskeletal:  Positive for neck stiffness. Negative for arthralgias, back pain, gait problem, joint swelling, myalgias and neck pain.   Skin:  Negative for rash and wound.   Allergic/Immunologic: Negative for environmental allergies, food allergies and immunocompromised state.   Neurological:  Positive for headaches. Negative for dizziness, tremors, seizures, syncope, facial asymmetry, speech difficulty, weakness, light-headedness and numbness.   Hematological:  Negative for adenopathy. Does not bruise/bleed easily.   Psychiatric/Behavioral:  Negative for confusion, decreased concentration, hallucinations, self-injury, sleep disturbance and suicidal ideas. The patient is nervous/anxious.        Past Medical History:   Diagnosis Date    Abnormal Pap smear of cervix     NEELAM d/t recurrent abnormals/mild dysplasia; negative pathology after hysterctomy    Allergy     Anxiety     Asthma     Basal cell carcinoma (BCC)     R forehead    Hyperlipidemia     PAC (premature atrial contraction)     Rosacea       Past Surgical History:   Procedure Laterality Date     SECTION      COLD KNIFE CONIZATION OF CERVIX      multiple    COLONOSCOPY N/A 10/20/2020    Procedure: COLONOSCOPY;  Surgeon: Sudheer Santiago III, MD;  Location: [facility];  Service: Endoscopy;  Laterality: N/A;    COLPOSCOPY  2023    DENTAL SURGERY  03/15/2022    HYSTERECTOMY  2015    ovaries intact    NEUROPLASTY Left 2020    Procedure: NEUROPLASTY;  Surgeon: Quinton Alexandre DPM;  Location: [facility] OR;  Service: Podiatry;  Laterality: Left;    PLANTAR FASCIOTOMY Left 2020    Procedure: FASCIOTOMY, PLANTAR;  Surgeon: Quinton Alexandre DPM;  Location: [facility] OR;  Service: Podiatry;  Laterality: Left;    TONSILLECTOMY         Family History   Problem Relation Age of Onset    Diabetes Mother     Asthma Mother     Hypertension Father     Prostate cancer Father     Melanoma Neg Hx        Social History     Socioeconomic  History    Marital status:    Tobacco Use    Smoking status: Never    Smokeless tobacco: Never   Substance and Sexual Activity    Alcohol use: Yes     Comment: occ    Drug use: No    Sexual activity: Yes     Birth control/protection: See Surgical Hx, Surgical   Social History Narrative    Live with      Social Determinants of Health     Financial Resource Strain: Low Risk  (8/24/2021)    Overall Financial Resource Strain (CARDIA)     Difficulty of Paying Living Expenses: Not hard at all   Food Insecurity: No Food Insecurity (8/24/2021)    Hunger Vital Sign     Worried About Running Out of Food in the Last Year: Never true     Ran Out of Food in the Last Year: Never true   Transportation Needs: No Transportation Needs (8/24/2021)    PRAPARE - Transportation     Lack of Transportation (Medical): No     Lack of Transportation (Non-Medical): No   Physical Activity: Unknown (8/24/2021)    Exercise Vital Sign     Days of Exercise per Week: 0 days   Stress: Stress Concern Present (8/24/2021)    [employer] [place] [employer] - Occupational Stress Questionnaire     Feeling of Stress : Rather much   Social Connections: Unknown (8/24/2021)    Social Connection and Isolation Panel [NHANES]     Frequency of Communication with Friends and Family: Three times a week     Frequency of Social Gatherings with Friends and Family: Once a week     Active Member of Clubs or Organizations: No     Attends Club or Organization Meetings: Never     Marital Status:        Current Outpatient Medications   Medication Sig Dispense Refill    albuterol (PROVENTIL/VENTOLIN HFA) 90 mcg/actuation inhaler Inhale 2 puffs into the lungs every 6 (six) hours as needed for Wheezing. 18 g 3    ALPRAZolam (XANAX) 0.5 MG tablet Take 1 tablet (0.5 mg total) by mouth 3 (three) times daily as needed for Anxiety. 30 tablet 1    hydrocortisone 2.5 % cream Apply topically 2 (two) times daily. 30 g 1    ibuprofen (ADVIL,MOTRIN) 200 MG  "tablet Take 400 mg by mouth every 6 (six) hours as needed for Pain.      loratadine (CLARITIN) 10 mg tablet Take 1 tablet (10 mg total) by mouth once daily. 90 tablet 1    magnesium oxide (MAG-OX) 400 mg (241.3 mg magnesium) tablet Take 1 tablet (400 mg total) by mouth every evening. 90 tablet 3    metoprolol succinate (TOPROL-XL) 25 MG 24 hr tablet Take 1 tablet (25 mg total) by mouth 2 (two) times daily. 180 tablet 1    montelukast (SINGULAIR) 10 mg tablet Take 1 tablet (10 mg total) by mouth every evening. 90 tablet 1    multivitamin (THERAGRAN) per tablet Take 1 tablet by mouth once daily.      potassium chloride (KLOR-CON) 8 MEQ TbSR Take 1 tablet (8 mEq total) by mouth once daily. 90 tablet 3    simvastatin (ZOCOR) 20 MG tablet Take 1 tablet (20 mg total) by mouth every evening. 90 tablet 1    sumatriptan (IMITREX) 100 MG tablet Take 1 tablet (100 mg total) by mouth every 2 (two) hours as needed for Migraine. Do not exceed 2 doses in 24 hours. 27 tablet 3    triamcinolone acetonide 0.025% (KENALOG) 0.025 % Oint Apply topically 2 (two) times daily. 15 g 1    valACYclovir (VALTREX) 1000 MG tablet Take 2 tablets (2,000 mg total) by mouth every 12 (twelve) hours as needed (fever blister). 30 tablet 3    butalbital-acetaminophen-caffeine -40 mg (FIORICET, ESGIC) -40 mg per tablet Take 1 tablet by mouth every 4 (four) hours as needed for Pain. 30 tablet 1     No current facility-administered medications for this visit.       Review of patient's allergies indicates:   Allergen Reactions    Mepergan fortis Shortness Of Breath    Penicillins Rash    Sulfa (sulfonamide antibiotics) Rash    Sulfamethoxazole-trimethoprim Rash     Objective:    HPI     Allergic Reaction     Additional comments: Reaction to flu shot          Last edited by Ismael Raymond MA on 12/18/2023  3:30 PM.      Blood pressure 120/70, pulse 81, temperature 96.6 °F (35.9 °C), temperature source Temporal, height 5' 5" (1.651 m), weight " 68.7 kg (151 lb 7.3 oz), SpO2 97 %. Body mass index is 25.2 kg/m².   Physical Exam  Vitals and nursing note reviewed.   Constitutional:       General: She is not in acute distress.     Appearance: She is well-developed. She is not ill-appearing, toxic-appearing or diaphoretic.   HENT:      Head: Normocephalic and atraumatic.   Eyes:      General: No scleral icterus.        Right eye: No discharge.         Left eye: No discharge.      Conjunctiva/sclera: Conjunctivae normal.   Neck:      Vascular: No carotid bruit.   Cardiovascular:      Rate and Rhythm: Normal rate and regular rhythm.      Heart sounds: Normal heart sounds. No murmur heard.  Pulmonary:      Effort: Pulmonary effort is normal. No respiratory distress.      Breath sounds: Normal breath sounds. No decreased breath sounds, wheezing, rhonchi or rales.   Abdominal:      General: There is no distension.      Palpations: Abdomen is soft.      Tenderness: There is no abdominal tenderness. There is no guarding or rebound.   Musculoskeletal:      Right lower leg: No edema.      Left lower leg: No edema.   Skin:     General: Skin is warm and dry.   Neurological:      Mental Status: She is alert.      Motor: No tremor.   Psychiatric:         Mood and Affect: Mood normal.         Speech: Speech normal.         Behavior: Behavior normal.             Assessment:       1. PAC (premature atrial contraction)    2. New daily persistent headache    3. Visit for screening mammogram        Plan:       Rosaura was seen today for allergic reaction.    Diagnoses and all orders for this visit:    PAC (premature atrial contraction)  Comments:  Check electrolytes.  f/u with Cardiology  Orders:  -     Basic Metabolic Panel; Future  -     Magnesium; Future    New daily persistent headache  Comments:  Trial fioricet.  If no improvement, refer to Neuro.  I'm not sure if it is actually r/t flu shot or not.  Orders:  -     butalbital-acetaminophen-caffeine -40 mg (FIORICET,  ESGIC) -40 mg per tablet; Take 1 tablet by mouth every 4 (four) hours as needed for Pain.    Visit for screening mammogram  -     Mammo Digital Screening Bilat w/ Garcia; Future

## 2023-12-22 ENCOUNTER — PATIENT MESSAGE (OUTPATIENT)
Dept: CARDIOLOGY | Facility: CLINIC | Age: 48
End: 2023-12-22
Payer: COMMERCIAL

## 2023-12-22 ENCOUNTER — LAB VISIT (OUTPATIENT)
Dept: LAB | Facility: HOSPITAL | Age: 48
End: 2023-12-22
Attending: INTERNAL MEDICINE
Payer: COMMERCIAL

## 2023-12-22 DIAGNOSIS — I49.1 PAC (PREMATURE ATRIAL CONTRACTION): ICD-10-CM

## 2023-12-22 LAB
ANION GAP SERPL CALC-SCNC: 6 MMOL/L (ref 8–16)
BUN SERPL-MCNC: 18 MG/DL (ref 6–20)
CALCIUM SERPL-MCNC: 9.2 MG/DL (ref 8.7–10.5)
CHLORIDE SERPL-SCNC: 102 MMOL/L (ref 95–110)
CO2 SERPL-SCNC: 30 MMOL/L (ref 23–29)
CREAT SERPL-MCNC: 0.9 MG/DL (ref 0.5–1.4)
EST. GFR  (NO RACE VARIABLE): >60 ML/MIN/1.73 M^2
GLUCOSE SERPL-MCNC: 88 MG/DL (ref 70–110)
MAGNESIUM SERPL-MCNC: 1.9 MG/DL (ref 1.6–2.6)
POTASSIUM SERPL-SCNC: 3.6 MMOL/L (ref 3.5–5.1)
SODIUM SERPL-SCNC: 138 MMOL/L (ref 136–145)

## 2023-12-22 PROCEDURE — 36415 COLL VENOUS BLD VENIPUNCTURE: CPT | Performed by: INTERNAL MEDICINE

## 2023-12-22 PROCEDURE — 80048 BASIC METABOLIC PNL TOTAL CA: CPT | Performed by: INTERNAL MEDICINE

## 2023-12-22 PROCEDURE — 83735 ASSAY OF MAGNESIUM: CPT | Performed by: INTERNAL MEDICINE

## 2024-01-02 ENCOUNTER — PATIENT MESSAGE (OUTPATIENT)
Dept: DERMATOLOGY | Facility: CLINIC | Age: 49
End: 2024-01-02
Payer: COMMERCIAL

## 2024-01-02 DIAGNOSIS — L71.9 ROSACEA: Primary | ICD-10-CM

## 2024-01-02 RX ORDER — MINOCYCLINE 15 MG/G
1 AEROSOL, FOAM TOPICAL NIGHTLY
Qty: 30 G | Refills: 2 | Status: SHIPPED | OUTPATIENT
Start: 2024-01-02

## 2024-01-18 ENCOUNTER — OFFICE VISIT (OUTPATIENT)
Dept: CARDIOLOGY | Facility: CLINIC | Age: 49
End: 2024-01-18
Payer: COMMERCIAL

## 2024-01-18 VITALS
BODY MASS INDEX: 25.99 KG/M2 | RESPIRATION RATE: 16 BRPM | HEIGHT: 65 IN | DIASTOLIC BLOOD PRESSURE: 80 MMHG | OXYGEN SATURATION: 98 % | WEIGHT: 156 LBS | SYSTOLIC BLOOD PRESSURE: 136 MMHG

## 2024-01-18 DIAGNOSIS — I49.1 PAC (PREMATURE ATRIAL CONTRACTION): Primary | ICD-10-CM

## 2024-01-18 DIAGNOSIS — R07.9 CHEST PAIN, UNSPECIFIED TYPE: ICD-10-CM

## 2024-01-18 DIAGNOSIS — E78.00 PURE HYPERCHOLESTEROLEMIA: ICD-10-CM

## 2024-01-18 DIAGNOSIS — R06.09 DOE (DYSPNEA ON EXERTION): ICD-10-CM

## 2024-01-18 PROCEDURE — 99214 OFFICE O/P EST MOD 30 MIN: CPT | Mod: S$GLB,,, | Performed by: NURSE PRACTITIONER

## 2024-01-18 PROCEDURE — 93010 ELECTROCARDIOGRAM REPORT: CPT | Mod: S$GLB,,, | Performed by: INTERNAL MEDICINE

## 2024-01-18 PROCEDURE — 3079F DIAST BP 80-89 MM HG: CPT | Mod: CPTII,S$GLB,, | Performed by: NURSE PRACTITIONER

## 2024-01-18 PROCEDURE — 1159F MED LIST DOCD IN RCRD: CPT | Mod: CPTII,S$GLB,, | Performed by: NURSE PRACTITIONER

## 2024-01-18 PROCEDURE — 3075F SYST BP GE 130 - 139MM HG: CPT | Mod: CPTII,S$GLB,, | Performed by: NURSE PRACTITIONER

## 2024-01-18 PROCEDURE — 3008F BODY MASS INDEX DOCD: CPT | Mod: CPTII,S$GLB,, | Performed by: NURSE PRACTITIONER

## 2024-01-18 PROCEDURE — 93005 ELECTROCARDIOGRAM TRACING: CPT | Mod: S$GLB,,,

## 2024-01-18 PROCEDURE — 99999 PR PBB SHADOW E&M-EST. PATIENT-LVL V: CPT | Mod: PBBFAC,,, | Performed by: NURSE PRACTITIONER

## 2024-01-18 RX ORDER — POTASSIUM CHLORIDE 1500 MG/1
20 TABLET, EXTENDED RELEASE ORAL DAILY
Qty: 90 TABLET | Refills: 3 | Status: SHIPPED | OUTPATIENT
Start: 2024-01-18 | End: 2025-01-17

## 2024-01-18 NOTE — ASSESSMENT & PLAN NOTE
Pt is still experiencing some dyspnea on exertion as well as had an episode of squeezing chest pressure.  Will continue on metoprolol succinate 25 mg BID as well as Mag oxide 400 mg daily.   Increased Potassium supplement to 20 meq daily.  Will repeat BMP in 2 weeks after making this change.  Ordered stress test to further evaluate.  Advised pt to hold metoprolol the night before the stress test as well as that morning of the test.   Could consider diltiazem in the future if the increased dose of potassium dose not help relieve symptoms.   Obtained EKG in office which showed NSR.

## 2024-01-18 NOTE — ASSESSMENT & PLAN NOTE
Last lipid panel at goal:     Latest Reference Range & Units Most Recent   Cholesterol Total 120 - 199 mg/dL 156  10/25/23 06:30   HDL 40 - 75 mg/dL 57  10/25/23 06:30   HDL/Cholesterol Ratio 20.0 - 50.0 % 36.5  10/25/23 06:30   Non HDL Chol. (LDL+VLDL) <130 mg/dL (calc) 105  11/4/19 10:15   Non-HDL Cholesterol mg/dL 99  10/25/23 06:30   Total Cholesterol/HDL Ratio 2.0 - 5.0  2.7  10/25/23 06:30   Triglycerides 30 - 150 mg/dL 90  10/25/23 06:30   LDL Cholesterol 63.0 - 159.0 mg/dL 81.0  10/25/23 06:30   Continue on Zocar 20 mg nightly.

## 2024-01-26 ENCOUNTER — TELEPHONE (OUTPATIENT)
Dept: CARDIOLOGY | Facility: HOSPITAL | Age: 49
End: 2024-01-26

## 2024-01-26 NOTE — TELEPHONE ENCOUNTER
[facility] advised[facility] [facility]).   Will need to register on the first floor at the main entrance.   Patient advised that arrival time is 9:00am.  Patient advised, may take most of her medications prior to testing if you need to.  Patient should HOLD Metoprolol.   Advised if she needs to eat to take her medications, please keep it light, like toast and juice.    Patient advised to avoid all caffeine 12 hours prior to testing.  This includes decaf tea and coffee.    Wear comfortable clothing.   No lotions, oils, or powders to the upper chest area. May wear deodorant.    Patient verbalizes understanding of instructions.

## 2024-01-29 ENCOUNTER — HOSPITAL ENCOUNTER (OUTPATIENT)
Dept: CARDIOLOGY | Facility: HOSPITAL | Age: 49
Discharge: HOME | End: 2024-01-29
Attending: NURSE PRACTITIONER
Payer: COMMERCIAL

## 2024-01-29 DIAGNOSIS — R07.9 CHEST PAIN, UNSPECIFIED TYPE: ICD-10-CM

## 2024-01-29 DIAGNOSIS — R06.09 DOE (DYSPNEA ON EXERTION): ICD-10-CM

## 2024-01-29 DIAGNOSIS — I49.1 PAC (PREMATURE ATRIAL CONTRACTION): ICD-10-CM

## 2024-01-29 LAB
CV STRESS BASE HR: 79 BPM
DIASTOLIC BLOOD PRESSURE: 80 MMHG
OHS CV CPX 1 MINUTE RECOVERY HEART RATE: 139 BPM
OHS CV CPX 85 PERCENT MAX PREDICTED HEART RATE MALE: 145
OHS CV CPX ESTIMATED METS: 7
OHS CV CPX MAX PREDICTED HEART RATE: 170
OHS CV CPX PATIENT IS FEMALE: 1
OHS CV CPX PATIENT IS MALE: 0
OHS CV CPX PEAK DIASTOLIC BLOOD PRESSURE: 84 MMHG
OHS CV CPX PEAK HEAR RATE: 165 BPM
OHS CV CPX PEAK RATE PRESSURE PRODUCT: NORMAL
OHS CV CPX PEAK SYSTOLIC BLOOD PRESSURE: 182 MMHG
OHS CV CPX PERCENT MAX PREDICTED HEART RATE ACHIEVED: 102
OHS CV CPX RATE PRESSURE PRODUCT PRESENTING: NORMAL
STRESS ECHO POST EXERCISE DUR MIN: 6 MINUTES
STRESS ECHO POST EXERCISE DUR SEC: 0 SECONDS
SYSTOLIC BLOOD PRESSURE: 134 MMHG

## 2024-01-29 PROCEDURE — 93017 CV STRESS TEST TRACING ONLY: CPT

## 2024-01-29 PROCEDURE — 93018 CV STRESS TEST I&R ONLY: CPT | Mod: ,,, | Performed by: INTERNAL MEDICINE

## 2024-01-29 PROCEDURE — 93016 CV STRESS TEST SUPVJ ONLY: CPT | Mod: ,,,

## 2024-01-31 ENCOUNTER — OFFICE VISIT (OUTPATIENT)
Dept: DERMATOLOGY | Facility: CLINIC | Age: 49
End: 2024-01-31
Payer: COMMERCIAL

## 2024-01-31 ENCOUNTER — PATIENT MESSAGE (OUTPATIENT)
Dept: DERMATOLOGY | Facility: CLINIC | Age: 49
End: 2024-01-31

## 2024-01-31 DIAGNOSIS — L65.9 ALOPECIA: Primary | ICD-10-CM

## 2024-01-31 DIAGNOSIS — L71.9 ROSACEA: ICD-10-CM

## 2024-01-31 DIAGNOSIS — L30.9 DERMATITIS: ICD-10-CM

## 2024-01-31 PROCEDURE — 1160F RVW MEDS BY RX/DR IN RCRD: CPT | Mod: CPTII,S$GLB,, | Performed by: STUDENT IN AN ORGANIZED HEALTH CARE EDUCATION/TRAINING PROGRAM

## 2024-01-31 PROCEDURE — 99214 OFFICE O/P EST MOD 30 MIN: CPT | Mod: S$GLB,,, | Performed by: STUDENT IN AN ORGANIZED HEALTH CARE EDUCATION/TRAINING PROGRAM

## 2024-01-31 PROCEDURE — 1159F MED LIST DOCD IN RCRD: CPT | Mod: CPTII,S$GLB,, | Performed by: STUDENT IN AN ORGANIZED HEALTH CARE EDUCATION/TRAINING PROGRAM

## 2024-01-31 RX ORDER — FINASTERIDE 5 MG/1
5 TABLET, FILM COATED ORAL DAILY
Qty: 30 TABLET | Refills: 11 | Status: SHIPPED | OUTPATIENT
Start: 2024-01-31 | End: 2025-01-30

## 2024-01-31 RX ORDER — MUPIROCIN 20 MG/G
OINTMENT TOPICAL 2 TIMES DAILY
Qty: 22 G | Refills: 2 | Status: SHIPPED | OUTPATIENT
Start: 2024-01-31

## 2024-01-31 NOTE — PROGRESS NOTES
Subjective:      Patient ID:  Rosaura Loredo is a 48 y.o. female who presents for   Chief Complaint   Patient presents with    Hair Loss     LOV 12/12/2023    Patient is coming in today for hair loss. Patient states that it has been years that she has noticed hair loss. States that she has noticed it more in the past year.   First noticed hair loss about 10 years ago. Noticed that hair looked thinner and also had more hair come out with washing and brushing.  Had covid twice- once in 2020 and once in 2023. She felt she shed more after this.     Washes hair 1-2 x a week as her hair is very dry.   She colors her hair every 3 months.   Denies burning, itching, pain, denies chronic sores. Denies flaking, redness, pustules    Denies that hair is thinner in any particular area.   Loses  hairs with brushing and 100-200 with washing    Mom has thin hair  Dad with full head of hair  Brother with hair loss.   Taking biotin.       Current Outpatient Medications:   ·  hydrocortisone 2.5 % cream, Apply topically 2 (two) times daily., Disp: 30 g, Rfl: 1  ·  ibuprofen (ADVIL,MOTRIN) 200 MG tablet, Take 400 mg by mouth every 6 (six) hours as needed for Pain., Disp: , Rfl:   ·  loratadine (CLARITIN) 10 mg tablet, Take 1 tablet (10 mg total) by mouth once daily., Disp: 90 tablet, Rfl: 1  ·  magnesium oxide (MAG-OX) 400 mg (241.3 mg magnesium) tablet, Take 1 tablet (400 mg total) by mouth every evening., Disp: 90 tablet, Rfl: 3  ·  metoprolol succinate (TOPROL-XL) 25 MG 24 hr tablet, Take 1 tablet (25 mg total) by mouth 2 (two) times daily., Disp: 180 tablet, Rfl: 1  ·  minocycline (ZILXI) 1.5 % Foam, Apply 1 Application topically every evening., Disp: 30 g, Rfl: 2  ·  montelukast (SINGULAIR) 10 mg tablet, Take 1 tablet (10 mg total) by mouth every evening., Disp: 90 tablet, Rfl: 1  ·  multivitamin (THERAGRAN) per tablet, Take 1 tablet by mouth once daily., Disp: , Rfl:   ·  potassium chloride (K-TAB) 20 mEq, Take 1  tablet (20 mEq total) by mouth once daily., Disp: 90 tablet, Rfl: 3  ·  simvastatin (ZOCOR) 20 MG tablet, Take 1 tablet (20 mg total) by mouth every evening., Disp: 90 tablet, Rfl: 1  ·  sumatriptan (IMITREX) 100 MG tablet, Take 1 tablet (100 mg total) by mouth every 2 (two) hours as needed for Migraine. Do not exceed 2 doses in 24 hours., Disp: 27 tablet, Rfl: 3          Review of Systems   Constitutional:  Negative for fever, chills and fatigue.   Respiratory:  Negative for cough and shortness of breath.    Gastrointestinal:  Negative for nausea and vomiting.   Skin:  Positive for dry skin and activity-related sunscreen use. Negative for itching, rash and daily sunscreen use.       Objective:   Physical Exam   Constitutional: She appears well-developed and well-nourished.   Neurological: She is alert and oriented to person, place, and time.   Psychiatric: She has a normal mood and affect.   Skin:   Areas Examined (abnormalities noted in diagram):   Scalp / Hair Palpated and Inspected  Head / Face Inspection Performed            Diagram Legend     Erythematous scaling macule/papule c/w actinic keratosis       Vascular papule c/w angioma      Pigmented verrucoid papule/plaque c/w seborrheic keratosis      Yellow umbilicated papule c/w sebaceous hyperplasia      Irregularly shaped tan macule c/w lentigo     1-2 mm smooth white papules consistent with Milia      Movable subcutaneous cyst with punctum c/w epidermal inclusion cyst      Subcutaneous movable cyst c/w pilar cyst      Firm pink to brown papule c/w dermatofibroma      Pedunculated fleshy papule(s) c/w skin tag(s)      Evenly pigmented macule c/w junctional nevus     Mildly variegated pigmented, slightly irregular-bordered macule c/w mildly atypical nevus      Flesh colored to evenly pigmented papule c/w intradermal nevus       Pink pearly papule/plaque c/w basal cell carcinoma      Erythematous hyperkeratotic cursted plaque c/w SCC      Surgical scar with no  sign of skin cancer recurrence      Open and closed comedones      Inflammatory papules and pustules      Verrucoid papule consistent consistent with wart     Erythematous eczematous patches and plaques     Dystrophic onycholytic nail with subungual debris c/w onychomycosis     Umbilicated papule    Erythematous-base heme-crusted tan verrucoid plaque consistent with inflamed seborrheic keratosis     Erythematous Silvery Scaling Plaque c/w Psoriasis     See annotation      Assessment / Plan:        Alopecia  -     ZINC; Future; Expected date: 01/31/2024  -     IRON AND TIBC; Future; Expected date: 01/31/2024  -     FERRITIN; Future; Expected date: 01/31/2024  -     CBC Auto Differential; Future; Expected date: 01/31/2024  -     Calcitriol; Future; Expected date: 01/31/2024  -     finasteride (PROSCAR) 5 mg tablet; Take 1 tablet (5 mg total) by mouth once daily.  Dispense: 30 tablet; Refill: 11  Suspect FPHL w/ TE  No signs of scarring hair loss.   Finasteride is not approved by the FDA for use in women, however multiple studies have shown benefit when used to treat female pattern hair loss in combination with topical minoxidil. Side effects are infrequent, usually mild, and reversible even with maintenance of treatment. They include decreased libido, breast tenderness, and headache. In premenopausal women, irregular menstruation and spotting may occur. Pregnancy must be avoided while on this medication (risk of feminization of the male fetus)  - instructed patient to apply OTC minoxidil 5% solution or  5% foam twice daily. Side effects may include dryness, irritation, and rare ACD.  Thyroid studies WNL  Discussed multivitamin, viviscal, nutrafol  Stop biotin    Dermatitis  -     mupirocin (BACTROBAN) 2 % ointment; Apply topically 2 (two) times daily.  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

## 2024-02-01 ENCOUNTER — LAB VISIT (OUTPATIENT)
Dept: LAB | Facility: HOSPITAL | Age: 49
End: 2024-02-01
Attending: STUDENT IN AN ORGANIZED HEALTH CARE EDUCATION/TRAINING PROGRAM
Payer: COMMERCIAL

## 2024-02-01 DIAGNOSIS — L65.9 ALOPECIA: ICD-10-CM

## 2024-02-01 LAB
BASOPHILS # BLD AUTO: 0.04 K/UL (ref 0–0.2)
BASOPHILS NFR BLD: 0.6 % (ref 0–1.9)
DIFFERENTIAL METHOD BLD: NORMAL
EOSINOPHIL # BLD AUTO: 0.2 K/UL (ref 0–0.5)
EOSINOPHIL NFR BLD: 2.5 % (ref 0–8)
ERYTHROCYTE [DISTWIDTH] IN BLOOD BY AUTOMATED COUNT: 13.3 % (ref 11.5–14.5)
FERRITIN SERPL-MCNC: 157.8 NG/ML (ref 20–300)
HCT VFR BLD AUTO: 41.4 % (ref 37–48.5)
HGB BLD-MCNC: 13.5 G/DL (ref 12–16)
IMM GRANULOCYTES # BLD AUTO: 0.01 K/UL (ref 0–0.04)
IMM GRANULOCYTES NFR BLD AUTO: 0.2 % (ref 0–0.5)
IRON SERPL-MCNC: 65 UG/DL (ref 30–160)
LYMPHOCYTES # BLD AUTO: 1.9 K/UL (ref 1–4.8)
LYMPHOCYTES NFR BLD: 29.5 % (ref 18–48)
MCH RBC QN AUTO: 30.8 PG (ref 27–31)
MCHC RBC AUTO-ENTMCNC: 32.6 G/DL (ref 32–36)
MCV RBC AUTO: 94 FL (ref 82–98)
MONOCYTES # BLD AUTO: 0.5 K/UL (ref 0.3–1)
MONOCYTES NFR BLD: 7.2 % (ref 4–15)
NEUTROPHILS # BLD AUTO: 3.8 K/UL (ref 1.8–7.7)
NEUTROPHILS NFR BLD: 60 % (ref 38–73)
NRBC BLD-RTO: 0 /100 WBC
PLATELET # BLD AUTO: 243 K/UL (ref 150–450)
PMV BLD AUTO: 9.6 FL (ref 9.2–12.9)
RBC # BLD AUTO: 4.39 M/UL (ref 4–5.4)
SATURATED IRON: 19 % (ref 20–50)
TOTAL IRON BINDING CAPACITY: 351 UG/DL (ref 250–450)
TRANSFERRIN SERPL-MCNC: 251 MG/DL (ref 200–375)
WBC # BLD AUTO: 6.37 K/UL (ref 3.9–12.7)

## 2024-02-01 PROCEDURE — 82728 ASSAY OF FERRITIN: CPT | Performed by: STUDENT IN AN ORGANIZED HEALTH CARE EDUCATION/TRAINING PROGRAM

## 2024-02-01 PROCEDURE — 83540 ASSAY OF IRON: CPT | Performed by: STUDENT IN AN ORGANIZED HEALTH CARE EDUCATION/TRAINING PROGRAM

## 2024-02-01 PROCEDURE — 82652 VIT D 1 25-DIHYDROXY: CPT | Performed by: STUDENT IN AN ORGANIZED HEALTH CARE EDUCATION/TRAINING PROGRAM

## 2024-02-01 PROCEDURE — 36415 COLL VENOUS BLD VENIPUNCTURE: CPT | Performed by: STUDENT IN AN ORGANIZED HEALTH CARE EDUCATION/TRAINING PROGRAM

## 2024-02-01 PROCEDURE — 85025 COMPLETE CBC W/AUTO DIFF WBC: CPT | Performed by: STUDENT IN AN ORGANIZED HEALTH CARE EDUCATION/TRAINING PROGRAM

## 2024-02-01 PROCEDURE — 84630 ASSAY OF ZINC: CPT | Performed by: STUDENT IN AN ORGANIZED HEALTH CARE EDUCATION/TRAINING PROGRAM

## 2024-02-05 LAB — 1,25(OH)2D3 SERPL-MCNC: 37.1 PG/ML (ref 24.8–81.5)

## 2024-02-08 LAB — ZINC SERPL-MCNC: 70 UG/DL (ref 44–115)

## 2024-02-09 RX ORDER — LANOLIN ALCOHOL/MO/W.PET/CERES
400 CREAM (GRAM) TOPICAL NIGHTLY
Qty: 90 TABLET | Refills: 3 | Status: SHIPPED | OUTPATIENT
Start: 2024-02-09

## 2024-02-15 ENCOUNTER — PATIENT MESSAGE (OUTPATIENT)
Dept: CARDIOLOGY | Facility: CLINIC | Age: 49
End: 2024-02-15
Payer: COMMERCIAL

## 2024-02-16 RX ORDER — DILTIAZEM HYDROCHLORIDE 60 MG/1
60 TABLET, FILM COATED ORAL EVERY 8 HOURS
Qty: 90 TABLET | Refills: 0 | Status: SHIPPED | OUTPATIENT
Start: 2024-02-16 | End: 2024-03-13

## 2024-02-19 ENCOUNTER — PATIENT MESSAGE (OUTPATIENT)
Dept: FAMILY MEDICINE | Facility: CLINIC | Age: 49
End: 2024-02-19
Payer: COMMERCIAL

## 2024-02-19 DIAGNOSIS — F41.9 ANXIETY AND DEPRESSION: Primary | ICD-10-CM

## 2024-02-19 DIAGNOSIS — F32.A ANXIETY AND DEPRESSION: Primary | ICD-10-CM

## 2024-02-28 ENCOUNTER — PATIENT MESSAGE (OUTPATIENT)
Dept: FAMILY MEDICINE | Facility: CLINIC | Age: 49
End: 2024-02-28
Payer: COMMERCIAL

## 2024-03-04 ENCOUNTER — TELEPHONE (OUTPATIENT)
Dept: PSYCHIATRY | Facility: CLINIC | Age: 49
End: 2024-03-04
Payer: COMMERCIAL

## 2024-03-04 NOTE — TELEPHONE ENCOUNTER
Spoke to patient regarding the referral for medication management. Advised patient of the wait list, patient would like to be placed on the wait list. Patient is available for short notice cancellations. Patient inquired about EAP. Explained EAP program to patient. Patient is not interested in therapy at this time, only med management. Patient added to wait list

## 2024-03-07 ENCOUNTER — TELEPHONE (OUTPATIENT)
Dept: PSYCHIATRY | Facility: CLINIC | Age: 49
End: 2024-03-07
Payer: COMMERCIAL

## 2024-03-07 NOTE — TELEPHONE ENCOUNTER
Spoke to patient and scheduled a new patient appointment with Dr. Tomas for 03/15/2024 @ 9:30 am. Advised patient of the location, contact phone number, to arrive 15 minutes early for the appointment, to bring ID, insurance card and list of current medications, informed of security presence and mandatory electronic search, and of the cancellation policy. Patient states understanding and no additional questions at this time.

## 2024-03-08 ENCOUNTER — TELEPHONE (OUTPATIENT)
Dept: PSYCHIATRY | Facility: CLINIC | Age: 49
End: 2024-03-08
Payer: COMMERCIAL

## 2024-03-12 ENCOUNTER — PATIENT MESSAGE (OUTPATIENT)
Dept: CARDIOLOGY | Facility: CLINIC | Age: 49
End: 2024-03-12
Payer: COMMERCIAL

## 2024-03-13 RX ORDER — DILTIAZEM HYDROCHLORIDE 180 MG/1
180 CAPSULE, EXTENDED RELEASE ORAL DAILY
Qty: 30 CAPSULE | Refills: 11 | Status: SHIPPED | OUTPATIENT
Start: 2024-03-13 | End: 2024-03-22

## 2024-03-15 ENCOUNTER — OFFICE VISIT (OUTPATIENT)
Dept: PSYCHIATRY | Facility: CLINIC | Age: 49
End: 2024-03-15
Payer: COMMERCIAL

## 2024-03-15 VITALS
WEIGHT: 151.25 LBS | BODY MASS INDEX: 25.2 KG/M2 | DIASTOLIC BLOOD PRESSURE: 67 MMHG | HEIGHT: 65 IN | SYSTOLIC BLOOD PRESSURE: 108 MMHG | HEART RATE: 86 BPM

## 2024-03-15 DIAGNOSIS — F41.1 GENERALIZED ANXIETY DISORDER WITH PANIC ATTACKS: ICD-10-CM

## 2024-03-15 DIAGNOSIS — G47.00 INSOMNIA, UNSPECIFIED TYPE: ICD-10-CM

## 2024-03-15 DIAGNOSIS — Z86.59 HISTORY OF ADHD: ICD-10-CM

## 2024-03-15 DIAGNOSIS — F41.0 GENERALIZED ANXIETY DISORDER WITH PANIC ATTACKS: ICD-10-CM

## 2024-03-15 DIAGNOSIS — F33.1 MODERATE EPISODE OF RECURRENT MAJOR DEPRESSIVE DISORDER: Primary | ICD-10-CM

## 2024-03-15 PROCEDURE — 90792 PSYCH DIAG EVAL W/MED SRVCS: CPT | Mod: S$GLB,,, | Performed by: STUDENT IN AN ORGANIZED HEALTH CARE EDUCATION/TRAINING PROGRAM

## 2024-03-15 PROCEDURE — 3078F DIAST BP <80 MM HG: CPT | Mod: CPTII,S$GLB,, | Performed by: STUDENT IN AN ORGANIZED HEALTH CARE EDUCATION/TRAINING PROGRAM

## 2024-03-15 PROCEDURE — 99999 PR PBB SHADOW E&M-EST. PATIENT-LVL V: CPT | Mod: PBBFAC,,, | Performed by: STUDENT IN AN ORGANIZED HEALTH CARE EDUCATION/TRAINING PROGRAM

## 2024-03-15 PROCEDURE — 1159F MED LIST DOCD IN RCRD: CPT | Mod: CPTII,S$GLB,, | Performed by: STUDENT IN AN ORGANIZED HEALTH CARE EDUCATION/TRAINING PROGRAM

## 2024-03-15 PROCEDURE — 3074F SYST BP LT 130 MM HG: CPT | Mod: CPTII,S$GLB,, | Performed by: STUDENT IN AN ORGANIZED HEALTH CARE EDUCATION/TRAINING PROGRAM

## 2024-03-15 PROCEDURE — 1160F RVW MEDS BY RX/DR IN RCRD: CPT | Mod: CPTII,S$GLB,, | Performed by: STUDENT IN AN ORGANIZED HEALTH CARE EDUCATION/TRAINING PROGRAM

## 2024-03-15 RX ORDER — ALPRAZOLAM 0.5 MG/1
0.5 TABLET ORAL 2 TIMES DAILY PRN
Qty: 60 TABLET | Refills: 1 | Status: SHIPPED | OUTPATIENT
Start: 2024-03-15 | End: 2024-06-12

## 2024-03-15 NOTE — PATIENT INSTRUCTIONS
Start TRINTELLIX 10mg daily  Continue XANAX    BOX BREATHING: Practice this by breathing while you slowly count to four for a total of four times - four counts of breathing in, four counts of holding your breath, four counts of exhaling and four more counts of holding after your exhale. Repeat this cycle multiple times. This is helpful for a variety of problems, including for insomnia, panic attacks, restlessness, anxiety and feeling on edge or overwhelmed.    Referral placed for individual therapy

## 2024-03-15 NOTE — PROGRESS NOTES
"    Outpatient Psychiatry Initial Visit (DO/MD/NP, etc.)  PSYCHIATRIC EVALUATION ("EVAL")    3/15/2024  Subjective      Referral from:    Jayy Ramos Jr., MD  [address] [facility]  [address]  [place] [address]    History of Present Illness (HPI) & Review of Symptoms (ROS):     Rosaura Loredo, a 48 y.o. female, presenting for initial evaluation visit.     Chart was reviewed. Available documentation has been reviewed, and pertinent elements of the chart have been incorporated into this note where appropriate.     General     Pt says, "I've always had depression and anxiety and never felt anything helped." Pt complains about PACs and SVTs associated with timing of panic attacks. Cardiology workup is ongoing. SVTs always in evening hours. PACs "random times" during the day and worsened by stress. Pt is currently taking XANAX and want to avoid regular use due to unfavorable effects to cognition.    Discussed pt's treatment history with antidepressants. Pt agrees to try a new antidepressant - TRINTELLIX - and agrees to start psychotherapy.     Personal Functioning   Stress See PSS4 below.   Mood Mood is sad, with crying spells. POSITIVE AFFECT Structures: Intact. NEGATIVE AFFECT and DMN Structures: Feeling both HOPELESSNESS and WORTHLESSNESS without guilt. RUMINATION and NEGATIVE BIAS noted. PERSONAL-INTERPERSONAL Functioning: Energy is normal. Socializing LESS. See instruments below.   Anxiety RUMINATION: Pt described repeated worry and inward focus on negative thoughts. SALIENCE/APPREHENSION: Pt described anxious arousal and apprehension. Amygdala-Centered Circuit: THREAT DYSREGULATION: Pt described panic attacks with classic symptoms. See history below. See instruments below.   Sleep Pt reports sleep as poor overall and NOT restorative. Sleep onset is 30 mins or less. Duration is 6 to 7 hours with 5 interruptions due to bathroom needs and spontaneous awakenings. Denies naps. Pt reports the following before " bed: XANAX 1-2 nights/week; IBUPROFEN sometimes. Nightmares have NOT been a problem.   Cognition Pt reports concentration is down. Pt is easily distracted. Memory is poor.   Appetite and Nutrition Appetite was excessive, started OZEMPIC 1.5ma   Safety Patient did not display signs of nor endorse symptoms of overt psychosis or acute mood disorder requiring hospitalization during the encounter. Patient denied violent thoughts or suicidal or homicidal ideation, intent, or plan.   Suicide Risk Suicide risk assessment performed. Pt denies suicidal ideation, intent or plan. Pt has never attempted suicide in the past. Risk factors include anxiety and depression. Protective factors include wanting to live for the family and receptivity to treatment. Suicide risk at this time is LOW. Pt agrees to safety. No safety concerns identified at this time.    Interpersonal Functioning   Home Overall no concerns, or concerns are minimal.   Peers Socializing less.   Work Overall no concerns, or concerns are minimal.     History:     Psychiatric History - Diagnostic   Reported Diagnoses MDD, IRENE, ADHD   Formal Testing ADHD dx 13ya by [facility] for ADHD    Symptom History General Psychosis: No history of psychosis.  PANIC ATTACKS: positive history with classic symptoms, onset 21yo, most recent 4da.  Mood cycles: No episodes of hypomania, natan or mixed mood episodes.  DEPRESSION: Recurrent with multiple depression episodes, onset her 20s.  ANXIETY: Symptoms onset her late 20s.  ADHD: Symptoms onset unclear.    Suicide Attempts NO    Self Harm NO   Psychiatric History - Treatment   Inpatient Treatment NONE   ACT, IOP, PHP NONE   Outpatient  Psychotherapy NO   Outpatient   Psych Med Mgmt PMHNP at [facility] for ADHD   Medications Prior Psychotropics AMBIEN, BENADRYL, BUSPAR (dizziness), CYMBALTA (NO improvement), FOCALIN, GABAPENTIN, LEXAPRO (NO improvement), PAXIL, VYVANSE (worsened anxiety, GI symptoms, palpitations), WELLBUTRIN (NO  "improvement), XANAX    Current Psychotropics XANAX    Psychoactive Agents None   Personal Psychosocial History   Childhood and  Adolescence Born 1st of 2 children. Raised by parents. Overall childhood was "fine and completely normal". No abuse, no serious illnesses and no injuries reported in childhood   Marital Status 2nd marriage   Children 24yo son   Residence With    Occupation Endoscopy nurse   Hobbies & Interests Shopping, TV   H[employer]u Practice [employer]   Education History Associate degree.    History NO   Legal History NO   Firearm Access Stored at home   Abuse History NO   Trauma History NO   Sexual History Deferred.   Family History    1st Degree 2nd Degree 3rd Degree   Suicides NO NO cousin   Substance Abuse NO NO cousin   Alcohol Abuse NO NO NO   Bipolar Disorder NO NO NO   Anxiety father NO NO   Depression NO NO NO   Other/Medical cancers, diabetes, heart disease, hypertension, lung disease   Substance History   Alcohol 1-2drinks 2-3    Tobacco and Nicotine NEVER   Caffeine NONE   Marijuana 0/4: No use within a year.   Other Recreational Substances NO   Detox/rehab NO   Medical History   Past Medical History:   Diagnosis Date    Abnormal Pap smear of cervix     NEELAM d/t recurrent abnormals/mild dysplasia; negative pathology after hysterctomy    Allergy     Anxiety     Asthma     Basal cell carcinoma (BCC) 2014    R forehead    Hyperlipidemia     PAC (premature atrial contraction)     Rosacea       Active Problem List with Overview Notes    Diagnosis Date Noted    Moderate episode of recurrent major depressive disorder 03/15/2024    History of ADHD 03/15/2024    Insomnia 03/15/2024    BMI 25.0-25.9,adult 03/15/2024    CATALAN (dyspnea on exertion) 01/18/2024    Plantar fasciitis of left foot 12/11/2020    Abnormal Pap smear of cervix      NEELAM d/t recurrent abnormals; GYN still does PAP; ASCUS neg HPV 01/2020      Abdominal pain 10/20/2020    Difficulty concentrating 06/04/2019    Migraine " without aura and without status migrainosus, not intractable 10/09/2018    Generalized anxiety disorder with panic attacks 2017    Exercise-induced asthma 2017    Hyperlipidemia 10/05/2017    PAC (premature atrial contraction) 10/05/2017    Chronic seasonal allergic rhinitis due to pollen 10/05/2017       HS with LOC NO   Seizure NO   Surgical History   Past Surgical History:   Procedure Laterality Date     SECTION      COLD KNIFE CONIZATION OF CERVIX      multiple    COLONOSCOPY N/A 10/20/2020    Procedure: COLONOSCOPY;  Surgeon: Sudheer Santiago III, MD;  Location: [facility] ENDO;  Service: Endoscopy;  Laterality: N/A;    COLPOSCOPY  2023    DENTAL SURGERY  03/15/2022    HYSTERECTOMY  2015    ovaries intact    NEUROPLASTY Left 2020    Procedure: NEUROPLASTY;  Surgeon: Quinton Alexandre DPM;  Location: [facility] OR;  Service: Podiatry;  Laterality: Left;    PLANTAR FASCIOTOMY Left 2020    Procedure: FASCIOTOMY, PLANTAR;  Surgeon: Quinton Alexandre DPM;  Location: [facility] OR;  Service: Podiatry;  Laterality: Left;    TONSILLECTOMY         Objective    Measurement-Based Care (MBC):     Routine Evaluation Instruments   GAD7 Interpretation: ; MODERATE using 5-10-15 cutoffs. The GAD7 has acceptable properties for identifying IRENE at cutoff scores 7 to 10; a cutoff score of 10 is fairly sensitive (0.8) but not specific (0.4).   PHQ9 Interpretation: 17; MODERATE using 7-15-21 cutoffs.  The PHQ-9 was found to have acceptable diagnostic properties for screening for MDD for cut-off scores between 8 and 11. PHQ-9 is useful for screening, but not always for diagnosis of a current epsiode of MDD in a psychiatric specialty clinic; according to the literature the optimal cutoff score for a current episode of MDD is most reliably 13 or 14.   ANNA Interpretation: 1/6, SCREENED NEGATIVE   PSS4 Interpretation: 6/16; MODERATE using 6-11 cutoffs. 0 PH, 0 LSE.   Additional Instruments   N/A  "    Current Evaluation of Mental Status:     Nutritional Screening  "Considering the patient's height and weight, medications, medical history and preferences, should a referral be made to the dietitian?" not at this time   Constitutional       Vitals:    03/15/24 0933   BP: 108/67   Pulse: 86   Weight: 68.6 kg (151 lb 3.8 oz)   Height: 5' 5" (1.651 m)     General:  casual dress, unhealthy weight; overweight (BMI 25.0 - 29.9)    Psychiatric / Mental Status Examination  1. Appearance: Dress is informal but appropriate. Motor activity normal.  2. Discourse: Clear speech with normal rate and volume. Associations intact. Orderly.  3. Emotional Expression: Somewhat anxious and depressed mood. Affect is appropriate.  4. Perception and Thinking: No hallucinations. No suicidality, no homicidality, delusions, or paranoia.  5. Sensorium: Grossly intact. Able to focus for interview.  6. Memory and Fund of Knowledge: Intact for content of interview.  7. Insight and Judgment: Intact.    Neurological / Musculoskeletal / Osteopathic Structural  Gait, Station:  Non-ataxic gait     Auto-populated Chart Data:     Medications  Outpatient Encounter Medications as of 3/15/2024   Medication Sig Dispense Refill    albuterol (PROVENTIL/VENTOLIN HFA) 90 mcg/actuation inhaler Inhale 2 puffs into the lungs every 6 (six) hours as needed for Wheezing. 18 g 3    diltiaZEM (DILACOR XR) 180 MG CDCR Take 1 capsule (180 mg total) by mouth once daily. 30 capsule 11    finasteride (PROSCAR) 5 mg tablet Take 1 tablet (5 mg total) by mouth once daily. 30 tablet 11    hydrocortisone 2.5 % cream Apply topically 2 (two) times daily. 30 g 1    ibuprofen (ADVIL,MOTRIN) 200 MG tablet Take 400 mg by mouth every 6 (six) hours as needed for Pain.      loratadine (CLARITIN) 10 mg tablet Take 1 tablet (10 mg total) by mouth once daily. 90 tablet 1    magnesium oxide (MAG-OX) 400 mg (241.3 mg magnesium) tablet Take 1 tablet (400 mg total) by mouth every evening. 90 " tablet 3    montelukast (SINGULAIR) 10 mg tablet Take 1 tablet (10 mg total) by mouth every evening. 90 tablet 1    multivitamin (THERAGRAN) per tablet Take 1 tablet by mouth once daily.      mupirocin (BACTROBAN) 2 % ointment Apply topically 2 (two) times daily. 22 g 2    pantoprazole (PROTONIX) 40 MG tablet Take 1 tablet (40 mg total) by mouth once daily. 90 tablet 1    potassium chloride (K-TAB) 20 mEq Take 1 tablet (20 mEq total) by mouth once daily. 90 tablet 3    semaglutide (OZEMPIC) 1 mg/dose (4 mg/3 mL) Inject into the skin every 7 days.      simvastatin (ZOCOR) 20 MG tablet Take 1 tablet (20 mg total) by mouth every evening. 90 tablet 1    sumatriptan (IMITREX) 100 MG tablet Take 1 tablet (100 mg total) by mouth every 2 (two) hours as needed for Migraine. Do not exceed 2 doses in 24 hours. 27 tablet 3    triamcinolone acetonide 0.025% (KENALOG) 0.025 % Oint Apply topically 2 (two) times daily. 15 g 1    valACYclovir (VALTREX) 1000 MG tablet Take 2 tablets (2,000 mg total) by mouth every 12 (twelve) hours as needed (fever blister). 30 tablet 3    [DISCONTINUED] ALPRAZolam (XANAX) 0.5 MG tablet Take 1 tablet (0.5 mg total) by mouth 3 (three) times daily as needed for Anxiety. 30 tablet 1    ALPRAZolam (XANAX) 0.5 MG tablet Take 1 tablet (0.5 mg total) by mouth 2 (two) times daily as needed for Anxiety or Insomnia. 60 tablet 1    vortioxetine (TRINTELLIX) 10 mg Tab Take 1 tablet (10 mg total) by mouth once daily. 30 tablet 1    [DISCONTINUED] diltiaZEM (CARDIZEM) 60 MG tablet Take 1 tablet (60 mg total) by mouth every 8 (eight) hours. 90 tablet 0    [DISCONTINUED] minocycline (ZILXI) 1.5 % Foam Apply 1 Application topically every evening. 30 g 2     No facility-administered encounter medications on file as of 3/15/2024.      The chart was reviewed for recent diagnostic procedures and investigations, and pertinent results are noted below.    Wt Readings from Last 2 Encounters:   03/15/24 68.6 kg (151 lb 3.8  oz)   01/18/24 70.8 kg (156 lb)     BP Readings from Last 1 Encounters:   03/15/24 108/67     Pulse Readings from Last 1 Encounters:   03/15/24 86        Blood Counts, Electrolytes & Glucose: (i.e. WBC, ANC, Hemoglobin, Hematocrit, MCV, Platelets)  Lab Results   Component Value Date    WBC 6.37 02/01/2024    GRAN 3.8 02/01/2024    GRAN 60.0 02/01/2024    HGB 13.5 02/01/2024    HCT 41.4 02/01/2024    MCV 94 02/01/2024     02/01/2024     02/01/2024    K 4.0 02/01/2024    CALCIUM 9.2 02/01/2024    MG 1.9 12/22/2023    CO2 27 02/01/2024    ANIONGAP 8 02/01/2024     02/01/2024       Renal, Liver, Pancreas, Thyroid, Parathyroid, Prolactin, CPK, Lipids & Vitamin Levels: (i.e. Cr, BUN, Anion Gap, GFR, Urine Specific Gravity, Urine Protein, Microalburnin, AST, ALT, GGT, Alk Phos,Total Bili, Total Protein, Albumin, Ammonia, INR, Amylase, Lipase, TSH, Total T3, Total T4, Free T4 PTH, Prolactin, CPK, Cholesterol, Triglycerides, LDH, HDL, Vitamin B12, Folate, Vitamin D)  Lab Results   Component Value Date    CREATININE 0.7 02/01/2024    BUN 24 (H) 02/01/2024    EGFRNORACEVR >60.0 02/01/2024    SPECGRAV 1.015 12/02/2020    PROTEINUA Negative 12/02/2020    AST 15 10/25/2023    ALT 11 10/25/2023    ALKPHOS 46 (L) 10/25/2023    BILITOT 0.9 10/25/2023    ALBUMIN 4.5 10/25/2023    LIPASE 28 09/15/2020    TSH 1.750 03/10/2023    W7MYBCA 81 03/10/2023    FREET4 1.03 03/10/2023    CHOL 156 10/25/2023    TRIG 90 10/25/2023    LDLCALC 81.0 10/25/2023    HDL 57 10/25/2023       Infection Diseases, Pregnancy Screenings & Drug Levels: (i.e. Hepatitis Panel, HIV, Syphilis, Urine & Blood Pregnancy Screens, beta hCG, Lithium, Valproic Acid, Carbamazepine, Lamotrigine, Phenytoin, Phenobarbital, Clozapine, Norclozapine, Clozapine + Norclozapine)   Lab Results   Component Value Date    HEPCAB 0.1 08/31/2022    [id number] Non Reactive 08/31/2022       Addiction: (i.e. Urine Toxicology, Blood Alcohol, PETH, EtG, EtS, CDT,  "Buprenorphine, Norbuprenorphine)  No results found for: "PCDSOALCOHOL", "PCDSOBENZOD", "BARBITURATES", "PCDSCOMETHA", "OPIATESCREEN", "COCAINEMETAB", "AMPHETAMINES", "MARIJUANATHC", "PCDSOPHENCYN", "PCDSUBUPRE", "PCDSUFENTANY", "PCDSUOXYCOD", "PCDSUTRAMA", "[id number]", "PETH", "ALCOHOLMEDIC", "THEYLGLUCU", "ETHYLSULF", "CDT", "BUPRENORPH", "NORBUPRENOR"    Results for orders placed or performed in visit on 01/18/24   IN OFFICE EKG 12-LEAD (to [place])    Collection Time: 01/18/24  4:06 PM    Narrative    Test Reason : I49.1,    Vent. Rate : 072 BPM     Atrial Rate : 072 BPM     P-R Int : 162 ms          QRS Dur : 084 ms      QT Int : 412 ms       P-R-T Axes : 073 076 068 degrees     QTc Int : 451 ms    Normal sinus rhythm  Normal ECG  When compared with ECG of 22-FEB-2023 16:09,  No significant change was found  Confirmed by Seb RAE, Sophia Bledsoe ([id number]) on 1/22/2024 6:50:12 PM    Referred By:             Confirmed By:Sophia Grigsby MD        Assessment    Assessments & Case Formulation:     Assessments   Safety Case risk, violence risk, and suicide risk at this time are NOT high. Pt agrees to safety and no safety concerns were identified during the interview.   Adherence  Barriers to adherence to treatment are unclear.   Strengths Patient accepts guidance/feedback. Patient is expressive/articulate.   Liabilities Coping skills are deficient or poorly employed. There is a history of failed treatments.   Goals Sleep: improve sleep hygiene  Anxiety: acquiring relapse prevention skills, reducing excessive behaviors associated with SALIENCE/APPREHENSION/ANXIOUS-AVOIDANCE, reducing safety behaviors, eliminating assumptions about dangerousness of anxiety, reducing assumptions about positive value of worry, modifying schemata of vulnerability and threats, and reducing RUMINATION, reduce negative automatic thoughts, reducing time spent worrying, modifying schemata of need for control  Depression: acquiring relapse " prevention skills, decreasing WORTHLESSNESS, reducing RUMINATION, increasing HOPE, increasing self-reward for positive thoughts and behaviors, reducing NEGATIVE BIAS, reducing negative automatic thoughts, increasing social contacts  Stress/Panic: acquiring relapse prevention skills, acquiring breathing skills   Case Formulation   Abstract  Pt with chronic depression, anxiety and possible ADHD agrees to medication management and starting individual psychotherapy.   Working DX Considering history, clinical presentation and instruments, the most likely mood disorder diagnosis is MDD. Clear IRENE. ADHD unclear     ICD-10-CM ICD-9-CM   1. Moderate episode of recurrent major depressive disorder  F33.1 296.32   2. Generalized anxiety disorder with panic attacks  F41.1 300.02    F41.0 300.01   3. History of ADHD  Z86.59 V11.8   4. Insomnia, unspecified type  G47.00 780.52   5. BMI 25.0-25.9,adult  Z68.25 V85.21      Disease  Perspective Potential organic and biomedical factors influencing the case's presentation must be ruled out. Pt is undergoing a cardiac workup.  Relevant biomedical factors include cardiac arrhythmia, cardiovascular disease, migraines/headaches, and weight gain concerns.   Psychotropics to avoid may include those which are anxiogenic, associated with multiple drug-drug interactions, depressing, likely to cause weight gain, and likely to worsen cardiovascular risk by worsening lipid profile.   Dimensions  Perspective Temperament and personality traits predispose the patient to certain strengths and vulnerabilities. At this time the patient's temperament is unclear.  Life circumstances provoke responses in the form of neurotic symptoms;  STRESS APPRAISAL is NOT influenced by a lack of self efficacy nor perceived helplessness.  Insufficient data is available to characterize other dimensions of the person, such as attachment patterns.   Behavior Perspective N/A   Life Story Perspective Divorce/Separation    Prognosis This patient would benefit from treatment that includes both individual psychotherapy and pharmacotherapy.  Favorable prognostic factors include receptivity to treatment, established social support structures, being employed, and [employer] practice     Plan   Plan of Care:     Plan of Care (& Medication Management)   Chart was reviewed. The risks and benefits of medication were discussed with pt. The treatment plan and followup plan were reviewed with pt. Pt understands to contact clinic if symptoms worsen. Pt understands to call 911 or go to nearest ER for suicidal ideation, intent or plan.   RX History AMBIEN, BENADRYL, BUSPAR (dizziness), CYMBALTA (NO improvement), FOCALIN, GABAPENTIN, LEXAPRO (NO improvement), PAXIL, VYVANSE (worsened anxiety, GI symptoms, palpitations), WELLBUTRIN (NO improvement), XANAX   Current RX Start TRINTELLIX  Useful for symptom coverage of anxiety and depression.  Compared to alternative options, this has lower risk for weight gain.  Pt was provided [facility] educational material 3/15/2024  Pt was provided a savings card with instructions 3/15/2024.  Adjustments:  15MAR2024: Start 10mg daily  Prior to 3/15/2024, pt had been intermittently taking XANAX only. History of multiple failed alternatives (see above).  Continue XANAX  Pt was provided [facility] educational material 3/15/2024  Adjustments:  15MAR2024: Continue 0.5mg BID prn anxiety/insomnia  Prior to 3/15/2024, pt had been taking 0.5-1mg intermittently for insomnia   Education & Counseling BOX BREATHING  Educational material provided  RX administration and adherence  NARX/ REVIEWED (3/15/2024)  Clinic's CDS contract signed today 3/15/2024.   Other Orders Referral to individual psychotherapy   Monitor VITAL SIGNS  Use of: benzo  sleep  Instruments: PROMIS (A&D), PSS4   RETURN K: RETURN IN 4 WEEKS (ONE MONTH), and reassess frequency within three visits from now  Continue medication management      Billing Documentation:      Method of Encounter IN PERSON visit at the clinic   Type of Encounter New patient to me, NOT seen by department within last 3 years   Counseling;  Psychotherapy Not applicable: Not done or UNDER 16 minutes   Counseling;  Tobacco and/or Nicotine Not applicable: Not done or UNDER 3 minutes   Additional Codes and Modifiers N/A   Time Remaining Chart/Pt 12816: NEW patient to me and DID prescribe meds (and two or fewer 83592/12353 codes within a year), 45+mins   Total Mins  (3/15/2024) 45+mins face-to-face with patient AND 30+mins charting        Mendez Tomas, DO  Department of Psychiatry, [facility]

## 2024-03-22 ENCOUNTER — OFFICE VISIT (OUTPATIENT)
Dept: CARDIOLOGY | Facility: CLINIC | Age: 49
End: 2024-03-22
Payer: COMMERCIAL

## 2024-03-22 VITALS
SYSTOLIC BLOOD PRESSURE: 110 MMHG | HEIGHT: 65 IN | DIASTOLIC BLOOD PRESSURE: 68 MMHG | HEART RATE: 83 BPM | WEIGHT: 151.88 LBS | RESPIRATION RATE: 16 BRPM | OXYGEN SATURATION: 99 % | BODY MASS INDEX: 25.3 KG/M2

## 2024-03-22 DIAGNOSIS — J45.20 MILD INTERMITTENT ASTHMA WITHOUT COMPLICATION: ICD-10-CM

## 2024-03-22 DIAGNOSIS — E78.2 MIXED HYPERLIPIDEMIA: ICD-10-CM

## 2024-03-22 DIAGNOSIS — J45.990 EXERCISE-INDUCED ASTHMA: ICD-10-CM

## 2024-03-22 DIAGNOSIS — J30.2 SEASONAL ALLERGIES: ICD-10-CM

## 2024-03-22 DIAGNOSIS — L71.9 ROSACEA: ICD-10-CM

## 2024-03-22 DIAGNOSIS — G43.009 MIGRAINE WITHOUT AURA AND WITHOUT STATUS MIGRAINOSUS, NOT INTRACTABLE: ICD-10-CM

## 2024-03-22 DIAGNOSIS — F41.0 GENERALIZED ANXIETY DISORDER WITH PANIC ATTACKS: ICD-10-CM

## 2024-03-22 DIAGNOSIS — R00.2 PALPITATIONS: ICD-10-CM

## 2024-03-22 DIAGNOSIS — F41.1 GENERALIZED ANXIETY DISORDER WITH PANIC ATTACKS: ICD-10-CM

## 2024-03-22 DIAGNOSIS — I49.1 PAC (PREMATURE ATRIAL CONTRACTION): Primary | ICD-10-CM

## 2024-03-22 PROCEDURE — 1159F MED LIST DOCD IN RCRD: CPT | Mod: CPTII,S$GLB,, | Performed by: STUDENT IN AN ORGANIZED HEALTH CARE EDUCATION/TRAINING PROGRAM

## 2024-03-22 PROCEDURE — 3008F BODY MASS INDEX DOCD: CPT | Mod: CPTII,S$GLB,, | Performed by: STUDENT IN AN ORGANIZED HEALTH CARE EDUCATION/TRAINING PROGRAM

## 2024-03-22 PROCEDURE — 3078F DIAST BP <80 MM HG: CPT | Mod: CPTII,S$GLB,, | Performed by: STUDENT IN AN ORGANIZED HEALTH CARE EDUCATION/TRAINING PROGRAM

## 2024-03-22 PROCEDURE — 3074F SYST BP LT 130 MM HG: CPT | Mod: CPTII,S$GLB,, | Performed by: STUDENT IN AN ORGANIZED HEALTH CARE EDUCATION/TRAINING PROGRAM

## 2024-03-22 PROCEDURE — 99205 OFFICE O/P NEW HI 60 MIN: CPT | Mod: S$GLB,,, | Performed by: STUDENT IN AN ORGANIZED HEALTH CARE EDUCATION/TRAINING PROGRAM

## 2024-03-22 PROCEDURE — 99999 PR PBB SHADOW E&M-EST. PATIENT-LVL IV: CPT | Mod: PBBFAC,,, | Performed by: STUDENT IN AN ORGANIZED HEALTH CARE EDUCATION/TRAINING PROGRAM

## 2024-03-22 RX ORDER — DILTIAZEM HYDROCHLORIDE 180 MG/1
180 CAPSULE, EXTENDED RELEASE ORAL DAILY
Qty: 30 CAPSULE | Refills: 11 | Status: SHIPPED | OUTPATIENT
Start: 2024-03-22 | End: 2025-03-22

## 2024-03-22 NOTE — PROGRESS NOTES
"Electrophysiology Clinic Note    Reason for new patient visit: Evaluation and recommendations regarding symptoms of palpitations with rare PACs recorded on a prior 24-hour Holter monitor.     PRESENTING HISTORY:     History of Present Illness:  Ms. Rosaura Loredo is a jasmyne 48-year-old woman who presents today for evaluation and recommendations regarding symptoms of palpitations with rare PACs recorded on a prior 24-hour Holter monitor. She has a past medical history significant for symptoms of palpitations, rare PACs recorded on a prior 24-hour Holter monitor, hyperlipidemia, seasonal allergies, mild intermittent asthma, migraine headaches, generalized anxiety disorder, a history of basal cell carcinoma, and rosacea.    She is followed in general cardiology clinic with QUINN Montalvo and was recently seen on 1/18/2024. At that encounter, they continued to discuss her symptoms of palpitations. She reports that she has been having palpitations for the past several years, and reports that they have increased in frequency and severity in the past two years. 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 She has noted that these events tend to be more common in the evening. She does not drink caffeine and drinks about 2 glass of wine on the weekends. She has had rare episodes of sustained palpitations lasting for about 15 minutes, described as feeling like a "trash compactor" in her chest at that time. She tells me that watching TV last night she felt mild palpitations. She checked her Apple watch at that time and noted that it did not record any arrhythmia, with rare PACs. She reports associated symptoms of shortness of breath and coughing with these events of palpitations, and tells me that sleeping on her left side causes these palpitations to worsen. She recently started on vortioxetine with psychiatry for treatment of her " generalized anxiety disorder. NP Selvin consolidated her calcium channel blocker to diltiazem 180mg po daily about one week ago with reported improvement in her palpitation symptoms. She underwent Holter monitor assessment about a year ago which revealed very rare PVCs, <1%, and very rare PACs, <1%.     In discussion with Ms. Loredo today, she tells me that she is feeling overall quite well. She denies any episodes of dizziness, lightheadedness, syncope/presyncope, chest pain or chest discomfort, nausea or vomiting, orthopnea, or PND. She reports brief, intermittent palpitations that do not limit her functioning as above. She reports baseline very mild shortness of breath and dyspnea with exertion that has remained stable. She can climb one flight of stairs prior to needing to take a break and continues to attempt to increase her level of physical activity.     Review of Systems:  Review of Systems   Constitutional:  Negative for activity change.   HENT:  Negative for nasal congestion, nosebleeds, postnasal drip, rhinorrhea, sinus pressure/congestion, sneezing and sore throat.    Respiratory:  Positive for shortness of breath. Negative for apnea, cough, chest tightness and wheezing.    Cardiovascular:  Positive for palpitations. Negative for chest pain, leg swelling and claudication.   Gastrointestinal:  Negative for abdominal distention, abdominal pain, blood in stool, change in bowel habit, constipation, diarrhea, nausea and vomiting.   Genitourinary:  Negative for dysuria and hematuria.   Musculoskeletal:  Negative for gait problem.   Neurological:  Negative for dizziness, seizures, syncope, weakness, light-headedness, headaches and coordination difficulties.   Psychiatric/Behavioral:  The patient is nervous/anxious.         PAST HISTORY:     Past Medical History:   Diagnosis Date    Abnormal Pap smear of cervix     NEELAM d/t recurrent abnormals/mild dysplasia; negative pathology after hysterctomy    Allergy      Anxiety     Asthma     Basal cell carcinoma (BCC) 2014    R forehead    Hyperlipidemia     PAC (premature atrial contraction)     Rosacea        Past Surgical History:   Procedure Laterality Date     SECTION      COLD KNIFE CONIZATION OF CERVIX      multiple    COLONOSCOPY N/A 10/20/2020    Procedure: COLONOSCOPY;  Surgeon: Sudheer Santiago III, MD;  Location: [facility] ENDO;  Service: Endoscopy;  Laterality: N/A;    COLPOSCOPY  2023    DENTAL SURGERY  03/15/2022    HYSTERECTOMY  2015    ovaries intact    NEUROPLASTY Left 2020    Procedure: NEUROPLASTY;  Surgeon: Quinton Alexandre DPM;  Location: [facility] OR;  Service: Podiatry;  Laterality: Left;    PLANTAR FASCIOTOMY Left 2020    Procedure: FASCIOTOMY, PLANTAR;  Surgeon: Quinton Alexandre DPM;  Location: [facility] OR;  Service: Podiatry;  Laterality: Left;    TONSILLECTOMY         Family History:  Family History   Problem Relation Age of Onset    Diabetes Mother     Asthma Mother     Hypertension Father     Prostate cancer Father     Melanoma Neg Hx        Social History:  She  reports that she has never smoked. She has never used smokeless tobacco. She reports current alcohol use. She reports that she does not use drugs.      MEDICATIONS & ALLERGIES:     Review of patient's allergies indicates:   Allergen Reactions    Mepergan fortis Shortness Of Breath    Penicillins Rash    Sulfa (sulfonamide antibiotics) Rash    Sulfamethoxazole-trimethoprim Rash       Current Outpatient Medications on File Prior to Visit   Medication Sig Dispense Refill    albuterol (PROVENTIL/VENTOLIN HFA) 90 mcg/actuation inhaler Inhale 2 puffs into the lungs every 6 (six) hours as needed for Wheezing. 18 g 3    ALPRAZolam (XANAX) 0.5 MG tablet Take 1 tablet (0.5 mg total) by mouth 2 (two) times daily as needed for Anxiety or Insomnia. 60 tablet 1    diltiaZEM (DILACOR XR) 180 MG CDCR Take 1 capsule (180 mg total) by mouth once daily. 30 capsule 11    finasteride  "(PROSCAR) 5 mg tablet Take 1 tablet (5 mg total) by mouth once daily. 30 tablet 11    hydrocortisone 2.5 % cream Apply topically 2 (two) times daily. 30 g 1    ibuprofen (ADVIL,MOTRIN) 200 MG tablet Take 400 mg by mouth every 6 (six) hours as needed for Pain.      loratadine (CLARITIN) 10 mg tablet Take 1 tablet (10 mg total) by mouth once daily. 90 tablet 1    magnesium oxide (MAG-OX) 400 mg (241.3 mg magnesium) tablet Take 1 tablet (400 mg total) by mouth every evening. 90 tablet 3    montelukast (SINGULAIR) 10 mg tablet Take 1 tablet (10 mg total) by mouth every evening. 90 tablet 1    multivitamin (THERAGRAN) per tablet Take 1 tablet by mouth once daily.      mupirocin (BACTROBAN) 2 % ointment Apply topically 2 (two) times daily. 22 g 2    pantoprazole (PROTONIX) 40 MG tablet Take 1 tablet (40 mg total) by mouth once daily. 90 tablet 1    potassium chloride (K-TAB) 20 mEq Take 1 tablet (20 mEq total) by mouth once daily. 90 tablet 3    semaglutide (OZEMPIC) 1 mg/dose (4 mg/3 mL) Inject into the skin every 7 days.      simvastatin (ZOCOR) 20 MG tablet Take 1 tablet (20 mg total) by mouth every evening. 90 tablet 1    sumatriptan (IMITREX) 100 MG tablet Take 1 tablet (100 mg total) by mouth every 2 (two) hours as needed for Migraine. Do not exceed 2 doses in 24 hours. 27 tablet 3    triamcinolone acetonide 0.025% (KENALOG) 0.025 % Oint Apply topically 2 (two) times daily. 15 g 1    valACYclovir (VALTREX) 1000 MG tablet Take 2 tablets (2,000 mg total) by mouth every 12 (twelve) hours as needed (fever blister). 30 tablet 3    vortioxetine (TRINTELLIX) 10 mg Tab Take 1 tablet (10 mg total) by mouth once daily. 30 tablet 1     No current facility-administered medications on file prior to visit.        OBJECTIVE:     Vital Signs:  /68 (BP Location: Left arm, Patient Position: Sitting, BP Method: Medium (Manual))   Pulse 83   Resp 16   Ht 5' 5" (1.651 m)   Wt 68.9 kg (151 lb 14.4 oz)   SpO2 99%   BMI 25.28 " kg/m²     Physical Exam:  Physical Exam  Constitutional:       General: She is not in acute distress.     Appearance: Normal appearance. She is not ill-appearing or diaphoretic.      Comments: Well-appearing woman in NAD.   HENT:      Head: Normocephalic and atraumatic.      Nose: Nose normal.      Mouth/Throat:      Mouth: Mucous membranes are moist.      Pharynx: Oropharynx is clear.   Eyes:      Pupils: Pupils are equal, round, and reactive to light.   Cardiovascular:      Rate and Rhythm: Normal rate and regular rhythm.      Pulses: Normal pulses.      Heart sounds: Normal heart sounds. No murmur heard.     No friction rub. No gallop.   Pulmonary:      Effort: Pulmonary effort is normal. No respiratory distress.      Breath sounds: Normal breath sounds. No wheezing, rhonchi or rales.   Chest:      Chest wall: No tenderness.   Abdominal:      General: There is no distension.      Palpations: Abdomen is soft.      Tenderness: There is no abdominal tenderness.   Musculoskeletal:         General: No swelling or tenderness.      Cervical back: Normal range of motion.      Right lower leg: No edema.      Left lower leg: No edema.   Skin:     General: Skin is warm and dry.      Findings: No erythema, lesion or rash.   Neurological:      General: No focal deficit present.      Mental Status: She is alert and oriented to person, place, and time. Mental status is at baseline.      Motor: No weakness.      Gait: Gait normal.   Psychiatric:         Mood and Affect: Mood normal.         Behavior: Behavior normal.        Laboratory Data:  Lab Results   Component Value Date    WBC 6.37 02/01/2024    HGB 13.5 02/01/2024    HCT 41.4 02/01/2024    MCV 94 02/01/2024     02/01/2024     Lab Results   Component Value Date     02/01/2024     02/01/2024    K 4.0 02/01/2024     02/01/2024    CO2 27 02/01/2024    BUN 24 (H) 02/01/2024    CREATININE 0.7 02/01/2024    CALCIUM 9.2 02/01/2024    MG 1.9 12/22/2023  "    No results found for: "INR", "PROTIME"    Pertinent Cardiac Data:  ECG: Normal sinus rhythm with rate of 72 bpm,  ms, QRS 84 ms, QT/QTc 412/451 ms.     24-Hour Holter Monitor - 1/6/2023:  Predominant Rhythm Sinus rhythm with heart rates varying between 57 and 117 BPM with an average of 80 BPM. Maximum heart rate recorded at: 16:18 CST on day 1. Minimum heart rate recorded at 05:00 CST on day 1.  Ventricular Arrhythmias There were very rare polymorphic PVCs totalling 3 and averaging 0.06 per hour.  Supraventricular Arrhythmias There were very rare monomorphic PACs totalling 30 and averaging 0.63 per hour.  The diary was properly completed. There were rare hookup related artifacts. Overall, the study was of good quality. The tape was adequate (1 days , 24 hours, 0 minutes). The patient reported no s/s during monitoring.    Resting 2D Transthoracic Echocardiogram - 3/24/2023:  The left ventricle is normal in size with normal systolic function.  The estimated ejection fraction is 65%.  Normal left ventricular diastolic function.  Normal right ventricular size with normal right ventricular systolic function.      ASSESSMENT & PLAN:   Ms. Rosaura Loredo is a jasmyne 48-year-old woman who presents today for evaluation and recommendations regarding symptoms of palpitations with rare PACs recorded on a prior 24-hour Holter monitor. She has a past medical history significant for symptoms of palpitations, rare PACs recorded on a prior 24-hour Holter monitor, hyperlipidemia, seasonal allergies, mild intermittent asthma, migraine headaches, generalized anxiety disorder, a history of basal cell carcinoma, and rosacea.    - We discussed the pathophysiology of PACs. We reviewed her prior Holter monitoring that revealed very rare PACs with an overall PAC burden of <1%. She has no history of atrial fibrillation on any ambulatory monitoring, nor on serial ECGs.   - We will obtain a 14-day ambulatory event monitor for " ongoing evaluation of her continued palpitation symptoms.   - We reviewed the results of her prior resting echocardiogram revealing preserved systolic function, LVEF 65%, with no evidence of structural heart disease.   - She was recently consolidated to diltiazem 180mg po daily for symptomatic relief. In the event she has a recurrent event of palpitations, she was instructed that she may take a short-acting diltiazem 60mg po PRN palpitations.   - She is wondering if her symptoms of chest tightness and palpitations may coincide with anxiety, and has recently started SSRI therapy.   - We discussed lifestyle modification to mitigate her palpitations symptoms, including ensuring adequate hydration, stress management, appropriate sleep hygiene and encouragement for improved sleep, and avoidance of potential triggers such as excessive alcohol or caffeine.     This patient will return to clinic with me in four months with general cardiology and PCP visits in the interim. We will obtain a 14-day ambulatory event monitor in the interim. All questions and concerns were addressed at this encounter.     Signing Physician:       HAMIDA Cassidy MD  Electrophysiology Attending

## 2024-03-26 ENCOUNTER — HOSPITAL ENCOUNTER (OUTPATIENT)
Dept: CARDIOLOGY | Facility: CLINIC | Age: 49
Discharge: HOME | End: 2024-03-26
Attending: STUDENT IN AN ORGANIZED HEALTH CARE EDUCATION/TRAINING PROGRAM
Payer: COMMERCIAL

## 2024-03-26 DIAGNOSIS — I49.1 PAC (PREMATURE ATRIAL CONTRACTION): ICD-10-CM

## 2024-03-26 PROCEDURE — 93246 EXT ECG>7D<15D RECORDING: CPT | Mod: ,,, | Performed by: STUDENT IN AN ORGANIZED HEALTH CARE EDUCATION/TRAINING PROGRAM

## 2024-03-26 PROCEDURE — 93248 EXT ECG>7D<15D REV&INTERPJ: CPT | Mod: ,,, | Performed by: STUDENT IN AN ORGANIZED HEALTH CARE EDUCATION/TRAINING PROGRAM

## 2024-04-03 ENCOUNTER — OFFICE VISIT (OUTPATIENT)
Dept: PSYCHIATRY | Facility: CLINIC | Age: 49
End: 2024-04-03
Payer: COMMERCIAL

## 2024-04-03 DIAGNOSIS — F41.0 GENERALIZED ANXIETY DISORDER WITH PANIC ATTACKS: ICD-10-CM

## 2024-04-03 DIAGNOSIS — F41.1 GENERALIZED ANXIETY DISORDER WITH PANIC ATTACKS: ICD-10-CM

## 2024-04-03 DIAGNOSIS — R69 ILL-DEFINED CAUSE OF MORBIDITY/MORTALITY: Primary | ICD-10-CM

## 2024-04-03 PROCEDURE — 90791 PSYCH DIAGNOSTIC EVALUATION: CPT | Mod: S$GLB,,, | Performed by: SOCIAL WORKER

## 2024-04-03 PROCEDURE — 99999 PR PBB SHADOW E&M-EST. PATIENT-LVL II: CPT | Mod: PBBFAC,,, | Performed by: SOCIAL WORKER

## 2024-04-03 NOTE — PROGRESS NOTES
Psychiatry Initial Visit (PhD/LCSW)  Diagnostic Interview - CPT 73263    Date: 4/3/2024    Site: [facility]    Referral source: Mendez Tomas,*    Clinical status of patient: Outpatient    Rosaura Loredo, a 48 y.o. female, for initial evaluation visit.  Met with patient.    EAP VISIT 1 OF 5     PLEASE SEE CLINICIAN'S CONFIDENTIAL NOTES.         Diagnostic Impression - Plan:       ICD-10-CM ICD-9-CM   1. Ill-defined cause of morbidity/mortality  R69 799.89   2. Generalized anxiety disorder with panic attacks  F41.1 300.02    F41.0 300.01       Plan:individual psychotherapy    Return to Clinic: as scheduled    Length of Service (minutes): 45

## 2024-04-15 ENCOUNTER — OFFICE VISIT (OUTPATIENT)
Dept: PSYCHIATRY | Facility: CLINIC | Age: 49
End: 2024-04-15
Payer: COMMERCIAL

## 2024-04-15 VITALS
HEIGHT: 65 IN | DIASTOLIC BLOOD PRESSURE: 74 MMHG | HEART RATE: 80 BPM | WEIGHT: 149.06 LBS | SYSTOLIC BLOOD PRESSURE: 122 MMHG | BODY MASS INDEX: 24.83 KG/M2

## 2024-04-15 DIAGNOSIS — F41.0 GENERALIZED ANXIETY DISORDER WITH PANIC ATTACKS: ICD-10-CM

## 2024-04-15 DIAGNOSIS — Z86.59 HISTORY OF ADHD: ICD-10-CM

## 2024-04-15 DIAGNOSIS — G47.00 INSOMNIA, UNSPECIFIED TYPE: ICD-10-CM

## 2024-04-15 DIAGNOSIS — F41.1 GENERALIZED ANXIETY DISORDER WITH PANIC ATTACKS: ICD-10-CM

## 2024-04-15 DIAGNOSIS — F33.0 MILD EPISODE OF RECURRENT MAJOR DEPRESSIVE DISORDER: Primary | ICD-10-CM

## 2024-04-15 DIAGNOSIS — R69 ILL-DEFINED CAUSE OF MORBIDITY/MORTALITY: Primary | ICD-10-CM

## 2024-04-15 PROCEDURE — 3008F BODY MASS INDEX DOCD: CPT | Mod: CPTII,S$GLB,, | Performed by: STUDENT IN AN ORGANIZED HEALTH CARE EDUCATION/TRAINING PROGRAM

## 2024-04-15 PROCEDURE — 99999 PR PBB SHADOW E&M-EST. PATIENT-LVL I: CPT | Mod: PBBFAC,,, | Performed by: SOCIAL WORKER

## 2024-04-15 PROCEDURE — 1159F MED LIST DOCD IN RCRD: CPT | Mod: CPTII,S$GLB,, | Performed by: STUDENT IN AN ORGANIZED HEALTH CARE EDUCATION/TRAINING PROGRAM

## 2024-04-15 PROCEDURE — 96136 PSYCL/NRPSYC TST PHY/QHP 1ST: CPT | Mod: 59,S$GLB,, | Performed by: STUDENT IN AN ORGANIZED HEALTH CARE EDUCATION/TRAINING PROGRAM

## 2024-04-15 PROCEDURE — 3078F DIAST BP <80 MM HG: CPT | Mod: CPTII,S$GLB,, | Performed by: STUDENT IN AN ORGANIZED HEALTH CARE EDUCATION/TRAINING PROGRAM

## 2024-04-15 PROCEDURE — 1160F RVW MEDS BY RX/DR IN RCRD: CPT | Mod: CPTII,S$GLB,, | Performed by: STUDENT IN AN ORGANIZED HEALTH CARE EDUCATION/TRAINING PROGRAM

## 2024-04-15 PROCEDURE — 99214 OFFICE O/P EST MOD 30 MIN: CPT | Mod: S$GLB,,, | Performed by: STUDENT IN AN ORGANIZED HEALTH CARE EDUCATION/TRAINING PROGRAM

## 2024-04-15 PROCEDURE — 3074F SYST BP LT 130 MM HG: CPT | Mod: CPTII,S$GLB,, | Performed by: STUDENT IN AN ORGANIZED HEALTH CARE EDUCATION/TRAINING PROGRAM

## 2024-04-15 PROCEDURE — 90837 PSYTX W PT 60 MINUTES: CPT | Mod: S$GLB,,, | Performed by: SOCIAL WORKER

## 2024-04-15 PROCEDURE — 99999 PR PBB SHADOW E&M-EST. PATIENT-LVL IV: CPT | Mod: PBBFAC,,, | Performed by: STUDENT IN AN ORGANIZED HEALTH CARE EDUCATION/TRAINING PROGRAM

## 2024-04-15 NOTE — PROGRESS NOTES
Outpatient Psychiatry Followup Visit (DO/MD/NP, etc.)    4/15/2024  Assessment & Plan    Assessment - Plan:     Impression     ICD-10-CM ICD-9-CM   1. Mild episode of recurrent major depressive disorder  F33.0 296.31   2. Generalized anxiety disorder with panic attacks  F41.1 300.02    F41.0 300.01   3. History of ADHD  Z86.59 V11.8   4. Insomnia, unspecified type  G47.00 780.52   5. BMI 24.0-24.9, adult  Z68.24 V85.1        Plan of Care & Medication Management    Chart was reviewed. The risks and benefits of medication were discussed with pt. The treatment plan and followup plan were reviewed with pt. Pt understands to contact clinic if symptoms worsen. Pt understands to call 911 or go to nearest ER for suicidal ideation, intent or plan.   RX History AMBIEN, BENADRYL, BUSPAR (dizziness), CYMBALTA (NO improvement), FOCALIN, GABAPENTIN, LEXAPRO (NO improvement), PAXIL, VYVANSE (worsened anxiety, GI symptoms, palpitations), WELLBUTRIN (NO improvement), XANAX    Current RX Continue TRINTELLIX  Useful for symptom coverage of anxiety and depression.  Compared to alternative options, this has lower risk for weight gain.  Pt was provided [employer] educational material 3/15/2024  Pt was provided a savings card with instructions 3/15/2024.  Adjustments:  [id number]: Switch to 10mg HS  [id number]: Start 10mg daily  Prior to 3/15/2024, pt had been intermittently taking XANAX only. History of multiple failed alternatives (see above).  Continue XANAX  Pt was provided [employer] educational material 3/15/2024  Adjustments:  15MAR2024: Continue 0.5mg BID prn anxiety/insomnia  Prior to 3/15/2024, pt had been taking 0.5-1mg intermittently for insomnia   Education & Counseling RX administration and adherence   Other Orders Continue individual psychotherapy   Monitor VITAL SIGNS  Use of: benzo  Instruments: PROMIS (A&D), PSS4   RETURN U: RETURN IN 12 WEEKS (THREE MONTHS), and reassess frequency within three visits from now  Continue medication  "management     Subjective    Interval History:     Available documentation has been reviewed, and pertinent elements of the chart have been incorporated into this note where appropriate. Last Epic encounter with writer was on 3/15/2024   Rosaura Loredo, a 48 y.o. female, presenting for followup visit.      Since last visit, reports overall A LITTLE BETTER.    Taking TRINTELLIX for about 3w, feeling somewhat better.    Switched to HS dosing due to tired and yawning.    Sleeping well.    NO PACs or SVTs.     Keeping therapy appts.    Using XANAX about once q2w.    Will continue medications as prescribed.    Can reduce visit frequency to q3m.       Unless otherwise specified, pt did NOT display signs of nor endorse symptoms of overt psychosis or acute mood disorder requiring hospitalization during the encounter; pt denied violent thoughts or suicidal or homicidal ideation, intent, or plan.         Objective    Measurement-Based Care (MBC):     Routine Instruments   PROMIS-ANXIETY Interpretation: 10 (4a raw score): T-SCORE 59.5; MILD using 55-60-70 cutoffs. Stratification of anxiety severity was done with GAD7 last time; compared to MODERATE 12/21  last time, severity probably IMPROVED. Changed instruments today; severity changes may be artifact. See "Interval History."   PROMIS-DEPRESSION Interpretation: 8 (4a raw score): T-SCORE 55.7; MILD using 55-60-70 cutoffs. Stratification of depression severity was done with PHQ9 last time; compared to MODERATE 17/27 last time, severity probably IMPROVED. Changed instruments today; severity changes may be artifact. See "Interval History."   PSS4 Interpretation: 3/16; LOW using 6-11 cutoffs. 0 PH, 0 LSE.   Additional Instruments   N/A     Current Evaluation of Mental Status:     Constitutional / General       Vitals:    04/15/24 1513   BP: 122/74   Pulse: 80   Weight: 67.6 kg (149 lb 0.5 oz)   Height: 5' 5" (1.651 m)       Psychiatric / Mental Status Examination  1. " "Appearance: Dress is informal but appropriate. Motor activity normal.  2. Discourse: Clear speech with normal rate and volume. Associations intact. Orderly.  3. Emotional Expression: Somewhat anxious and depressed mood. Affect is appropriate.  4. Perception and Thinking: No hallucinations. No suicidality, no homicidality, delusions, or paranoia.  5. Sensorium: Grossly intact. Able to focus for interview.  6. Memory and Fund of Knowledge: Intact for content of interview.  7. Insight and Judgment: Intact.         Auto-populated chart data omitted from this note for brevity.      Billing Documentation:     Method of Encounter IN PERSON visit at the clinic   Type of Encounter Follow up visit with me   Counseling;  Psychotherapy    Counseling;  Tobacco and/or Nicotine    Additional Codes and Modifiers 03817, with modifer 59: administered and scored more than one psychological or neuropsychological tests (see MBC above) (16+ mins)   Time Remaining Chart/Pt 77425: FOLLOW UP VISIT, Rx mgmt, "Multiple STABLE chronic illnesses; no changes in treatment at this time"   Total Mins  (4/15/2024) N/A - Not billing for time        Mendez Tomas DO  Department of Psychiatry, [facility]        "

## 2024-04-15 NOTE — PROGRESS NOTES
Individual Psychotherapy (PhD/LCSW)    4/15/2024    Site:  [facility] # 2 of 5     PLEASE SEE CLINICIAN'S CONFIDENTIAL NOTES.   Diagnosis:   1. Ill-defined cause of morbidity/mortality        Plan:  individual psychotherapy    Return to clinic: as scheduled    Length of Service (minutes): 45

## 2024-04-18 ENCOUNTER — PATIENT MESSAGE (OUTPATIENT)
Dept: FAMILY MEDICINE | Facility: CLINIC | Age: 49
End: 2024-04-18
Payer: COMMERCIAL

## 2024-04-18 DIAGNOSIS — R35.0 URINE FREQUENCY: Primary | ICD-10-CM

## 2024-04-19 LAB
OHS CV EVENT MONITOR DAY: 13
OHS CV HOLTER HOOKUP DATE: NORMAL
OHS CV HOLTER HOOKUP TIME: NORMAL
OHS CV HOLTER LENGTH DECIMAL HOURS: 329
OHS CV HOLTER LENGTH HOURS: 17
OHS CV HOLTER LENGTH MINUTES: 0
OHS CV HOLTER SCAN DATE: NORMAL
OHS CV HOLTER SINUS AVERAGE HR: 87 BPM
OHS CV HOLTER SINUS MAX HR: 235 BPM
OHS CV HOLTER SINUS MIN HR: 60 BPM
OHS CV HOLTER STUDY END DATE: NORMAL
OHS CV HOLTER STUDY END TIME: NORMAL

## 2024-04-19 RX ORDER — CIPROFLOXACIN 500 MG/1
500 TABLET ORAL EVERY 12 HOURS
Qty: 6 TABLET | Refills: 0 | Status: SHIPPED | OUTPATIENT
Start: 2024-04-19 | End: 2024-04-22

## 2024-04-22 ENCOUNTER — TELEPHONE (OUTPATIENT)
Dept: UROLOGY | Facility: CLINIC | Age: 49
End: 2024-04-22
Payer: COMMERCIAL

## 2024-04-22 NOTE — TELEPHONE ENCOUNTER
----- Message from Farzana Ellison sent at 4/22/2024  7:28 AM CDT -----  Contact: Self  Type: Needs Medical Advice    Who Called:  Patient  What is this regarding?:  She say she has been this Dr before and would prefer her apt with this Dr before. Can she get an apt with Dr Payton instead?  Best Call Back Number:  [phone]  Additional Information:  Please call the patient back at the phone number listed above to advise. Thank you!

## 2024-04-22 NOTE — TELEPHONE ENCOUNTER
Spoke with patient requesting to see  for uti symptoms, currently scheduled with  today. Patient declined seeing . offered appointment with np due to provider is out of office today. Patient states to cancel appointment she had just scheduled to see  tomorrow.

## 2024-04-23 ENCOUNTER — HOSPITAL ENCOUNTER (OUTPATIENT)
Dept: RADIOLOGY | Facility: HOSPITAL | Age: 49
Discharge: HOME | End: 2024-04-23
Attending: SPECIALIST
Payer: COMMERCIAL

## 2024-04-23 DIAGNOSIS — N20.0 URIC ACID NEPHROLITHIASIS: ICD-10-CM

## 2024-04-23 DIAGNOSIS — N20.0 URIC ACID NEPHROLITHIASIS: Primary | ICD-10-CM

## 2024-04-23 PROCEDURE — 74176 CT ABD & PELVIS W/O CONTRAST: CPT | Mod: 26,,, | Performed by: RADIOLOGY

## 2024-04-23 PROCEDURE — 74176 CT ABD & PELVIS W/O CONTRAST: CPT | Mod: TC

## 2024-05-27 ENCOUNTER — HOSPITAL ENCOUNTER (OUTPATIENT)
Dept: RADIOLOGY | Facility: HOSPITAL | Age: 49
Discharge: HOME | End: 2024-05-27
Attending: INTERNAL MEDICINE
Payer: COMMERCIAL

## 2024-05-27 DIAGNOSIS — Z12.31 VISIT FOR SCREENING MAMMOGRAM: ICD-10-CM

## 2024-05-27 PROCEDURE — 77067 SCR MAMMO BI INCL CAD: CPT | Mod: 26,,, | Performed by: RADIOLOGY

## 2024-05-27 PROCEDURE — 77063 BREAST TOMOSYNTHESIS BI: CPT | Mod: 26,,, | Performed by: RADIOLOGY

## 2024-05-27 PROCEDURE — 77063 BREAST TOMOSYNTHESIS BI: CPT | Mod: TC,PO

## 2024-06-12 ENCOUNTER — OFFICE VISIT (OUTPATIENT)
Dept: FAMILY MEDICINE | Facility: CLINIC | Age: 49
End: 2024-06-12
Payer: COMMERCIAL

## 2024-06-12 VITALS
HEIGHT: 65 IN | HEART RATE: 74 BPM | BODY MASS INDEX: 22.9 KG/M2 | TEMPERATURE: 97 F | OXYGEN SATURATION: 99 % | WEIGHT: 137.44 LBS | DIASTOLIC BLOOD PRESSURE: 67 MMHG | SYSTOLIC BLOOD PRESSURE: 103 MMHG

## 2024-06-12 DIAGNOSIS — E78.00 PURE HYPERCHOLESTEROLEMIA: ICD-10-CM

## 2024-06-12 DIAGNOSIS — J30.1 CHRONIC SEASONAL ALLERGIC RHINITIS DUE TO POLLEN: ICD-10-CM

## 2024-06-12 DIAGNOSIS — Z00.00 ENCOUNTER FOR PREVENTATIVE ADULT HEALTH CARE EXAMINATION: Primary | ICD-10-CM

## 2024-06-12 PROCEDURE — 3078F DIAST BP <80 MM HG: CPT | Mod: CPTII,S$GLB,, | Performed by: INTERNAL MEDICINE

## 2024-06-12 PROCEDURE — 99396 PREV VISIT EST AGE 40-64: CPT | Mod: S$GLB,,, | Performed by: INTERNAL MEDICINE

## 2024-06-12 PROCEDURE — 99999 PR PBB SHADOW E&M-EST. PATIENT-LVL III: CPT | Mod: PBBFAC,,, | Performed by: INTERNAL MEDICINE

## 2024-06-12 PROCEDURE — 3074F SYST BP LT 130 MM HG: CPT | Mod: CPTII,S$GLB,, | Performed by: INTERNAL MEDICINE

## 2024-06-12 PROCEDURE — 3008F BODY MASS INDEX DOCD: CPT | Mod: CPTII,S$GLB,, | Performed by: INTERNAL MEDICINE

## 2024-06-12 RX ORDER — MONTELUKAST SODIUM 10 MG/1
10 TABLET ORAL NIGHTLY
Qty: 90 TABLET | Refills: 1 | Status: SHIPPED | OUTPATIENT
Start: 2024-06-12

## 2024-06-12 RX ORDER — PANTOPRAZOLE SODIUM 40 MG/1
40 TABLET, DELAYED RELEASE ORAL DAILY
Qty: 90 TABLET | Refills: 1 | Status: SHIPPED | OUTPATIENT
Start: 2024-06-12 | End: 2025-06-12

## 2024-06-12 NOTE — PROGRESS NOTES
Subjective:       Patient ID: Rosaura Loredo is a 48 y.o. female.    Chief Complaint: Annual Exam    Here for annual well visit and routine follow up; last visit note, most recent available labs, and health maintenance topics reviewed.   PACs have been better since switch from metoprolol to cardizem; still present but significantly less frequent.  She had follow up PAP a few weeks ago; told it was fine.   UTD on mammogram and colon cancer screen.      Anxiety  Presents for follow-up visit. Symptoms include palpitations. Patient reports no chest pain, confusion, decreased concentration, dizziness, nausea, nervous/anxious behavior, shortness of breath or suicidal ideas. The severity of symptoms is mild.       Hyperlipidemia  This is a chronic problem. The problem is controlled. Recent lipid tests were reviewed and are normal (labs done yearly for work). Pertinent negatives include no chest pain, myalgias or shortness of breath. Current antihyperlipidemic treatment includes statins. The current treatment provides significant improvement of lipids. There are no compliance problems.      Review of Systems   Constitutional:  Negative for activity change, appetite change, chills, diaphoresis, fatigue, fever and unexpected weight change.   HENT:  Negative for congestion, ear discharge, ear pain, hearing loss, mouth sores, nosebleeds, postnasal drip, rhinorrhea, sinus pressure, sinus pain, sneezing, sore throat, tinnitus, trouble swallowing and voice change.    Eyes:  Negative for photophobia, pain, discharge, redness, itching and visual disturbance.   Respiratory:  Negative for apnea, cough, choking, chest tightness, shortness of breath and wheezing.    Cardiovascular:  Positive for palpitations. Negative for chest pain and leg swelling.   Gastrointestinal:  Negative for abdominal distention, abdominal pain, blood in stool, constipation, diarrhea, nausea and vomiting.   Endocrine: Negative for cold intolerance, heat  intolerance, polydipsia and polyuria.   Genitourinary:  Negative for decreased urine volume, difficulty urinating, dyspareunia, dysuria, enuresis, frequency, genital sores, hematuria, menstrual problem, pelvic pain, urgency, vaginal bleeding, vaginal discharge and vaginal pain.   Musculoskeletal:  Negative for arthralgias, back pain, gait problem, joint swelling, myalgias, neck pain and neck stiffness.   Skin:  Negative for rash and wound.   Allergic/Immunologic: Negative for environmental allergies, food allergies and immunocompromised state.   Neurological:  Negative for dizziness, tremors, seizures, syncope, facial asymmetry, speech difficulty, weakness, light-headedness, numbness and headaches.   Hematological:  Negative for adenopathy. Does not bruise/bleed easily.   Psychiatric/Behavioral:  Negative for confusion, decreased concentration, hallucinations, self-injury, sleep disturbance and suicidal ideas. The patient is not nervous/anxious.        Past Medical History:   Diagnosis Date    Abnormal Pap smear of cervix     NEELAM d/t recurrent abnormals/mild dysplasia; negative pathology after hysterctomy    Allergy     Anxiety     Asthma     Basal cell carcinoma (BCC)     R forehead    Hyperlipidemia     PAC (premature atrial contraction)     Rosacea       Past Surgical History:   Procedure Laterality Date     SECTION      COLD KNIFE CONIZATION OF CERVIX      multiple    COLONOSCOPY N/A 10/20/2020    Procedure: COLONOSCOPY;  Surgeon: Sudheer Santiago III, MD;  Location: [facility];  Service: Endoscopy;  Laterality: N/A;    COLPOSCOPY  2023    DENTAL SURGERY  03/15/2022    HYSTERECTOMY  2015    ovaries intact    NEUROPLASTY Left 2020    Procedure: NEUROPLASTY;  Surgeon: Quinton Alexandre DPM;  Location: [facility];  Service: Podiatry;  Laterality: Left;    PLANTAR FASCIOTOMY Left 2020    Procedure: FASCIOTOMY, PLANTAR;  Surgeon: Quinton Alexandre DPM;  Location: [facility];  Service:  Podiatry;  Laterality: Left;    TONSILLECTOMY         Family History   Problem Relation Name Age of Onset    Diabetes Mother      Asthma Mother      Hypertension Father      Prostate cancer Father      Melanoma Neg Hx         Social History     Socioeconomic History    Marital status:    Tobacco Use    Smoking status: Never    Smokeless tobacco: Never   Substance and Sexual Activity    Alcohol use: Yes     Comment: occ    Drug use: No    Sexual activity: Yes     Birth control/protection: See Surgical Hx, Surgical   Social History Narrative    Live with      Social Determinants of Health     Financial Resource Strain: Low Risk  (8/24/2021)    Overall Financial Resource Strain (CARDIA)     Difficulty of Paying Living Expenses: Not hard at all   Food Insecurity: No Food Insecurity (8/24/2021)    Hunger Vital Sign     Worried About Running Out of Food in the Last Year: Never true     Ran Out of Food in the Last Year: Never true   Transportation Needs: No Transportation Needs (8/24/2021)    PRAPARE - Transportation     Lack of Transportation (Medical): No     Lack of Transportation (Non-Medical): No   Physical Activity: Unknown (8/24/2021)    Exercise Vital Sign     Days of Exercise per Week: 0 days   Stress: Stress Concern Present (8/24/2021)    [employer] - Occupational Stress Questionnaire     Feeling of Stress : Rather much       Current Outpatient Medications   Medication Sig Dispense Refill    albuterol (PROVENTIL/VENTOLIN HFA) 90 mcg/actuation inhaler Inhale 2 puffs into the lungs every 6 (six) hours as needed for Wheezing. 18 g 3    ALPRAZolam (XANAX) 0.5 MG tablet Take 1 tablet (0.5 mg total) by mouth 2 (two) times daily as needed for Anxiety or Insomnia. 60 tablet 1    diltiaZEM (DILACOR XR) 180 MG CDCR Take 1 capsule (180 mg total) by mouth once daily. 30 capsule 11    finasteride (PROSCAR) 5 mg tablet Take 1 tablet (5 mg total) by mouth once daily. 30 tablet 11     hydrocortisone 2.5 % cream Apply topically 2 (two) times daily. 30 g 1    ibuprofen (ADVIL,MOTRIN) 200 MG tablet Take 400 mg by mouth every 6 (six) hours as needed for Pain.      loratadine (CLARITIN) 10 mg tablet Take 1 tablet (10 mg total) by mouth once daily. 90 tablet 1    magnesium oxide (MAG-OX) 400 mg (241.3 mg magnesium) tablet Take 1 tablet (400 mg total) by mouth every evening. 90 tablet 3    multivitamin (THERAGRAN) per tablet Take 1 tablet by mouth once daily.      mupirocin (BACTROBAN) 2 % ointment Apply topically 2 (two) times daily. 22 g 2    potassium chloride (K-TAB) 20 mEq Take 1 tablet (20 mEq total) by mouth once daily. 90 tablet 3    semaglutide (OZEMPIC) 1 mg/dose (4 mg/3 mL) Inject into the skin every 7 days.      simvastatin (ZOCOR) 20 MG tablet Take 1 tablet (20 mg total) by mouth every evening. 90 tablet 1    sumatriptan (IMITREX) 100 MG tablet Take 1 tablet (100 mg total) by mouth every 2 (two) hours as needed for Migraine. Do not exceed 2 doses in 24 hours. 27 tablet 3    terconazole (TERAZOL 3) 0.8 % vaginal cream Place vaginally at bedtime for 3 nights 20 g 1    triamcinolone acetonide 0.025% (KENALOG) 0.025 % Oint Apply topically 2 (two) times daily. 15 g 1    valACYclovir (VALTREX) 1000 MG tablet Take 2 tablets (2,000 mg total) by mouth every 12 (twelve) hours as needed (fever blister). 30 tablet 3    vortioxetine (TRINTELLIX) 10 mg Tab Take 1 tablet (10 mg total) by mouth once daily. 90 tablet 0    montelukast (SINGULAIR) 10 mg tablet Take 1 tablet (10 mg total) by mouth every evening. 90 tablet 1    pantoprazole (PROTONIX) 40 MG tablet Take 1 tablet (40 mg total) by mouth once daily. 90 tablet 1     No current facility-administered medications for this visit.       Review of patient's allergies indicates:   Allergen Reactions    Mepergan fortis Shortness Of Breath    Penicillins Rash    Sulfa (sulfonamide antibiotics) Rash    Sulfamethoxazole-trimethoprim Rash     Objective:     "  Blood pressure 103/67, pulse 74, temperature 96.6 °F (35.9 °C), temperature source Temporal, height 5' 5" (1.651 m), weight 62.4 kg (137 lb 7.3 oz), SpO2 99%. Body mass index is 22.87 kg/m².   Physical Exam  Vitals and nursing note reviewed.   Constitutional:       General: She is not in acute distress.     Appearance: She is well-developed. She is not ill-appearing, toxic-appearing or diaphoretic.   HENT:      Head: Normocephalic and atraumatic.   Eyes:      General: No scleral icterus.        Right eye: No discharge.         Left eye: No discharge.      Conjunctiva/sclera: Conjunctivae normal.   Neck:      Vascular: No carotid bruit.   Cardiovascular:      Rate and Rhythm: Normal rate and regular rhythm.      Heart sounds: Normal heart sounds. No murmur heard.  Pulmonary:      Effort: Pulmonary effort is normal. No respiratory distress.      Breath sounds: Normal breath sounds. No decreased breath sounds, wheezing, rhonchi or rales.   Abdominal:      General: There is no distension.      Palpations: Abdomen is soft.      Tenderness: There is no abdominal tenderness. There is no guarding or rebound.   Musculoskeletal:      Right lower leg: No edema.      Left lower leg: No edema.   Skin:     General: Skin is warm and dry.   Neurological:      Mental Status: She is alert.      Motor: No tremor.   Psychiatric:         Mood and Affect: Mood normal.         Speech: Speech normal.         Behavior: Behavior normal.             Assessment:       1. Encounter for preventative adult health care examination    2. Pure hypercholesterolemia    3. Chronic seasonal allergic rhinitis due to pollen        Plan:       Rosaura was seen today for annual exam.    Diagnoses and all orders for this visit:    Encounter for preventative adult health care examination  -     Comprehensive Metabolic Panel; Future  -     Lipid Panel; Future    Pure hypercholesterolemia  Comments:  She wants to try and get of meds and see how she does.  Wait " 4-6 weeks to do labs.  Orders:  -     Comprehensive Metabolic Panel; Future  -     Lipid Panel; Future    Chronic seasonal allergic rhinitis due to pollen  -     montelukast (SINGULAIR) 10 mg tablet; Take 1 tablet (10 mg total) by mouth every evening.    Other orders  -     pantoprazole (PROTONIX) 40 MG tablet; Take 1 tablet (40 mg total) by mouth once daily.

## 2024-08-13 ENCOUNTER — TELEPHONE (OUTPATIENT)
Dept: CARDIOLOGY | Facility: CLINIC | Age: 49
End: 2024-08-13
Payer: COMMERCIAL

## 2024-08-13 NOTE — TELEPHONE ENCOUNTER
Spoke with Deepali. She advised cannot use those forms. Did sent message to Jackie she is going to handle it and send to her people.

## 2024-08-13 NOTE — TELEPHONE ENCOUNTER
----- Message from Gia Blair RN sent at 8/13/2024  2:16 PM CDT -----  Regarding: FW: Consult  Hi,    This is in regards to Zio Extended Holter put on in the [facility].      Gia  ----- Message -----  From: Keyanna Guerrero  Sent: 8/13/2024  12:46 PM CDT  To: [facility] Arrhythmia Device Staff  Subject: Consult                                          Deepali from [facility] calling to speak with staff regarding patient device. Please call back @ [phone]. Thank you Keyanna

## 2024-08-27 ENCOUNTER — TELEPHONE (OUTPATIENT)
Dept: CARDIOLOGY | Facility: CLINIC | Age: 49
End: 2024-08-27
Payer: COMMERCIAL

## 2024-08-29 ENCOUNTER — OFFICE VISIT (OUTPATIENT)
Dept: CARDIOLOGY | Facility: CLINIC | Age: 49
End: 2024-08-29
Payer: COMMERCIAL

## 2024-08-29 VITALS
HEIGHT: 65 IN | WEIGHT: 154 LBS | SYSTOLIC BLOOD PRESSURE: 124 MMHG | RESPIRATION RATE: 16 BRPM | BODY MASS INDEX: 25.66 KG/M2 | HEART RATE: 78 BPM | OXYGEN SATURATION: 98 % | DIASTOLIC BLOOD PRESSURE: 74 MMHG

## 2024-08-29 DIAGNOSIS — I47.10 SUPRAVENTRICULAR TACHYCARDIA: Primary | ICD-10-CM

## 2024-08-29 DIAGNOSIS — E78.2 MIXED HYPERLIPIDEMIA: ICD-10-CM

## 2024-08-29 DIAGNOSIS — I49.1 PAC (PREMATURE ATRIAL CONTRACTION): ICD-10-CM

## 2024-08-29 DIAGNOSIS — I47.10 SVT (SUPRAVENTRICULAR TACHYCARDIA): ICD-10-CM

## 2024-08-29 LAB
OHS QRS DURATION: 78 MS
OHS QTC CALCULATION: 431 MS

## 2024-08-29 PROCEDURE — 99999 PR PBB SHADOW E&M-EST. PATIENT-LVL IV: CPT | Mod: PBBFAC,,, | Performed by: NURSE PRACTITIONER

## 2024-08-29 PROCEDURE — 93005 ELECTROCARDIOGRAM TRACING: CPT | Mod: S$GLB,,, | Performed by: NURSE PRACTITIONER

## 2024-08-29 PROCEDURE — 3008F BODY MASS INDEX DOCD: CPT | Mod: CPTII,S$GLB,, | Performed by: NURSE PRACTITIONER

## 2024-08-29 PROCEDURE — 1159F MED LIST DOCD IN RCRD: CPT | Mod: CPTII,S$GLB,, | Performed by: NURSE PRACTITIONER

## 2024-08-29 PROCEDURE — 99214 OFFICE O/P EST MOD 30 MIN: CPT | Mod: S$GLB,,, | Performed by: NURSE PRACTITIONER

## 2024-08-29 PROCEDURE — 3074F SYST BP LT 130 MM HG: CPT | Mod: CPTII,S$GLB,, | Performed by: NURSE PRACTITIONER

## 2024-08-29 PROCEDURE — 3078F DIAST BP <80 MM HG: CPT | Mod: CPTII,S$GLB,, | Performed by: NURSE PRACTITIONER

## 2024-08-29 PROCEDURE — 93010 ELECTROCARDIOGRAM REPORT: CPT | Mod: S$GLB,,, | Performed by: GENERAL PRACTICE

## 2024-08-29 RX ORDER — DILTIAZEM HYDROCHLORIDE 60 MG/1
60 TABLET, FILM COATED ORAL EVERY 8 HOURS
Qty: 45 TABLET | Refills: 1 | Status: SHIPPED | OUTPATIENT
Start: 2024-08-29

## 2024-08-29 RX ORDER — DILTIAZEM HYDROCHLORIDE 240 MG/1
240 CAPSULE, COATED, EXTENDED RELEASE ORAL DAILY
Qty: 30 CAPSULE | Refills: 11 | Status: SHIPPED | OUTPATIENT
Start: 2024-08-29 | End: 2025-08-29

## 2024-08-29 RX ORDER — DILTIAZEM HYDROCHLORIDE 60 MG/1
60 TABLET, FILM COATED ORAL EVERY 8 HOURS
COMMUNITY
End: 2024-08-29 | Stop reason: SDUPTHER

## 2024-08-29 NOTE — ASSESSMENT & PLAN NOTE
She has symptomatic PACs which are intermittent. She also has Episodes of SVT.  Patient has been taking diltiazem  mg p.o. daily and also taking diltiazem 60 mg and metoprolol tartrate p.r.n. which she has not needed frequently.

## 2024-08-29 NOTE — ASSESSMENT & PLAN NOTE
Patient has taken herself off of simvastatin and is due to have labs done.  Will check a lipid panel to evaluate cholesterol control.

## 2024-08-29 NOTE — ASSESSMENT & PLAN NOTE
Will increase Cardizem CD to 240 mg p.o. daily.  She can continue to take the diltiazem 60 mg p.o. t.i.d. p.r.n. for breakthrough SVT.  Continue magnesium oxide 400 mg p.o. daily.  I have encouraged her to follow-up with electrophysiology for further discussion and options.    Have also encouraged her to follow-up with her gyn or primary care regarding having some hormone testing done as she is 48 years old.  She has had a partial hysterectomy in the past and does not have menses.  Hormone fluctuations and menopause could be contributing to some of her symptoms and exacerbating her SVT.

## 2024-08-29 NOTE — PROGRESS NOTES
Subjective:    Patient ID:  Rosaura Loredo is a 48 y.o. female.  Chief Complaint   Patient presents with    Tachycardia     She is having increased symptoms with her svt, she is getting pre syncope       HPI:  Patient seen today for follow-up appointment.  She is having significant issues with palpitations and feeling lightheaded when her heart races.  She states that Monday she was off and at home not under lot of stress and noted that her heart rate began to rise and fluctuate.  She felt quite lightheaded at that time.    Review of patient's allergies indicates:   Allergen Reactions    Mepergan fortis Shortness Of Breath    Influenza virus vaccines Palpitations     SVT episodes increased significantly after receiving the last vaccine.    Penicillins Rash    Sulfa (sulfonamide antibiotics) Rash    Sulfamethoxazole-trimethoprim Rash       Past Medical History:   Diagnosis Date    Abnormal Pap smear of cervix     NEELAM d/t recurrent abnormals/mild dysplasia; negative pathology after hysterctomy    Allergy     Anxiety     Asthma     Basal cell carcinoma (BCC)     R forehead    Hyperlipidemia     PAC (premature atrial contraction)     Rosacea      Past Surgical History:   Procedure Laterality Date     SECTION      COLD KNIFE CONIZATION OF CERVIX      multiple    COLONOSCOPY N/A 10/20/2020    Procedure: COLONOSCOPY;  Surgeon: Sudheer Santiago III, MD;  Location: [facility];  Service: Endoscopy;  Laterality: N/A;    COLPOSCOPY  2023    DENTAL SURGERY  03/15/2022    HYSTERECTOMY  2015    ovaries intact    NEUROPLASTY Left 2020    Procedure: NEUROPLASTY;  Surgeon: Quinton Alexandre DPM;  Location: [facility] OR;  Service: Podiatry;  Laterality: Left;    PLANTAR FASCIOTOMY Left 2020    Procedure: FASCIOTOMY, PLANTAR;  Surgeon: Quinton Alexandre DPM;  Location: [facility] OR;  Service: Podiatry;  Laterality: Left;    TONSILLECTOMY       Social History     Tobacco Use    Smoking status: Never     Smokeless tobacco: Never   Substance Use Topics    Alcohol use: Yes     Comment: occ    Drug use: No     Family History   Problem Relation Name Age of Onset    Diabetes Mother      Asthma Mother      Hypertension Father      Prostate cancer Father      Melanoma Neg Hx          Review of Systems:   Per HPI         Objective:        Vitals:    08/29/24 0843   BP: 124/74   Pulse: 78   Resp: 16       Lab Results   Component Value Date    WBC 6.37 02/01/2024    HGB 13.5 02/01/2024    HCT 41.4 02/01/2024     02/01/2024    CHOL 156 10/25/2023    TRIG 90 10/25/2023    HDL 57 10/25/2023    ALT 11 10/25/2023    AST 15 10/25/2023     02/01/2024    K 4.0 02/01/2024     02/01/2024    CREATININE 0.7 02/01/2024    BUN 24 (H) 02/01/2024    CO2 27 02/01/2024    TSH 1.750 03/10/2023        ECHOCARDIOGRAM RESULTS  Results for orders placed during the hospital encounter of 03/24/23    Echo    Interpretation Summary  · The left ventricle is normal in size with normal systolic function.  · The estimated ejection fraction is 65%.  · Normal left ventricular diastolic function.  · Normal right ventricular size with normal right ventricular systolic function.        CURRENT/PREVIOUS VISIT EKG  Results for orders placed or performed in visit on 01/18/24   IN OFFICE EKG 12-LEAD (to [place])    Collection Time: 01/18/24  4:06 PM    Narrative    Test Reason : I49.1,    Vent. Rate : 072 BPM     Atrial Rate : 072 BPM     P-R Int : 162 ms          QRS Dur : 084 ms      QT Int : 412 ms       P-R-T Axes : 073 076 068 degrees     QTc Int : 451 ms    Normal sinus rhythm  Normal ECG  When compared with ECG of 22-FEB-2023 16:09,  No significant change was found  Confirmed by Seb RAE, Sophia Bledsoe ([id number]) on 1/22/2024 6:50:12 PM    Referred By:             Confirmed By:Sophia Grigsby MD     No valid procedures specified.   Results for orders placed during the hospital encounter of 01/29/24    Exercise Stress - EKG    Interpretation  Summary    The patient exercised for 6 minutes 0 seconds on a Zander protocol, corresponding to a functional capacity of 7 METS, achieving a peak heart rate of 165 bpm, which is 102 % of the age predicted maximum heart rate.    The ECG portion of the study is negative for ischemia.    The patient reported no chest pain during the stress test.    The blood pressure response to stress was normal.    There were no arrhythmias during stress.      Physical Exam:  CONSTITUTIONAL: No fever, no chills  HEENT: Normocephalic, atraumatic,pupils reactive to light                 NECK:  No JVD no carotid bruit  CVS: S1S2+, RRR  LUNGS: Clear  ABDOMEN: Soft, NT, BS+  EXTREMITIES: No cyanosis, edema  : No castillo catheter  NEURO: AAO X 3  PSY: Normal affect      Medication List with Changes/Refills   New Medications    DILTIAZEM (CARDIZEM CD) 240 MG 24 HR CAPSULE    Take 1 capsule (240 mg total) by mouth once daily.   Current Medications    ALBUTEROL (PROVENTIL/VENTOLIN HFA) 90 MCG/ACTUATION INHALER    Inhale 2 puffs into the lungs every 6 (six) hours as needed for Wheezing.    ALPRAZOLAM (XANAX) 0.5 MG TABLET    Take 1 tablet (0.5 mg total) by mouth 2 (two) times daily as needed for Anxiety or Insomnia.    CIPROFLOXACIN HCL (CIPRO) 500 MG TABLET    Take 1 tablet (500 mg total) by mouth 2 (two) times a day.    FINASTERIDE (PROSCAR) 5 MG TABLET    Take 1 tablet (5 mg total) by mouth once daily.    HYDROCORTISONE 2.5 % CREAM    Apply topically 2 (two) times daily.    IBUPROFEN (ADVIL,MOTRIN) 200 MG TABLET    Take 400 mg by mouth every 6 (six) hours as needed for Pain.    LORATADINE (CLARITIN) 10 MG TABLET    Take 1 tablet (10 mg total) by mouth once daily.    MAGNESIUM OXIDE (MAG-OX) 400 MG (241.3 MG MAGNESIUM) TABLET    Take 1 tablet (400 mg total) by mouth every evening.    MONTELUKAST (SINGULAIR) 10 MG TABLET    Take 1 tablet (10 mg total) by mouth every evening.    MULTIVITAMIN (THERAGRAN) PER TABLET    Take 1 tablet by mouth once  daily.    MUPIROCIN (BACTROBAN) 2 % OINTMENT    Apply topically 2 (two) times daily.    PANTOPRAZOLE (PROTONIX) 40 MG TABLET    Take 1 tablet (40 mg total) by mouth once daily.    POTASSIUM CHLORIDE (K-TAB) 20 MEQ    Take 1 tablet (20 mEq total) by mouth once daily.    SIMVASTATIN (ZOCOR) 20 MG TABLET    Take 1 tablet (20 mg total) by mouth every evening.    SUMATRIPTAN (IMITREX) 100 MG TABLET    Take 1 tablet (100 mg total) by mouth every 2 (two) hours as needed for Migraine. Do not exceed 2 doses in 24 hours.    TERCONAZOLE (TERAZOL 3) 0.8 % VAGINAL CREAM    Place vaginally at bedtime for 3 nights    TRIAMCINOLONE ACETONIDE 0.025% (KENALOG) 0.025 % OINT    Apply topically 2 (two) times daily.    VALACYCLOVIR (VALTREX) 1000 MG TABLET    Take 2 tablets (2,000 mg total) by mouth every 12 (twelve) hours as needed (fever blister).   Changed and/or Refilled Medications    Modified Medication Previous Medication    DILTIAZEM (CARDIZEM) 60 MG TABLET diltiaZEM (CARDIZEM) 60 MG tablet       Take 1 tablet (60 mg total) by mouth every 8 (eight) hours. PER DR. PULIDO USE PRN    Take 60 mg by mouth every 8 (eight) hours. PER DR. PULIDO USE PRN   Discontinued Medications    DILTIAZEM (DILACOR XR) 180 MG CDCR    Take 1 capsule (180 mg total) by mouth once daily.    SEMAGLUTIDE (OZEMPIC) 1 MG/DOSE (4 MG/3 ML)    Inject into the skin every 7 days.             Assessment:       1. Supraventricular tachycardia    2. Mixed hyperlipidemia    3. PAC (premature atrial contraction)    4. SVT (supraventricular tachycardia)         Plan:     Problem List Items Addressed This Visit          Unprioritized    Hyperlipidemia    Current Assessment & Plan     Patient has taken herself off of simvastatin and is due to have labs done.  Will check a lipid panel to evaluate cholesterol control.         Relevant Orders    Lipid Panel    PAC (premature atrial contraction)    Current Assessment & Plan     She has symptomatic PACs which are  intermittent. She also has Episodes of SVT.  Patient has been taking diltiazem  mg p.o. daily and also taking diltiazem 60 mg and metoprolol tartrate p.r.n. which she has not needed frequently.         Relevant Medications    diltiaZEM (CARDIZEM CD) 240 MG 24 hr capsule    diltiaZEM (CARDIZEM) 60 MG tablet    Other Relevant Orders    IN OFFICE EKG 12-LEAD (to [place])    Lipid Panel    CBC Without Differential    Comprehensive Metabolic Panel    TSH    T4, Free    T3, FREE    SVT (supraventricular tachycardia)    Current Assessment & Plan     Will increase Cardizem CD to 240 mg p.o. daily.  She can continue to take the diltiazem 60 mg p.o. t.i.d. p.r.n. for breakthrough SVT.  Continue magnesium oxide 400 mg p.o. daily.  I have encouraged her to follow-up with electrophysiology for further discussion and options.    Have also encouraged her to follow-up with her gyn or primary care regarding having some hormone testing done as she is 48 years old.  She has had a partial hysterectomy in the past and does not have menses.  Hormone fluctuations and menopause could be contributing to some of her symptoms and exacerbating her SVT.         Relevant Medications    diltiaZEM (CARDIZEM CD) 240 MG 24 hr capsule    diltiaZEM (CARDIZEM) 60 MG tablet    Other Relevant Orders    IN OFFICE EKG 12-LEAD (to [facility])    Lipid Panel    CBC Without Differential    Comprehensive Metabolic Panel    TSH    T4, Free    T3, FREE     Other Visit Diagnoses       Supraventricular tachycardia    -  Primary    Relevant Medications    diltiaZEM (CARDIZEM CD) 240 MG 24 hr capsule    diltiaZEM (CARDIZEM) 60 MG tablet    Other Relevant Orders    IN OFFICE EKG 12-LEAD (to [facility])    Lipid Panel    CBC Without Differential    Comprehensive Metabolic Panel    TSH    T4, Free    T3, FREE            Follow up in about 2 months (around 10/29/2024).

## 2024-09-04 ENCOUNTER — LAB VISIT (OUTPATIENT)
Dept: LAB | Facility: HOSPITAL | Age: 49
End: 2024-09-04
Attending: NURSE PRACTITIONER
Payer: COMMERCIAL

## 2024-09-04 DIAGNOSIS — I47.10 SUPRAVENTRICULAR TACHYCARDIA: ICD-10-CM

## 2024-09-04 DIAGNOSIS — E78.2 MIXED HYPERLIPIDEMIA: ICD-10-CM

## 2024-09-04 DIAGNOSIS — I47.10 SVT (SUPRAVENTRICULAR TACHYCARDIA): ICD-10-CM

## 2024-09-04 DIAGNOSIS — I49.1 PAC (PREMATURE ATRIAL CONTRACTION): ICD-10-CM

## 2024-09-04 LAB
ALBUMIN SERPL BCP-MCNC: 4.8 G/DL (ref 3.5–5.2)
ALP SERPL-CCNC: 56 U/L (ref 55–135)
ALT SERPL W/O P-5'-P-CCNC: 11 U/L (ref 10–44)
ANION GAP SERPL CALC-SCNC: 8 MMOL/L (ref 8–16)
AST SERPL-CCNC: 16 U/L (ref 10–40)
BILIRUB SERPL-MCNC: 0.7 MG/DL (ref 0.1–1)
BUN SERPL-MCNC: 19 MG/DL (ref 6–20)
CALCIUM SERPL-MCNC: 9.9 MG/DL (ref 8.7–10.5)
CHLORIDE SERPL-SCNC: 103 MMOL/L (ref 95–110)
CHOLEST SERPL-MCNC: 256 MG/DL (ref 120–199)
CHOLEST/HDLC SERPL: 3.8 {RATIO} (ref 2–5)
CO2 SERPL-SCNC: 27 MMOL/L (ref 23–29)
CREAT SERPL-MCNC: 0.7 MG/DL (ref 0.5–1.4)
ERYTHROCYTE [DISTWIDTH] IN BLOOD BY AUTOMATED COUNT: 13 % (ref 11.5–14.5)
EST. GFR  (NO RACE VARIABLE): >60 ML/MIN/1.73 M^2
GLUCOSE SERPL-MCNC: 89 MG/DL (ref 70–110)
HCT VFR BLD AUTO: 42.9 % (ref 37–48.5)
HDLC SERPL-MCNC: 67 MG/DL (ref 40–75)
HDLC SERPL: 26.2 % (ref 20–50)
HGB BLD-MCNC: 14.1 G/DL (ref 12–16)
LDLC SERPL CALC-MCNC: 163.4 MG/DL (ref 63–159)
MCH RBC QN AUTO: 30.8 PG (ref 27–31)
MCHC RBC AUTO-ENTMCNC: 32.9 G/DL (ref 32–36)
MCV RBC AUTO: 94 FL (ref 82–98)
NONHDLC SERPL-MCNC: 189 MG/DL
PLATELET # BLD AUTO: 246 K/UL (ref 150–450)
PMV BLD AUTO: 9.9 FL (ref 9.2–12.9)
POTASSIUM SERPL-SCNC: 3.9 MMOL/L (ref 3.5–5.1)
PROT SERPL-MCNC: 7.6 G/DL (ref 6–8.4)
RBC # BLD AUTO: 4.58 M/UL (ref 4–5.4)
SODIUM SERPL-SCNC: 138 MMOL/L (ref 136–145)
T4 FREE SERPL-MCNC: 0.81 NG/DL (ref 0.71–1.51)
TRIGL SERPL-MCNC: 128 MG/DL (ref 30–150)
TSH SERPL DL<=0.005 MIU/L-ACNC: 1.9 UIU/ML (ref 0.34–5.6)
WBC # BLD AUTO: 5.36 K/UL (ref 3.9–12.7)

## 2024-09-04 PROCEDURE — 84443 ASSAY THYROID STIM HORMONE: CPT | Performed by: NURSE PRACTITIONER

## 2024-09-04 PROCEDURE — 80053 COMPREHEN METABOLIC PANEL: CPT | Performed by: NURSE PRACTITIONER

## 2024-09-04 PROCEDURE — 84481 FREE ASSAY (FT-3): CPT | Performed by: NURSE PRACTITIONER

## 2024-09-04 PROCEDURE — 84439 ASSAY OF FREE THYROXINE: CPT | Performed by: NURSE PRACTITIONER

## 2024-09-04 PROCEDURE — 85027 COMPLETE CBC AUTOMATED: CPT | Performed by: NURSE PRACTITIONER

## 2024-09-04 PROCEDURE — 80061 LIPID PANEL: CPT | Performed by: NURSE PRACTITIONER

## 2024-09-05 LAB — T3FREE SERPL-MCNC: 2.6 PG/ML (ref 2–4.4)

## 2024-10-02 ENCOUNTER — LAB VISIT (OUTPATIENT)
Dept: LAB | Facility: HOSPITAL | Age: 49
End: 2024-10-02
Attending: INTERNAL MEDICINE
Payer: COMMERCIAL

## 2024-10-02 DIAGNOSIS — R10.84 ABDOMINAL PAIN, GENERALIZED: Primary | ICD-10-CM

## 2024-10-02 LAB
ALBUMIN SERPL BCP-MCNC: 5 G/DL (ref 3.5–5.2)
ALP SERPL-CCNC: 56 U/L (ref 55–135)
ALT SERPL W/O P-5'-P-CCNC: 15 U/L (ref 10–44)
ANION GAP SERPL CALC-SCNC: 9 MMOL/L (ref 8–16)
AST SERPL-CCNC: 20 U/L (ref 10–40)
BASOPHILS # BLD AUTO: 0.03 K/UL (ref 0–0.2)
BASOPHILS NFR BLD: 0.3 % (ref 0–1.9)
BILIRUB SERPL-MCNC: 0.7 MG/DL (ref 0.1–1)
BILIRUB UR QL STRIP: NEGATIVE
BUN SERPL-MCNC: 12 MG/DL (ref 6–20)
CALCIUM SERPL-MCNC: 9.4 MG/DL (ref 8.7–10.5)
CHLORIDE SERPL-SCNC: 103 MMOL/L (ref 95–110)
CLARITY UR: CLEAR
CO2 SERPL-SCNC: 27 MMOL/L (ref 23–29)
COLOR UR: YELLOW
CREAT SERPL-MCNC: 0.7 MG/DL (ref 0.5–1.4)
DIFFERENTIAL METHOD BLD: ABNORMAL
EOSINOPHIL # BLD AUTO: 0 K/UL (ref 0–0.5)
EOSINOPHIL NFR BLD: 0.2 % (ref 0–8)
ERYTHROCYTE [DISTWIDTH] IN BLOOD BY AUTOMATED COUNT: 12.8 % (ref 11.5–14.5)
EST. GFR  (NO RACE VARIABLE): >60 ML/MIN/1.73 M^2
GLUCOSE SERPL-MCNC: 87 MG/DL (ref 70–110)
GLUCOSE UR QL STRIP: NEGATIVE
HCT VFR BLD AUTO: 43 % (ref 37–48.5)
HGB BLD-MCNC: 13.8 G/DL (ref 12–16)
HGB UR QL STRIP: NEGATIVE
IMM GRANULOCYTES # BLD AUTO: 0.03 K/UL (ref 0–0.04)
IMM GRANULOCYTES NFR BLD AUTO: 0.3 % (ref 0–0.5)
KETONES UR QL STRIP: ABNORMAL
LEUKOCYTE ESTERASE UR QL STRIP: NEGATIVE
LIPASE SERPL-CCNC: 30 U/L (ref 4–60)
LYMPHOCYTES # BLD AUTO: 1.7 K/UL (ref 1–4.8)
LYMPHOCYTES NFR BLD: 19.4 % (ref 18–48)
MCH RBC QN AUTO: 30.8 PG (ref 27–31)
MCHC RBC AUTO-ENTMCNC: 32.1 G/DL (ref 32–36)
MCV RBC AUTO: 96 FL (ref 82–98)
MONOCYTES # BLD AUTO: 0.5 K/UL (ref 0.3–1)
MONOCYTES NFR BLD: 6.2 % (ref 4–15)
NEUTROPHILS # BLD AUTO: 6.4 K/UL (ref 1.8–7.7)
NEUTROPHILS NFR BLD: 73.6 % (ref 38–73)
NITRITE UR QL STRIP: NEGATIVE
NRBC BLD-RTO: 0 /100 WBC
PH UR STRIP: 8 [PH] (ref 5–8)
PLATELET # BLD AUTO: 259 K/UL (ref 150–450)
PMV BLD AUTO: 9.8 FL (ref 9.2–12.9)
POTASSIUM SERPL-SCNC: 3.8 MMOL/L (ref 3.5–5.1)
PROT SERPL-MCNC: 7.6 G/DL (ref 6–8.4)
PROT UR QL STRIP: NEGATIVE
RBC # BLD AUTO: 4.48 M/UL (ref 4–5.4)
SODIUM SERPL-SCNC: 139 MMOL/L (ref 136–145)
SP GR UR STRIP: 1.01 (ref 1–1.03)
URN SPEC COLLECT METH UR: ABNORMAL
UROBILINOGEN UR STRIP-ACNC: NEGATIVE EU/DL
WBC # BLD AUTO: 8.66 K/UL (ref 3.9–12.7)

## 2024-10-02 PROCEDURE — 80053 COMPREHEN METABOLIC PANEL: CPT | Performed by: INTERNAL MEDICINE

## 2024-10-02 PROCEDURE — 36415 COLL VENOUS BLD VENIPUNCTURE: CPT | Performed by: INTERNAL MEDICINE

## 2024-10-02 PROCEDURE — 81003 URINALYSIS AUTO W/O SCOPE: CPT | Performed by: INTERNAL MEDICINE

## 2024-10-02 PROCEDURE — 83690 ASSAY OF LIPASE: CPT | Performed by: INTERNAL MEDICINE

## 2024-10-02 PROCEDURE — 85025 COMPLETE CBC W/AUTO DIFF WBC: CPT | Performed by: INTERNAL MEDICINE

## 2024-10-09 NOTE — PROGRESS NOTES
Subjective:       Patient ID: Rosaura Loredo is a 41 y.o. female.    Chief Complaint: Medication Refill and Depression    Multiple meds need refill, stable on these. See Renan. Recurrent depression with hx of SSRI use - causes yawning. Nonsuicidal. Recent bllodwork done at [facility]      Medication Refill   Pertinent negatives include no abdominal pain, arthralgias, chest pain, chills, congestion, coughing, diaphoresis, fatigue, fever, headaches, myalgias, nausea, rash, sore throat, vomiting or weakness.     Review of Systems   Constitutional: Negative for appetite change, chills, diaphoresis, fatigue and fever.   HENT: Negative for congestion, ear pain, hearing loss, sore throat and trouble swallowing.    Eyes: Negative for photophobia, pain and visual disturbance.   Respiratory: Negative for cough, chest tightness and shortness of breath.    Cardiovascular: Negative for chest pain, palpitations and leg swelling.   Gastrointestinal: Negative for abdominal pain, blood in stool, constipation, diarrhea, nausea and vomiting.   Endocrine: Negative for cold intolerance and heat intolerance.   Genitourinary: Negative for difficulty urinating, flank pain, pelvic pain and vaginal pain.   Musculoskeletal: Negative for arthralgias and myalgias.   Skin: Negative for rash.   Allergic/Immunologic: Negative for immunocompromised state.   Neurological: Negative for dizziness, weakness, light-headedness and headaches.   Hematological: Negative for adenopathy. Does not bruise/bleed easily.   Psychiatric/Behavioral: Positive for dysphoric mood. Negative for agitation, confusion, decreased concentration, self-injury and suicidal ideas. The patient is not nervous/anxious.        Past Medical History:   Diagnosis Date    Allergy     Hyperlipidemia     PAC (premature atrial contraction)     Rosacea       Past Surgical History:   Procedure Laterality Date     SECTION      HYSTERECTOMY      SPINE SURGERY      cervical     "TONSILLECTOMY         Family History   Problem Relation Age of Onset    Diabetes Mother     Asthma Mother     Cancer Father     Hypertension Father        Social History     Social History    Marital status:      Spouse name: N/A    Number of children: N/A    Years of education: N/A     Social History Main Topics    Smoking status: Never Smoker    Smokeless tobacco: Never Used    Alcohol use Yes    Drug use: No    Sexual activity: Yes     Other Topics Concern    None     Social History Narrative    None       Current Outpatient Prescriptions   Medication Sig Dispense Refill    fluticasone (FLONASE) 50 mcg/actuation nasal spray 1 spray by Each Nare route once daily.      loratadine (CLARITIN) 10 mg tablet Take 1 tablet (10 mg total) by mouth once daily. 90 tablet 1    metoprolol succinate (TOPROL-XL) 25 MG 24 hr tablet Take 1 tablet (25 mg total) by mouth once daily. 90 tablet 1    montelukast (SINGULAIR) 10 mg tablet Take 1 tablet (10 mg total) by mouth every evening. 90 tablet 1    simvastatin (ZOCOR) 20 MG tablet Take 1 tablet (20 mg total) by mouth every evening. 90 tablet 1    buPROPion (WELLBUTRIN XL) 150 MG TB24 tablet Take 1 tablet (150 mg total) by mouth once daily. 30 tablet 11     No current facility-administered medications for this visit.        Review of patient's allergies indicates:   Allergen Reactions    Mepergan fortis Shortness Of Breath    Penicillins Rash    Sulfa (sulfonamide antibiotics) Rash     Objective:      Blood pressure 100/70, pulse 70, height 5' 6.5" (1.689 m), weight 72.6 kg (160 lb), SpO2 98 %. Body mass index is 25.44 kg/m².   Physical Exam   Constitutional: She is oriented to person, place, and time. She appears well-developed and well-nourished. She is cooperative. No distress.   HENT:   Head: Normocephalic and atraumatic.   Right Ear: Tympanic membrane normal.   Left Ear: Tympanic membrane normal.   Eyes: Conjunctivae, EOM and lids are normal. " Pupils are equal, round, and reactive to light. Lids are everted and swept, no foreign bodies found. Right pupil is round and reactive. Left pupil is round and reactive.   Neck: Trachea normal and normal range of motion. Neck supple.   Cardiovascular: Normal rate, regular rhythm, S1 normal, S2 normal, normal heart sounds and intact distal pulses.    Pulmonary/Chest: Breath sounds normal.   Abdominal: There is no rigidity and no guarding.   Musculoskeletal: Normal range of motion.   Lymphadenopathy:     She has no cervical adenopathy.     She has no axillary adenopathy.   Neurological: She is alert and oriented to person, place, and time.   Skin: Skin is warm and dry. Capillary refill takes less than 2 seconds.   Psychiatric: She has a normal mood and affect. Her behavior is normal. Judgment and thought content normal.   Nursing note and vitals reviewed.          Assessment:       1. Familial hypercholesterolemia    2. PAC (premature atrial contraction)    3. Chronic seasonal allergic rhinitis due to pollen    4. Moderate major depression        Plan:       Rosaura was seen today for medication refill and depression.    Diagnoses and all orders for this visit:    Familial hypercholesterolemia  -     simvastatin (ZOCOR) 20 MG tablet; Take 1 tablet (20 mg total) by mouth every evening.    PAC (premature atrial contraction) - Perninkel  -     metoprolol succinate (TOPROL-XL) 25 MG 24 hr tablet; Take 1 tablet (25 mg total) by mouth once daily.    Chronic seasonal allergic rhinitis due to pollen  -     loratadine (CLARITIN) 10 mg tablet; Take 1 tablet (10 mg total) by mouth once daily.  -     montelukast (SINGULAIR) 10 mg tablet; Take 1 tablet (10 mg total) by mouth every evening.    Moderate major depression  -     buPROPion (WELLBUTRIN XL) 150 MG TB24 tablet; Take 1 tablet (150 mg total) by mouth once daily.          Never smoker

## 2024-11-03 NOTE — PROGRESS NOTES
Subjective:    Patient ID:  Rosaura Loredo is a 48 y.o. female who presents for a follow up visit.   Chief Complaint   Patient presents with    pacs       HPI:  Rosaura Loredo is here for follow-up visit. Denies recent fever cough chills or congestion. Denies blood in the urine or blood in the stool.  Denies myalgias. Denies orthopnea or peripheral edema. Denies nausea vomiting or dyspepsia. No recent arm neck or jaw pain. No lightheadedness or dizziness.   She is however still having episodes of shortness of breath even with minimal exertion. She states that she had an episode recently where she had to pull her car over at the gas station because she was feeling a severe squeezing sensation in her chest. She does use an milagro on her phone to try and monitor her arrhythmias when she feels them. She had a Holter monitor a year ago which showed very rare polymorphic PVCs and very rare monomorphic PACs.         Review of patient's allergies indicates:   Allergen Reactions    Mepergan fortis Shortness Of Breath    Penicillins Rash    Sulfa (sulfonamide antibiotics) Rash    Sulfamethoxazole-trimethoprim Rash       Past Medical History:   Diagnosis Date    Abnormal Pap smear of cervix     NEELAM d/t recurrent abnormals/mild dysplasia; negative pathology after hysterctomy    Allergy     Anxiety     Asthma     Basal cell carcinoma (BCC)     R forehead    Hyperlipidemia     PAC (premature atrial contraction)     Rosacea      Past Surgical History:   Procedure Laterality Date     SECTION      COLD KNIFE CONIZATION OF CERVIX      multiple    COLONOSCOPY N/A 10/20/2020    Procedure: COLONOSCOPY;  Surgeon: Sudheer Santiago III, MD;  Location: [facility];  Service: Endoscopy;  Laterality: N/A;    COLPOSCOPY  2023    DENTAL SURGERY  03/15/2022    HYSTERECTOMY  2015    ovaries intact    NEUROPLASTY Left 2020    Procedure: NEUROPLASTY;  Surgeon: Quinton Alexandre DPM;  Location: [facility] OR;   Service: Podiatry;  Laterality: Left;    PLANTAR FASCIOTOMY Left 12/11/2020    Procedure: FASCIOTOMY, PLANTAR;  Surgeon: Quinton Alexandre DPM;  Location: [facility];  Service: Podiatry;  Laterality: Left;    TONSILLECTOMY       Social History     Tobacco Use    Smoking status: Never    Smokeless tobacco: Never   Substance Use Topics    Alcohol use: Yes     Comment: occ    Drug use: No     Family History   Problem Relation Age of Onset    Diabetes Mother     Asthma Mother     Hypertension Father     Prostate cancer Father     Melanoma Neg Hx         Review of Systems:   Constitution: Negative for diaphoresis and fever.   HEENT: Negative for nosebleeds.    Cardiovascular: Negative for chest pain       No dyspnea on exertion       No leg swelling        No palpitations  Respiratory: Negative for shortness of breath and wheezing.    Hematologic/Lymphatic: Negative for bleeding problem. Does not bruise/bleed easily.   Skin: Negative for color change and rash.   Musculoskeletal: Negative for falls and myalgias.   Gastrointestinal: Negative for hematemesis and hematochezia.   Genitourinary: Negative for hematuria.   Neurological: Negative for dizziness and light-headedness.   Psychiatric/Behavioral: Negative for altered mental status and memory loss.          Objective:        Vitals:    01/18/24 1544   BP: 136/80   Pulse: (P) 71   Resp: 16       Lab Results   Component Value Date    WBC 7.24 11/07/2022    HGB 13.2 11/07/2022    HCT 40.8 11/07/2022     11/07/2022    CHOL 156 10/25/2023    TRIG 90 10/25/2023    HDL 57 10/25/2023    ALT 11 10/25/2023    AST 15 10/25/2023     12/22/2023    K 3.6 12/22/2023     12/22/2023    CREATININE 0.9 12/22/2023    BUN 18 12/22/2023    CO2 30 (H) 12/22/2023    TSH 1.750 03/10/2023        ECHOCARDIOGRAM RESULTS  Results for orders placed during the hospital encounter of 03/24/23    Echo    Interpretation Summary  · The left ventricle is normal in size with normal  systolic function.  · The estimated ejection fraction is 65%.  · Normal left ventricular diastolic function.  · Normal right ventricular size with normal right ventricular systolic function.        CURRENT/PREVIOUS VISIT EKG  Results for orders placed or performed in visit on 02/22/23   IN OFFICE EKG 12-LEAD (to [place])    Collection Time: 02/22/23  4:09 PM    Narrative    Test Reason : I47.1,I47.1,I49.8,    Vent. Rate : 083 BPM     Atrial Rate : 083 BPM     P-R Int : 156 ms          QRS Dur : 082 ms      QT Int : 396 ms       P-R-T Axes : 081 070 030 degrees     QTc Int : 465 ms    Normal sinus rhythm  Normal ECG  When compared with ECG of 02-DEC-2020 11:36,  No significant change was found  Confirmed by Steven Fisher MD ([id number]) on 2/28/2023 9:20:29 AM    Referred By: ARIK   SELF           Confirmed By:Steven Fisher MD     No valid procedures specified.   No results found for this or any previous visit.      Physical Exam:  CONSTITUTIONAL: No fever, no chills  HEENT: Normocephalic, atraumatic,pupils reactive to light                 NECK:  No JVD no carotid bruit  CVS: S1S2+, RRR, no murmurs,   LUNGS: Clear  ABDOMEN: Soft, NT, BS+  EXTREMITIES: No cyanosis, edema  : No castillo catheter  NEURO: AAO X 3  PSY: Normal affect      Medication List with Changes/Refills   Current Medications    ALBUTEROL (PROVENTIL/VENTOLIN HFA) 90 MCG/ACTUATION INHALER    Inhale 2 puffs into the lungs every 6 (six) hours as needed for Wheezing.    ALPRAZOLAM (XANAX) 0.5 MG TABLET    Take 1 tablet (0.5 mg total) by mouth 3 (three) times daily as needed for Anxiety.    HYDROCORTISONE 2.5 % CREAM    Apply topically 2 (two) times daily.    IBUPROFEN (ADVIL,MOTRIN) 200 MG TABLET    Take 400 mg by mouth every 6 (six) hours as needed for Pain.    LORATADINE (CLARITIN) 10 MG TABLET    Take 1 tablet (10 mg total) by mouth once daily.    MAGNESIUM OXIDE (MAG-OX) 400 MG (241.3 MG MAGNESIUM) TABLET    Take 1 tablet (400 mg total) by mouth every  evening.    METOPROLOL SUCCINATE (TOPROL-XL) 25 MG 24 HR TABLET    Take 1 tablet (25 mg total) by mouth 2 (two) times daily.    MINOCYCLINE (ZILXI) 1.5 % FOAM    Apply 1 Application topically every evening.    MONTELUKAST (SINGULAIR) 10 MG TABLET    Take 1 tablet (10 mg total) by mouth every evening.    MULTIVITAMIN (THERAGRAN) PER TABLET    Take 1 tablet by mouth once daily.    SIMVASTATIN (ZOCOR) 20 MG TABLET    Take 1 tablet (20 mg total) by mouth every evening.    SUMATRIPTAN (IMITREX) 100 MG TABLET    Take 1 tablet (100 mg total) by mouth every 2 (two) hours as needed for Migraine. Do not exceed 2 doses in 24 hours.    TRIAMCINOLONE ACETONIDE 0.025% (KENALOG) 0.025 % OINT    Apply topically 2 (two) times daily.    VALACYCLOVIR (VALTREX) 1000 MG TABLET    Take 2 tablets (2,000 mg total) by mouth every 12 (twelve) hours as needed (fever blister).   Changed and/or Refilled Medications    Modified Medication Previous Medication    POTASSIUM CHLORIDE (K-TAB) 20 MEQ potassium chloride (KLOR-CON) 8 MEQ TbSR       Take 1 tablet (20 mEq total) by mouth once daily.    Take 1 tablet (8 mEq total) by mouth once daily.             Assessment:       1. PAC (premature atrial contraction)    2. CATALAN (dyspnea on exertion)    3. Chest pain, unspecified type    4. Pure hypercholesterolemia         Plan:     Problem List Items Addressed This Visit          Cardiac/Vascular    Hyperlipidemia    Current Assessment & Plan     Last lipid panel at goal:     Latest Reference Range & Units Most Recent   Cholesterol Total 120 - 199 mg/dL 156  10/25/23 06:30   HDL 40 - 75 mg/dL 57  10/25/23 06:30   HDL/Cholesterol Ratio 20.0 - 50.0 % 36.5  10/25/23 06:30   Non HDL Chol. (LDL+VLDL) <130 mg/dL (calc) 105  11/4/19 10:15   Non-HDL Cholesterol mg/dL 99  10/25/23 06:30   Total Cholesterol/HDL Ratio 2.0 - 5.0  2.7  10/25/23 06:30   Triglycerides 30 - 150 mg/dL 90  10/25/23 06:30   LDL Cholesterol 63.0 - 159.0 mg/dL 81.0  10/25/23 06:30   Continue  on Zocar 20 mg nightly.          PAC (premature atrial contraction) - Primary    Current Assessment & Plan     Pt is still experiencing some dyspnea on exertion as well as had an episode of squeezing chest pressure.  Will continue on metoprolol succinate 25 mg BID as well as Mag oxide 400 mg daily.   Increased Potassium supplement to 20 meq daily.  Will repeat BMP in 2 weeks after making this change.  Ordered stress test to further evaluate.  Advised pt to hold metoprolol the night before the stress test as well as that morning of the test.   Could consider diltiazem in the future if the increased dose of potassium dose not help relieve symptoms.   Obtained EKG in office which showed NSR.            Relevant Orders    IN OFFICE EKG 12-LEAD (to [place])    Ambulatory referral/consult to Electrophysiology    Exercise Stress - EKG    Basic metabolic panel    CATALAN (dyspnea on exertion)    Current Assessment & Plan     Plan for stress test.  If results are abnormal could consider a nuclear stress.          Relevant Orders    Exercise Stress - EKG    Basic metabolic panel     Other Visit Diagnoses       Chest pain, unspecified type        Relevant Orders    Exercise Stress - EKG            Follow up in about 8 weeks (around 3/14/2024).     Yes

## 2024-11-04 ENCOUNTER — OFFICE VISIT (OUTPATIENT)
Dept: FAMILY MEDICINE | Facility: CLINIC | Age: 49
End: 2024-11-04
Payer: COMMERCIAL

## 2024-11-04 VITALS
RESPIRATION RATE: 16 BRPM | WEIGHT: 142.19 LBS | DIASTOLIC BLOOD PRESSURE: 70 MMHG | HEIGHT: 65 IN | SYSTOLIC BLOOD PRESSURE: 112 MMHG | HEART RATE: 86 BPM | OXYGEN SATURATION: 98 % | BODY MASS INDEX: 23.69 KG/M2

## 2024-11-04 DIAGNOSIS — J40 BRONCHITIS: Primary | ICD-10-CM

## 2024-11-04 DIAGNOSIS — J30.1 CHRONIC SEASONAL ALLERGIC RHINITIS DUE TO POLLEN: ICD-10-CM

## 2024-11-04 DIAGNOSIS — G43.009 MIGRAINE WITHOUT AURA AND WITHOUT STATUS MIGRAINOSUS, NOT INTRACTABLE: ICD-10-CM

## 2024-11-04 DIAGNOSIS — E78.00 PURE HYPERCHOLESTEROLEMIA: ICD-10-CM

## 2024-11-04 PROCEDURE — 3008F BODY MASS INDEX DOCD: CPT | Mod: CPTII,S$GLB,,

## 2024-11-04 PROCEDURE — 99999 PR PBB SHADOW E&M-EST. PATIENT-LVL V: CPT | Mod: PBBFAC,,,

## 2024-11-04 PROCEDURE — 1160F RVW MEDS BY RX/DR IN RCRD: CPT | Mod: CPTII,S$GLB,,

## 2024-11-04 PROCEDURE — 3078F DIAST BP <80 MM HG: CPT | Mod: CPTII,S$GLB,,

## 2024-11-04 PROCEDURE — 99214 OFFICE O/P EST MOD 30 MIN: CPT | Mod: S$GLB,,,

## 2024-11-04 PROCEDURE — 1159F MED LIST DOCD IN RCRD: CPT | Mod: CPTII,S$GLB,,

## 2024-11-04 PROCEDURE — 3074F SYST BP LT 130 MM HG: CPT | Mod: CPTII,S$GLB,,

## 2024-11-04 RX ORDER — LANOLIN ALCOHOL/MO/W.PET/CERES
400 CREAM (GRAM) TOPICAL NIGHTLY
Qty: 90 TABLET | Refills: 3 | Status: SHIPPED | OUTPATIENT
Start: 2024-11-04

## 2024-11-04 RX ORDER — SIMVASTATIN 20 MG/1
20 TABLET, FILM COATED ORAL NIGHTLY
Qty: 90 TABLET | Refills: 1 | Status: SHIPPED | OUTPATIENT
Start: 2024-11-04

## 2024-11-04 RX ORDER — SUMATRIPTAN SUCCINATE 100 MG/1
100 TABLET ORAL DAILY PRN
Qty: 27 TABLET | Refills: 3 | Status: SHIPPED | OUTPATIENT
Start: 2024-11-04

## 2024-11-04 RX ORDER — POTASSIUM CHLORIDE 1500 MG/1
20 TABLET, EXTENDED RELEASE ORAL DAILY
Qty: 90 TABLET | Refills: 3 | Status: SHIPPED | OUTPATIENT
Start: 2024-11-04 | End: 2025-11-04

## 2024-11-04 RX ORDER — PROMETHAZINE HYDROCHLORIDE AND DEXTROMETHORPHAN HYDROBROMIDE 6.25; 15 MG/5ML; MG/5ML
5 SYRUP ORAL EVERY 4 HOURS PRN
Qty: 118 ML | Refills: 0 | Status: SHIPPED | OUTPATIENT
Start: 2024-11-04 | End: 2024-11-14

## 2024-11-04 RX ORDER — AZITHROMYCIN 250 MG/1
TABLET, FILM COATED ORAL
Qty: 6 TABLET | Refills: 0 | Status: SHIPPED | OUTPATIENT
Start: 2024-11-04

## 2024-11-04 RX ORDER — MONTELUKAST SODIUM 10 MG/1
10 TABLET ORAL NIGHTLY
Qty: 90 TABLET | Refills: 1 | Status: SHIPPED | OUTPATIENT
Start: 2024-11-04

## 2024-11-04 RX ORDER — ALBUTEROL SULFATE 90 UG/1
2 INHALANT RESPIRATORY (INHALATION) EVERY 6 HOURS PRN
Qty: 18 G | Refills: 3 | Status: SHIPPED | OUTPATIENT
Start: 2024-11-04

## 2024-11-04 NOTE — PROGRESS NOTES
Subjective:       Patient ID: Rosaura Loredo is a 48 y.o. female.    Chief Complaint: Cough    Patient presents to the clinic with complaint of cough, chest congestion, fatigue, and body aches.     Symptoms started over 1  weeks ago. She did complete a Medrol dose pack last week.     She also requests refills on maintenance medications. Explained to patient that she will have to follow up with Psychiatry or her new PCP for Xanax refills.     Patient educated on plan of care, verbalized understanding.      Cough  This is a new problem. The current episode started in the past 7 days. The problem has been gradually improving. The problem occurs every few minutes. The cough is Productive of purulent sputum. Associated symptoms include a fever, headaches, postnasal drip and wheezing. Pertinent negatives include no chest pain, chills, ear pain, eye redness, rash, sore throat or shortness of breath. The symptoms are aggravated by lying down. She has tried OTC cough suppressant for the symptoms. The treatment provided no relief.     Review of Systems   Constitutional:  Positive for fever. Negative for activity change, appetite change, chills and diaphoresis.   HENT:  Positive for postnasal drip. Negative for congestion, ear pain, sinus pressure, sneezing and sore throat.    Eyes:  Negative for pain, discharge, redness and itching.   Respiratory:  Positive for cough and wheezing. Negative for apnea, chest tightness and shortness of breath.    Cardiovascular:  Negative for chest pain and leg swelling.   Gastrointestinal:  Negative for abdominal distention, abdominal pain, constipation, diarrhea, nausea and vomiting.   Genitourinary:  Negative for difficulty urinating, dysuria, flank pain and frequency.   Skin:  Negative for color change, rash and wound.   Neurological:  Positive for headaches. Negative for dizziness.   All other systems reviewed and are negative.      Patient Active Problem List   Diagnosis     Hyperlipidemia    PAC (premature atrial contraction)    Chronic seasonal allergic rhinitis due to pollen    Exercise-induced asthma    Migraine without aura and without status migrainosus, not intractable    Difficulty concentrating    Abdominal pain    Abnormal Pap smear of cervix    Plantar fasciitis of left foot    CATALAN (dyspnea on exertion)    Mild episode of recurrent major depressive disorder    History of ADHD    Generalized anxiety disorder with panic attacks    Insomnia    BMI 25.0-25.9,adult    Palpitations    Mild intermittent asthma without complication    Seasonal allergies    Rosacea    BMI 24.0-24.9, adult    SVT (supraventricular tachycardia)       Objective:      Physical Exam  Vitals and nursing note reviewed.   Constitutional:       General: She is not in acute distress.     Appearance: Normal appearance. She is well-developed.   HENT:      Head: Normocephalic.      Nose: Nose normal.   Eyes:      Conjunctiva/sclera: Conjunctivae normal.      Pupils: Pupils are equal, round, and reactive to light.   Cardiovascular:      Rate and Rhythm: Normal rate and regular rhythm.      Heart sounds: Normal heart sounds.   Pulmonary:      Effort: Pulmonary effort is normal. No respiratory distress.      Breath sounds: Normal breath sounds.   Musculoskeletal:      Cervical back: Normal range of motion and neck supple.   Skin:     General: Skin is warm and dry.      Findings: No rash.   Neurological:      Mental Status: She is alert and oriented to person, place, and time.   Psychiatric:         Behavior: Behavior normal.         Lab Results   Component Value Date    WBC 8.66 10/02/2024    HGB 13.8 10/02/2024    HCT 43.0 10/02/2024     10/02/2024    CHOL 256 (H) 09/04/2024    TRIG 128 09/04/2024    HDL 67 09/04/2024    ALT 15 10/02/2024    AST 20 10/02/2024     10/02/2024    K 3.8 10/02/2024     10/02/2024    CREATININE 0.7 10/02/2024    BUN 12 10/02/2024    CO2 27 10/02/2024    TSH 1.901  "09/04/2024     The 10-year ASCVD risk score (Octavio VILLEDA, et al., 2019) is: 1%    Values used to calculate the score:      Age: 48 years      Sex: Female      Is Non- : No      Diabetic: No      Tobacco smoker: No      Systolic Blood Pressure: 112 mmHg      Is BP treated: No      HDL Cholesterol: 67 mg/dL      Total Cholesterol: 256 mg/dL  Visit Vitals  /70 (BP Location: Right arm, Patient Position: Sitting)   Pulse 86   Resp 16   Ht 5' 5" (1.651 m)   Wt 64.5 kg (142 lb 3.2 oz)   SpO2 98%   BMI 23.66 kg/m²      Assessment:       1. Bronchitis    2. Pure hypercholesterolemia    3. Chronic seasonal allergic rhinitis due to pollen    4. Migraine without aura and without status migrainosus, not intractable        Plan:       1. Bronchitis  -     albuterol (PROVENTIL/VENTOLIN HFA) 90 mcg/actuation inhaler; Inhale 2 puffs into the lungs every 6 (six) hours as needed for Wheezing.  Dispense: 18 g; Refill: 3  -     azithromycin (Z-ORION) 250 MG tablet; Take 2 tablets by mouth on day 1; Take 1 tablet by mouth on days 2-5  Dispense: 6 tablet; Refill: 0  -     promethazine-dextromethorphan (PROMETHAZINE-DM) 6.25-15 mg/5 mL Syrp; Take 5 mLs by mouth every 4 (four) hours as needed (cough).  Dispense: 118 mL; Refill: 0   - The diagnosis, treatment plan, instructions for follow-up and reevaluation as well as ED precautions were discussed and understanding was verbalized. All questions or concerns have been addressed.     2. Pure hypercholesterolemia  -     simvastatin (ZOCOR) 20 MG tablet; Take 1 tablet (20 mg total) by mouth every evening.  Dispense: 90 tablet; Refill: 1   - Stable-Continue Zocor   - Continue current plan of care    3. Chronic seasonal allergic rhinitis due to pollen  -     montelukast (SINGULAIR) 10 mg tablet; Take 1 tablet (10 mg total) by mouth every evening.  Dispense: 90 tablet; Refill: 1   - Stable-Continue [place]air   - Continue current plan of care    4. Migraine without aura and " without status migrainosus, not intractable  -     sumatriptan (IMITREX) 100 MG tablet; Take 1 tablet (100 mg total) by mouth daily as needed for Migraine. Do not exceed 2 doses in 24 hours.  Dispense: 27 tablet; Refill: 3   - Stable-Continue Imitrex PRN   - Continue current plan of care    Other orders  -     potassium chloride (K-TAB) 20 mEq; Take 1 tablet (20 mEq total) by mouth once daily.  Dispense: 90 tablet; Refill: 3  -     magnesium oxide (MAG-OX) 400 mg (241.3 mg magnesium) tablet; Take 1 tablet (400 mg total) by mouth every evening.  Dispense: 90 tablet; Refill: 3       Follow up if symptoms worsen or fail to improve.      Future Appointments       Date Provider Specialty Appt Notes    5/7/2025 Miguel Ángel Ferrari MD Family Medicine [facility]

## 2024-11-04 NOTE — PATIENT INSTRUCTIONS
Thank you for allowing me to be part of your healthcare team at [facility]. It is a pleasure to care for you today.   Please take all of your medications as instructed and follow all new instructions from your visit today.  If you received labs or medical tests today you should hear information about results or scheduling either by phone or mychart within approximately a week.   If you have any questions or concerns please do not hesitate to call. Have a blessed day and I hope to see you again soon.  JAZMINE Wright      WE STRIVE FOR 5'S!!!        We strive for exceptional care. Please fill out a survey if you received 5 star service.

## 2024-12-04 ENCOUNTER — PATIENT MESSAGE (OUTPATIENT)
Dept: PSYCHIATRY | Facility: CLINIC | Age: 49
End: 2024-12-04
Payer: COMMERCIAL

## 2025-01-02 ENCOUNTER — TELEPHONE (OUTPATIENT)
Dept: CARDIOLOGY | Facility: CLINIC | Age: 50
End: 2025-01-02
Payer: COMMERCIAL

## 2025-01-02 ENCOUNTER — OFFICE VISIT (OUTPATIENT)
Dept: CARDIOLOGY | Facility: CLINIC | Age: 50
End: 2025-01-02
Payer: COMMERCIAL

## 2025-01-02 ENCOUNTER — LAB VISIT (OUTPATIENT)
Dept: LAB | Facility: HOSPITAL | Age: 50
End: 2025-01-02
Attending: NURSE PRACTITIONER
Payer: COMMERCIAL

## 2025-01-02 VITALS
SYSTOLIC BLOOD PRESSURE: 124 MMHG | HEIGHT: 65 IN | HEART RATE: 78 BPM | DIASTOLIC BLOOD PRESSURE: 74 MMHG | WEIGHT: 153.81 LBS | BODY MASS INDEX: 25.63 KG/M2 | OXYGEN SATURATION: 99 %

## 2025-01-02 DIAGNOSIS — I47.10 SVT (SUPRAVENTRICULAR TACHYCARDIA): Primary | ICD-10-CM

## 2025-01-02 DIAGNOSIS — I47.10 SVT (SUPRAVENTRICULAR TACHYCARDIA): ICD-10-CM

## 2025-01-02 DIAGNOSIS — R07.9 CHEST PAIN, UNSPECIFIED TYPE: ICD-10-CM

## 2025-01-02 DIAGNOSIS — E78.2 MIXED HYPERLIPIDEMIA: ICD-10-CM

## 2025-01-02 DIAGNOSIS — I49.1 PAC (PREMATURE ATRIAL CONTRACTION): ICD-10-CM

## 2025-01-02 LAB
ALBUMIN SERPL BCP-MCNC: 4.6 G/DL (ref 3.5–5.2)
ALP SERPL-CCNC: 58 U/L (ref 55–135)
ALT SERPL W/O P-5'-P-CCNC: 19 U/L (ref 10–44)
ANION GAP SERPL CALC-SCNC: 6 MMOL/L (ref 8–16)
AST SERPL-CCNC: 16 U/L (ref 10–40)
BASOPHILS # BLD AUTO: 0.04 K/UL (ref 0–0.2)
BASOPHILS NFR BLD: 0.6 % (ref 0–1.9)
BILIRUB SERPL-MCNC: 0.4 MG/DL (ref 0.1–1)
BUN SERPL-MCNC: 20 MG/DL (ref 6–20)
CALCIUM SERPL-MCNC: 9.3 MG/DL (ref 8.7–10.5)
CHLORIDE SERPL-SCNC: 105 MMOL/L (ref 95–110)
CO2 SERPL-SCNC: 28 MMOL/L (ref 23–29)
CREAT SERPL-MCNC: 1.2 MG/DL (ref 0.5–1.4)
DIFFERENTIAL METHOD BLD: ABNORMAL
EOSINOPHIL # BLD AUTO: 0.2 K/UL (ref 0–0.5)
EOSINOPHIL NFR BLD: 2.5 % (ref 0–8)
ERYTHROCYTE [DISTWIDTH] IN BLOOD BY AUTOMATED COUNT: 13.8 % (ref 11.5–14.5)
EST. GFR  (NO RACE VARIABLE): 54.8 ML/MIN/1.73 M^2
GLUCOSE SERPL-MCNC: 100 MG/DL (ref 70–110)
HCT VFR BLD AUTO: 39.2 % (ref 37–48.5)
HGB BLD-MCNC: 12.8 G/DL (ref 12–16)
IMM GRANULOCYTES # BLD AUTO: 0.02 K/UL (ref 0–0.04)
IMM GRANULOCYTES NFR BLD AUTO: 0.3 % (ref 0–0.5)
LYMPHOCYTES # BLD AUTO: 1.8 K/UL (ref 1–4.8)
LYMPHOCYTES NFR BLD: 24.5 % (ref 18–48)
MAGNESIUM SERPL-MCNC: 1.8 MG/DL (ref 1.6–2.6)
MCH RBC QN AUTO: 31.3 PG (ref 27–31)
MCHC RBC AUTO-ENTMCNC: 32.7 G/DL (ref 32–36)
MCV RBC AUTO: 96 FL (ref 82–98)
MONOCYTES # BLD AUTO: 0.5 K/UL (ref 0.3–1)
MONOCYTES NFR BLD: 6.9 % (ref 4–15)
NEUTROPHILS # BLD AUTO: 4.7 K/UL (ref 1.8–7.7)
NEUTROPHILS NFR BLD: 65.2 % (ref 38–73)
NRBC BLD-RTO: 0 /100 WBC
PLATELET # BLD AUTO: 238 K/UL (ref 150–450)
PMV BLD AUTO: 9.4 FL (ref 9.2–12.9)
POTASSIUM SERPL-SCNC: 3.7 MMOL/L (ref 3.5–5.1)
PROT SERPL-MCNC: 7 G/DL (ref 6–8.4)
RBC # BLD AUTO: 4.09 M/UL (ref 4–5.4)
SODIUM SERPL-SCNC: 139 MMOL/L (ref 136–145)
TROPONIN I SERPL HS-MCNC: 2.4 PG/ML (ref 0–14.9)
TSH SERPL DL<=0.005 MIU/L-ACNC: 1.35 UIU/ML (ref 0.34–5.6)
WBC # BLD AUTO: 7.22 K/UL (ref 3.9–12.7)

## 2025-01-02 PROCEDURE — 3008F BODY MASS INDEX DOCD: CPT | Mod: CPTII,S$GLB,, | Performed by: NURSE PRACTITIONER

## 2025-01-02 PROCEDURE — 36415 COLL VENOUS BLD VENIPUNCTURE: CPT | Performed by: NURSE PRACTITIONER

## 2025-01-02 PROCEDURE — 3078F DIAST BP <80 MM HG: CPT | Mod: CPTII,S$GLB,, | Performed by: NURSE PRACTITIONER

## 2025-01-02 PROCEDURE — 85025 COMPLETE CBC W/AUTO DIFF WBC: CPT | Performed by: NURSE PRACTITIONER

## 2025-01-02 PROCEDURE — 84484 ASSAY OF TROPONIN QUANT: CPT | Performed by: NURSE PRACTITIONER

## 2025-01-02 PROCEDURE — 1159F MED LIST DOCD IN RCRD: CPT | Mod: CPTII,S$GLB,, | Performed by: NURSE PRACTITIONER

## 2025-01-02 PROCEDURE — 99999 PR PBB SHADOW E&M-EST. PATIENT-LVL IV: CPT | Mod: PBBFAC,,, | Performed by: NURSE PRACTITIONER

## 2025-01-02 PROCEDURE — 3074F SYST BP LT 130 MM HG: CPT | Mod: CPTII,S$GLB,, | Performed by: NURSE PRACTITIONER

## 2025-01-02 PROCEDURE — 99214 OFFICE O/P EST MOD 30 MIN: CPT | Mod: S$GLB,,, | Performed by: NURSE PRACTITIONER

## 2025-01-02 PROCEDURE — 84443 ASSAY THYROID STIM HORMONE: CPT | Performed by: NURSE PRACTITIONER

## 2025-01-02 PROCEDURE — 80053 COMPREHEN METABOLIC PANEL: CPT | Performed by: NURSE PRACTITIONER

## 2025-01-02 PROCEDURE — 83735 ASSAY OF MAGNESIUM: CPT | Performed by: NURSE PRACTITIONER

## 2025-01-02 NOTE — PROGRESS NOTES
Subjective:    Patient ID:  Rosaura Loredo is a 49 y.o. female who presents for evaluation of chest discomfort.      HPI:      Rosaura Loredo is here for follow-up visit. She had an episode of chest pressure yesterday in the shower after she cleaned it. She has noticed an increase in Palpitations and SVT over the past 3 weeks also. Denies recent fever cough chills or congestion. Denies blood in the urine or blood in the stool. Denies myalgias. Denies orthopnea or peripheral edema. Denies nausea vomiting or dyspepsia. No recent arm neck or jaw pain. No lightheadedness or dizziness.       Review of patient's allergies indicates:   Allergen Reactions    Mepergan fortis Shortness Of Breath    Influenza virus vaccines Palpitations     SVT episodes increased significantly after receiving the last vaccine.    Penicillins Rash    Sulfa (sulfonamide antibiotics) Rash    Sulfamethoxazole-trimethoprim Rash       Past Medical History:   Diagnosis Date    Abnormal Pap smear of cervix     NEELAM d/t recurrent abnormals/mild dysplasia; negative pathology after hysterctomy    Allergy     Anxiety     Asthma     Basal cell carcinoma (BCC)     R forehead    Hyperlipidemia     PAC (premature atrial contraction)     Rosacea      Past Surgical History:   Procedure Laterality Date     SECTION      COLD KNIFE CONIZATION OF CERVIX      multiple    COLONOSCOPY N/A 10/20/2020    Procedure: COLONOSCOPY;  Surgeon: Sudheer Santiago III, MD;  Location: [facility];  Service: Endoscopy;  Laterality: N/A;    COLPOSCOPY  2023    DENTAL SURGERY  03/15/2022    HYSTERECTOMY  2015    ovaries intact    NEUROPLASTY Left 2020    Procedure: NEUROPLASTY;  Surgeon: Quinton Alexandre DPM;  Location: [facility] OR;  Service: Podiatry;  Laterality: Left;    PLANTAR FASCIOTOMY Left 2020    Procedure: FASCIOTOMY, PLANTAR;  Surgeon: Quinton Alexandre DPM;  Location: [facility] OR;  Service: Podiatry;  Laterality: Left;     TONSILLECTOMY       Social History     Tobacco Use    Smoking status: Never    Smokeless tobacco: Never   Substance Use Topics    Alcohol use: Yes     Comment: occ    Drug use: No     Family History   Problem Relation Name Age of Onset    Diabetes Mother      Asthma Mother      Hypertension Father      Prostate cancer Father      Melanoma Neg Hx          Review of Systems:   PER HPI         Objective:        Vitals:    01/02/25 1342   BP: 124/74   Pulse: 78       Lab Results   Component Value Date    WBC 8.66 10/02/2024    HGB 13.8 10/02/2024    HCT 43.0 10/02/2024     10/02/2024    CHOL 256 (H) 09/04/2024    TRIG 128 09/04/2024    HDL 67 09/04/2024    ALT 15 10/02/2024    AST 20 10/02/2024     10/02/2024    K 3.8 10/02/2024     10/02/2024    CREATININE 0.7 10/02/2024    BUN 12 10/02/2024    CO2 27 10/02/2024    TSH 1.901 09/04/2024        ECHOCARDIOGRAM RESULTS  Results for orders placed during the hospital encounter of 03/24/23    Echo    Interpretation Summary  · The left ventricle is normal in size with normal systolic function.  · The estimated ejection fraction is 65%.  · Normal left ventricular diastolic function.  · Normal right ventricular size with normal right ventricular systolic function.        CURRENT/PREVIOUS VISIT EKG  Results for orders placed or performed in visit on 08/29/24   IN OFFICE EKG 12-LEAD (to [place])    Collection Time: 08/29/24  8:58 AM   Result Value Ref Range    QRS Duration 78 ms    [facility] QTC Calculation 431 ms    Narrative    Test Reason : I47.10,    Vent. Rate : 080 BPM     Atrial Rate : 080 BPM     P-R Int : 176 ms          QRS Dur : 078 ms      QT Int : 374 ms       P-R-T Axes : 062 046 046 degrees     QTc Int : 431 ms    Sinus rhythm with Premature atrial complexes  Otherwise normal ECG  When compared with ECG of 18-JAN-2024 16:06,  Premature atrial complexes are now Present  Confirmed by Jenni RAE, Onur PRINCE ([id number]) on 8/29/2024 8:57:14 PM    Referred By:              Confirmed By:Onur Arteaga MD     No valid procedures specified.   Results for orders placed during the hospital encounter of 01/29/24    Exercise Stress - EKG    Interpretation Summary    The patient exercised for 6 minutes 0 seconds on a Zander protocol, corresponding to a functional capacity of 7 METS, achieving a peak heart rate of 165 bpm, which is 102 % of the age predicted maximum heart rate.    The ECG portion of the study is negative for ischemia.    The patient reported no chest pain during the stress test.    The blood pressure response to stress was normal.    There were no arrhythmias during stress.      Physical Exam:  CONSTITUTIONAL: No fever, no chills  HEENT: Normocephalic, atraumatic,pupils reactive to light                 NECK:  No JVD no carotid bruit  CVS: S1S2+, RRR, no murmurs,   LUNGS: Clear  ABDOMEN: Soft, NT, BS+  EXTREMITIES: No cyanosis, edema  : No castillo catheter  NEURO: AAO X 3  PSY: Normal affect      Medication List with Changes/Refills   Current Medications    ALBUTEROL (PROVENTIL/VENTOLIN HFA) 90 MCG/ACTUATION INHALER    Inhale 2 puffs into the lungs every 6 (six) hours as needed for Wheezing.    ALPRAZOLAM (XANAX) 0.5 MG TABLET    Take 1 tablet (0.5 mg total) by mouth 2 (two) times daily as needed for Anxiety or Insomnia.    DILTIAZEM (CARDIZEM CD) 240 MG 24 HR CAPSULE    Take 1 capsule (240 mg total) by mouth once daily.    DILTIAZEM (CARDIZEM) 60 MG TABLET    Take 1 tablet (60 mg total) by mouth every 8 (eight) hours. PER DR. PULIDO USE PRN    HYDROCORTISONE 2.5 % CREAM    Apply topically 2 (two) times daily.    IBUPROFEN (ADVIL,MOTRIN) 200 MG TABLET    Take 400 mg by mouth every 6 (six) hours as needed for Pain.    LORATADINE (CLARITIN) 10 MG TABLET    Take 1 tablet (10 mg total) by mouth once daily.    MAGNESIUM OXIDE (MAG-OX) 400 MG (241.3 MG MAGNESIUM) TABLET    Take 1 tablet (400 mg total) by mouth every evening.    MONTELUKAST (SINGULAIR) 10 MG TABLET    Take 1  tablet (10 mg total) by mouth every evening.    MULTIVITAMIN (THERAGRAN) PER TABLET    Take 1 tablet by mouth once daily.    MUPIROCIN (BACTROBAN) 2 % OINTMENT    Apply topically 2 (two) times daily.    PANTOPRAZOLE (PROTONIX) 40 MG TABLET    Take 1 tablet (40 mg total) by mouth once daily.    POTASSIUM CHLORIDE (K-TAB) 20 MEQ    Take 1 tablet (20 mEq total) by mouth once daily.    SIMVASTATIN (ZOCOR) 20 MG TABLET    Take 1 tablet (20 mg total) by mouth every evening.    SUMATRIPTAN (IMITREX) 100 MG TABLET    Take 1 tablet (100 mg total) by mouth daily as needed for Migraine. Do not exceed 2 doses in 24 hours.    TERCONAZOLE (TERAZOL 3) 0.8 % VAGINAL CREAM    Place vaginally at bedtime for 3 nights    TRIAMCINOLONE ACETONIDE 0.025% (KENALOG) 0.025 % OINT    Apply topically 2 (two) times daily.    VALACYCLOVIR (VALTREX) 1000 MG TABLET    Take 2 tablets (2,000 mg total) by mouth every 12 (twelve) hours as needed (fever blister).   Discontinued Medications    AZITHROMYCIN (Z-ORION) 250 MG TABLET    Take 2 tablets by mouth on day 1; Take 1 tablet by mouth on days 2-5    CIPROFLOXACIN HCL (CIPRO) 500 MG TABLET    Take 1 tablet (500 mg total) by mouth 2 (two) times a day.    FINASTERIDE (PROSCAR) 5 MG TABLET    Take 1 tablet (5 mg total) by mouth once daily.             Assessment:       1. SVT (supraventricular tachycardia)    2. Mixed hyperlipidemia    3. Chest pain, unspecified type    4. PAC (premature atrial contraction)    5. BMI 24.0-24.9, adult         Plan:             Problem List Items Addressed This Visit       Hyperlipidemia    Current Assessment & Plan     Check Lipid Panel  Continue Simvastatin 20 mg Q HS         Relevant Orders    Stress Echo Which stress agent will be used? Treadmill Exercise; Color Flow Doppler? No    PAC (premature atrial contraction)    Current Assessment & Plan     Continue Cardizem CD  Take in the mornings  Check TSH CMP MAG  PRN Diltiazem short acting         BMI 24.0-24.9, adult     Current Assessment & Plan     BMI-25.59           SVT (supraventricular tachycardia) - Primary    Current Assessment & Plan     Continue Cardizem CD to 240 mg daily and Mag-Ox  Continue diltiazem 60 mg TID for breakthrough SVT         Relevant Orders    IN OFFICE EKG 12-LEAD (to [place])    TSH    Magnesium    Comprehensive Metabolic Panel    CBC Auto Differential    TROPONIN I    Chest pain    Current Assessment & Plan     Troponin now. If elevated she will need to be sent to ER.   Stress ECHO.             Relevant Orders    TSH    Magnesium    Comprehensive Metabolic Panel    CBC Auto Differential    TROPONIN I       No follow-ups on file.

## 2025-01-02 NOTE — ASSESSMENT & PLAN NOTE
Continue Cardizem CD to 240 mg daily and Mag-Ox  Continue diltiazem 60 mg TID for breakthrough SVT

## 2025-01-09 ENCOUNTER — TELEPHONE (OUTPATIENT)
Dept: CARDIOLOGY | Facility: CLINIC | Age: 50
End: 2025-01-09
Payer: COMMERCIAL

## 2025-01-09 NOTE — TELEPHONE ENCOUNTER
----- Message from Marina sent at 1/9/2025 11:53 AM CST -----  Regarding: Echo  Type:  Needs Medical Advice    Who Called: Pt    Would the patient rather a call back or a response via [facility]? Call    Best Call Back Number: [phone]      Additional Information: Pt is requesting a call back about Echo test ( please look at order for Echo there is a error message). Thanks

## 2025-01-15 DIAGNOSIS — R07.9 CHEST PAIN, UNSPECIFIED TYPE: Primary | ICD-10-CM

## 2025-01-27 ENCOUNTER — OFFICE VISIT (OUTPATIENT)
Dept: DERMATOLOGY | Facility: CLINIC | Age: 50
End: 2025-01-27
Payer: COMMERCIAL

## 2025-01-27 VITALS — HEIGHT: 65 IN | BODY MASS INDEX: 25.33 KG/M2 | WEIGHT: 152 LBS

## 2025-01-27 DIAGNOSIS — L82.1 SEBORRHEIC KERATOSIS: ICD-10-CM

## 2025-01-27 DIAGNOSIS — L90.5 SCAR: ICD-10-CM

## 2025-01-27 DIAGNOSIS — L91.8 SKIN TAG: ICD-10-CM

## 2025-01-27 DIAGNOSIS — L57.0 AK (ACTINIC KERATOSIS): ICD-10-CM

## 2025-01-27 DIAGNOSIS — D22.9 MULTIPLE BENIGN NEVI: ICD-10-CM

## 2025-01-27 DIAGNOSIS — Z85.828 HISTORY OF NONMELANOMA SKIN CANCER: ICD-10-CM

## 2025-01-27 DIAGNOSIS — D48.5 NEOPLASM OF UNCERTAIN BEHAVIOR OF SKIN: Primary | ICD-10-CM

## 2025-01-27 PROCEDURE — 88305 TISSUE EXAM BY PATHOLOGIST: CPT | Mod: 26,,, | Performed by: PATHOLOGY

## 2025-01-27 PROCEDURE — 11102 TANGNTL BX SKIN SINGLE LES: CPT | Mod: S$GLB,,, | Performed by: STUDENT IN AN ORGANIZED HEALTH CARE EDUCATION/TRAINING PROGRAM

## 2025-01-27 PROCEDURE — 17000 DESTRUCT PREMALG LESION: CPT | Mod: XS,S$GLB,, | Performed by: STUDENT IN AN ORGANIZED HEALTH CARE EDUCATION/TRAINING PROGRAM

## 2025-01-27 PROCEDURE — 99213 OFFICE O/P EST LOW 20 MIN: CPT | Mod: 25,S$GLB,, | Performed by: STUDENT IN AN ORGANIZED HEALTH CARE EDUCATION/TRAINING PROGRAM

## 2025-01-27 PROCEDURE — 1159F MED LIST DOCD IN RCRD: CPT | Mod: CPTII,S$GLB,, | Performed by: STUDENT IN AN ORGANIZED HEALTH CARE EDUCATION/TRAINING PROGRAM

## 2025-01-27 PROCEDURE — 1160F RVW MEDS BY RX/DR IN RCRD: CPT | Mod: CPTII,S$GLB,, | Performed by: STUDENT IN AN ORGANIZED HEALTH CARE EDUCATION/TRAINING PROGRAM

## 2025-01-27 PROCEDURE — 3008F BODY MASS INDEX DOCD: CPT | Mod: CPTII,S$GLB,, | Performed by: STUDENT IN AN ORGANIZED HEALTH CARE EDUCATION/TRAINING PROGRAM

## 2025-01-27 PROCEDURE — 11103 TANGNTL BX SKIN EA SEP/ADDL: CPT | Mod: S$GLB,,, | Performed by: STUDENT IN AN ORGANIZED HEALTH CARE EDUCATION/TRAINING PROGRAM

## 2025-01-27 PROCEDURE — 88305 TISSUE EXAM BY PATHOLOGIST: CPT | Performed by: PATHOLOGY

## 2025-01-27 NOTE — PATIENT INSTRUCTIONS
CRYOSURGERY      Your doctor has used a method called cryosurgery to treat your skin condition. Cryosurgery refers to the use of very cold substances to treat a variety of skin conditions such as warts, pre-skin cancers, molluscum contagiosum, sun spots, and several benign growths. The substance we use in cryosurgery is liquid nitrogen and is so cold (-195 degrees Celsius) that is burns when administered.     Following treatment in the office, the skin may immediately burn and become red. You may find the area around the lesion is affected as well. It is sometimes necessary to treat not only the lesion, but a small area of the surrounding normal skin to achieve a good response.     A blister, and even a blood filled blister, may form after treatment.   This is a normal response. If the blister is painful, it is acceptable to sterilize a needle and with rubbing alcohol and gently pop the blister. It is important that you gently wash the area with soap and warm water as the blister fluid may contain wart virus if a wart was treated. Do no remove the roof of the blister.     The area treated can take anywhere from 1-3 weeks to heal. Healing time depends on the kind skin lesion treated, the location, and how aggressively the lesion was treated. It is recommended that the areas treated are covered with Vaseline or bacitracin ointment and a band-aid. If a band-aid is not practical, just ointment applied several times per day will do. Keeping these areas moist will speed the healing time.    Treatment with liquid nitrogen can leave a scar. In dark skin, it may be a light or dark scar, in light skin it may be a white or pink scar. These will generally fade with time, but may never go away completely.     If you have any concerns after your treatment, please feel free to call the office.       [address] [facility] [place] [address]/ [phone] FAX/ [email or web address]  Shave Biopsy Wound Care    Your  doctor has performed a shave biopsy today.  A band aid and vaseline ointment has been placed over the site.  This should remain in place for 24 hours.  It is recommended that you keep the area dry for the first 24 hours.  After 24 hours, you may remove the band aid and wash the area with warm soap and water and apply Vaseline jelly.  Many patients prefer to use Neosporin or Bacitracin ointment.  This is acceptable; however, know that you can develop an allergy to this medication even if you have used it safely for years.  It is important to keep the area moist.  Letting it dry out and get air slows healing time, and will worsen the scar.  Band aid is optional after first 24 hours.      If you notice increasing redness, tenderness, pain, or yellow drainage at the biopsy site, please notify your doctor.  These are signs of an infection.    If your biopsy site is bleeding, apply firm pressure for 15 minutes straight.  Repeat for another 15 minutes, if it is still bleeding.   If the surgical site continues to bleed, then please contact your doctor.      [address] [place] [address]/ [phone] FAX/ [email or web address]

## 2025-01-27 NOTE — PROGRESS NOTES
Subjective:      Patient ID:  Rosaura Loredo is a 49 y.o. female who presents for   Chief Complaint   Patient presents with    Spot     On forehead    Skin Check     tbsc     Lov- 1/31/24    Here today for c/o lesion on forehead x 2 months with no symptoms- bleeds when picked  Is requesting a TBSC     Derm hx:   BCC R forehead 2014 MOHS DR Land   Denies fhx of MM                 Review of Systems   Constitutional:  Negative for fever, chills and fatigue.   Respiratory:  Negative for cough and shortness of breath.    Gastrointestinal:  Negative for nausea and vomiting.   Skin:  Positive for dry skin, activity-related sunscreen use and wears hat. Negative for itching, rash and daily sunscreen use.   Hematologic/Lymphatic: Does not bruise/bleed easily.       Objective:   Physical Exam   Constitutional: She appears well-developed and well-nourished. No distress.   Neurological: She is alert and oriented to person, place, and time. She is not disoriented.   Psychiatric: She has a normal mood and affect.   Skin:   Areas Examined (abnormalities noted in diagram):   Scalp / Hair Palpated and Inspected  Head / Face Inspection Performed  Neck Inspection Performed  Chest / Axilla Inspection Performed  Abdomen Inspection Performed  Genitals / Buttocks / Groin Inspection Performed  Back Inspection Performed  RUE Inspected  LUE Inspection Performed  RLE Inspected  LLE Inspection Performed  Nails and Digits Inspection Performed                     Diagram Legend     Erythematous scaling macule/papule c/w actinic keratosis       Vascular papule c/w angioma      Pigmented verrucoid papule/plaque c/w seborrheic keratosis      Yellow umbilicated papule c/w sebaceous hyperplasia      Irregularly shaped tan macule c/w lentigo     1-2 mm smooth white papules consistent with Milia      Movable subcutaneous cyst with punctum c/w epidermal inclusion cyst      Subcutaneous movable cyst c/w pilar cyst      Firm pink to brown papule  c/w dermatofibroma      Pedunculated fleshy papule(s) c/w skin tag(s)      Evenly pigmented macule c/w junctional nevus     Mildly variegated pigmented, slightly irregular-bordered macule c/w mildly atypical nevus      Flesh colored to evenly pigmented papule c/w intradermal nevus       Pink pearly papule/plaque c/w basal cell carcinoma      Erythematous hyperkeratotic cursted plaque c/w SCC      Surgical scar with no sign of skin cancer recurrence      Open and closed comedones      Inflammatory papules and pustules      Verrucoid papule consistent consistent with wart     Erythematous eczematous patches and plaques     Dystrophic onycholytic nail with subungual debris c/w onychomycosis     Umbilicated papule    Erythematous-base heme-crusted tan verrucoid plaque consistent with inflamed seborrheic keratosis     Erythematous Silvery Scaling Plaque c/w Psoriasis     See annotation                Assessment / Plan:      Pathology Orders:       Normal Orders This Visit    Specimen to Pathology, Dermatology     Questions:    Procedure Type: Dermatology and skin neoplasms    Number of Specimens: 2    ------------------------: -------------------------    Spec 1 Procedure: Biopsy    Spec 1 Clinical Impression: r/o bcc    Spec 1 Source: central forehead    ------------------------: -------------------------    Spec 2 Procedure: Biopsy    Spec 2 Clinical Impression: r/o bcc    Spec 2 Source: right upper back    Release to patient:           Neoplasm of uncertain behavior of skin x2  -     Specimen to Pathology, Dermatology  Shave biopsy procedure note:    Shave biopsy performed after verbal consent including risk of infection, scar, recurrence, need for additional treatment of site. Area prepped with alcohol, anesthetized with approximately 1.0cc of 1% lidocaine with epinephrine. Lesional tissue shaved with razor blade. Hemostasis achieved with application of aluminum chloride followed by hyfrecation. No complications.  Dressing applied. Wound care explained.    History of nonmelanoma skin cancer  Scar  Area of previous NMSC examined. Site well healed with no signs of recurrence.  Total body skin examination performed today including at least 12 points as noted in physical examination. No lesions suspicious for malignancy noted.  Patient instructed in importance in daily broad spectrum sun protection of at least spf 30. Mineral sunscreen ingredients preferred (Zinc +/- Titanium) and can be found OTC.   Patient encouraged to wear hat for all outdoor exposure.   Also discussed sun avoidance and use of protective clothing.    Multiple benign nevi  Careful dermoscopy evaluation of nevi performed  Monitor for new mole or moles that are becoming bigger, darker, irritated, or developing irregular borders.     Seborrheic keratosis  These are benign inherited growths without a malignant potential. Reassurance given to patient. No treatment is necessary.     Skin tag  - discussed that lesions are benign, non-cancerous. Removal is cosmetic. Price sheet provided to patient. Discussed risks of scarring and dyspigmentation after removal.     AK (actinic keratosis)  Scalp  Recheck  Cryosurgery Procedure Note    Verbal consent from the patient is obtained and the patient is aware of the precancerous quality and need for treatment of these lesions. Liquid nitrogen cryosurgery is applied to the 1 actinic keratoses, as detailed in the physical exam, to produce a freeze injury. The patient is aware that blisters may form and is instructed on wound care with gentle cleansing and use of vaseline ointment to keep moist until healed. The patient is supplied a handout on cryosurgery and is instructed to call if lesions do not completely resolve.           6 months  No follow-ups on file.

## 2025-01-29 ENCOUNTER — TELEPHONE (OUTPATIENT)
Dept: DERMATOLOGY | Facility: CLINIC | Age: 50
End: 2025-01-29
Payer: COMMERCIAL

## 2025-01-29 DIAGNOSIS — C44.319 BASAL CELL CARCINOMA (BCC) OF FOREHEAD: Primary | ICD-10-CM

## 2025-01-29 LAB
FINAL PATHOLOGIC DIAGNOSIS: NORMAL
GROSS: NORMAL
Lab: NORMAL
MICROSCOPIC EXAM: NORMAL

## 2025-01-29 NOTE — TELEPHONE ENCOUNTER
MOHS order placed    ----- Message from Eliza Hernandez MD sent at 1/29/2025  8:34 AM CST -----     Component 2 d ago  Final Pathologic Diagnosis 1. Skin, central forehead, shave biopsy:  -BASAL CELL CARCINOMA, NODULAR AND INFILTRATIVE, EXTENDING TO THE DEEP BIOPSY EDGE    This lesion is skin cancer. You will be contacted regarding treatment.    ##I recommend treatment by a Mohs surgeon who has the ability to ensure negative surgical margins before repairing the defect. Our [facility] Mohs surgeon Dr Mora is located in [place].  Please notify patient and place referral request once patient has been given diagnosis and opportunity to discuss treatment plan    2. Skin, right upper back, shave biopsy:  -BASAL CELL CARCINOMA, NODULAR AND INFILTRATIVE, EXTENDING TO THE DEEP BIOPSY EDGE    This lesion is skin cancer. You will be contacted regarding treatment.  Comment: Interp By Thierno Mendenhall M.D., Signed on 01/29/2025 at 08:29    ##Please notify patient of results and need for further excision. Please schedule patient for excision and suture in the coming weeks.

## 2025-01-30 ENCOUNTER — TELEPHONE (OUTPATIENT)
Dept: CARDIOLOGY | Facility: HOSPITAL | Age: 50
End: 2025-01-30

## 2025-01-30 NOTE — TELEPHONE ENCOUNTER
Patient advised, test will be at [facility] ([address] [facility]).  Will need to register on the first floor at the main entrance.  Patient advised that arrival time is 06:30.  Patient advised that they may be here about 3.5-4 hours, and may want to bring something to occupy their time, as there will be periods of waiting.  Patient advised, may take her medications prior to testing if you need to. Advised if food is needed to take medications, please keep it light, like toast and juice.    Patient advised to avoid all caffeine 12 hours prior to testing.  This includes chocolate, tea and decaf coffee.    Will provide peanut butter crackers for a snack after stress test.  If patient would prefer something else, please bring a snack from home.    Wear comfortable clothing with tennis shoes.   No lotions, oils, or powders to the upper chest area. May wear deodorant.    No metal jewelry, buttons, or zippers to the upper body.  Patient verbalizes understanding of instructions.

## 2025-01-31 ENCOUNTER — TELEPHONE (OUTPATIENT)
Dept: CARDIOLOGY | Facility: CLINIC | Age: 50
End: 2025-01-31

## 2025-01-31 ENCOUNTER — HOSPITAL ENCOUNTER (OUTPATIENT)
Dept: CARDIOLOGY | Facility: HOSPITAL | Age: 50
Discharge: HOME | End: 2025-01-31
Attending: NURSE PRACTITIONER
Payer: COMMERCIAL

## 2025-01-31 ENCOUNTER — PATIENT MESSAGE (OUTPATIENT)
Dept: CARDIOLOGY | Facility: CLINIC | Age: 50
End: 2025-01-31

## 2025-01-31 ENCOUNTER — HOSPITAL ENCOUNTER (OUTPATIENT)
Dept: RADIOLOGY | Facility: HOSPITAL | Age: 50
Discharge: HOME | End: 2025-01-31
Attending: NURSE PRACTITIONER
Payer: COMMERCIAL

## 2025-01-31 DIAGNOSIS — R07.9 CHEST PAIN, UNSPECIFIED TYPE: ICD-10-CM

## 2025-01-31 LAB
CV STRESS BASE HR: 82 BPM
DIASTOLIC BLOOD PRESSURE: 81 MMHG
EJECTION FRACTION- HIGH: 65 %
END DIASTOLIC INDEX-HIGH: 153 ML/M2
END DIASTOLIC INDEX-LOW: 93 ML/M2
END SYSTOLIC INDEX-HIGH: 71 ML/M2
END SYSTOLIC INDEX-LOW: 31 ML/M2
NUC REST DIASTOLIC VOLUME INDEX: 77
NUC REST EJECTION FRACTION: 74
NUC REST SYSTOLIC VOLUME INDEX: 20
NUC STRESS DIASTOLIC VOLUME INDEX: 70
NUC STRESS EJECTION FRACTION: 69 %
NUC STRESS SYSTOLIC VOLUME INDEX: 22
OHS CV CPX 1 MINUTE RECOVERY HEART RATE: 132 BPM
OHS CV CPX 85 PERCENT MAX PREDICTED HEART RATE MALE: 144
OHS CV CPX ESTIMATED METS: 7
OHS CV CPX MAX PREDICTED HEART RATE: 169
OHS CV CPX PATIENT IS FEMALE: 1
OHS CV CPX PATIENT IS MALE: 0
OHS CV CPX PEAK DIASTOLIC BLOOD PRESSURE: 48 MMHG
OHS CV CPX PEAK HEAR RATE: 151 BPM
OHS CV CPX PEAK RATE PRESSURE PRODUCT: NORMAL
OHS CV CPX PEAK SYSTOLIC BLOOD PRESSURE: 197 MMHG
OHS CV CPX PERCENT MAX PREDICTED HEART RATE ACHIEVED: 94
OHS CV CPX RATE PRESSURE PRODUCT PRESENTING: NORMAL
OHS CV INITIAL DOSE: 12.9 MCG/KG/MIN
OHS CV PEAK DOSE: 26.1 MCG/KG/MIN
RETIRED EF AND QEF - SEE NOTES: 53 %
STRESS ECHO POST EXERCISE DUR MIN: 6 MINUTES
STRESS ECHO POST EXERCISE DUR SEC: 22 SECONDS
SYSTOLIC BLOOD PRESSURE: 141 MMHG

## 2025-01-31 PROCEDURE — 78452 HT MUSCLE IMAGE SPECT MULT: CPT | Mod: 26,,, | Performed by: INTERNAL MEDICINE

## 2025-01-31 PROCEDURE — A9502 TC99M TETROFOSMIN: HCPCS | Performed by: NURSE PRACTITIONER

## 2025-01-31 PROCEDURE — 93017 CV STRESS TEST TRACING ONLY: CPT

## 2025-01-31 PROCEDURE — 93018 CV STRESS TEST I&R ONLY: CPT | Mod: ,,, | Performed by: INTERNAL MEDICINE

## 2025-01-31 PROCEDURE — 93016 CV STRESS TEST SUPVJ ONLY: CPT | Mod: ,,,

## 2025-01-31 RX ADMIN — TETROFOSMIN 12.9 MILLICURIE: 1.38 INJECTION, POWDER, LYOPHILIZED, FOR SOLUTION INTRAVENOUS at 06:01

## 2025-01-31 RX ADMIN — TETROFOSMIN 26.1 MILLICURIE: 1.38 INJECTION, POWDER, LYOPHILIZED, FOR SOLUTION INTRAVENOUS at 08:01

## 2025-01-31 NOTE — TELEPHONE ENCOUNTER
----- Message from Nelda Crespo NP sent at 1/31/2025 10:49 AM CST -----  Your stress test is negative for reversible ischemia. The test is about 90% accurate. This means there are no blockages that are causing a problem, ( meaning over 70% blocked.) According to this test you are getting enough blood flow to the coronary arteries. If you are not having any symptoms we can push back your appointment. If you are having symptoms we will be happy to see you.

## 2025-02-20 ENCOUNTER — PATIENT MESSAGE (OUTPATIENT)
Dept: INTERNAL MEDICINE | Facility: CLINIC | Age: 50
End: 2025-02-20

## 2025-02-20 ENCOUNTER — OFFICE VISIT (OUTPATIENT)
Dept: INTERNAL MEDICINE | Facility: CLINIC | Age: 50
End: 2025-02-20
Payer: COMMERCIAL

## 2025-02-20 PROCEDURE — 99499 UNLISTED E&M SERVICE: CPT | Mod: 95,,,

## 2025-02-20 RX ORDER — TIRZEPATIDE 5 MG/.5ML
5 INJECTION, SOLUTION SUBCUTANEOUS
Qty: 2 ML | Refills: 3 | Status: ACTIVE | OUTPATIENT
Start: 2025-02-20

## 2025-02-20 NOTE — PROGRESS NOTES
Patient ID: Rosaura Loredo is a 49 y.o. White female    Subjective  Chief Complaint: patient presents for medical weight loss management.    Contraindications to GLP-1 receptor agonist therapy:   Denies personal or family history of MTC and personal history of MEN2     Co-morbidities: DLD    History of weight loss therapy: Pt currently taking compounded Mounjaro 2.5 mg. Pt has completed 3 doses. Pt is tolerating well but not seeing a lot of appetite suppression. Pt noted that she took also took Mounjaro on and off last year and got up to 10 mg dose and d/c due to cost.     Weight loss history:  Starting weight: Pt reports 165 lbs BMI 27.5 last year   Current weight:    2/2/2025   Recent Readings    Weight (lbs) 154 lb    BMI 25.62 BMI    % weight loss since GLP-1 initiation: 6.7 %    Objective  Lab Results   Component Value Date     01/02/2025     10/02/2024     09/04/2024     Lab Results   Component Value Date    K 3.7 01/02/2025    K 3.8 10/02/2024    K 3.9 09/04/2024     Lab Results   Component Value Date     01/02/2025     10/02/2024     09/04/2024     Lab Results   Component Value Date    CO2 28 01/02/2025    CO2 27 10/02/2024    CO2 27 09/04/2024     Lab Results   Component Value Date    BUN 20 01/02/2025    BUN 12 10/02/2024    BUN 19 09/04/2024     Lab Results   Component Value Date     01/02/2025    GLU 87 10/02/2024    GLU 89 09/04/2024     Lab Results   Component Value Date    CALCIUM 9.3 01/02/2025    CALCIUM 9.4 10/02/2024    CALCIUM 9.9 09/04/2024     Lab Results   Component Value Date    PROT 7.0 01/02/2025    PROT 7.6 10/02/2024    PROT 7.6 09/04/2024     Lab Results   Component Value Date    ALBUMIN 4.6 01/02/2025    ALBUMIN 5.0 10/02/2024    ALBUMIN 4.8 09/04/2024     Lab Results   Component Value Date    BILITOT 0.4 01/02/2025    BILITOT 0.7 10/02/2024    BILITOT 0.7 09/04/2024     Lab Results   Component Value Date    AST 16 01/02/2025    AST 20  10/02/2024    AST 16 09/04/2024     Lab Results   Component Value Date    ALT 19 01/02/2025    ALT 15 10/02/2024    ALT 11 09/04/2024     Lab Results   Component Value Date    ANIONGAP 6 (L) 01/02/2025    ANIONGAP 9 10/02/2024    ANIONGAP 8 09/04/2024     Lab Results   Component Value Date    CREATININE 1.2 01/02/2025    CREATININE 0.7 10/02/2024    CREATININE 0.7 09/04/2024     Lab Results   Component Value Date    EGFRNORACEVR 54.8 (A) 01/02/2025    EGFRNORACEVR >60.0 10/02/2024    EGFRNORACEVR >60.0 09/04/2024     Assessment/Plan  -Restarting GLP-1 therapy  - Increase to Zepbound 5 mg SQ weekly. May need a dose increase before next follow up apt.  - RTC in 3 months for follow-up evaluation      Patient consented to pharmacist management via collaborative practice.

## 2025-02-20 NOTE — PROGRESS NOTES
Patient ID: Rosaura Loredo is a 49 y.o. White female    Subjective  Chief Complaint: patient presents for medical weight loss management.    Contraindications to GLP-1 receptor agonist therapy:   {Denies/Endorses:27724} {GLP-1 exclusions:40741}     Pregnancy Status: ***  - Pt denies current pregnancy, breastfeeding, or plans to become pregnant.  - Pt denies current use of oral hormonal contraception.     Co-morbidities: {GLP-1 comorbidities:33278}    History of weight loss therapy:  ***    Tolerance to current therapy:  {Denies/Endorses:23724} {GLP-1 ADRs:81945}  {Denies/Endorses:88067} {GLP-1 ADRs:78593}    Weight loss history:  Starting weight: ***  Current weight: ***  % weight loss since GLP-1 initiation: *** %    Objective  Lab Results   Component Value Date     01/02/2025     10/02/2024     09/04/2024     Lab Results   Component Value Date    K 3.7 01/02/2025    K 3.8 10/02/2024    K 3.9 09/04/2024     Lab Results   Component Value Date     01/02/2025     10/02/2024     09/04/2024     Lab Results   Component Value Date    CO2 28 01/02/2025    CO2 27 10/02/2024    CO2 27 09/04/2024     Lab Results   Component Value Date    BUN 20 01/02/2025    BUN 12 10/02/2024    BUN 19 09/04/2024     Lab Results   Component Value Date     01/02/2025    GLU 87 10/02/2024    GLU 89 09/04/2024     Lab Results   Component Value Date    CALCIUM 9.3 01/02/2025    CALCIUM 9.4 10/02/2024    CALCIUM 9.9 09/04/2024     Lab Results   Component Value Date    PROT 7.0 01/02/2025    PROT 7.6 10/02/2024    PROT 7.6 09/04/2024     Lab Results   Component Value Date    ALBUMIN 4.6 01/02/2025    ALBUMIN 5.0 10/02/2024    ALBUMIN 4.8 09/04/2024     Lab Results   Component Value Date    BILITOT 0.4 01/02/2025    BILITOT 0.7 10/02/2024    BILITOT 0.7 09/04/2024     Lab Results   Component Value Date    AST 16 01/02/2025    AST 20 10/02/2024    AST 16 09/04/2024     Lab Results   Component Value  Date    ALT 19 01/02/2025    ALT 15 10/02/2024    ALT 11 09/04/2024     Lab Results   Component Value Date    ANIONGAP 6 (L) 01/02/2025    ANIONGAP 9 10/02/2024    ANIONGAP 8 09/04/2024     Lab Results   Component Value Date    CREATININE 1.2 01/02/2025    CREATININE 0.7 10/02/2024    CREATININE 0.7 09/04/2024     Lab Results   Component Value Date    EGFRNORACEVR 54.8 (A) 01/02/2025    EGFRNORACEVR >60.0 10/02/2024    EGFRNORACEVR >60.0 09/04/2024     Assessment/Plan  -{GLP-1 Plan Options:84941}    Patient consented to pharmacist management via collaborative practice.

## 2025-03-13 ENCOUNTER — PROCEDURE VISIT (OUTPATIENT)
Dept: DERMATOLOGY | Facility: CLINIC | Age: 50
End: 2025-03-13
Payer: COMMERCIAL

## 2025-03-13 VITALS
HEIGHT: 65 IN | WEIGHT: 154.13 LBS | DIASTOLIC BLOOD PRESSURE: 73 MMHG | BODY MASS INDEX: 25.68 KG/M2 | SYSTOLIC BLOOD PRESSURE: 113 MMHG | HEART RATE: 80 BPM

## 2025-03-13 DIAGNOSIS — C44.319 BASAL CELL CARCINOMA (BCC) OF FOREHEAD: ICD-10-CM

## 2025-03-13 PROCEDURE — 17311 MOHS 1 STAGE H/N/HF/G: CPT | Mod: 51,S$GLB,, | Performed by: DERMATOLOGY

## 2025-03-13 PROCEDURE — 13132 CMPLX RPR F/C/C/M/N/AX/G/H/F: CPT | Mod: S$GLB,,, | Performed by: DERMATOLOGY

## 2025-03-13 NOTE — PROGRESS NOTES
MOHS MICROGRAPHIC SURGERY OPERATIVE NOTE  Name:  Rosaura Loredo  Date: 3/13/2025  Patient : 1975  Attending Surgeon: Iesha Mora MD  Assistants: Shital Carmen - Surgical Technician  Anesthetic Agent: 1% lidocaine with 1:100,000 epinephrine  Clinical Diagnosis: basal cell carcinoma - nodular and infiltrative  Operation: Mohs Micrographic Surgery  Location: central forehead  Indications: Location in mask areas of face including central face, nose, eyelids, eyebrows, lips, chin, preauricular, temple, and ear.  Surgical Preparation: povidone-iodine  Pre-op anbitioics: no     Description of Operation:  The nature and purpose of the procedure, associated risks and alternative treatments were explained to the patient in detail. All patient questions were answered completely. An informed operative consent and photography permit were obtained. The tumor location was then identified and marked with agreement by the patient of the correct location. The patient was positioned, prepped, and draped in the usual sterile manner. Local anesthesia was obtained with 2 cc(s) of 1% lidocaine with 1:100,000 epinephrine. The lesion pre-operatively measures 0.6 by 0.5 cm.     1ST STAGE:  A 2+ mm rim of normal appearing skin was marked circumferentially around the lesion after scraping with a curette to define the margin. The area thus outlined was excised at a 45 degree angle. Hemostasis was obtained with electrodesiccation. The specimen was oriented, mapped, and subdivided into at least two sections. Sections were then chromacoded and submitted for horizontal frozen sections. The patient tolerated the procedure well and there were no complications. Upon microscopic examination of the processed horizontal frozen sections of this stage:  No residual tumor was noted.      SUMMARY:0.7  The tumor was extirpated in 1 Mohs Stages resulting in a final defect measuring 0.7 by 0.7 cm².   The final defect extended deep to  subcutaneous fat  The patient tolerated the procedure well and no complications were noted.     DEFECT PHOTO:      Iesha Mora MD          Complex Linear Closure Operative Note  Name: Rosaura Loredo  YOB: 1975  Date: 3/13/2025  Attending Surgeon: Iesha Mora MD FAAD  Assistants:  Shital Carmen - Surgical Technician  Clinical Diagnosis: A 0.7 by 0.7 cm defect secondary to Mohs Micrographic Surgery  Location: central forehead  Operation: Complex linear closure  Surgical Preparation: broad scrub with povidone-iodine    Description of Operation:  The nature and purpose of the procedure, associated risks and alternative treatments were explained to the patient in detail. All patient questions were answered completely. In order to minimize tension on the closure and avoid compromising the anatomic contour of the cosmetic region and maximize functional capacity, a complex linear closure was performed. An informed operative consent and photography permit were obtained. The patient was positioned, prepped, and draped in the usual sterile manner. Local anesthesia was obtained with 4 cc(s) of 1% lidocaine with 1:100,000 epinephrine.    The defect edges were debeveled with a scalpel blade. The circular defect was widely undermined in the deep subcutaneous plane at least 100% of the defect diameter to allow for maximum tissue movement. Hemostasis was achieved with spot electrodessication. The defect was closed first utilizing multiple 5-0 Monocryl interrupted buried subcutaneous sutures. This resulted in excellent apposition of the subcutis and dermis, however two redundant areas of tissue were created on opposite sides of the defect. Utilizing a #15 scalpel blade, two Burows triangles were excised to ensure a flat scar. Hemostasis was obtained with spot electrodessication. The epidermal edges throughout further fastened superficially with 6-0 Prolene. The final length of the repair was  2.9 cm. The patient tolerated the procedure well and there were no complications.    Polysporin, non-adherent gauze and a pressure dressing were applied to wound after gentle cleansing with saline.    Patient instructed in and provided with instructions for post-op care, will RTC in 7 days for suture removal    Post op meds: None    Photos:      MD TEO Killian

## 2025-03-17 ENCOUNTER — DOCUMENTATION ONLY (OUTPATIENT)
Dept: PSYCHIATRY | Facility: CLINIC | Age: 50
End: 2025-03-17
Payer: COMMERCIAL

## 2025-03-17 NOTE — PROGRESS NOTES
Outreach Note    3/17/2025    Chart Review:     LAST VISIT 4/15/2024   Cancellations: 7/25/2024, 7/16/2024, 4/16/2024   NO-SHOWS: X   Outreach Attempts: 12/4/2024, 3/17/2025      Future Scheduling:       If pt would like to re-engage in treatment within 3 years from the LAST VISIT (see above), then the next appointment should be in person at the clinic and should also be an extended visit (an hour).            Mendez Tomas, DO  Department of Psychiatry, [facility]

## 2025-03-20 ENCOUNTER — CLINICAL SUPPORT (OUTPATIENT)
Dept: DERMATOLOGY | Facility: CLINIC | Age: 50
End: 2025-03-20
Payer: COMMERCIAL

## 2025-03-20 ENCOUNTER — PROCEDURE VISIT (OUTPATIENT)
Dept: DERMATOLOGY | Facility: CLINIC | Age: 50
End: 2025-03-20
Payer: COMMERCIAL

## 2025-03-20 DIAGNOSIS — Z48.02 ENCOUNTER FOR REMOVAL OF SUTURES: Primary | ICD-10-CM

## 2025-03-20 DIAGNOSIS — L65.9 ALOPECIA: ICD-10-CM

## 2025-03-20 DIAGNOSIS — C44.519 BASAL CELL CARCINOMA (BCC) OF BACK: Primary | ICD-10-CM

## 2025-03-20 PROCEDURE — 99024 POSTOP FOLLOW-UP VISIT: CPT | Mod: S$GLB,,, | Performed by: DERMATOLOGY

## 2025-03-20 PROCEDURE — 99999 PR PBB SHADOW E&M-EST. PATIENT-LVL I: CPT | Mod: PBBFAC,,,

## 2025-03-20 RX ORDER — FINASTERIDE 5 MG/1
5 TABLET, FILM COATED ORAL DAILY
Qty: 30 TABLET | Refills: 11 | Status: SHIPPED | OUTPATIENT
Start: 2025-03-20 | End: 2026-03-20

## 2025-03-20 NOTE — PROGRESS NOTES
Subjective:      Patient ID:  Rosaura Loredo is a 49 y.o. female who presents for   Chief Complaint   Patient presents with    Skin Cancer     E&S     Patient here today for E&S of BCC on right upper back.   Denies pacemaker.   Denies blood thinners.     2. Skin, right upper back, shave biopsy:   -BASAL CELL CARCINOMA, NODULAR AND INFILTRATIVE, EXTENDING TO THE DEEP BIOPSY EDGE           Review of Systems   Constitutional:  Negative for fever, chills and fatigue.   Respiratory:  Negative for cough and shortness of breath.    Gastrointestinal:  Negative for nausea and vomiting.   Skin:  Positive for activity-related sunscreen use and wears hat. Negative for itching, rash, dry skin and daily sunscreen use.   Hematologic/Lymphatic: Does not bruise/bleed easily.       Objective:   Physical Exam   Constitutional: She appears well-developed and well-nourished.   Neurological: She is alert and oriented to person, place, and time.   Psychiatric: She has a normal mood and affect.   Skin:   Areas Examined (abnormalities noted in diagram):   Back Inspection Performed            Diagram Legend     Erythematous scaling macule/papule c/w actinic keratosis       Vascular papule c/w angioma      Pigmented verrucoid papule/plaque c/w seborrheic keratosis      Yellow umbilicated papule c/w sebaceous hyperplasia      Irregularly shaped tan macule c/w lentigo     1-2 mm smooth white papules consistent with Milia      Movable subcutaneous cyst with punctum c/w epidermal inclusion cyst      Subcutaneous movable cyst c/w pilar cyst      Firm pink to brown papule c/w dermatofibroma      Pedunculated fleshy papule(s) c/w skin tag(s)      Evenly pigmented macule c/w junctional nevus     Mildly variegated pigmented, slightly irregular-bordered macule c/w mildly atypical nevus      Flesh colored to evenly pigmented papule c/w intradermal nevus       Pink pearly papule/plaque c/w basal cell carcinoma      Erythematous hyperkeratotic  cursted plaque c/w SCC      Surgical scar with no sign of skin cancer recurrence      Open and closed comedones      Inflammatory papules and pustules      Verrucoid papule consistent consistent with wart     Erythematous eczematous patches and plaques     Dystrophic onycholytic nail with subungual debris c/w onychomycosis     Umbilicated papule    Erythematous-base heme-crusted tan verrucoid plaque consistent with inflamed seborrheic keratosis     Erythematous Silvery Scaling Plaque c/w Psoriasis     See annotation      Assessment / Plan:      Pathology Orders:       Normal Orders This Visit    Specimen to Pathology, Dermatology     Questions:    Procedure Type: Dermatology and skin neoplasms    Number of Specimens: 1    ------------------------: -------------------------    Spec 1 Procedure: Excision >2cm    Spec 1 Clinical Impression: biopsy proven BCC, please check margins    Spec 1 Source: right upper back    Release to patient:           Basal cell carcinoma (BCC) of back  -     Specimen to Pathology, Dermatology  PROCEDURE: Elliptical excision with intermediate layered repair in order to decrease dead space, decrease tension, and close large gap.    ANESTHETIC: 6 cc 1% Xylocaine with Epinephrine 1:100,000, buffered    SURGEON: Eliza Hernandez MD  ASSISTANTS: Jazmine Dutta MA    PREOPERATIVE DIAGNOSIS:  Biopsy-proven Basal Cell Carcinoma    POSTOPERATIVE DIAGNOSIS:  Same as preoperative diagnosis    PATHOLOGIC DIAGNOSIS: Pending    LOCATION: right upper back    INITIAL LESION SIZE: 0.9 cm    EXCISED DIAMETER: 1.7 cm    PREPARATION: The diagnosis, procedure, alternatives, benefits and risks, including but not limited to: infection, bleeding/bruising, drug reactions, pain, scar or cosmetic defect, local sensation disturbances, wound dehiscence (separation of wound edges after sutures removed) and/or recurrence of present condition were explained to the patient. The patient elected to proceed.  Patient's identity  was verified using 2 patient identifiers and the side and site was verified.  Time out period with surgeon, assistant and patient in surgical suite was taken.    PROCEDURE: The location noted above was prepped and draped in the usual sterile fashion. The area was anesthetized with intradermal buffered xylocaine. Lesional tissue was carefully marked with at least 4 mm margins of clinically normal skin in all directions. A fusiform elliptical excision was done with #15 blade carried down completely through the dermis into the subcutaneous tissues to the level of the subcutaneous fat, and dissection was carried out in that plane.  Electrocoagulation was used to obtain hemostasis. Blood loss was minimal. The wound was then approximated in a layered fashion with subcutaneous and intradermal sutures of 3.0 Monocryl, approximately 5 in number, and the wound was then superficially closed with running sutures of 3.0 Prolene.    The patient tolerated the procedure well.    The area was cleaned and dressed appropriately and the patient was given wound care instructions, as well as an appointment for follow-up evaluation.    LENGTH OF REPAIR: 4.5 cm      Alopecia  -     finasteride (PROSCAR) 5 mg tablet; Take 1 tablet (5 mg total) by mouth once daily.  Dispense: 30 tablet; Refill: 11  Finasteride is not approved by the FDA for use in women, however multiple studies have shown benefit when used to treat female pattern hair loss in combination with topical minoxidil. Side effects are infrequent, usually mild, and reversible even with maintenance of treatment. They include decreased libido, breast tenderness, and headache. In premenopausal women, irregular menstruation and spotting may occur. Pregnancy must be avoided while on this medication (risk of feminization of the male fetus)5         Trial of neova nontinted on her arm x 7 days  Also discussed mineral only sunscreen such as colorescience flex or [place] ro[place] tinted mineral  No  follow-ups on file.

## 2025-03-20 NOTE — PATIENT INSTRUCTIONS

## 2025-03-20 NOTE — PROGRESS NOTES
Suture Removal Note   Patient Name: Rosaura Loredo  Patient : 1975  Date of Service: 3/20/2025    CC: Pt. Presents for removal of sutures on the forehead today  Subjective: patient reports wound healing as expected     Objective: Wound well healed, sutures clean/dry/intact. No evidence of any complications noted.     Visit For Suture Removal:  - Suture removal today  - Steri strips/mastisol were not applied  - Patient counseled on silicone gel/silicone sheeting and their use in the management of post-operative scars  - Handout on silicone gel/silicone gel sheeting was provided  - Patient to follow up with their referring provider in 3 months for further skin evaluation    Deanna Alford

## 2025-03-25 ENCOUNTER — RESULTS FOLLOW-UP (OUTPATIENT)
Dept: DERMATOLOGY | Facility: CLINIC | Age: 50
End: 2025-03-25
Payer: COMMERCIAL

## 2025-03-25 ENCOUNTER — PATIENT MESSAGE (OUTPATIENT)
Dept: DERMATOLOGY | Facility: CLINIC | Age: 50
End: 2025-03-25
Payer: COMMERCIAL

## 2025-03-26 ENCOUNTER — PATIENT MESSAGE (OUTPATIENT)
Dept: ADMINISTRATIVE | Facility: OTHER | Age: 50
End: 2025-03-26
Payer: COMMERCIAL

## 2025-03-26 ENCOUNTER — PATIENT MESSAGE (OUTPATIENT)
Dept: INTERNAL MEDICINE | Facility: CLINIC | Age: 50
End: 2025-03-26

## 2025-03-26 ENCOUNTER — TELEPHONE (OUTPATIENT)
Dept: INTERNAL MEDICINE | Facility: CLINIC | Age: 50
End: 2025-03-26
Payer: COMMERCIAL

## 2025-03-26 PROCEDURE — 99499 UNLISTED E&M SERVICE: CPT | Mod: S$GLB,,,

## 2025-03-26 RX ORDER — TIRZEPATIDE 7.5 MG/.5ML
7.5 INJECTION, SOLUTION SUBCUTANEOUS
Qty: 2 ML | Refills: 2 | Status: ACTIVE | OUTPATIENT
Start: 2025-03-26

## 2025-03-26 NOTE — TELEPHONE ENCOUNTER
Pt confirmed general tolerability with Zepbound 5 mg. To avoid unnecessary delays in titration, sent order to [facility] for Zepbound 7.5 mg. Will continue with scheduled follow-up.

## 2025-05-07 ENCOUNTER — OFFICE VISIT (OUTPATIENT)
Dept: FAMILY MEDICINE | Facility: CLINIC | Age: 50
End: 2025-05-07
Payer: COMMERCIAL

## 2025-05-07 VITALS
BODY MASS INDEX: 24.12 KG/M2 | HEIGHT: 65 IN | TEMPERATURE: 99 F | OXYGEN SATURATION: 97 % | SYSTOLIC BLOOD PRESSURE: 112 MMHG | WEIGHT: 144.81 LBS | DIASTOLIC BLOOD PRESSURE: 62 MMHG

## 2025-05-07 DIAGNOSIS — R79.9 ABNORMAL BLOOD FINDING: ICD-10-CM

## 2025-05-07 DIAGNOSIS — G47.00 INSOMNIA, UNSPECIFIED TYPE: ICD-10-CM

## 2025-05-07 DIAGNOSIS — E78.2 MIXED HYPERLIPIDEMIA: Primary | ICD-10-CM

## 2025-05-07 DIAGNOSIS — K21.9 GASTROESOPHAGEAL REFLUX DISEASE, UNSPECIFIED WHETHER ESOPHAGITIS PRESENT: ICD-10-CM

## 2025-05-07 DIAGNOSIS — F41.1 GENERALIZED ANXIETY DISORDER WITH PANIC ATTACKS: ICD-10-CM

## 2025-05-07 DIAGNOSIS — G43.009 MIGRAINE WITHOUT AURA AND WITHOUT STATUS MIGRAINOSUS, NOT INTRACTABLE: ICD-10-CM

## 2025-05-07 DIAGNOSIS — J30.1 CHRONIC SEASONAL ALLERGIC RHINITIS DUE TO POLLEN: ICD-10-CM

## 2025-05-07 DIAGNOSIS — F41.0 GENERALIZED ANXIETY DISORDER WITH PANIC ATTACKS: ICD-10-CM

## 2025-05-07 PROBLEM — R07.9 CHEST PAIN: Status: RESOLVED | Noted: 2025-01-02 | Resolved: 2025-05-07

## 2025-05-07 PROBLEM — E87.6 HYPOKALEMIA: Status: ACTIVE | Noted: 2025-05-07

## 2025-05-07 PROBLEM — R06.09 DOE (DYSPNEA ON EXERTION): Status: RESOLVED | Noted: 2024-01-18 | Resolved: 2025-05-07

## 2025-05-07 PROBLEM — Z98.890 S/P CATHETER ABLATION OF SLOW PATHWAY: Status: ACTIVE | Noted: 2025-05-07

## 2025-05-07 PROBLEM — Z86.79 S/P CATHETER ABLATION OF SLOW PATHWAY: Status: ACTIVE | Noted: 2025-05-07

## 2025-05-07 PROCEDURE — 99999 PR PBB SHADOW E&M-EST. PATIENT-LVL IV: CPT | Mod: PBBFAC,,, | Performed by: FAMILY MEDICINE

## 2025-05-07 RX ORDER — SIMVASTATIN 20 MG/1
20 TABLET, FILM COATED ORAL NIGHTLY
Qty: 90 TABLET | Refills: 3 | Status: SHIPPED | OUTPATIENT
Start: 2025-05-07

## 2025-05-07 RX ORDER — SUMATRIPTAN SUCCINATE 100 MG/1
100 TABLET ORAL DAILY PRN
Qty: 27 TABLET | Refills: 3 | Status: SHIPPED | OUTPATIENT
Start: 2025-05-07

## 2025-05-07 RX ORDER — TRAZODONE HYDROCHLORIDE 50 MG/1
50 TABLET ORAL NIGHTLY PRN
Qty: 30 TABLET | Refills: 11 | Status: SHIPPED | OUTPATIENT
Start: 2025-05-07 | End: 2026-05-07

## 2025-05-07 RX ORDER — PANTOPRAZOLE SODIUM 40 MG/1
40 TABLET, DELAYED RELEASE ORAL DAILY
Qty: 90 TABLET | Refills: 1 | Status: SHIPPED | OUTPATIENT
Start: 2025-05-07 | End: 2026-05-07

## 2025-05-07 RX ORDER — MONTELUKAST SODIUM 10 MG/1
10 TABLET ORAL NIGHTLY
Qty: 90 TABLET | Refills: 3 | Status: SHIPPED | OUTPATIENT
Start: 2025-05-07

## 2025-05-09 NOTE — PROGRESS NOTES
Subjective:   Patient ID: Rosaura Loredo is a 49 y.o. female     Chief Complaint: Checkup    Here for checkup      Review of Systems   Constitutional:  Negative for chills and fever.   HENT:  Negative for sore throat and trouble swallowing.    Respiratory:  Negative for cough and shortness of breath.    Cardiovascular:  Negative for chest pain and leg swelling.   Gastrointestinal:  Negative for abdominal distention and abdominal pain.   Genitourinary:  Negative for dysuria and flank pain.   Musculoskeletal:  Negative for arthralgias and back pain.   Skin:  Negative for color change and pallor.   Neurological:  Negative for weakness and headaches.   Psychiatric/Behavioral:  Negative for agitation and confusion.      Past Medical History:   Diagnosis Date    Abnormal Pap smear of cervix     NEELAM d/t recurrent abnormals/mild dysplasia; negative pathology after hysterctomy    Allergy     Anxiety     Asthma     Basal cell carcinoma (BCC)     R forehead    GE reflux     Hyperlipidemia     PAC (premature atrial contraction)     PONV (postoperative nausea and vomiting)     Rosacea     SVT (supraventricular tachycardia)      Past Surgical History:   Procedure Laterality Date    ABLATION  3/27/2025    Procedure: Ablation SVT;  Surgeon: Armando Lugo III, MD;  Location: [facility];  Service: Cardiology;;     SECTION      COLD KNIFE CONIZATION OF CERVIX      multiple    COLONOSCOPY N/A 10/20/2020    Procedure: COLONOSCOPY;  Surgeon: Sudheer Santiago III, MD;  Location: [facility];  Service: Endoscopy;  Laterality: N/A;    COLPOSCOPY  2023    DENTAL SURGERY  03/15/2022    HYSTERECTOMY  2015    ovaries intact    NEUROPLASTY Left 2020    Procedure: NEUROPLASTY;  Surgeon: Quinton Alexandre DPM;  Location: [facility] OR;  Service: Podiatry;  Laterality: Left;    PLANTAR FASCIOTOMY Left 2020    Procedure: FASCIOTOMY, PLANTAR;  Surgeon: Quinton Alexandre DPM;  Location: [facility] OR;  Service:  Podiatry;  Laterality: Left;    TONSILLECTOMY       Objective:     Vitals:    05/07/25 0923   BP: 112/62   Pulse: (P) 82   Temp: 98.9 °F (37.2 °C)     Body mass index is 24.1 kg/m².  Physical Exam  Vitals and nursing note reviewed.   Constitutional:       Appearance: She is well-developed.   HENT:      Head: Normocephalic and atraumatic.   Eyes:      General: No scleral icterus.     Conjunctiva/sclera: Conjunctivae normal.   Cardiovascular:      Heart sounds: No murmur heard.  Pulmonary:      Effort: Pulmonary effort is normal. No respiratory distress.   Musculoskeletal:         General: No deformity. Normal range of motion.      Cervical back: Normal range of motion and neck supple.   Skin:     Coloration: Skin is not pale.      Findings: No rash.   Neurological:      Mental Status: She is alert and oriented to person, place, and time.   Psychiatric:         Behavior: Behavior normal.         Thought Content: Thought content normal.         Judgment: Judgment normal.       Assessment:     1. Mixed hyperlipidemia    2. Abnormal blood finding    3. Migraine without aura and without status migrainosus, not intractable    4. Gastroesophageal reflux disease, unspecified whether esophagitis present    5. Chronic seasonal allergic rhinitis due to pollen    6. Insomnia, unspecified type    7. Generalized anxiety disorder with panic attacks      Plan:   Mixed hyperlipidemia  -     simvastatin (ZOCOR) 20 MG tablet; Take 1 tablet (20 mg total) by mouth every evening.  Dispense: 90 tablet; Refill: 3    Abnormal blood finding  -     Potassium; Future; Expected date: 05/07/2025    Migraine without aura and without status migrainosus, not intractable  -     sumatriptan (IMITREX) 100 MG tablet; Take 1 tablet (100 mg total) by mouth daily as needed for Migraine. Do not exceed 2 doses in 24 hours.  Dispense: 27 tablet; Refill: 3    Gastroesophageal reflux disease, unspecified whether esophagitis present  -     pantoprazole  (PROTONIX) 40 MG tablet; Take 1 tablet (40 mg total) by mouth once daily.  Dispense: 90 tablet; Refill: 1    Chronic seasonal allergic rhinitis due to pollen  -     montelukast (SINGULAIR) 10 mg tablet; Take 1 tablet (10 mg total) by mouth every evening.  Dispense: 90 tablet; Refill: 3    Insomnia, unspecified type  -     traZODone (DESYREL) 50 MG tablet; Take 1 tablet (50 mg total) by mouth nightly as needed for Insomnia.  Dispense: 30 tablet; Refill: 11  Counseled. Had been taking xanax for sleep. Advised do not prescribed chronic BZD therapy which had been prescribed by psychiatry whom she no longer sees.. Discussed safer alternatives. Discussed sleep hygiene. Elected trial trazodone. Discussed risks.     Generalized anxiety disorder with panic attacks  Stable, following with psychiatry but no longer    Chronic condition not otherwise mentioned is stable      Established patient with me has been instructed that must see me at least 1 time yearly (every 365 days) for refills of medications. Seeing other providers in this clinic is fine but expectation is to see me yearly.    Miguel Ángel Ferrari MD  05/09/2025    Portions of this note have been dictated with MC Neves.

## 2025-05-12 ENCOUNTER — PATIENT MESSAGE (OUTPATIENT)
Dept: INTERNAL MEDICINE | Facility: CLINIC | Age: 50
End: 2025-05-12

## 2025-05-12 ENCOUNTER — OFFICE VISIT (OUTPATIENT)
Dept: INTERNAL MEDICINE | Facility: CLINIC | Age: 50
End: 2025-05-12
Payer: COMMERCIAL

## 2025-05-12 PROCEDURE — 99499 UNLISTED E&M SERVICE: CPT | Mod: 95,,,

## 2025-05-12 RX ORDER — TIRZEPATIDE 7.5 MG/.5ML
7.5 INJECTION, SOLUTION SUBCUTANEOUS
Qty: 2 ML | Refills: 5 | Status: ACTIVE | OUTPATIENT
Start: 2025-05-12

## 2025-05-12 NOTE — PROGRESS NOTES
Patient ID: Rosaura Loredo is a 49 y.o. White female    Subjective  Chief Complaint: patient presents for medical weight loss management.    Co-morbidities: DLD    HPI: Patient [facility] with [facility] in February 2025 and is currently managed on Zepbound 7.5 mg. Pt was on compounded tirzepatide 2.5 mg when presenting to the [facility].    Tolerance to current therapy:  Denies nausea, vomiting, constipation, abdominal pain  Endorses loose stools    Weight loss history:  Starting weight: 165 lbs - pt reported (BMI 27.5)  Current weight:    5/5/2025   Recent Readings    Weight (lbs) 141.5 lb    BMI 23.54 BMI    % weight loss since GLP-1 initiation: 14.2 %    Objective  Lab Results   Component Value Date     03/27/2025     01/02/2025     10/02/2024     Lab Results   Component Value Date    K 4.9 03/27/2025    K 3.7 01/02/2025    K 3.8 10/02/2024     Lab Results   Component Value Date     03/27/2025     01/02/2025     10/02/2024     Lab Results   Component Value Date    CO2 26 03/27/2025    CO2 28 01/02/2025    CO2 27 10/02/2024     Lab Results   Component Value Date    BUN 15 03/27/2025    BUN 20 01/02/2025    BUN 12 10/02/2024     Lab Results   Component Value Date    GLU 85 03/27/2025     01/02/2025    GLU 87 10/02/2024     Lab Results   Component Value Date    CALCIUM 9.8 03/27/2025    CALCIUM 9.3 01/02/2025    CALCIUM 9.4 10/02/2024     Lab Results   Component Value Date    PROT 7.0 01/02/2025    PROT 7.6 10/02/2024    PROT 7.6 09/04/2024     Lab Results   Component Value Date    ALBUMIN 4.6 01/02/2025    ALBUMIN 5.0 10/02/2024    ALBUMIN 4.8 09/04/2024     Lab Results   Component Value Date    BILITOT 0.4 01/02/2025    BILITOT 0.7 10/02/2024    BILITOT 0.7 09/04/2024     Lab Results   Component Value Date    AST 16 01/02/2025    AST 20 10/02/2024    AST 16 09/04/2024     Lab Results   Component Value Date    ALT 19 01/02/2025     ALT 15 10/02/2024    ALT 11 09/04/2024     Lab Results   Component Value Date    ANIONGAP 6 (L) 03/27/2025    ANIONGAP 6 (L) 01/02/2025    ANIONGAP 9 10/02/2024     Lab Results   Component Value Date    CREATININE 0.61 03/27/2025    CREATININE 1.2 01/02/2025    CREATININE 0.7 10/02/2024     Lab Results   Component Value Date    EGFRNORACEVR >60 03/27/2025    EGFRNORACEVR 54.8 (A) 01/02/2025    EGFRNORACEVR >60.0 10/02/2024     Assessment/Plan  - Continue Zepbound 7.5 mg SQ weekly  - RTC in 6 months for follow-up evaluation    Patient consented to pharmacist management via collaborative practice.

## 2025-06-04 ENCOUNTER — PATIENT MESSAGE (OUTPATIENT)
Dept: ADMINISTRATIVE | Facility: HOSPITAL | Age: 50
End: 2025-06-04
Payer: COMMERCIAL

## 2025-06-04 ENCOUNTER — PATIENT OUTREACH (OUTPATIENT)
Dept: ADMINISTRATIVE | Facility: HOSPITAL | Age: 50
End: 2025-06-04
Payer: COMMERCIAL

## 2025-06-04 DIAGNOSIS — Z12.31 OTHER SCREENING MAMMOGRAM: ICD-10-CM

## 2025-06-11 ENCOUNTER — HOSPITAL ENCOUNTER (OUTPATIENT)
Dept: RADIOLOGY | Facility: HOSPITAL | Age: 50
Discharge: HOME | End: 2025-06-11
Attending: FAMILY MEDICINE
Payer: COMMERCIAL

## 2025-06-11 VITALS — BODY MASS INDEX: 23.99 KG/M2 | WEIGHT: 144 LBS | HEIGHT: 65 IN

## 2025-06-11 DIAGNOSIS — Z12.31 OTHER SCREENING MAMMOGRAM: ICD-10-CM

## 2025-06-11 PROCEDURE — 77067 SCR MAMMO BI INCL CAD: CPT | Mod: 26,,, | Performed by: RADIOLOGY

## 2025-06-11 PROCEDURE — 77063 BREAST TOMOSYNTHESIS BI: CPT | Mod: TC,PO

## 2025-06-11 PROCEDURE — 77063 BREAST TOMOSYNTHESIS BI: CPT | Mod: 26,,, | Performed by: RADIOLOGY

## 2025-06-12 ENCOUNTER — RESULTS FOLLOW-UP (OUTPATIENT)
Dept: FAMILY MEDICINE | Facility: CLINIC | Age: 50
End: 2025-06-12

## 2025-06-19 ENCOUNTER — LAB VISIT (OUTPATIENT)
Dept: LAB | Facility: HOSPITAL | Age: 50
End: 2025-06-19
Attending: SPECIALIST
Payer: COMMERCIAL

## 2025-06-19 DIAGNOSIS — Z98.890 S/P CATHETER ABLATION OF SLOW PATHWAY: ICD-10-CM

## 2025-06-19 DIAGNOSIS — Z00.00 ROUTINE GENERAL MEDICAL EXAMINATION AT A HEALTH CARE FACILITY: Primary | ICD-10-CM

## 2025-06-19 DIAGNOSIS — Z86.79 S/P CATHETER ABLATION OF SLOW PATHWAY: ICD-10-CM

## 2025-06-19 DIAGNOSIS — I47.10 SVT (SUPRAVENTRICULAR TACHYCARDIA): ICD-10-CM

## 2025-06-19 DIAGNOSIS — E78.49 OTHER HYPERLIPIDEMIA: ICD-10-CM

## 2025-06-19 DIAGNOSIS — R53.82 CHRONIC FATIGUE: ICD-10-CM

## 2025-06-19 LAB
25(OH)D3+25(OH)D2 SERPL-MCNC: 37 NG/ML (ref 30–96)
ABSOLUTE EOSINOPHIL (SMH): 0.14 K/UL
ABSOLUTE MONOCYTE (SMH): 0.33 K/UL (ref 0.3–1)
ABSOLUTE NEUTROPHIL COUNT (SMH): 2.8 K/UL (ref 1.8–7.7)
ALBUMIN SERPL-MCNC: 4.8 G/DL (ref 3.5–5.2)
ALP SERPL-CCNC: 60 UNIT/L (ref 55–135)
ALT SERPL-CCNC: 14 UNIT/L (ref 10–44)
ANION GAP (SMH): 6 MMOL/L (ref 8–16)
AST SERPL-CCNC: 15 UNIT/L (ref 10–40)
BASOPHILS # BLD AUTO: 0.03 K/UL
BASOPHILS NFR BLD AUTO: 0.6 %
BILIRUB SERPL-MCNC: 0.6 MG/DL (ref 0.1–1)
BUN SERPL-MCNC: 16 MG/DL (ref 6–20)
CALCIUM SERPL-MCNC: 9.5 MG/DL (ref 8.7–10.5)
CHLORIDE SERPL-SCNC: 104 MMOL/L (ref 95–110)
CHOLEST SERPL-MCNC: 164 MG/DL (ref 120–199)
CHOLEST/HDLC SERPL: 2.6 {RATIO} (ref 2–5)
CO2 SERPL-SCNC: 29 MMOL/L (ref 23–29)
CREAT SERPL-MCNC: 0.6 MG/DL (ref 0.5–1.4)
ERYTHROCYTE [DISTWIDTH] IN BLOOD BY AUTOMATED COUNT: 13.5 % (ref 11.5–14.5)
GFR SERPLBLD CREATININE-BSD FMLA CKD-EPI: >60 ML/MIN/1.73/M2
GLUCOSE SERPL-MCNC: 88 MG/DL (ref 70–110)
HCT VFR BLD AUTO: 40.2 % (ref 37–48.5)
HDLC SERPL-MCNC: 64 MG/DL (ref 40–75)
HDLC SERPL: 39 % (ref 20–50)
HGB BLD-MCNC: 13.1 GM/DL (ref 12–16)
IMM GRANULOCYTES # BLD AUTO: 0.01 K/UL (ref 0–0.04)
IMM GRANULOCYTES NFR BLD AUTO: 0.2 % (ref 0–0.5)
LDLC SERPL CALC-MCNC: 80.2 MG/DL (ref 63–159)
LYMPHOCYTES # BLD AUTO: 1.46 K/UL (ref 1–4.8)
MAGNESIUM SERPL-MCNC: 2 MG/DL (ref 1.6–2.6)
MCH RBC QN AUTO: 30.6 PG (ref 27–31)
MCHC RBC AUTO-ENTMCNC: 32.6 G/DL (ref 32–36)
MCV RBC AUTO: 94 FL (ref 82–98)
NONHDLC SERPL-MCNC: 100 MG/DL
NUCLEATED RBC (/100WBC) (SMH): 0 /100 WBC
PLATELET # BLD AUTO: 259 K/UL (ref 150–450)
PMV BLD AUTO: 9.8 FL (ref 9.2–12.9)
POTASSIUM SERPL-SCNC: 3.6 MMOL/L (ref 3.5–5.1)
PROT SERPL-MCNC: 7.1 GM/DL (ref 6–8.4)
RBC # BLD AUTO: 4.28 M/UL (ref 4–5.4)
RELATIVE EOSINOPHIL (SMH): 3 % (ref 0–8)
RELATIVE LYMPHOCYTE (SMH): 30.9 % (ref 18–48)
RELATIVE MONOCYTE (SMH): 7 % (ref 4–15)
RELATIVE NEUTROPHIL (SMH): 58.3 % (ref 38–73)
SODIUM SERPL-SCNC: 139 MMOL/L (ref 136–145)
T4 FREE SERPL-MCNC: 0.8 NG/DL (ref 0.71–1.51)
TRIGL SERPL-MCNC: 99 MG/DL (ref 30–150)
TSH SERPL-ACNC: 1.83 UIU/ML (ref 0.34–5.6)
VIT B12 SERPL-MCNC: 681 PG/ML (ref 210–950)
WBC # BLD AUTO: 4.73 K/UL (ref 3.9–12.7)

## 2025-06-19 PROCEDURE — 83735 ASSAY OF MAGNESIUM: CPT

## 2025-06-19 PROCEDURE — 82670 ASSAY OF TOTAL ESTRADIOL: CPT

## 2025-06-19 PROCEDURE — 80053 COMPREHEN METABOLIC PANEL: CPT

## 2025-06-19 PROCEDURE — 84436 ASSAY OF TOTAL THYROXINE: CPT

## 2025-06-19 PROCEDURE — 82607 VITAMIN B-12: CPT

## 2025-06-19 PROCEDURE — 82306 VITAMIN D 25 HYDROXY: CPT

## 2025-06-19 PROCEDURE — 86376 MICROSOMAL ANTIBODY EACH: CPT

## 2025-06-19 PROCEDURE — 84403 ASSAY OF TOTAL TESTOSTERONE: CPT

## 2025-06-19 PROCEDURE — 83001 ASSAY OF GONADOTROPIN (FSH): CPT

## 2025-06-19 PROCEDURE — 85025 COMPLETE CBC W/AUTO DIFF WBC: CPT

## 2025-06-19 PROCEDURE — 84439 ASSAY OF FREE THYROXINE: CPT

## 2025-06-19 PROCEDURE — 84443 ASSAY THYROID STIM HORMONE: CPT

## 2025-06-19 PROCEDURE — 83002 ASSAY OF GONADOTROPIN (LH): CPT

## 2025-06-19 PROCEDURE — 80061 LIPID PANEL: CPT

## 2025-06-20 DIAGNOSIS — I47.10 SUPRAVENTRICULAR TACHYCARDIA: ICD-10-CM

## 2025-06-20 DIAGNOSIS — I49.1 PAC (PREMATURE ATRIAL CONTRACTION): ICD-10-CM

## 2025-06-20 DIAGNOSIS — I47.10 SVT (SUPRAVENTRICULAR TACHYCARDIA): ICD-10-CM

## 2025-06-20 LAB
ESTRADIOL SERPL-MCNC: 160 PG/ML
FSH SERPL-ACNC: 4.8 MIU/ML
LH SERPL-ACNC: 4 MIU/ML
T4 SERPL-MCNC: 5.9 UG/DL (ref 4.5–12)

## 2025-06-20 RX ORDER — DILTIAZEM HYDROCHLORIDE 240 MG/1
240 CAPSULE, COATED, EXTENDED RELEASE ORAL DAILY
Qty: 30 CAPSULE | Refills: 11 | Status: SHIPPED | OUTPATIENT
Start: 2025-06-20 | End: 2026-06-20

## 2025-06-22 LAB — LABCORP MISCELLANEOUS TEST RESULT: NORMAL

## 2025-06-25 ENCOUNTER — PATIENT MESSAGE (OUTPATIENT)
Dept: INTERNAL MEDICINE | Facility: CLINIC | Age: 50
End: 2025-06-25
Payer: COMMERCIAL

## 2025-06-25 ENCOUNTER — PATIENT MESSAGE (OUTPATIENT)
Dept: ADMINISTRATIVE | Facility: OTHER | Age: 50
End: 2025-06-25
Payer: COMMERCIAL

## 2025-06-27 RX ORDER — TIRZEPATIDE 10 MG/.5ML
10 INJECTION, SOLUTION SUBCUTANEOUS
Qty: 2 ML | Refills: 4 | Status: ACTIVE | OUTPATIENT
Start: 2025-06-27

## 2025-07-22 ENCOUNTER — PATIENT MESSAGE (OUTPATIENT)
Dept: ADMINISTRATIVE | Facility: OTHER | Age: 50
End: 2025-07-22
Payer: COMMERCIAL

## 2025-07-30 ENCOUNTER — OFFICE VISIT (OUTPATIENT)
Dept: DERMATOLOGY | Facility: CLINIC | Age: 50
End: 2025-07-30
Payer: COMMERCIAL

## 2025-07-30 VITALS — HEIGHT: 65 IN | WEIGHT: 140 LBS | BODY MASS INDEX: 23.32 KG/M2

## 2025-07-30 DIAGNOSIS — L82.1 SEBORRHEIC KERATOSIS: ICD-10-CM

## 2025-07-30 DIAGNOSIS — L81.4 LENTIGO: ICD-10-CM

## 2025-07-30 DIAGNOSIS — Z85.828 HISTORY OF NONMELANOMA SKIN CANCER: Primary | ICD-10-CM

## 2025-07-30 DIAGNOSIS — L73.8 SEBACEOUS HYPERPLASIA: ICD-10-CM

## 2025-07-30 DIAGNOSIS — R23.2 FLUSHING: ICD-10-CM

## 2025-07-30 DIAGNOSIS — D48.5 NEOPLASM OF UNCERTAIN BEHAVIOR OF SKIN: ICD-10-CM

## 2025-07-30 DIAGNOSIS — H01.133 EYELID ECZEMA, RIGHT: ICD-10-CM

## 2025-07-30 DIAGNOSIS — L30.4 INTERTRIGO: ICD-10-CM

## 2025-07-30 DIAGNOSIS — L71.9 ROSACEA: ICD-10-CM

## 2025-07-30 DIAGNOSIS — D22.9 MULTIPLE BENIGN NEVI: ICD-10-CM

## 2025-07-30 DIAGNOSIS — D18.01 CHERRY ANGIOMA: ICD-10-CM

## 2025-07-30 DIAGNOSIS — L90.5 SCAR: ICD-10-CM

## 2025-07-30 PROCEDURE — 88305 TISSUE EXAM BY PATHOLOGIST: CPT | Mod: TC | Performed by: STUDENT IN AN ORGANIZED HEALTH CARE EDUCATION/TRAINING PROGRAM

## 2025-07-30 PROCEDURE — 88305 TISSUE EXAM BY PATHOLOGIST: CPT | Mod: 26,,, | Performed by: PATHOLOGY

## 2025-07-30 RX ORDER — TRIAMCINOLONE ACETONIDE 0.25 MG/G
CREAM TOPICAL 2 TIMES DAILY
Qty: 80 G | Refills: 1 | Status: SHIPPED | OUTPATIENT
Start: 2025-07-30

## 2025-07-30 RX ORDER — KETOCONAZOLE 20 MG/G
CREAM TOPICAL 2 TIMES DAILY
Qty: 60 G | Refills: 1 | Status: SHIPPED | OUTPATIENT
Start: 2025-07-30

## 2025-07-30 NOTE — PATIENT INSTRUCTIONS
Mix keto and triamcinolone and apply 1-2 daily x 7- 10 days for flares  Zeasorb excess moisture      Shave Biopsy Wound Care    Your doctor has performed a shave biopsy today.  A band aid and vaseline ointment has been placed over the site.  This should remain in place for 24 hours.  It is recommended that you keep the area dry for the first 24 hours.  After 24 hours, you may remove the band aid and wash the area with warm soap and water and apply Vaseline jelly.  Many patients prefer to use Neosporin or Bacitracin ointment.  This is acceptable; however, know that you can develop an allergy to this medication even if you have used it safely for years.  It is important to keep the area moist.  Letting it dry out and get air slows healing time, and will worsen the scar.  Band aid is optional after first 24 hours.      If you notice increasing redness, tenderness, pain, or yellow drainage at the biopsy site, please notify your doctor.  These are signs of an infection.    If your biopsy site is bleeding, apply firm pressure for 15 minutes straight.  Repeat for another 15 minutes, if it is still bleeding.   If the surgical site continues to bleed, then please contact your doctor.      [facility] [place] [address]/ [phone] FAX/ [email or web address]

## 2025-07-30 NOTE — PROGRESS NOTES
Subjective:      Patient ID:  Rosaura Loredo is a 49 y.o. female who presents for   Chief Complaint   Patient presents with    Skin Check     tbsc    Spot     Right upper lip, by right eye, left forehead     LOV- 1/27/25    Pt here today for a TBSC  C/o spot on right upper lip, by right eye and left upper forehead    Final Pathologic Diagnosis 1. Skin, central forehead, shave biopsy: - [facility]- Dr ANGELO 3/13/25  -BASAL CELL CARCINOMA, NODULAR AND INFILTRATIVE, EXTENDING TO THE DEEP BIOPSY EDGE     This lesion is skin cancer. You will be contacted regarding treatment.       2. Skin, right upper back, shave biopsy: -[facility] 3/20/25  -BASAL CELL CARCINOMA, NODULAR AND INFILTRATIVE, EXTENDING TO THE DEEP BIOPSY EDGE     This lesion is skin cancer. You will be contacted regarding treatment.   Comment: Interp By Thierno Mendenhall M.D., Signed on 01/29/2025 at 08:29     Derm hx:   BCC R forehead 2014 [facility] [place]   Denies fhx of MM          Current Outpatient Medications:   ·  albuterol (PROVENTIL/VENTOLIN HFA) 90 mcg/actuation inhaler, Inhale 2 puffs into the lungs every 6 (six) hours as needed for Wheezing., Disp: 18 g, Rfl: 3  ·  ALPRAZolam (XANAX) 0.5 MG tablet, Take 1 tablet (0.5 mg total) by mouth 2 (two) times daily as needed for Anxiety or Insomnia., Disp: 90 tablet, Rfl: 3  ·  finasteride (PROSCAR) 5 mg tablet, Take 1 tablet (5 mg total) by mouth once daily. (Patient taking differently: Take 5 mg by mouth every evening.), Disp: 30 tablet, Rfl: 11  ·  hydrocortisone 2.5 % cream, Apply topically 2 (two) times daily. (Patient taking differently: Apply topically as needed.), Disp: 30 g, Rfl: 1  ·  ibuprofen (ADVIL,MOTRIN) 200 MG tablet, Take 400 mg by mouth every 6 (six) hours as needed for Pain., Disp: , Rfl:   ·  loratadine (CLARITIN) 10 mg tablet, Take 1 tablet (10 mg total) by mouth once daily., Disp: 90 tablet, Rfl: 1  ·  montelukast (SINGULAIR) 10 mg tablet, Take 1 tablet (10 mg total) by mouth every evening., Disp:  90 tablet, Rfl: 3  ·  multivitamin (THERAGRAN) per tablet, Take 1 tablet by mouth every evening., Disp: , Rfl:   ·  mupirocin (BACTROBAN) 2 % ointment, Apply topically 2 (two) times daily. (Patient taking differently: Apply topically as needed.), Disp: 22 g, Rfl: 2  ·  pantoprazole (PROTONIX) 40 MG tablet, Take 1 tablet (40 mg total) by mouth once daily., Disp: 90 tablet, Rfl: 1  ·  potassium chloride (K-TAB) 20 mEq, Take 1 tablet (20 mEq total) by mouth once daily. (Patient taking differently: Take 20 mEq by mouth every evening.), Disp: 90 tablet, Rfl: 3  ·  simvastatin (ZOCOR) 20 MG tablet, Take 1 tablet (20 mg total) by mouth every evening., Disp: 90 tablet, Rfl: 3  ·  sumatriptan (IMITREX) 100 MG tablet, Take 1 tablet (100 mg total) by mouth daily as needed for Migraine. Do not exceed 2 doses in 24 hours., Disp: 27 tablet, Rfl: 3  ·  terconazole (TERAZOL 3) 0.8 % vaginal cream, Place vaginally at bedtime for 3 nights (Patient taking differently: Place 1 applicator vaginally as needed.), Disp: 20 g, Rfl: 1  ·  tirzepatide, weight loss, (ZEPBOUND) 10 mg/0.5 mL PnIj, Inject 10 mg into the skin every 7 days., Disp: 2 mL, Rfl: 4  ·  traZODone (DESYREL) 50 MG tablet, Take 1 tablet (50 mg total) by mouth nightly as needed for Insomnia., Disp: 30 tablet, Rfl: 11  ·  triamcinolone acetonide 0.025% (KENALOG) 0.025 % Oint, Apply topically 2 (two) times daily. (Patient taking differently: Apply topically as needed.), Disp: 15 g, Rfl: 1  ·  valACYclovir (VALTREX) 1000 MG tablet, Take 2 tablets (2,000 mg total) by mouth every 12 (twelve) hours as needed (fever blister)., Disp: 30 tablet, Rfl: 3  ·  diltiaZEM (CARDIZEM CD) 240 MG 24 hr capsule, Take 1 capsule (240 mg total) by mouth once daily. (Patient not taking: Reported on 7/30/2025), Disp: 30 capsule, Rfl: 11  ·  diltiaZEM (CARDIZEM) 60 MG tablet, Take 60 mg by mouth every 8 (eight) hours as needed (palpitations / SVT). (Patient not taking: Reported on 7/30/2025), Disp: ,  Rfl:   ·  ketoconazole (NIZORAL) 2 % cream, Apply topically 2 (two) times daily., Disp: 60 g, Rfl: 1  ·  magnesium oxide (MAG-OX) 400 mg (241.3 mg magnesium) tablet, Take 1 tablet (400 mg total) by mouth every evening. (Patient not taking: Reported on 7/30/2025), Disp: 90 tablet, Rfl: 3  ·  triamcinolone acetonide 0.025% (KENALOG) 0.025 % cream, Apply topically 2 (two) times daily., Disp: 80 g, Rfl: 1            Review of Systems   Constitutional:  Negative for fever, chills and fatigue.   Respiratory:  Negative for cough and shortness of breath.    Gastrointestinal:  Negative for nausea and vomiting.   Skin:  Positive for activity-related sunscreen use and wears hat. Negative for itching, rash, dry skin and daily sunscreen use.   Hematologic/Lymphatic: Does not bruise/bleed easily.       Objective:   Physical Exam   Constitutional: She appears well-developed and well-nourished. No distress.   Neurological: She is alert and oriented to person, place, and time. She is not disoriented.   Psychiatric: She has a normal mood and affect.   Skin:   Areas Examined (abnormalities noted in diagram):   Scalp / Hair Palpated and Inspected  Head / Face Inspection Performed  Neck Inspection Performed  Chest / Axilla Inspection Performed  Abdomen Inspection Performed  Genitals / Buttocks / Groin Inspection Performed  Back Inspection Performed  RUE Inspected  LUE Inspection Performed  RLE Inspected  LLE Inspection Performed  Nails and Digits Inspection Performed                     Diagram Legend     Erythematous scaling macule/papule c/w actinic keratosis       Vascular papule c/w angioma      Pigmented verrucoid papule/plaque c/w seborrheic keratosis      Yellow umbilicated papule c/w sebaceous hyperplasia      Irregularly shaped tan macule c/w lentigo     1-2 mm smooth white papules consistent with Milia      Movable subcutaneous cyst with punctum c/w epidermal inclusion cyst      Subcutaneous movable cyst c/w pilar cyst       Firm pink to brown papule c/w dermatofibroma      Pedunculated fleshy papule(s) c/w skin tag(s)      Evenly pigmented macule c/w junctional nevus     Mildly variegated pigmented, slightly irregular-bordered macule c/w mildly atypical nevus      Flesh colored to evenly pigmented papule c/w intradermal nevus       Pink pearly papule/plaque c/w basal cell carcinoma      Erythematous hyperkeratotic cursted plaque c/w SCC      Surgical scar with no sign of skin cancer recurrence      Open and closed comedones      Inflammatory papules and pustules      Verrucoid papule consistent consistent with wart     Erythematous eczematous patches and plaques     Dystrophic onycholytic nail with subungual debris c/w onychomycosis     Umbilicated papule    Erythematous-base heme-crusted tan verrucoid plaque consistent with inflamed seborrheic keratosis     Erythematous Silvery Scaling Plaque c/w Psoriasis     See annotation      Assessment / Plan:      Pathology Orders:       Normal Orders This Visit    Specimen to Pathology, Dermatology     Questions:    Procedure Type: Dermatology and skin neoplasms    Number of Specimens: 1    ------------------------: -------------------------    Spec 1 Procedure: Shave Biopsy    Spec 1 Clinical Impression: pink papule dx: MM vs. df vs. ISK vs. BLK vs. other    Spec 1 Source: left posterior thigh    Clinical Information: see EPIC    Clinical History: see EPIC    Specimen Source: Skin    Release to patient: Immediate    Send normal result to authorizing provider's In Basket if patient is active on MyChart: Yes          History of nonmelanoma skin cancer  Scar  Area of previous NMSC examined. Site well healed with no signs of recurrence.  Total body skin examination performed today including at least 12 points as noted in physical examination. Suspicious lesions noted.  Patient instructed in importance in daily broad spectrum sun protection of at least spf 30. Mineral sunscreen ingredients  preferred (Zinc +/- Titanium) and can be found OTC.   Patient encouraged to wear hat for all outdoor exposure.   Also discussed sun avoidance and use of protective clothing.    Neoplasm of uncertain behavior of skin  -     Specimen to Pathology, Dermatology  Shave biopsy procedure note:    Shave biopsy performed after verbal consent including risk of infection, scar, recurrence, need for additional treatment of site. Area prepped with alcohol, anesthetized with approximately 1.0cc of 1% lidocaine with epinephrine. Lesional tissue shaved with razor blade. Hemostasis achieved with application of aluminum chloride followed by hyfrecation. No complications. Dressing applied. Wound care explained.    Also with pink telangiectatic papule on right shoulder- offered biopsy- thinks it may be related to recent rash- recheck    Seborrheic keratosis  These are benign inherited growths without a malignant potential. Reassurance given to patient. No treatment is necessary.     Cherry angioma  This is a benign vascular lesion. Reassurance given. No treatment required.     Multiple benign nevi  Careful dermoscopy evaluation of nevi performed   Monitor for new mole or moles that are becoming bigger, darker, irritated, or developing irregular borders.     Sebaceous hyperplasia  This is a common condition representing benign enlargement of the sebaceous lobule. It typically occurs in adulthood. Reassurance given to patient.     Lentigo  This is a benign hyperpigmented sun induced lesion. Daily sun protection will reduce the number of new lesions. Treatment of these benign lesions are considered cosmetic.    Rosacea  Flushing  Related to changes in temperature    Eyelid eczema, right  Use aquaphor daily even when rash not present  Ok to use tac 0.025% ointment BID x 5 days when flaring  Make up may trigger    Intertrigo  -     ketoconazole (NIZORAL) 2 % cream; Apply topically 2 (two) times daily.  Dispense: 60 g; Refill: 1  -      triamcinolone acetonide 0.025% (KENALOG) 0.025 % cream; Apply topically 2 (two) times daily.  Dispense: 80 g; Refill: 1  Mix and apply 1-2 x a day when flaring  Zeasorb excess moisture         6 months    No follow-ups on file.

## 2025-08-19 ENCOUNTER — PATIENT MESSAGE (OUTPATIENT)
Dept: ADMINISTRATIVE | Facility: OTHER | Age: 50
End: 2025-08-19
Payer: COMMERCIAL

## (undated) DEVICE — BANDAGE SOFTBAND 4 441506

## (undated) DEVICE — TRAY LOWER EXTREMITY  SMHS029-05

## (undated) DEVICE — CUFF TOURNIQUET 34DUAL PRT 5921-034-235

## (undated) DEVICE — BANDAGE ESMARK 4X9 55514

## (undated) DEVICE — TRAY SKIN PREP DRY

## (undated) DEVICE — STERISTRIP 1/2 R1547

## (undated) DEVICE — GLOVE BIOGEL PI   GOLD SIZE 8

## (undated) DEVICE — SOLUTION PREP IODINE 4OZ

## (undated) DEVICE — SOLUTION IRRI NS BOTTLE 1000ML R5200-01

## (undated) DEVICE — NEEDLE SAFETY ECLIPSE 25G 1-1/2IN 305767

## (undated) DEVICE — SUTURE PROLENE 4-0 PS-2 18 8682H

## (undated) DEVICE — BANDAGE ACE STERILE 4 REB3114

## (undated) DEVICE — WALKER ANKLE MEDIUM

## (undated) DEVICE — PAD BOVIE ADULT

## (undated) DEVICE — SOLUTION SCRUB IODINE 4OZ

## (undated) DEVICE — BLADE SCALPEL #15 371115